# Patient Record
Sex: MALE | Race: WHITE | NOT HISPANIC OR LATINO | Employment: OTHER | URBAN - METROPOLITAN AREA
[De-identification: names, ages, dates, MRNs, and addresses within clinical notes are randomized per-mention and may not be internally consistent; named-entity substitution may affect disease eponyms.]

---

## 2017-04-17 DIAGNOSIS — R53.83 OTHER FATIGUE: ICD-10-CM

## 2017-04-17 DIAGNOSIS — E78.00 PURE HYPERCHOLESTEROLEMIA: ICD-10-CM

## 2017-04-17 DIAGNOSIS — E03.9 HYPOTHYROIDISM: ICD-10-CM

## 2017-04-17 DIAGNOSIS — R68.82 DECREASED LIBIDO: ICD-10-CM

## 2017-04-17 DIAGNOSIS — I10 ESSENTIAL (PRIMARY) HYPERTENSION: ICD-10-CM

## 2017-04-17 DIAGNOSIS — E11.9 TYPE 2 DIABETES MELLITUS WITHOUT COMPLICATIONS (HCC): ICD-10-CM

## 2017-04-17 DIAGNOSIS — L40.9 PSORIASIS: ICD-10-CM

## 2017-04-20 ENCOUNTER — GENERIC CONVERSION - ENCOUNTER (OUTPATIENT)
Dept: OTHER | Facility: OTHER | Age: 62
End: 2017-04-20

## 2017-05-11 ENCOUNTER — ALLSCRIPTS OFFICE VISIT (OUTPATIENT)
Dept: OTHER | Facility: OTHER | Age: 62
End: 2017-05-11

## 2017-06-28 DIAGNOSIS — D64.9 ANEMIA: ICD-10-CM

## 2018-01-14 VITALS
HEART RATE: 76 BPM | WEIGHT: 257.13 LBS | TEMPERATURE: 97.1 F | BODY MASS INDEX: 36 KG/M2 | SYSTOLIC BLOOD PRESSURE: 128 MMHG | HEIGHT: 71 IN | OXYGEN SATURATION: 92 % | DIASTOLIC BLOOD PRESSURE: 82 MMHG

## 2018-01-15 NOTE — MISCELLANEOUS
To Whom It May Concern:    Colleen Nicole is a patient of our practice  He requires all of these medications for the listed medical conditions  If you have any questions or concerns, please contact my office        Sincerely,     Kristofer Fernandez MD        Electronically signed by:Sherrie Mccullough MD  Apr 20 2017 12:36PM EST

## 2018-03-05 ENCOUNTER — OFFICE VISIT (OUTPATIENT)
Dept: FAMILY MEDICINE CLINIC | Facility: CLINIC | Age: 63
End: 2018-03-05
Payer: COMMERCIAL

## 2018-03-05 ENCOUNTER — TELEPHONE (OUTPATIENT)
Dept: FAMILY MEDICINE CLINIC | Facility: CLINIC | Age: 63
End: 2018-03-05

## 2018-03-05 VITALS
WEIGHT: 250 LBS | BODY MASS INDEX: 35 KG/M2 | HEART RATE: 74 BPM | OXYGEN SATURATION: 96 % | HEIGHT: 71 IN | DIASTOLIC BLOOD PRESSURE: 74 MMHG | TEMPERATURE: 98.4 F | SYSTOLIC BLOOD PRESSURE: 122 MMHG

## 2018-03-05 DIAGNOSIS — J42 CHRONIC BRONCHITIS, UNSPECIFIED CHRONIC BRONCHITIS TYPE (HCC): ICD-10-CM

## 2018-03-05 DIAGNOSIS — R68.89 FLU-LIKE SYMPTOMS: Primary | ICD-10-CM

## 2018-03-05 DIAGNOSIS — F33.1 MODERATE EPISODE OF RECURRENT MAJOR DEPRESSIVE DISORDER (HCC): ICD-10-CM

## 2018-03-05 PROBLEM — R79.89 LOW TESTOSTERONE: Status: ACTIVE | Noted: 2017-06-29

## 2018-03-05 LAB
SL AMB POCT RAPID FLU A: NEGATIVE
SL AMB POCT RAPID FLU B: NEGATIVE

## 2018-03-05 PROCEDURE — 87804 INFLUENZA ASSAY W/OPTIC: CPT | Performed by: FAMILY MEDICINE

## 2018-03-05 PROCEDURE — 99214 OFFICE O/P EST MOD 30 MIN: CPT | Performed by: FAMILY MEDICINE

## 2018-03-05 RX ORDER — ESCITALOPRAM OXALATE 20 MG/1
20 TABLET ORAL DAILY
Qty: 90 TABLET | Refills: 1 | Status: SHIPPED | OUTPATIENT
Start: 2018-03-05 | End: 2018-04-29 | Stop reason: SDUPTHER

## 2018-03-05 RX ORDER — OSELTAMIVIR PHOSPHATE 75 MG/1
75 CAPSULE ORAL EVERY 12 HOURS SCHEDULED
Qty: 10 CAPSULE | Refills: 0 | Status: SHIPPED | OUTPATIENT
Start: 2018-03-05 | End: 2018-03-10

## 2018-03-05 RX ORDER — ESCITALOPRAM OXALATE 20 MG/1
1 TABLET ORAL DAILY
COMMUNITY
Start: 2017-05-14 | End: 2018-03-05 | Stop reason: SDUPTHER

## 2018-03-05 RX ORDER — VALSARTAN AND HYDROCHLOROTHIAZIDE 80; 12.5 MG/1; MG/1
1 TABLET, FILM COATED ORAL DAILY
COMMUNITY
Start: 2017-04-16 | End: 2018-06-25 | Stop reason: SDUPTHER

## 2018-03-05 RX ORDER — GLIMEPIRIDE 1 MG/1
TABLET ORAL
Refills: 3 | COMMUNITY
Start: 2018-02-06 | End: 2021-02-02 | Stop reason: SDUPTHER

## 2018-03-05 RX ORDER — FEXOFENADINE HCL 180 MG/1
180 TABLET ORAL
COMMUNITY
Start: 2008-10-28 | End: 2021-03-12 | Stop reason: SDUPTHER

## 2018-03-05 RX ORDER — TESTOSTERONE GEL, 1% 10 MG/G
0.05 GEL TRANSDERMAL
COMMUNITY
Start: 2018-01-11

## 2018-03-05 RX ORDER — LEVOTHYROXINE SODIUM 137 UG/1
1 TABLET ORAL DAILY
COMMUNITY
End: 2018-09-26 | Stop reason: DRUGHIGH

## 2018-03-05 RX ORDER — ALBUTEROL SULFATE 90 UG/1
AEROSOL, METERED RESPIRATORY (INHALATION)
COMMUNITY
End: 2018-08-02

## 2018-03-05 RX ORDER — ALBUTEROL SULFATE 90 UG/1
1 AEROSOL, METERED RESPIRATORY (INHALATION) EVERY 4 HOURS PRN
COMMUNITY
Start: 2017-05-11 | End: 2018-03-05 | Stop reason: SDUPTHER

## 2018-03-05 RX ORDER — ALBUTEROL SULFATE 90 UG/1
1 AEROSOL, METERED RESPIRATORY (INHALATION) EVERY 4 HOURS PRN
Qty: 1 INHALER | Refills: 0 | Status: SHIPPED | OUTPATIENT
Start: 2018-03-05 | End: 2021-11-15

## 2018-03-05 NOTE — LETTER
March 5, 2018     Patient: Norman Goodrich   YOB: 1955   Date of Visit: 3/5/2018       To Whom it May Concern:    Norman Goodrich is under my professional care  He was seen in my office on 3/5/2018  He may return to work on 3/10/18  If you have any questions or concerns, please don't hesitate to call           Sincerely,          Roger Abreu MD        CC: No Recipients

## 2018-03-05 NOTE — PROGRESS NOTES
Assessment/Plan:     Diagnoses and all orders for this visit:    Flu-like symptoms  -     albuterol (PROAIR HFA) 90 mcg/act inhaler; Inhale 1 puff every 4 (four) hours as needed for wheezing or shortness of breath  -     oseltamivir (TAMIFLU) 75 mg capsule; Take 1 capsule (75 mg total) by mouth every 12 (twelve) hours for 5 days    Chronic bronchitis, unspecified chronic bronchitis type (HCC)  -     tiotropium (SPIRIVA HANDIHALER) 18 mcg inhalation capsule; Place 1 capsule (18 mcg total) into inhaler and inhale daily    Moderate episode of recurrent major depressive disorder (HCC)  -     escitalopram (LEXAPRO) 20 mg tablet; Take 1 tablet (20 mg total) by mouth daily    Other orders  -     metFORMIN (GLUCOPHAGE) 500 mg tablet; Take 1 tablet by mouth 2 (two) times a day  -     fexofenadine (ALLEGRA) 180 MG tablet; Take 180 mg by mouth  -     albuterol (PROAIR HFA) 90 mcg/act inhaler; Inhale  -     Discontinue: albuterol (PROAIR HFA) 90 mcg/act inhaler; Inhale 1 puff every 4 (four) hours as needed  -     Discontinue: tiotropium (SPIRIVA HANDIHALER) 18 mcg inhalation capsule; Place into inhaler and inhale  -     Discontinue: escitalopram (LEXAPRO) 20 mg tablet; Take 1 tablet by mouth daily  -     valsartan-hydrochlorothiazide (DIOVAN-HCT) 80-12 5 MG per tablet; Take 1 tablet by mouth daily  -     levothyroxine 137 mcg tablet; Take 1 tablet by mouth daily  -     testosterone (ANDROGEL) 1%; Place 0 05 g on the skin  -     glimepiride (AMARYL) 1 mg tablet; TAKE 1 TABLET (1 MG TOTAL) BY MOUTH EVERY MORNING BEFORE BREAKFAST  Suspect flu clinically  Will send tamiflu  Tylenol for pain, fevers  Subjective:      Patient ID: Gregor Narayanan is a 63 y o  male  He is here for cough, congestion, fevers, chills, aches  Taking Tylenol and Cough medication  Last dose of Tylenol was this morning  Started 2-3 days ago  He also has a h/o COPD and needs meds renewed   He states he has been wheezing with this current illness  He is on Spiriva and [employer]  Also needs depression meds renewed  States he is stable on the Lexapro  "it's better than it was"  Depression   This is a chronic problem  The current episode started more than 1 year ago  The problem has been unchanged  Associated symptoms include chills, congestion, coughing, a fever and a sore throat  The following portions of the patient's history were reviewed and updated as appropriate:   He  has no past medical history on file  He   Patient Active Problem List    Diagnosis Date Noted    Flu-like symptoms 03/05/2018    Low testosterone 06/29/2017    Controlled diabetes mellitus ([facility] ) 04/13/2016    Psoriasis 04/13/2016    Chronic obstructive pulmonary disease ([place] [address] ) 06/28/2013    Depression 06/28/2013    Hypercholesterolemia 06/27/2013    Hypertension 06/27/2013    Hypothyroidism 06/27/2013    Diabetes mellitus without complication ([place] [address] ) 41/75/3432     He  has no past surgical history on file  His family history is not on file  He  reports that he has quit smoking  He has never used smokeless tobacco  His alcohol and drug histories are not on file    Current Outpatient Prescriptions   Medication Sig Dispense Refill    albuterol (PROAIR HFA) 90 mcg/act inhaler Inhale      albuterol (PROAIR HFA) 90 mcg/act inhaler Inhale 1 puff every 4 (four) hours as needed for wheezing or shortness of breath 1 Inhaler 0    escitalopram (LEXAPRO) 20 mg tablet Take 1 tablet (20 mg total) by mouth daily 90 tablet 1    fexofenadine (ALLEGRA) 180 MG tablet Take 180 mg by mouth      glimepiride (AMARYL) 1 mg tablet TAKE 1 TABLET (1 MG TOTAL) BY MOUTH EVERY MORNING BEFORE BREAKFAST   3    levothyroxine 137 mcg tablet Take 1 tablet by mouth daily      metFORMIN (GLUCOPHAGE) 500 mg tablet Take 1 tablet by mouth 2 (two) times a day      testosterone (ANDROGEL) 1% Place 0 05 g on the skin      tiotropium (SPIRIVA HANDIHALER) 18 mcg inhalation capsule Place 1 capsule (18 mcg total) into inhaler and inhale daily 30 capsule 5    valsartan-hydrochlorothiazide (DIOVAN-HCT) 80-12 5 MG per tablet Take 1 tablet by mouth daily      oseltamivir (TAMIFLU) 75 mg capsule Take 1 capsule (75 mg total) by mouth every 12 (twelve) hours for 5 days 10 capsule 0     No current facility-administered medications for this visit  No current outpatient prescriptions on file prior to visit  No current facility-administered medications on file prior to visit  He is allergic to cimetidine; codeine; contrast  [iodinated diagnostic agents]; prednisone; simvastatin; and statins       Review of Systems   Constitutional: Positive for chills and fever  Negative for activity change and appetite change  HENT: Positive for congestion and sore throat  Negative for ear pain  Eyes: Negative  Respiratory: Positive for cough  Gastrointestinal: Negative  Psychiatric/Behavioral: Positive for depression  Objective:      /74   Pulse 74   Temp 98 4 °F (36 9 °C)   Ht 5' 11" (1 803 m)   Wt 113 kg (250 lb)   SpO2 96%   BMI 34 87 kg/m²          Physical Exam   Constitutional: He is oriented to person, place, and time  He appears well-developed and well-nourished  No distress  HENT:   Right Ear: Hearing, tympanic membrane, external ear and ear canal normal    Left Ear: Hearing, tympanic membrane, external ear and ear canal normal    Nose: Nose normal    Mouth/Throat: Uvula is midline  No oropharyngeal exudate or posterior oropharyngeal erythema  Eyes: Conjunctivae are normal  Pupils are equal, round, and reactive to light  Cardiovascular: Normal rate and normal heart sounds  Exam reveals no gallop and no friction rub  No murmur heard  Pulmonary/Chest: Effort normal and breath sounds normal  No respiratory distress  He has no wheezes  He has no rales  Lymphadenopathy:     He has no cervical adenopathy     Neurological: He is alert and oriented to person, place, and time    Skin: Skin is warm  No rash noted  Psychiatric: He has a normal mood and affect  His behavior is normal  Judgment and thought content normal    Nursing note and vitals reviewed

## 2018-03-05 NOTE — TELEPHONE ENCOUNTER
Patient's daughter called and stated that his insurance does not cover the Spiriva hanihaler - I did look on the [facility] formulary and they prefer Spiriva Respimat 1 25 mcg or 2 5 mcg, Anoro, Atrovent or Combivent  He only has 2 more doses of the Spiriva left   [facility]

## 2018-03-06 DIAGNOSIS — J44.9 CHRONIC OBSTRUCTIVE PULMONARY DISEASE, UNSPECIFIED COPD TYPE (HCC): Primary | ICD-10-CM

## 2018-04-29 DIAGNOSIS — F33.1 MODERATE EPISODE OF RECURRENT MAJOR DEPRESSIVE DISORDER (HCC): ICD-10-CM

## 2018-04-30 RX ORDER — ESCITALOPRAM OXALATE 20 MG/1
TABLET ORAL
Qty: 90 TABLET | Refills: 3 | Status: SHIPPED | OUTPATIENT
Start: 2018-04-30 | End: 2018-08-02 | Stop reason: SDUPTHER

## 2018-06-25 ENCOUNTER — TELEPHONE (OUTPATIENT)
Dept: FAMILY MEDICINE CLINIC | Facility: CLINIC | Age: 63
End: 2018-06-25

## 2018-06-25 DIAGNOSIS — I10 ESSENTIAL HYPERTENSION: Primary | ICD-10-CM

## 2018-06-25 RX ORDER — VALSARTAN AND HYDROCHLOROTHIAZIDE 80; 12.5 MG/1; MG/1
1 TABLET, FILM COATED ORAL DAILY
Qty: 90 TABLET | Refills: 3 | Status: SHIPPED | OUTPATIENT
Start: 2018-06-25 | End: 2018-06-26 | Stop reason: ALTCHOICE

## 2018-06-25 NOTE — TELEPHONE ENCOUNTER
[facility] called on patient and stated the insurance will not cover the Valsartan/HCTZ - I looked on patients formulary and patient has to try/fail Losartan or Losartan/HCTZ before they will cover the valsartan

## 2018-06-26 ENCOUNTER — TELEPHONE (OUTPATIENT)
Dept: FAMILY MEDICINE CLINIC | Facility: CLINIC | Age: 63
End: 2018-06-26

## 2018-06-26 DIAGNOSIS — I10 ESSENTIAL HYPERTENSION: Primary | ICD-10-CM

## 2018-06-26 RX ORDER — LOSARTAN POTASSIUM AND HYDROCHLOROTHIAZIDE 12.5; 5 MG/1; MG/1
1 TABLET ORAL DAILY
Qty: 30 TABLET | Refills: 2 | Status: SHIPPED | OUTPATIENT
Start: 2018-06-26 | End: 2018-08-02 | Stop reason: SDUPTHER

## 2018-07-11 ENCOUNTER — TELEPHONE (OUTPATIENT)
Dept: FAMILY MEDICINE CLINIC | Facility: CLINIC | Age: 63
End: 2018-07-11

## 2018-07-11 DIAGNOSIS — R41.89 COGNITIVE CHANGES: Primary | ICD-10-CM

## 2018-07-11 NOTE — TELEPHONE ENCOUNTER
Patient is having blood work for his Endo and is having all sx of B12 deficiency, would like a script for his B12, B1, and his B6 to be checked

## 2018-07-11 NOTE — TELEPHONE ENCOUNTER
Ok I need to link the test to a diagnosis  Insurance companies notoriously do not like to cover vitamin testing, so he may want to check on this  What are his symptoms?

## 2018-07-11 NOTE — TELEPHONE ENCOUNTER
Daughter notified and will check with the insurance, he is having cognitive issues, neuropathy sx and very fatigued

## 2018-07-13 ENCOUNTER — APPOINTMENT (OUTPATIENT)
Dept: LAB | Facility: CLINIC | Age: 63
End: 2018-07-13
Payer: COMMERCIAL

## 2018-07-13 ENCOUNTER — TRANSCRIBE ORDERS (OUTPATIENT)
Dept: ADMINISTRATIVE | Facility: HOSPITAL | Age: 63
End: 2018-07-13

## 2018-07-13 DIAGNOSIS — E03.9 ACQUIRED HYPOTHYROIDISM: ICD-10-CM

## 2018-07-13 DIAGNOSIS — E29.1 HYPOGONADISM MALE: Primary | ICD-10-CM

## 2018-07-13 DIAGNOSIS — E78.2 MIXED HYPERLIPIDEMIA: ICD-10-CM

## 2018-07-13 DIAGNOSIS — R41.89 COGNITIVE CHANGES: ICD-10-CM

## 2018-07-13 DIAGNOSIS — E10.9 TYPE 1 DIABETES MELLITUS WITHOUT COMPLICATION (HCC): ICD-10-CM

## 2018-07-13 LAB
ANION GAP SERPL CALCULATED.3IONS-SCNC: 7 MMOL/L (ref 4–13)
BUN SERPL-MCNC: 12 MG/DL (ref 5–25)
CALCIUM SERPL-MCNC: 9.4 MG/DL (ref 8.3–10.1)
CHLORIDE SERPL-SCNC: 101 MMOL/L (ref 100–108)
CHOLEST SERPL-MCNC: 159 MG/DL (ref 50–200)
CO2 SERPL-SCNC: 29 MMOL/L (ref 21–32)
CREAT SERPL-MCNC: 0.91 MG/DL (ref 0.6–1.3)
CREAT UR-MCNC: 60.8 MG/DL
EST. AVERAGE GLUCOSE BLD GHB EST-MCNC: 174 MG/DL
GFR SERPL CREATININE-BSD FRML MDRD: 91 ML/MIN/1.73SQ M
GLUCOSE P FAST SERPL-MCNC: 151 MG/DL (ref 65–99)
HBA1C MFR BLD: 7.7 % (ref 4.2–6.3)
HDLC SERPL-MCNC: 38 MG/DL (ref 40–60)
LDLC SERPL CALC-MCNC: 78 MG/DL (ref 0–100)
MICROALBUMIN UR-MCNC: <5 MG/L (ref 0–20)
MICROALBUMIN/CREAT 24H UR: <8 MG/G CREATININE (ref 0–30)
NONHDLC SERPL-MCNC: 121 MG/DL
POTASSIUM SERPL-SCNC: 3.7 MMOL/L (ref 3.5–5.3)
SODIUM SERPL-SCNC: 137 MMOL/L (ref 136–145)
T4 SERPL-MCNC: 10.2 UG/DL (ref 4.7–13.3)
TESTOST SERPL-MCNC: 404 NG/DL (ref 95–948)
TRIGL SERPL-MCNC: 214 MG/DL
TSH SERPL DL<=0.05 MIU/L-ACNC: 0.47 UIU/ML (ref 0.36–3.74)
VIT B12 SERPL-MCNC: 585 PG/ML (ref 100–900)

## 2018-07-13 PROCEDURE — 80048 BASIC METABOLIC PNL TOTAL CA: CPT | Performed by: INTERNAL MEDICINE

## 2018-07-13 PROCEDURE — 84403 ASSAY OF TOTAL TESTOSTERONE: CPT | Performed by: INTERNAL MEDICINE

## 2018-07-13 PROCEDURE — 36415 COLL VENOUS BLD VENIPUNCTURE: CPT | Performed by: INTERNAL MEDICINE

## 2018-07-13 PROCEDURE — 84436 ASSAY OF TOTAL THYROXINE: CPT | Performed by: INTERNAL MEDICINE

## 2018-07-13 PROCEDURE — 84425 ASSAY OF VITAMIN B-1: CPT

## 2018-07-13 PROCEDURE — 84207 ASSAY OF VITAMIN B-6: CPT

## 2018-07-13 PROCEDURE — 83036 HEMOGLOBIN GLYCOSYLATED A1C: CPT | Performed by: INTERNAL MEDICINE

## 2018-07-13 PROCEDURE — 82607 VITAMIN B-12: CPT

## 2018-07-13 PROCEDURE — 82570 ASSAY OF URINE CREATININE: CPT | Performed by: INTERNAL MEDICINE

## 2018-07-13 PROCEDURE — 80061 LIPID PANEL: CPT | Performed by: INTERNAL MEDICINE

## 2018-07-13 PROCEDURE — 82043 UR ALBUMIN QUANTITATIVE: CPT | Performed by: INTERNAL MEDICINE

## 2018-07-13 PROCEDURE — 84443 ASSAY THYROID STIM HORMONE: CPT | Performed by: INTERNAL MEDICINE

## 2018-07-20 LAB
VIT B1 BLD-SCNC: 107.8 NMOL/L (ref 66.5–200)
VIT B6 SERPL-MCNC: 24.6 UG/L (ref 5.3–46.7)

## 2018-08-02 ENCOUNTER — OFFICE VISIT (OUTPATIENT)
Dept: FAMILY MEDICINE CLINIC | Facility: CLINIC | Age: 63
End: 2018-08-02
Payer: COMMERCIAL

## 2018-08-02 VITALS
WEIGHT: 253 LBS | HEART RATE: 62 BPM | HEIGHT: 71 IN | SYSTOLIC BLOOD PRESSURE: 136 MMHG | BODY MASS INDEX: 35.42 KG/M2 | OXYGEN SATURATION: 94 % | TEMPERATURE: 96.9 F | DIASTOLIC BLOOD PRESSURE: 70 MMHG

## 2018-08-02 DIAGNOSIS — J42 CHRONIC BRONCHITIS, UNSPECIFIED CHRONIC BRONCHITIS TYPE (HCC): ICD-10-CM

## 2018-08-02 DIAGNOSIS — F33.1 MODERATE EPISODE OF RECURRENT MAJOR DEPRESSIVE DISORDER (HCC): ICD-10-CM

## 2018-08-02 DIAGNOSIS — R41.3 MEMORY CHANGES: ICD-10-CM

## 2018-08-02 DIAGNOSIS — E78.00 HYPERCHOLESTEROLEMIA: Primary | ICD-10-CM

## 2018-08-02 DIAGNOSIS — E03.9 HYPOTHYROIDISM, UNSPECIFIED TYPE: ICD-10-CM

## 2018-08-02 DIAGNOSIS — IMO0002 UNCONTROLLED TYPE 2 DIABETES MELLITUS WITH COMPLICATION, WITHOUT LONG-TERM CURRENT USE OF INSULIN: ICD-10-CM

## 2018-08-02 DIAGNOSIS — I10 ESSENTIAL HYPERTENSION: ICD-10-CM

## 2018-08-02 PROBLEM — Z87.891 FORMER SMOKER: Status: ACTIVE | Noted: 2018-06-14

## 2018-08-02 PROBLEM — R68.89 FLU-LIKE SYMPTOMS: Status: RESOLVED | Noted: 2018-03-05 | Resolved: 2018-08-02

## 2018-08-02 PROBLEM — J43.2 CENTRILOBULAR EMPHYSEMA (HCC): Status: ACTIVE | Noted: 2018-04-06

## 2018-08-02 PROBLEM — G47.33 OSA (OBSTRUCTIVE SLEEP APNEA): Status: ACTIVE | Noted: 2018-04-06

## 2018-08-02 PROCEDURE — 99214 OFFICE O/P EST MOD 30 MIN: CPT | Performed by: FAMILY MEDICINE

## 2018-08-02 PROCEDURE — 3075F SYST BP GE 130 - 139MM HG: CPT | Performed by: FAMILY MEDICINE

## 2018-08-02 PROCEDURE — 3078F DIAST BP <80 MM HG: CPT | Performed by: FAMILY MEDICINE

## 2018-08-02 RX ORDER — ESCITALOPRAM OXALATE 20 MG/1
20 TABLET ORAL DAILY
Qty: 90 TABLET | Refills: 1 | Status: SHIPPED | OUTPATIENT
Start: 2018-08-02 | End: 2021-03-08 | Stop reason: SDUPTHER

## 2018-08-02 RX ORDER — LOSARTAN POTASSIUM AND HYDROCHLOROTHIAZIDE 12.5; 5 MG/1; MG/1
1 TABLET ORAL DAILY
Qty: 90 TABLET | Refills: 1 | Status: SHIPPED | OUTPATIENT
Start: 2018-08-02 | End: 2018-11-28 | Stop reason: SDUPTHER

## 2018-08-02 RX ORDER — LOSARTAN POTASSIUM AND HYDROCHLOROTHIAZIDE 12.5; 5 MG/1; MG/1
1 TABLET ORAL DAILY
Qty: 30 TABLET | Refills: 0 | Status: SHIPPED | OUTPATIENT
Start: 2018-08-02 | End: 2018-08-02 | Stop reason: SDUPTHER

## 2018-08-03 ENCOUNTER — TELEPHONE (OUTPATIENT)
Dept: NEUROLOGY | Facility: CLINIC | Age: 63
End: 2018-08-03

## 2018-08-14 ENCOUNTER — TELEPHONE (OUTPATIENT)
Dept: FAMILY MEDICINE CLINIC | Facility: CLINIC | Age: 63
End: 2018-08-14

## 2018-08-14 NOTE — TELEPHONE ENCOUNTER
I have a message to do a peer to peer review but have not had time to call yet   I will try to do it this week

## 2018-08-14 NOTE — TELEPHONE ENCOUNTER
Olvin Session,  Wilman's daughter Niyah Clifton called in stating that an MRI of her fathers brain was ordered and someone from [employer] called and told her that as of now insurance wont approve the MRI and is requesting more info as to why he needs this done  She was hoping you'd be able to help her out because he really needs it done    Lisa's phone number is: [phone]

## 2018-08-15 ENCOUNTER — TELEPHONE (OUTPATIENT)
Dept: FAMILY MEDICINE CLINIC | Facility: CLINIC | Age: 63
End: 2018-08-15

## 2018-08-15 NOTE — TELEPHONE ENCOUNTER
Please call patient and or daughter who is his representative  Insurance will not approve a brain MRI without a mini-mental status exam and certain blood tests  The blood tests have been done but a mini-mental status exam was not performed  They could bring him here for an MMSE or perhaps they could do this with Neurology at their appointment

## 2018-08-24 ENCOUNTER — OFFICE VISIT (OUTPATIENT)
Dept: FAMILY MEDICINE CLINIC | Facility: CLINIC | Age: 63
End: 2018-08-24
Payer: COMMERCIAL

## 2018-08-24 VITALS
TEMPERATURE: 98.9 F | HEIGHT: 71 IN | WEIGHT: 253 LBS | BODY MASS INDEX: 35.42 KG/M2 | SYSTOLIC BLOOD PRESSURE: 120 MMHG | HEART RATE: 62 BPM | RESPIRATION RATE: 18 BRPM | OXYGEN SATURATION: 98 % | DIASTOLIC BLOOD PRESSURE: 70 MMHG

## 2018-08-24 DIAGNOSIS — G44.039 EPISODIC PAROXYSMAL HEMICRANIA, NOT INTRACTABLE: ICD-10-CM

## 2018-08-24 DIAGNOSIS — R41.3 MEMORY IMPAIRMENT: Primary | ICD-10-CM

## 2018-08-24 DIAGNOSIS — R26.89 ABNORMALITY OF GAIT DUE TO IMPAIRMENT OF BALANCE: ICD-10-CM

## 2018-08-24 PROCEDURE — 99214 OFFICE O/P EST MOD 30 MIN: CPT | Performed by: FAMILY MEDICINE

## 2018-08-24 PROCEDURE — 3008F BODY MASS INDEX DOCD: CPT | Performed by: FAMILY MEDICINE

## 2018-08-24 NOTE — PROGRESS NOTES
Pretty Dubois 1955 male MRN: [id number]  Family medicine follow up Visit        ASSESSMENT/PLAN  Diagnoses and all orders for this visit:    Memory impairment    Episodic paroxysmal hemicrania, not intractable    Abnormality of gait due to impairment of balance         Based upon mini-mental status examination, patient does have mild to moderate cognitive impairment  TSH, vitamin B12, vitamin B1, vitamin B6 within normal limits  Obtain MRI brain without contrast considering patient associated symptoms- headache, balance problems along with memory impairment  Advised to take Tylenol for headache  Discussed with family about patient driving that patient may need [employer] to drive evaluation considering patient has memory problems  Discussed about safety and risks  while driving  Patient's wife is always with him when he drives  Advised to follow up with neurology next month  Future Appointments  Date Time Provider Flakito Stevens   8/31/2018 4:00 PM MO MRI 1 MO MRI [facility]   9/26/2018 1:30 PM Radha Casanova MD [facility]        SUBJECTIVE  CC: Here for evaluation for memory problems  MARVIN  Pretty Dubois is a 66 y o  male here with daughter and wife for evaluation for memory problems  As per daughter he does have memory issues, he forgets things easily,  needs multiple redirections to finish 1 task  His memory impairment is worsening day by day  He does have unstable gait due to balance issues, had multiple falls during last 2 months  He does take longer roads to get to places but he reports that he is always with his wife in car and drives occasionally to get grocery to 10-15 miles from his home  He does have numbness and tingling in his arms and legs, otherwise no other neurological deficit  He does have depression and is on Lexapro  He has been having headache on left side  He described headache occurs intermittently, sharp in nature, 7/10, relieved by Motrin  Denies any blurry vision, numbness and tingling in face, eye discharge  He denies any history of migraine in the past      As per wife he does have multiple falls during last 2 months due to balance issues  He does not tell his wife about falls because he does not feels good about his falls  He does have appointment with neurology next month  Review of Systems   Constitutional: Negative for activity change, chills, fatigue and unexpected weight change  Eyes: Negative for visual disturbance  Respiratory: Negative for cough, shortness of breath and wheezing  Cardiovascular: Negative for chest pain, palpitations and leg swelling  Gastrointestinal: Negative for abdominal pain, constipation, diarrhea, nausea and vomiting  Musculoskeletal: Positive for gait problem  Falls due to balance problems   Skin: Negative for color change  Neurological: Positive for tremors, weakness, numbness and headaches  Negative for dizziness, facial asymmetry, speech difficulty and light-headedness  Memory impairment   Psychiatric/Behavioral: Positive for dysphoric mood and sleep disturbance  Negative for agitation and suicidal ideas  The patient is not nervous/anxious          Historical Information   The patient history was reviewed as follows:  Past Medical History:   Diagnosis Date    Benign essential hypertension     [facility] pulmonary disease (COPD) ([facility] [address] )     Depression     Diabetes mellitus (HCC)     Fatigue     Hypercholesterolemia     Hypersomnia, persistent     Sinusitis      Past Surgical History:   Procedure Laterality Date    APPENDECTOMY      HERNIA REPAIR      TONSILLECTOMY       Family History   Problem Relation Age of Onset    Hypertension Father     Aneurysm Father         cerebral      Social History   History   Alcohol use Not on file     History   Drug use: Unknown     History   Smoking Status    Former Smoker   Smokeless Tobacco    Never Used       Medications: Meds/Allergies   Current Outpatient Prescriptions   Medication Sig Dispense Refill    albuterol (PROAIR HFA) 90 mcg/act inhaler Inhale 1 puff every 4 (four) hours as needed for wheezing or shortness of breath 1 Inhaler 0    escitalopram (LEXAPRO) 20 mg tablet Take 1 tablet (20 mg total) by mouth daily 90 tablet 1    fexofenadine (ALLEGRA) 180 MG tablet Take 180 mg by mouth      glimepiride (AMARYL) 1 mg tablet TAKE 1 TABLET (1 MG TOTAL) BY MOUTH EVERY MORNING BEFORE BREAKFAST   3    levothyroxine 137 mcg tablet Take 1 tablet by mouth daily      losartan-hydrochlorothiazide (HYZAAR) 50-12 5 mg per tablet Take 1 tablet by mouth daily 90 tablet 1    metFORMIN (GLUCOPHAGE) 500 mg tablet Take 1 tablet by mouth 2 (two) times a day      testosterone (ANDROGEL) 1% Place 0 05 g on the skin      Umeclidinium-Vilanterol (ANORO ELLIPTA) 62 5-25 MCG/INH AEPB Inhale 1 puff daily 3 each 1     No current facility-administered medications for this visit  Allergies   Allergen Reactions    Cimetidine     Codeine     Contrast  [Iodinated Diagnostic Agents]     Metrizamide     Prednisone Other (See Comments)     ALL STEROIDS  COMBATIVE    Simvastatin Other (See Comments)     ALL STATINS  MUSCLE CRAMPS    Statins        OBJECTIVE  Vitals:   Vitals:    08/24/18 1455 08/24/18 1603   BP: 160/94 120/70   Pulse: 62    Resp: 18    Temp: 98 9 °F (37 2 °C)    TempSrc: Tympanic    SpO2: 98%    Weight: 115 kg (253 lb)    Height: 5' 11" (1 803 m)        Invasive Devices          No matching active lines, drains, or airways          Physical Exam   Constitutional: He appears well-developed and well-nourished  HENT:   Head: Normocephalic  Right Ear: External ear normal    Left Ear: External ear normal    Mouth/Throat: Oropharynx is clear and moist    Eyes: Conjunctivae are normal    Neck: Neck supple  Cardiovascular: Normal rate, regular rhythm, normal heart sounds and intact distal pulses      Pulmonary/Chest: Effort normal and breath sounds normal    Abdominal: Soft  Bowel sounds are normal    Musculoskeletal: Normal range of motion  He exhibits no edema, tenderness or deformity  Gait stable   Neurological: He is alert  Skin: Skin is warm  Psychiatric: His speech is normal and behavior is normal  His affect is labile  Cognition and memory are impaired  He exhibits a depressed mood  He exhibits abnormal recent memory  Neurologic Exam     Mental Status   Oriented to person  Disoriented to place  Disoriented to street  Oriented to country, city and area  Disoriented to time  Disoriented to year, date and day  Oriented to month and season  Registration: recalls 1 of 3 objects  Recall at 5 minutes: recalls 1 of 3 objects  Follows 2 step commands  Attention: decreased  Concentration: decreased  Speech: speech is normal   Level of consciousness: alert  Knowledge: consistent with education  Unable to perform simple calculations  Unable to name object  Able to read  Unable to repeat  Able to write  Normal comprehension  Mini-mental status examination done at today's visit, score 18     Patient's education level: high school   Thus depending upon any mental status examination, patient has mild to moderate cognitive impairment

## 2018-08-31 ENCOUNTER — HOSPITAL ENCOUNTER (OUTPATIENT)
Dept: MRI IMAGING | Facility: HOSPITAL | Age: 63
Discharge: HOME | End: 2018-08-31
Payer: COMMERCIAL

## 2018-08-31 DIAGNOSIS — R41.3 MEMORY CHANGES: ICD-10-CM

## 2018-08-31 PROCEDURE — 70551 MRI BRAIN STEM W/O DYE: CPT

## 2018-09-06 ENCOUNTER — TELEPHONE (OUTPATIENT)
Dept: FAMILY MEDICINE CLINIC | Facility: CLINIC | Age: 63
End: 2018-09-06

## 2018-09-06 DIAGNOSIS — J32.0 MAXILLARY SINUSITIS, UNSPECIFIED CHRONICITY: Primary | ICD-10-CM

## 2018-09-06 DIAGNOSIS — D17.79 BRAIN LIPOMA: ICD-10-CM

## 2018-09-06 RX ORDER — AMOXICILLIN AND CLAVULANATE POTASSIUM 875; 125 MG/1; MG/1
1 TABLET, FILM COATED ORAL EVERY 12 HOURS SCHEDULED
Qty: 20 TABLET | Refills: 0 | Status: SHIPPED | OUTPATIENT
Start: 2018-09-06 | End: 2018-09-16

## 2018-09-06 NOTE — TELEPHONE ENCOUNTER
I spoke to Ammon Talbert, his daughter  Explained the recommendations by Dr Yunier Solitario  Will repeat MRI in 6 months  Will treat w/ Augmentin for sinus infection and refer to Dr Gloria Conner, ENT  Pt's daughter is agreeable to this plan     ----- Message from Julieta Jacinto RN sent at 9/6/2018  4:19 PM EDT -----  Regarding: FW: MRI  Hi Dr Sonal Marx,    Please see Dr Davidson Rodriguez recommendations below for the patient Nerissa Naranjo  Thank you,  Salstephanie Ramirez  ----- Message -----  From: Radames Cushing, MD  Sent: 9/6/2018   4:03 PM  To: Julieta Jacinto RN  Subject: RE: MRI                                          So I'd agree it does look like a lipoma  We generally do not need to operate on these - only if they for some reason obstruct and require a shunt (I've seen that x1)  In fact, we don't tend to do any follow-up imaging either, once we're convinced it is in fact a lipoma  So I'd think a repeat MRI in ~6-12 months to confirm stability would be reasonable  I definitely cannot attribute his symptoms to this finding  We can see the patient if it would be helpful to the patient/PCP - would be with an AP or SNPLX  Thanks    ----- Message -----  From: Julieta Jacinto RN  Sent: 9/6/2018   8:31 AM  To: Radames Cushing, MD  Subject: FW: MRI                                          Hi Dr Yunier Solitario,    Dr Faisal Do requested for another patient to be reviewed  Nerissa Naranjo 1955 is a 66year old male who presented to his PCP's office on 8/24/18 for a follow up  At the visit he reported balance problems, memory impairment, and headache  During the last 2 months, the patient reported multiple falls  Dr Sonal Marx ordered a MRI brain which demonstrated 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 Thank you,  Dorota Saini  ----- Message -----  From: Niyah Christianson MD  Sent: 9/5/2018   2:37 PM  To: Azalea Ge RN  Subject: MRI                                              Hi again Dorota Saini,    Would you mind having your providers look at this MRI? Patient has a "tectal lipoma" and I am not sure what the follow up for this  If he needs to be seen for a consultation please let me know  I appreciate your help      Niyah Christianson

## 2018-09-26 ENCOUNTER — TELEPHONE (OUTPATIENT)
Dept: NEUROLOGY | Facility: CLINIC | Age: 63
End: 2018-09-26

## 2018-09-26 ENCOUNTER — OFFICE VISIT (OUTPATIENT)
Dept: NEUROLOGY | Facility: CLINIC | Age: 63
End: 2018-09-26
Payer: COMMERCIAL

## 2018-09-26 VITALS
SYSTOLIC BLOOD PRESSURE: 126 MMHG | BODY MASS INDEX: 36.22 KG/M2 | DIASTOLIC BLOOD PRESSURE: 88 MMHG | WEIGHT: 253 LBS | HEIGHT: 70 IN | HEART RATE: 60 BPM

## 2018-09-26 DIAGNOSIS — G62.9 NEUROPATHY: ICD-10-CM

## 2018-09-26 DIAGNOSIS — R26.89 BALANCE PROBLEM: ICD-10-CM

## 2018-09-26 DIAGNOSIS — R41.3 MEMORY CHANGES: Primary | ICD-10-CM

## 2018-09-26 DIAGNOSIS — R25.1 TREMOR: ICD-10-CM

## 2018-09-26 DIAGNOSIS — G44.039 EPISODIC PAROXYSMAL HEMICRANIA, NOT INTRACTABLE: ICD-10-CM

## 2018-09-26 PROCEDURE — 99204 OFFICE O/P NEW MOD 45 MIN: CPT | Performed by: PSYCHIATRY & NEUROLOGY

## 2018-09-26 RX ORDER — ALBUTEROL SULFATE 90 UG/1
2 AEROSOL, METERED RESPIRATORY (INHALATION) EVERY 6 HOURS PRN
COMMUNITY

## 2018-09-26 RX ORDER — LEVOTHYROXINE SODIUM 0.15 MG/1
150 TABLET ORAL DAILY
COMMUNITY
End: 2022-06-12 | Stop reason: SDUPTHER

## 2018-09-26 NOTE — TELEPHONE ENCOUNTER
Pt's daughter called stated that she saw that you requested lymes testing, and she knows that the lymes testing is "less than 20% accurate", states that her daughter has lymes disease and so does her sisters so she is very familiar with it  States that she wishes she knew that you ordered this while at the appt because she would have discussed this with you, but states that he has been bitten several times by ticks  She is asking if you would be willing to order a SPECT scan because she knows this is more accurate than lyme testing  She stated that she would like to speak with you regarding this if possible

## 2018-09-26 NOTE — PROGRESS NOTES
Luli Gage is a 66 y o  male  Chief Complaint   Patient presents with    Memory Loss    Balance Problem       Assessment:  1  Memory changes    2  Balance problem    3  Tremor    4  Neuropathy    5  Episodic paroxysmal hemicrania, not intractable        Plan:    Discussion:  Differential diagnosis discussed with the family, he has moderate cognitive impairment, is he has some features of Parkinson's disease liked the pill-rolling tremor and some bradykinesia with cognitive impairment, also has balance issues could be secondary to that versus due to neuropathy, I reviewed in detail the patient's MRI scan of the brain and its results which shows a tectal lipoma, with him, also noted Neurosurgery comments that he is not a candidate for surgery at this time and they do not feel his symptoms are related to that and may need to repeat an MRI scan in 6 months, have advised the patient to go for a detailed neuropsych testing, also would recommend an MRI scan of the C-spine to rule out a cervical etiology of his balance issues, and to do a Lyme test, patient in the family to call me after the test to discuss the results, I have also advised him to go for physical therapy for balance evaluation, I discussed with the patient and the family regarding medication options including Aricept for his cognitive impairment, also we could empirically try him on Sinemet, they would like to wait on the medications for now, I have advised the patient not to drive until we have the neuropsych testing and then he may need a  adopt ability test, also advised him to be under supervision, he is currently not working, his headaches are not bothersome to him at this time, he was advised to take fall and safety precautions and was advised mentally stimulating exercises, to go to the hospital if has any worsening symptoms and call me otherwise to see me back in 6-8 weeks and follow up with his other physicians      Subjective:    HPI SBFT NO OBSTRUCTION, but did not tolerated clear liquids  Suspect partial sbo  Laparoscopy/laparotomy today at 430    Need for surgery discussed  Risks include infection, bleeding, hernia, bowel injury, and anastomotic leak or stricture with bowel resection     Patient is here for evaluation of memory difficulty, balance issues, headache and tremor, according to the patient's daughter patient has been having issue since the last 3 years which have been getting progressively worse, he has issues with short-term memory, and some word-finding difficulty, also he has become paranoid, and sometimes is agitated, he has slowed down compared to what he was a few years ago and recently could not continue to work, he also has mild tremor which they describe as pill rolling, left hand worse than the right hand, also has slight difficulty with his handwriting, he also has had a few falls that he would just trip without any loss of consciousness, he denies any neck pain, he has headaches maybe once a week lasting for a few seconds to a minute mostly on the left side, not associated with photophobia phonophobia, no vision or speech difficulty, no focal weakness, he does have slight numbness and tingling in his feet and has history of diabetes, appetite is good weight has been stable, his daughter manages the finances, he has been driving without any issues, no other complaints      Vitals:    09/26/18 1314   BP: 126/88   Pulse: 60   Weight: 115 kg (253 lb)   Height: 5' 10" (1 778 m)       Current Medications    Current Outpatient Prescriptions:     albuterol (PROAIR HFA) 90 mcg/act inhaler, Inhale 1 puff every 4 (four) hours as needed for wheezing or shortness of breath, Disp: 1 Inhaler, Rfl: 0    albuterol (PROVENTIL HFA,VENTOLIN HFA) 90 mcg/act inhaler, Inhale 2 puffs every 6 (six) hours as needed for wheezing, Disp: , Rfl:     escitalopram (LEXAPRO) 20 mg tablet, Take 1 tablet (20 mg total) by mouth daily, Disp: 90 tablet, Rfl: 1    fexofenadine (ALLEGRA) 180 MG tablet, Take 180 mg by mouth, Disp: , Rfl:     glimepiride (AMARYL) 1 mg tablet, TAKE 1 TABLET (1 MG TOTAL) BY MOUTH EVERY MORNING BEFORE BREAKFAST , Disp: , Rfl: 3    levothyroxine 150 mcg tablet, Take 150 mcg by mouth daily, Disp: , Rfl:     losartan-hydrochlorothiazide (HYZAAR) 50-12 5 mg per tablet, Take 1 tablet by mouth daily, Disp: 90 tablet, Rfl: 1    metFORMIN (GLUCOPHAGE) 500 mg tablet, Take 1 tablet by mouth 2 (two) times a day, Disp: , Rfl:     testosterone (ANDROGEL) 1%, Place 0 05 g on the skin, Disp: , Rfl:     Umeclidinium-Vilanterol (ANORO ELLIPTA) 62 5-25 MCG/INH AEPB, Inhale 1 puff daily, Disp: 3 each, Rfl: 1      Allergies  Cimetidine; Codeine; Contrast  [iodinated diagnostic agents]; Metrizamide; Prednisone; Simvastatin; and Statins    Past Medical History  Past Medical History:   Diagnosis Date    Alopecia     Arthritis     Back pain     Benign essential hypertension     Chronic obstructive pulmonary disease (COPD) ([facility] [address] [facility]     Depression     Diabetes mellitus (HCC)     Emphysema, interstitial (HCC)     Fatigue     Hypercholesterolemia     Hypersomnia, persistent     Hypothyroidism     Low testosterone     Memory loss     Sinusitis     Thyroid goiter          Past Surgical History:  Past Surgical History:   Procedure Laterality Date    ADENOIDECTOMY      APPENDECTOMY      HERNIA REPAIR      TONSILLECTOMY           Family History:  Family History   Problem Relation Age of Onset    Hypertension Father     Aneurysm Father         cerebral    Lupus Mother     Hypothyroidism Daughter     Hashimoto's thyroiditis Daughter        Social History:   reports that he has quit smoking  He has never used smokeless tobacco  He reports that he does not drink alcohol or use drugs  I have reviewed the past medical history, surgical history, social and family history, current medications, allergies vitals, review of systems, and updated this information as appropriate today  Objective:    Physical Exam    Neurological Exam    GENERAL:  Cooperative in no acute distress  Well-developed and well-nourished    HEAD and NECK   Head is atraumatic normocephalic with no lesions or masses   Neck is supple with full range of motion    CARDIOVASCULAR  Carotid Arteries-no carotid bruits  NEUROLOGIC:  Mental Status-the patient is awake alert and oriented without aphasia or apraxia, [place] is 14/30  Cranial Nerves: Visual fields are full to confrontation  Discs are flat  Extraocular movements are full without nystagmus  Pupils are 2-1/2 mm and reactive  Face is symmetrical to light touch  Movements of facial expression move symmetrically  Hearing is normal to finger rub bilaterally  Soft palate lifts symmetrically  Shoulder shrug is symmetrical  Tongue is midline without atrophy  Motor: No drift is noted on arm extension  Strength is full in the upper and lower extremities with normal bulk and tone  Very minimal pill rolling movement of the left hand  Sensory:   Decreased to light touch temperature and vibratory sensation in the upper and lower extremities bilaterally in a glove and stocking distribution Cortical function is intact  Coordination: Finger to nose testing is performed accurately  Romberg is negative  Gait reveals a normal base with symmetrical arm swing  With slight pill rolling movement of the left hand   Reflexes:     1+ and symmetrical      Toes are downgoing          ROS:  Review of Systems   Constitutional: Positive for fatigue  HENT: Positive for congestion, hearing loss, sore throat, tinnitus and trouble swallowing  Eyes: Positive for photophobia  Respiratory: Positive for shortness of breath  Cardiovascular: Positive for chest pain, palpitations and leg swelling  Gastrointestinal: Positive for diarrhea  Endocrine: Positive for heat intolerance  Genitourinary: Positive for frequency  Musculoskeletal: Positive for back pain, gait problem, joint swelling and neck pain  Skin: Negative  Allergic/Immunologic: Negative  Neurological: Positive for dizziness, tremors, syncope, light-headedness and numbness     Psychiatric/Behavioral: Positive for confusion and sleep disturbance

## 2018-09-26 NOTE — TELEPHONE ENCOUNTER
Discussed with patient's daughter, told her that insurance will not let us do SPECT scan, she is agreeable to get the Lyme test

## 2018-10-03 ENCOUNTER — TELEPHONE (OUTPATIENT)
Dept: FAMILY MEDICINE CLINIC | Facility: CLINIC | Age: 63
End: 2018-10-03

## 2018-10-03 NOTE — TELEPHONE ENCOUNTER
Marilee Teixeira called with concerns about the august 24th visit that her dad had  She said that Lucia Hernandez did an evaluation on him and that she was very hard to understand so she feels that the evaluation was not correct  She then took her dad to dr Jeremy Huggins and he did not do much of anything except read the evaluation  Now they are concerned about Susan Coil having his licence taken away  She can not get him into another neurologist until December  She would like you to call her to give them some guidance

## 2018-10-03 NOTE — TELEPHONE ENCOUNTER
I would recommend what Dr Sterling Fowler recommended - both a neuropsych evaluation (which I previously recommended as well) and a 's test which I believe he can do through [facility]

## 2018-10-10 ENCOUNTER — TELEPHONE (OUTPATIENT)
Dept: NEUROLOGY | Facility: CLINIC | Age: 63
End: 2018-10-10

## 2018-10-10 NOTE — TELEPHONE ENCOUNTER
Called and spoke to Lisa(Daughter) and explained about the physical therapy and she informed me that he is starting pulmonary therapy and is scheduled for a psych test in April  Will call insurance to see if there is another provider that can see him sooner

## 2018-10-10 NOTE — TELEPHONE ENCOUNTER
Patient's daughter stated she received a call from the MRI dept that patient's MRI was denied  She would like a call back from PA dept regarding this, wants to know if "this is being worked on" so that it can be rescheduled    Delorise Ray [phone]

## 2018-10-20 DIAGNOSIS — I10 ESSENTIAL HYPERTENSION: ICD-10-CM

## 2018-10-22 RX ORDER — LOSARTAN POTASSIUM AND HYDROCHLOROTHIAZIDE 12.5; 5 MG/1; MG/1
TABLET ORAL
Qty: 30 TABLET | Refills: 0 | Status: SHIPPED | OUTPATIENT
Start: 2018-10-22 | End: 2018-11-28 | Stop reason: SDUPTHER

## 2018-11-08 ENCOUNTER — TELEPHONE (OUTPATIENT)
Dept: NEUROLOGY | Facility: CLINIC | Age: 63
End: 2018-11-08

## 2018-11-28 ENCOUNTER — OFFICE VISIT (OUTPATIENT)
Dept: CARDIOLOGY CLINIC | Facility: CLINIC | Age: 63
End: 2018-11-28
Payer: COMMERCIAL

## 2018-11-28 VITALS
BODY MASS INDEX: 36.08 KG/M2 | RESPIRATION RATE: 18 BRPM | WEIGHT: 252 LBS | OXYGEN SATURATION: 94 % | HEIGHT: 70 IN | HEART RATE: 68 BPM | DIASTOLIC BLOOD PRESSURE: 68 MMHG | SYSTOLIC BLOOD PRESSURE: 112 MMHG

## 2018-11-28 DIAGNOSIS — Z76.89 ENCOUNTER TO ESTABLISH CARE: Primary | ICD-10-CM

## 2018-11-28 DIAGNOSIS — I10 ESSENTIAL HYPERTENSION: ICD-10-CM

## 2018-11-28 DIAGNOSIS — Z82.49 FAMILY HX OF AORTIC ANEURYSM: ICD-10-CM

## 2018-11-28 PROCEDURE — 93000 ELECTROCARDIOGRAM COMPLETE: CPT | Performed by: INTERNAL MEDICINE

## 2018-11-28 PROCEDURE — 99203 OFFICE O/P NEW LOW 30 MIN: CPT | Performed by: INTERNAL MEDICINE

## 2018-11-28 RX ORDER — LOSARTAN POTASSIUM AND HYDROCHLOROTHIAZIDE 12.5; 5 MG/1; MG/1
1 TABLET ORAL DAILY
Qty: 90 TABLET | Refills: 3 | Status: SHIPPED | OUTPATIENT
Start: 2018-11-28 | End: 2019-05-28 | Stop reason: SDUPTHER

## 2018-11-28 NOTE — PROGRESS NOTES
Cardiology Outpatient Follow up Note    Kenia Valdivia 66 y o  male MRN: [id number]    11/28/18          Assessment/Plan:  1  Cardiomegaly noted on chest-xray- Grisel Samaniego complains of occasional left-sided chest discomfort and trace edema  Will workup with an echocardiogram     2  Chest pain-patient was offered a nuclear stress test however declined at this time  He would like to monitor his symptoms and if they progress will call the office to schedule the test     3  Family history of sudden cardiac death related to a ruptured aortic aneurysm- will workup with a CTA C/A/P with contrast     4  DM2- A1c: 7 7; on metformin and glimepiride    5  Dyslipidemia- with statin intolerance; also did not tolerate cholesevelam; zetia was offered however he was not interested at this time  6  Hypertension- cont losartan- hctz    7  DAFNE on CPAP    Follow up: 6 month    1  Encounter to establish care  POCT ECG       HPI:   Grisel Samaniego is a 42-year-old man with history of diabetes and hypertension who presents to establish care  He had a chest x-ray in April of 2018 that revealed possible cardiomegaly  He reportedly has not had follow-up in this regard since that time and recently saw the results on his patient portal and was concerned  He does complain of occasional left-sided tenderness over his left rib that is nonexertional and does not radiate  It is not associated with dyspnea or diaphoresis  He does however have chronic dyspnea related to COPD and is following up with Pulmonary [facility]  He also notes occasional trace self limiting edema however denies orthopnea, PND or weight gain  With regards to his dyslipidemia, he has had intolerance to multiple statins where he gets severe muscle cramping and is reluctant to restart any lipid-lowering therapy  He is overweight and not able to exercise due to COPD and does not maintain a heart healthy diet and denies any interest in dietary changes   Additionally he has had some recent memory impairment and has established care with neurology  He has no other concerns today  Family History:  Mother; lupus and cabgx4; father with aortic aneurysm    Social history: 1ppd; 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; 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      Patient Active Problem List   Diagnosis    Chronic obstructive pulmonary disease ([facility] [address] )    Depression    [facility]    Hypercholesterolemia    Hypertension    Hypothyroidism    Low testosterone    Psoriasis    Uncontrolled type 2 diabetes mellitus with complication, without long-term current use of insulin ([facility])    [facility]    Former smoker    DAFNE (obstructive sleep apnea)    Memory impairment    Episodic paroxysmal hemicrania, not intractable    Abnormality of gait due to impairment of balance    Balance problem    Tremor    Neuropathy       Allergies   Allergen Reactions    Cimetidine     Codeine     Contrast  [Iodinated Diagnostic Agents]     Metrizamide     Prednisone Other (See Comments)     ALL STEROIDS  COMBATIVE    Simvastatin Other (See Comments)     ALL STATINS  MUSCLE CRAMPS    Statins          Current Outpatient Prescriptions:     albuterol (PROAIR HFA) 90 mcg/act inhaler, Inhale 1 puff every 4 (four) hours as needed for wheezing or shortness of breath, Disp: 1 Inhaler, Rfl: 0    albuterol (PROVENTIL HFA,VENTOLIN HFA) 90 mcg/act inhaler, Inhale 2 puffs every 6 (six) hours as needed for wheezing, Disp: , Rfl:     escitalopram (LEXAPRO) 20 mg tablet, Take 1 tablet (20 mg total) by mouth daily, Disp: 90 tablet, Rfl: 1    fexofenadine (ALLEGRA) 180 MG tablet, Take 180 mg by mouth, Disp: , Rfl:     FLOVENT  MCG/ACT inhaler, Inhale 2 puffs 2 (two) times a day, Disp: , Rfl: 6    glimepiride (AMARYL) 1 mg tablet, TAKE 1 TABLET (1 MG TOTAL) BY MOUTH EVERY MORNING BEFORE BREAKFAST , Disp: , Rfl: 3    levothyroxine 150 mcg tablet, Take 150 mcg by mouth daily, Disp: , Rfl:    losartan-hydrochlorothiazide (HYZAAR) 50-12 5 mg per tablet, Take 1 tablet by mouth daily, Disp: 90 tablet, Rfl: 1    losartan-hydrochlorothiazide (HYZAAR) 50-12 5 mg per tablet, TAKE 1 TABLET BY MOUTH EVERY DAY, Disp: 30 tablet, Rfl: 0    metFORMIN (GLUCOPHAGE) 500 mg tablet, Take 1 tablet by mouth 2 (two) times a day, Disp: , Rfl:     testosterone (ANDROGEL) 1%, Place 0 05 g on the skin, Disp: , Rfl:     Umeclidinium-Vilanterol (ANORO ELLIPTA) 62 5-25 MCG/INH AEPB, Inhale 1 puff daily, Disp: 3 each, Rfl: 1    Past Medical History:   Diagnosis Date    Alopecia     Arthritis     Back pain     Benign essential hypertension     Chronic obstructive pulmonary disease (COPD) (HCC)     Depression     Diabetes mellitus (HCC)     Emphysema, interstitial (HCC)     Fatigue     Hypercholesterolemia     Hypersomnia, persistent     Hypothyroidism     Low testosterone     Memory loss     Sinusitis     Thyroid goiter        Family History   Problem Relation Age of Onset    Hypertension Father     Aneurysm Father         cerebral    Lupus Mother     Hypothyroidism Daughter     Hashimoto's thyroiditis Daughter        Past Surgical History:   Procedure Laterality Date    ADENOIDECTOMY      APPENDECTOMY      HERNIA REPAIR      TONSILLECTOMY         Social History     Social History    Marital status: /Civil Union     Spouse name: N/A    Number of children: N/A    Years of education: N/A     Occupational History    Not on file  Social History Main Topics    Smoking status: Former Smoker    Smokeless tobacco: Never Used    Alcohol use No    Drug use: No    Sexual activity: Not on file     Other Topics Concern    Not on file     Social History Narrative    No narrative on file       Review of Systems   Constitution: Negative for chills, diaphoresis, fever, weakness, weight gain and weight loss  Cardiovascular: Positive for chest pain   Negative for claudication, dyspnea on exertion, irregular heartbeat, leg swelling, near-syncope, orthopnea, palpitations, paroxysmal nocturnal dyspnea and syncope  Respiratory: Negative for shortness of breath  Gastrointestinal: Negative for nausea and vomiting  Neurological: Negative for light-headedness  Vitals: /68   Pulse 68   Resp 18   Ht 5' 10" (1 778 m)   Wt 114 kg (252 lb)   SpO2 94%   BMI 36 16 kg/m²       Physical Exam:     GEN: Alert and oriented x 3, in no acute distress  Well appearing and well nourished  HEENT: Sclera anicteric, conjunctivae pink, mucous membranes moist  Oropharynx clear  NECK: Supple, no carotid bruits, no significant JVD  Trachea midline, no thyromegaly  HEART: Regular rhythm, normal S1 and S2, no murmurs, clicks, gallops or rubs  PMI nondisplaced, no thrills  LUNGS: Clear to auscultation bilaterally; no wheezes, rales, or rhonchi  No increased work of breathing or signs of respiratory distress  ABDOMEN: Soft, nontender, nondistended, normoactive bowel sounds  EXTREMITIES: Skin warm and well perfused, no clubbing, or cyanosis, trace edema          Lab Results:       Lab Results   Component Value Date    HGBA1C 7 7 (H) 07/13/2018     No results found for: CHOL  Lab Results   Component Value Date    HDL 38 (L) 07/13/2018     Lab Results   Component Value Date    LDLCALC 78 07/13/2018     Lab Results   Component Value Date    TRIG 214 (H) 07/13/2018     No results found for: CHOLHDL

## 2018-12-04 ENCOUNTER — TELEPHONE (OUTPATIENT)
Dept: CARDIOLOGY CLINIC | Facility: CLINIC | Age: 63
End: 2018-12-04

## 2018-12-04 NOTE — TELEPHONE ENCOUNTER
CT CHEST ABDOMEN PELVIS W CONTRAST HAS GONE TO PEER TO PEER  PLEASE CALL [phone] [facility]  TRACKING # [id number]

## 2018-12-06 ENCOUNTER — TELEPHONE (OUTPATIENT)
Dept: CARDIOLOGY CLINIC | Facility: CLINIC | Age: 63
End: 2018-12-06

## 2018-12-06 DIAGNOSIS — Z82.49 FAMILY HISTORY OF DISSECTING AORTIC ANEURYSM: Primary | ICD-10-CM

## 2018-12-06 NOTE — TELEPHONE ENCOUNTER
SPOKE WITH THE PATIENT AND LET HIM KNOW THE CT SCAN FOR HIS CHEST WAS DENIED BUT DR SHORT HAS NOW ORDERED AN ULTRA SOUND FOR ABDOMINAL AORTA SCREENING  ALSO CALLED [facility] AND MADE THEM AWARE OF THESE CHANGES PATIENT WILL CALL AND SCHEDULE HIS ULTRA SOUND  ORDER FAXED TO [facility]

## 2018-12-13 ENCOUNTER — HOSPITAL ENCOUNTER (OUTPATIENT)
Dept: ULTRASOUND IMAGING | Facility: HOSPITAL | Age: 63
Discharge: HOME | End: 2018-12-13
Attending: INTERNAL MEDICINE
Payer: COMMERCIAL

## 2018-12-13 DIAGNOSIS — Z82.49 FAMILY HISTORY OF DISSECTING AORTIC ANEURYSM: ICD-10-CM

## 2018-12-13 PROCEDURE — 76706 US ABDL AORTA SCREEN AAA: CPT

## 2018-12-18 ENCOUNTER — HOSPITAL ENCOUNTER (OUTPATIENT)
Dept: NON INVASIVE DIAGNOSTICS | Facility: CLINIC | Age: 63
Discharge: HOME | End: 2018-12-18
Payer: COMMERCIAL

## 2018-12-18 DIAGNOSIS — Z82.49 FAMILY HX OF AORTIC ANEURYSM: ICD-10-CM

## 2018-12-18 PROCEDURE — 93308 TTE F-UP OR LMTD: CPT

## 2018-12-19 PROCEDURE — 93325 DOPPLER ECHO COLOR FLOW MAPG: CPT | Performed by: INTERNAL MEDICINE

## 2018-12-19 PROCEDURE — 93321 DOPPLER ECHO F-UP/LMTD STD: CPT | Performed by: INTERNAL MEDICINE

## 2018-12-19 PROCEDURE — 93308 TTE F-UP OR LMTD: CPT | Performed by: INTERNAL MEDICINE

## 2019-01-29 ENCOUNTER — TELEPHONE (OUTPATIENT)
Dept: NEUROLOGY | Facility: CLINIC | Age: 64
End: 2019-01-29

## 2019-02-07 ENCOUNTER — TRANSCRIBE ORDERS (OUTPATIENT)
Dept: ADMINISTRATIVE | Facility: HOSPITAL | Age: 64
End: 2019-02-07

## 2019-02-07 ENCOUNTER — APPOINTMENT (OUTPATIENT)
Dept: LAB | Facility: CLINIC | Age: 64
End: 2019-02-07
Payer: COMMERCIAL

## 2019-02-07 DIAGNOSIS — E03.9 ACQUIRED HYPOTHYROIDISM: ICD-10-CM

## 2019-02-07 DIAGNOSIS — E11.9 TYPE 2 DIABETES MELLITUS WITHOUT COMPLICATION, UNSPECIFIED WHETHER LONG TERM INSULIN USE (HCC): ICD-10-CM

## 2019-02-07 DIAGNOSIS — E04.0 GOITER, NONTOXIC SIMPLE: ICD-10-CM

## 2019-02-07 DIAGNOSIS — E11.9 TYPE 2 DIABETES MELLITUS WITHOUT COMPLICATION, UNSPECIFIED WHETHER LONG TERM INSULIN USE (HCC): Primary | ICD-10-CM

## 2019-02-07 LAB
ANION GAP SERPL CALCULATED.3IONS-SCNC: 7 MMOL/L (ref 4–13)
BUN SERPL-MCNC: 12 MG/DL (ref 5–25)
CALCIUM SERPL-MCNC: 9.2 MG/DL (ref 8.3–10.1)
CHLORIDE SERPL-SCNC: 102 MMOL/L (ref 100–108)
CHOLEST SERPL-MCNC: 163 MG/DL (ref 50–200)
CO2 SERPL-SCNC: 29 MMOL/L (ref 21–32)
CREAT SERPL-MCNC: 0.9 MG/DL (ref 0.6–1.3)
CREAT UR-MCNC: 64.5 MG/DL
EST. AVERAGE GLUCOSE BLD GHB EST-MCNC: 180 MG/DL
GFR SERPL CREATININE-BSD FRML MDRD: 92 ML/MIN/1.73SQ M
GLUCOSE P FAST SERPL-MCNC: 166 MG/DL (ref 65–99)
HBA1C MFR BLD: 7.9 % (ref 4.2–6.3)
HDLC SERPL-MCNC: 40 MG/DL (ref 40–60)
LDLC SERPL CALC-MCNC: 77 MG/DL (ref 0–100)
MICROALBUMIN UR-MCNC: <5 MG/L (ref 0–20)
MICROALBUMIN/CREAT 24H UR: <8 MG/G CREATININE (ref 0–30)
NONHDLC SERPL-MCNC: 123 MG/DL
POTASSIUM SERPL-SCNC: 3.8 MMOL/L (ref 3.5–5.3)
SODIUM SERPL-SCNC: 138 MMOL/L (ref 136–145)
T4 FREE SERPL-MCNC: 1.16 NG/DL (ref 0.76–1.46)
TRIGL SERPL-MCNC: 231 MG/DL
TSH SERPL DL<=0.05 MIU/L-ACNC: 0.38 UIU/ML (ref 0.36–3.74)

## 2019-02-07 PROCEDURE — 82043 UR ALBUMIN QUANTITATIVE: CPT | Performed by: INTERNAL MEDICINE

## 2019-02-07 PROCEDURE — 84403 ASSAY OF TOTAL TESTOSTERONE: CPT

## 2019-02-07 PROCEDURE — 84402 ASSAY OF FREE TESTOSTERONE: CPT

## 2019-02-07 PROCEDURE — 84439 ASSAY OF FREE THYROXINE: CPT

## 2019-02-07 PROCEDURE — 80048 BASIC METABOLIC PNL TOTAL CA: CPT

## 2019-02-07 PROCEDURE — 83036 HEMOGLOBIN GLYCOSYLATED A1C: CPT

## 2019-02-07 PROCEDURE — 82570 ASSAY OF URINE CREATININE: CPT | Performed by: INTERNAL MEDICINE

## 2019-02-07 PROCEDURE — 80061 LIPID PANEL: CPT

## 2019-02-07 PROCEDURE — 84443 ASSAY THYROID STIM HORMONE: CPT

## 2019-02-07 PROCEDURE — 36415 COLL VENOUS BLD VENIPUNCTURE: CPT

## 2019-02-08 LAB
TESTOST FREE SERPL-MCNC: 13.6 PG/ML (ref 6.6–18.1)
TESTOST SERPL-MCNC: 351 NG/DL (ref 264–916)

## 2019-04-02 ENCOUNTER — OFFICE VISIT (OUTPATIENT)
Dept: FAMILY MEDICINE CLINIC | Facility: CLINIC | Age: 64
End: 2019-04-02
Payer: COMMERCIAL

## 2019-04-02 VITALS
OXYGEN SATURATION: 97 % | HEART RATE: 83 BPM | TEMPERATURE: 95.9 F | DIASTOLIC BLOOD PRESSURE: 74 MMHG | SYSTOLIC BLOOD PRESSURE: 120 MMHG | BODY MASS INDEX: 36.36 KG/M2 | WEIGHT: 254 LBS | HEIGHT: 70 IN

## 2019-04-02 DIAGNOSIS — M46.1 SACROILIAC INFLAMMATION (HCC): Primary | ICD-10-CM

## 2019-04-02 PROCEDURE — 99213 OFFICE O/P EST LOW 20 MIN: CPT | Performed by: NURSE PRACTITIONER

## 2019-04-02 PROCEDURE — 1036F TOBACCO NON-USER: CPT | Performed by: NURSE PRACTITIONER

## 2019-04-02 PROCEDURE — 3008F BODY MASS INDEX DOCD: CPT | Performed by: NURSE PRACTITIONER

## 2019-04-02 RX ORDER — CYCLOBENZAPRINE HCL 10 MG
10 TABLET ORAL 3 TIMES DAILY PRN
Qty: 30 TABLET | Refills: 0 | Status: SHIPPED | OUTPATIENT
Start: 2019-04-02 | End: 2021-03-12 | Stop reason: SDUPTHER

## 2019-04-03 ENCOUNTER — TELEPHONE (OUTPATIENT)
Dept: FAMILY MEDICINE CLINIC | Facility: CLINIC | Age: 64
End: 2019-04-03

## 2019-04-03 DIAGNOSIS — M62.838 MUSCLE SPASM: Primary | ICD-10-CM

## 2019-04-03 RX ORDER — METHOCARBAMOL 500 MG/1
500 TABLET, FILM COATED ORAL 2 TIMES DAILY PRN
Qty: 30 TABLET | Refills: 0 | Status: SHIPPED | OUTPATIENT
Start: 2019-04-03 | End: 2021-03-12 | Stop reason: SDUPTHER

## 2019-04-17 ENCOUNTER — TRANSCRIBE ORDERS (OUTPATIENT)
Dept: ADMINISTRATIVE | Facility: HOSPITAL | Age: 64
End: 2019-04-17

## 2019-04-17 ENCOUNTER — APPOINTMENT (OUTPATIENT)
Dept: LAB | Facility: CLINIC | Age: 64
End: 2019-04-17
Payer: COMMERCIAL

## 2019-04-17 DIAGNOSIS — E61.1 LOW IRON: ICD-10-CM

## 2019-04-17 DIAGNOSIS — E61.1 LOW IRON: Primary | ICD-10-CM

## 2019-04-17 LAB
FERRITIN SERPL-MCNC: 17 NG/ML (ref 8–388)
IRON SATN MFR SERPL: 16 %
IRON SERPL-MCNC: 70 UG/DL (ref 65–175)
TIBC SERPL-MCNC: 435 UG/DL (ref 250–450)

## 2019-04-17 PROCEDURE — 83540 ASSAY OF IRON: CPT

## 2019-04-17 PROCEDURE — 82728 ASSAY OF FERRITIN: CPT

## 2019-04-17 PROCEDURE — 83550 IRON BINDING TEST: CPT

## 2019-04-17 PROCEDURE — 36415 COLL VENOUS BLD VENIPUNCTURE: CPT

## 2019-05-28 DIAGNOSIS — I10 ESSENTIAL HYPERTENSION: ICD-10-CM

## 2019-05-28 RX ORDER — LOSARTAN POTASSIUM AND HYDROCHLOROTHIAZIDE 12.5; 5 MG/1; MG/1
1 TABLET ORAL DAILY
Qty: 90 TABLET | Refills: 3 | Status: SHIPPED | OUTPATIENT
Start: 2019-05-28 | End: 2020-02-13

## 2019-06-25 ENCOUNTER — TELEPHONE (OUTPATIENT)
Dept: CARDIOLOGY CLINIC | Facility: CLINIC | Age: 64
End: 2019-06-25

## 2019-06-25 NOTE — TELEPHONE ENCOUNTER
PT'S WIFE CALLED & SAID THAT PT HAD AN ECHO DONE IN DEC & PT NEVER GOT THE RESULTS; PT'S WIFE WOULD LIKE A CALL BACK W/ THE RESULTS & NOT WAIT TIL PT'S APPT IN [facility]

## 2019-07-22 ENCOUNTER — TELEPHONE (OUTPATIENT)
Dept: FAMILY MEDICINE CLINIC | Facility: CLINIC | Age: 64
End: 2019-07-22

## 2019-07-22 ENCOUNTER — TELEPHONE (OUTPATIENT)
Dept: CARDIOLOGY CLINIC | Facility: CLINIC | Age: 64
End: 2019-07-22

## 2019-07-22 NOTE — TELEPHONE ENCOUNTER
PT WIFE KENISHA WANTS TO SPEAK TO YOU TODAY IF POSSIBLE  PT HAS AN APPT WITH YOU TOMORROW  KENISHA WANTS TO DISCUSS SOMETHING WITH YOU BECAUSE SHE IS NOT ABLE TO COME TO PT APPT TOMORROW AND PT HAS MEMORY PROBLEMS   Tushar Patel

## 2019-07-22 NOTE — TELEPHONE ENCOUNTER
Sherie Del Rosario calls requesting a letter excusing her father from jury duty due to parkinson's, dementia  He just received a summons to serve  Call daughter when ready   [phone]    Thanks, rb

## 2019-07-23 ENCOUNTER — OFFICE VISIT (OUTPATIENT)
Dept: CARDIOLOGY CLINIC | Facility: CLINIC | Age: 64
End: 2019-07-23
Payer: COMMERCIAL

## 2019-07-23 VITALS
HEART RATE: 82 BPM | BODY MASS INDEX: 35.99 KG/M2 | HEIGHT: 70 IN | OXYGEN SATURATION: 97 % | WEIGHT: 251.4 LBS | RESPIRATION RATE: 18 BRPM | DIASTOLIC BLOOD PRESSURE: 78 MMHG | SYSTOLIC BLOOD PRESSURE: 138 MMHG

## 2019-07-23 DIAGNOSIS — I10 ESSENTIAL HYPERTENSION: ICD-10-CM

## 2019-07-23 DIAGNOSIS — E78.1 HYPERTRIGLYCERIDEMIA: ICD-10-CM

## 2019-07-23 DIAGNOSIS — Z82.49 FAMILY HISTORY OF ABDOMINAL AORTIC ANEURYSM (AAA): Primary | ICD-10-CM

## 2019-07-23 PROCEDURE — 3075F SYST BP GE 130 - 139MM HG: CPT | Performed by: INTERNAL MEDICINE

## 2019-07-23 PROCEDURE — 99214 OFFICE O/P EST MOD 30 MIN: CPT | Performed by: INTERNAL MEDICINE

## 2019-07-23 NOTE — PROGRESS NOTES
Cardiology Outpatient Follow up Note    Nancy Marx 60 y o  male MRN: [id number]    07/23/19          Assessment/Plan:  1  Cardiomegaly noted on chest-xray- TTE performed however very limited images; he will repeat with echo contrast (at [facility] per patient preference)    2  Atypical chest pain-he attributes it to COPD; nuclear stress test was offered however he declined  He would like to monitor his symptoms and if they progress will call the office to schedule the test     3  Family history of sudden cardiac death related to a ruptured aortic aneurysm- will workup with a CT C/A/P  US with limited visibility    4  DM2- A1c: 7 7; follows with endocrinology at [facility]    5  Hypertriglyceridemia- prior with statin intolerance; he reports improved diabetic control  Will repeat FLP  6  Hypertension- cont losartan- hctz    7  Hypothyroidism- on synthroid    8  DAFNE on CPAP      1  Family history of abdominal aortic aneurysm (AAA)  Echo complete with contrast if indicated    CT chest abdomen pelvis wo contrast   2  Hypertriglyceridemia  Lipid Panel with Direct LDL reflex   3  Essential hypertension         HPI:   Ismael Rubalcava is a 30-year-old man with history of diabetes and hypertension who presents for routine follow up  He had a chest x-ray in April of 2018 that revealed possible cardiomegaly  CT chest in 2/2019 with no cardiomegaly however with coronary calcifications and moderate COPD  TTE revealed preserved LV function, however the imaging was sub-optimal    He is otherwise doing well from a cardiac standpoint today  He complains of chronic dyspnea with occasional chest/rib pain with inspiration that he attributes to COPD  Stress testing has been previously offered, however he is not interested  With regards to his dyslipidemia, he has had intolerance to multiple statins where he gets severe muscle cramping and is reluctant to restart any lipid-lowering therapy  He denies any other concerns at this time       Family History:  Mother; lupus and cabgx4; father with aortic aneurysm    Social history: smoked 1ppd for 30 years; 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      Patient Active Problem List   Diagnosis    Chronic obstructive pulmonary disease ([facility] )    Depression    Diabetes mellitus without complication ([facility] )    Hypercholesterolemia    Hypertension    Hypothyroidism    Low testosterone    Psoriasis    Uncontrolled type 2 diabetes mellitus with complication, without long-term current use of insulin (HCC)    Centrilobular emphysema ([facility] )    Former smoker    DAFNE (obstructive sleep apnea)    Memory impairment    Episodic paroxysmal hemicrania, not intractable    Abnormality of gait due to impairment of balance    Balance problem    Tremor    Neuropathy    [facility]iliac inflammation ([facility])       Allergies   Allergen Reactions    Cimetidine     Codeine     Contrast  [Iodinated Diagnostic Agents]     Metrizamide     Prednisone Other (See Comments)     ALL STEROIDS  COMBATIVE    Simvastatin Other (See Comments)     ALL STATINS  MUSCLE CRAMPS    Statins          Current Outpatient Medications:     albuterol (PROAIR HFA) 90 mcg/act inhaler, Inhale 1 puff every 4 (four) hours as needed for wheezing or shortness of breath, Disp: 1 Inhaler, Rfl: 0    albuterol (PROVENTIL HFA,VENTOLIN HFA) 90 mcg/act inhaler, Inhale 2 puffs every 6 (six) hours as needed for wheezing, Disp: , Rfl:     cyclobenzaprine (FLEXERIL) 10 mg tablet, Take 1 tablet (10 mg total) by mouth 3 (three) times a day as needed for muscle spasms for up to 14 days, Disp: 30 tablet, Rfl: 0    escitalopram (LEXAPRO) 20 mg tablet, Take 1 tablet (20 mg total) by mouth daily, Disp: 90 tablet, Rfl: 1    fexofenadine (ALLEGRA) 180 MG tablet, Take 180 mg by mouth, Disp: , Rfl:     FLOVENT  MCG/ACT inhaler, Inhale 2 puffs 2 (two) times a day, Disp: , Rfl: 6    glimepiride (AMARYL) 1 mg tablet, TAKE 1 TABLET (1 MG TOTAL) BY MOUTH EVERY MORNING BEFORE BREAKFAST , Disp: , Rfl: 3    levothyroxine 150 mcg tablet, Take 150 mcg by mouth daily, Disp: , Rfl:     losartan-hydrochlorothiazide (HYZAAR) 50-12 5 mg per tablet, Take 1 tablet by mouth daily, Disp: 90 tablet, Rfl: 3    metFORMIN (GLUCOPHAGE) 500 mg tablet, Take 1 tablet by mouth 2 (two) times a day, Disp: , Rfl:     methocarbamol (ROBAXIN) 500 mg tablet, Take 1 tablet (500 mg total) by mouth 2 (two) times a day as needed for muscle spasms for up to 15 days, Disp: 30 tablet, Rfl: 0    testosterone (ANDROGEL) 1%, Place 0 05 g on the skin, Disp: , Rfl:     Umeclidinium-Vilanterol (ANORO ELLIPTA) 62 5-25 MCG/INH AEPB, Inhale 1 puff daily, Disp: 3 each, Rfl: 1    Past Medical History:   Diagnosis Date    Alopecia     Arthritis     Back pain     Benign essential hypertension     Chronic obstructive pulmonary disease (COPD) (HCC)     Depression     Diabetes mellitus (HCC)     Emphysema, interstitial (HCC)     Fatigue     Hypercholesterolemia     Hypersomnia, persistent     Hypothyroidism     Low testosterone     Memory loss     Sinusitis     Thyroid goiter        Family History   Problem Relation Age of Onset    Hypertension Father     Aneurysm Father         cerebral    Lupus Mother     Hypothyroidism Daughter     Hashimoto's thyroiditis Daughter        Past Surgical History:   Procedure Laterality Date    ADENOIDECTOMY      APPENDECTOMY      HERNIA REPAIR      TONSILLECTOMY         Social History     Socioeconomic History    Marital status: /Civil Union     Spouse name: Not on file    Number of children: Not on file    Years of education: Not on file    Highest education level: Not on file   Occupational History    Not on file   Social Needs    Financial resource strain: Not on file    Food insecurity:     Worry: Not on file     Inability: Not on file    Transportation needs:     Medical: Not on file     Non-medical: Not on file   Tobacco Use    Smoking status: Former Smoker    Smokeless tobacco: Never Used   Substance and Sexual Activity    Alcohol use: No    Drug use: No    Sexual activity: Not on file   Lifestyle    Physical activity:     Days per week: Not on file     Minutes per session: Not on file    Stress: Not on file   Relationships    Social connections:     Talks on phone: Not on file     Gets together: Not on file     Attends Mandaen service: Not on file     Active member of club or organization: Not on file     Attends meetings of clubs or organizations: Not on file     Relationship status: Not on file    Intimate partner violence:     Fear of current or ex partner: Not on file     Emotionally abused: Not on file     Physically abused: Not on file     Forced sexual activity: Not on file   Other Topics Concern    Not on file   Social History Narrative    Not on file       Review of Systems   Constitution: Negative for diaphoresis, weight gain and weight loss  HENT: Negative for congestion  Cardiovascular: Negative for chest pain (as noted in hpi), dyspnea on exertion, irregular heartbeat, leg swelling, near-syncope, orthopnea, palpitations, paroxysmal nocturnal dyspnea and syncope  Respiratory: Negative for shortness of breath, sleep disturbances due to breathing and snoring  Hematologic/Lymphatic: Does not bruise/bleed easily  Skin: Negative for rash  Musculoskeletal: Negative for myalgias  Gastrointestinal: Negative for nausea and vomiting  Neurological: Negative for excessive daytime sleepiness and light-headedness  Psychiatric/Behavioral: The patient is not nervous/anxious  Vitals: /78   Pulse 82   Resp 18   Ht 5' 10" (1 778 m)   Wt 114 kg (251 lb 6 4 oz)   SpO2 97%   BMI 36 07 kg/m²       Physical Exam:     GEN: Alert and oriented x 3, in no acute distress  Well appearing and well nourished  HEENT: Sclera anicteric, conjunctivae pink, mucous membranes moist  Oropharynx clear  NECK: Supple, no carotid bruits, no significant JVD  Trachea midline, no thyromegaly  HEART: Regular rhythm, normal S1 and S2, no murmurs, clicks, gallops or rubs  PMI nondisplaced, no thrills  LUNGS: Clear to auscultation bilaterally; no wheezes, rales, or rhonchi  No increased work of breathing or signs of respiratory distress  ABDOMEN: Soft, nontender, nondistended, normoactive bowel sounds  EXTREMITIES: Skin warm and well perfused, no clubbing, cyanosis, or edema  NEURO: No focal findings  Normal speech  Mood and affect normal    SKIN: Normal without suspicious lesions on exposed skin          Lab Results:       Lab Results   Component Value Date    HGBA1C 7 9 (H) 02/07/2019    HGBA1C 7 7 (H) 07/13/2018     No results found for: CHOL  Lab Results   Component Value Date    HDL 40 02/07/2019    HDL 38 (L) 07/13/2018     Lab Results   Component Value Date    LDLCALC 77 02/07/2019    LDLCALC 78 07/13/2018     Lab Results   Component Value Date    TRIG 231 (H) 02/07/2019    TRIG 214 (H) 07/13/2018     No results found for: CHOLHDL

## 2019-07-25 ENCOUNTER — TELEPHONE (OUTPATIENT)
Dept: CARDIOLOGY CLINIC | Facility: CLINIC | Age: 64
End: 2019-07-25

## 2019-07-25 NOTE — TELEPHONE ENCOUNTER
The CT chest abd and pelvis has been denied by pts insurance because the note and dx does not support the requirements of medical necessity guidelines for CT radiology   Please advise next step ,ty-MS

## 2019-07-29 DIAGNOSIS — Z82.49 FAMILY HISTORY OF AORTIC ANEURYSM: Primary | ICD-10-CM

## 2019-07-29 NOTE — TELEPHONE ENCOUNTER
Please let him know that his insurance denied this CT chest abdomen and pelvis  He did have a CT chest at [facility] in April with no mention aortic disease    Will just check a CT abdomen without contrast

## 2019-07-29 NOTE — TELEPHONE ENCOUNTER
Spoke with patients wife advised test has been changed  Wife states he would like script mailed to them so he can have the test done at [facility]  Advised they should call us and let us know when and where test is being done for prior auth and for results

## 2019-08-22 ENCOUNTER — TELEPHONE (OUTPATIENT)
Dept: CARDIOLOGY CLINIC | Facility: CLINIC | Age: 64
End: 2019-08-22

## 2019-08-22 NOTE — TELEPHONE ENCOUNTER
Patient is scheduled with Dr Guillermo Mcneill on 8/29 ~ echo has not been scheduled or completed  Order was faxed to testing again on 8/20  Please call pt to reschedule for after testing  Thanks

## 2019-10-02 ENCOUNTER — APPOINTMENT (OUTPATIENT)
Dept: LAB | Facility: CLINIC | Age: 64
End: 2019-10-02
Payer: COMMERCIAL

## 2019-10-02 ENCOUNTER — TRANSCRIBE ORDERS (OUTPATIENT)
Dept: ADMINISTRATIVE | Facility: HOSPITAL | Age: 64
End: 2019-10-02

## 2019-10-02 DIAGNOSIS — E03.9 ACQUIRED HYPOTHYROIDISM: ICD-10-CM

## 2019-10-02 DIAGNOSIS — E11.9 TYPE 2 DIABETES MELLITUS WITHOUT COMPLICATION, UNSPECIFIED WHETHER LONG TERM INSULIN USE (HCC): ICD-10-CM

## 2019-10-02 DIAGNOSIS — E04.0 GOITER, NONTOXIC SIMPLE: Primary | ICD-10-CM

## 2019-10-02 DIAGNOSIS — E04.0 GOITER, NONTOXIC SIMPLE: ICD-10-CM

## 2019-10-02 DIAGNOSIS — E78.1 HYPERTRIGLYCERIDEMIA: ICD-10-CM

## 2019-10-02 LAB
ANION GAP SERPL CALCULATED.3IONS-SCNC: 8 MMOL/L (ref 4–13)
BUN SERPL-MCNC: 12 MG/DL (ref 5–25)
CALCIUM SERPL-MCNC: 9.6 MG/DL (ref 8.3–10.1)
CHLORIDE SERPL-SCNC: 103 MMOL/L (ref 100–108)
CHOLEST SERPL-MCNC: 139 MG/DL (ref 50–200)
CO2 SERPL-SCNC: 28 MMOL/L (ref 21–32)
CREAT SERPL-MCNC: 0.92 MG/DL (ref 0.6–1.3)
CREAT UR-MCNC: 90 MG/DL
EST. AVERAGE GLUCOSE BLD GHB EST-MCNC: 157 MG/DL
GFR SERPL CREATININE-BSD FRML MDRD: 89 ML/MIN/1.73SQ M
GLUCOSE P FAST SERPL-MCNC: 158 MG/DL (ref 65–99)
HBA1C MFR BLD: 7.1 % (ref 4.2–6.3)
HDLC SERPL-MCNC: 37 MG/DL (ref 40–60)
LDLC SERPL CALC-MCNC: 55 MG/DL (ref 0–100)
MICROALBUMIN UR-MCNC: <5 MG/L (ref 0–20)
MICROALBUMIN/CREAT 24H UR: <6 MG/G CREATININE (ref 0–30)
POTASSIUM SERPL-SCNC: 3.8 MMOL/L (ref 3.5–5.3)
SODIUM SERPL-SCNC: 139 MMOL/L (ref 136–145)
T4 FREE SERPL-MCNC: 1.13 NG/DL (ref 0.76–1.46)
TRIGL SERPL-MCNC: 236 MG/DL
TSH SERPL DL<=0.05 MIU/L-ACNC: 0.3 UIU/ML (ref 0.36–3.74)

## 2019-10-02 PROCEDURE — 80061 LIPID PANEL: CPT

## 2019-10-02 PROCEDURE — 36415 COLL VENOUS BLD VENIPUNCTURE: CPT

## 2019-10-02 PROCEDURE — 84443 ASSAY THYROID STIM HORMONE: CPT

## 2019-10-02 PROCEDURE — 82570 ASSAY OF URINE CREATININE: CPT | Performed by: INTERNAL MEDICINE

## 2019-10-02 PROCEDURE — 83036 HEMOGLOBIN GLYCOSYLATED A1C: CPT

## 2019-10-02 PROCEDURE — 84439 ASSAY OF FREE THYROXINE: CPT

## 2019-10-02 PROCEDURE — 82043 UR ALBUMIN QUANTITATIVE: CPT | Performed by: INTERNAL MEDICINE

## 2019-10-02 PROCEDURE — 80048 BASIC METABOLIC PNL TOTAL CA: CPT

## 2019-10-03 ENCOUNTER — TELEPHONE (OUTPATIENT)
Dept: CARDIOLOGY CLINIC | Facility: CLINIC | Age: 64
End: 2019-10-03

## 2019-10-03 NOTE — TELEPHONE ENCOUNTER
----- Message from Shelly Pierre MD sent at 10/2/2019  8:00 PM EDT -----  Please let him know that his triglycerides are mildly elevated  He needs to continue to minimize sugar and simple carbohydrate intake  He should also exercise safely if possible  We can discuss it further during his next appt

## 2019-10-15 ENCOUNTER — TELEPHONE (OUTPATIENT)
Dept: FAMILY MEDICINE CLINIC | Facility: CLINIC | Age: 64
End: 2019-10-15

## 2019-10-15 NOTE — TELEPHONE ENCOUNTER
Who is this letter for? Typically it is confidential what meds he is on unless whomever want sthis note requests it and he consents to it

## 2019-10-15 NOTE — TELEPHONE ENCOUNTER
Every year patient needs a letter stating that all of his medications are medically necessary  I have a list of his medications that I can put in a list if this is okay to do

## 2019-10-15 NOTE — TELEPHONE ENCOUNTER
Per daughter it is for the state in order to keep their medical assistance  They have to send in a bunch of information including this

## 2019-12-16 ENCOUNTER — TELEPHONE (OUTPATIENT)
Dept: FAMILY MEDICINE CLINIC | Facility: CLINIC | Age: 64
End: 2019-12-16

## 2019-12-16 NOTE — TELEPHONE ENCOUNTER
Left message on machine to see if he he had a recent eye exam - if not we can schedule an iris at our office

## 2020-02-13 DIAGNOSIS — I10 ESSENTIAL HYPERTENSION: ICD-10-CM

## 2020-02-13 RX ORDER — LOSARTAN POTASSIUM AND HYDROCHLOROTHIAZIDE 12.5; 5 MG/1; MG/1
TABLET ORAL
Qty: 90 TABLET | Refills: 3 | Status: SHIPPED | OUTPATIENT
Start: 2020-02-13 | End: 2020-05-08

## 2020-02-18 ENCOUNTER — TELEPHONE (OUTPATIENT)
Dept: FAMILY MEDICINE CLINIC | Facility: CLINIC | Age: 65
End: 2020-02-18

## 2020-03-31 ENCOUNTER — TELEPHONE (OUTPATIENT)
Dept: ADMINISTRATIVE | Facility: OTHER | Age: 65
End: 2020-03-31

## 2020-03-31 NOTE — TELEPHONE ENCOUNTER
Upon review of your request/inquiry, we have found/obtained the documentation  After careful review of the document we are unable to complete this request for Diabetic Foot Exam because the documentation does not have the proper verbiage (wording) needed to close the requested care gap(s)  No pulses noted on exam needed for care gap closure  Any additional questions or concerns should be emailed to the Practice Liaisons via [email or web address] email, please do not reply via In Basket       Thank you  Yenny Wynne MA

## 2020-03-31 NOTE — TELEPHONE ENCOUNTER
----- Message from Leila Moreno MA sent at 3/31/2020  9:58 AM EDT -----  Regarding: DIABETIC FOOT EXAM  Contact: [phone]  03/31/20 9:59 AM    Hello, our patient Ida Hartmann has had Diabetic Foot Exam completed/performed  Please assist in updating the patient chart by pulling the document from ENCOUNTERS Tab within Chart Review  The date of service is 10/04/2019 - JAREK ESTEVES       Thank you,  Lelia Moreno MA   JERI MCMANUS

## 2020-05-08 DIAGNOSIS — I10 ESSENTIAL HYPERTENSION: ICD-10-CM

## 2020-05-08 RX ORDER — LOSARTAN POTASSIUM AND HYDROCHLOROTHIAZIDE 12.5; 5 MG/1; MG/1
TABLET ORAL
Qty: 90 TABLET | Refills: 3 | Status: SHIPPED | OUTPATIENT
Start: 2020-05-08 | End: 2021-02-02 | Stop reason: SDUPTHER

## 2020-05-11 ENCOUNTER — TELEMEDICINE (OUTPATIENT)
Dept: FAMILY MEDICINE CLINIC | Facility: CLINIC | Age: 65
End: 2020-05-11
Payer: COMMERCIAL

## 2020-05-11 DIAGNOSIS — B37.0 ORAL CANDIDIASIS: Primary | ICD-10-CM

## 2020-05-11 DIAGNOSIS — B37.0 ORAL CANDIDIASIS: ICD-10-CM

## 2020-05-11 PROCEDURE — G2012 BRIEF CHECK IN BY MD/QHP: HCPCS | Performed by: FAMILY MEDICINE

## 2020-05-11 RX ORDER — FLUCONAZOLE 150 MG/1
150 TABLET ORAL DAILY
Qty: 7 TABLET | Refills: 0 | Status: SHIPPED | OUTPATIENT
Start: 2020-05-11 | End: 2020-05-11 | Stop reason: SDUPTHER

## 2020-05-11 RX ORDER — FLUCONAZOLE 150 MG/1
150 TABLET ORAL DAILY
Qty: 7 TABLET | Refills: 0 | Status: SHIPPED | OUTPATIENT
Start: 2020-05-11 | End: 2020-05-15 | Stop reason: SDUPTHER

## 2020-05-11 RX ORDER — FLUCONAZOLE 150 MG/1
150 TABLET ORAL DAILY
Qty: 7 TABLET | Refills: 0 | OUTPATIENT
Start: 2020-05-11 | End: 2020-05-18

## 2020-05-15 ENCOUNTER — TELEPHONE (OUTPATIENT)
Dept: FAMILY MEDICINE CLINIC | Facility: CLINIC | Age: 65
End: 2020-05-15

## 2020-05-15 DIAGNOSIS — B37.0 ORAL CANDIDIASIS: ICD-10-CM

## 2020-05-15 RX ORDER — FLUCONAZOLE 150 MG/1
150 TABLET ORAL DAILY
Qty: 7 TABLET | Refills: 0 | Status: SHIPPED | OUTPATIENT
Start: 2020-05-15 | End: 2020-05-22

## 2020-06-22 ENCOUNTER — OFFICE VISIT (OUTPATIENT)
Dept: FAMILY MEDICINE CLINIC | Facility: CLINIC | Age: 65
End: 2020-06-22
Payer: COMMERCIAL

## 2020-06-22 VITALS
HEART RATE: 76 BPM | BODY MASS INDEX: 35.22 KG/M2 | SYSTOLIC BLOOD PRESSURE: 146 MMHG | DIASTOLIC BLOOD PRESSURE: 80 MMHG | WEIGHT: 246 LBS | OXYGEN SATURATION: 98 % | TEMPERATURE: 97.6 F | HEIGHT: 70 IN

## 2020-06-22 DIAGNOSIS — K14.9 TONGUE IRRITATION: Primary | ICD-10-CM

## 2020-06-22 PROBLEM — B37.0 ORAL CANDIDIASIS: Status: RESOLVED | Noted: 2020-05-11 | Resolved: 2020-06-22

## 2020-06-22 PROBLEM — IMO0002 UNCONTROLLED TYPE 2 DIABETES MELLITUS WITH COMPLICATION, WITHOUT LONG-TERM CURRENT USE OF INSULIN: Status: RESOLVED | Noted: 2018-08-02 | Resolved: 2020-06-22

## 2020-06-22 PROBLEM — R91.1 PULMONARY NODULE: Status: ACTIVE | Noted: 2018-10-03

## 2020-06-22 PROBLEM — G20.C PRIMARY PARKINSONISM: Status: ACTIVE | Noted: 2018-12-05

## 2020-06-22 PROBLEM — G20 PRIMARY PARKINSONISM (HCC): Status: ACTIVE | Noted: 2018-12-05

## 2020-06-22 PROCEDURE — 1036F TOBACCO NON-USER: CPT | Performed by: FAMILY MEDICINE

## 2020-06-22 PROCEDURE — 99213 OFFICE O/P EST LOW 20 MIN: CPT | Performed by: FAMILY MEDICINE

## 2020-06-22 PROCEDURE — 3077F SYST BP >= 140 MM HG: CPT | Performed by: FAMILY MEDICINE

## 2020-06-22 PROCEDURE — 3079F DIAST BP 80-89 MM HG: CPT | Performed by: FAMILY MEDICINE

## 2020-06-22 PROCEDURE — 3008F BODY MASS INDEX DOCD: CPT | Performed by: FAMILY MEDICINE

## 2020-08-25 ENCOUNTER — TELEPHONE (OUTPATIENT)
Dept: ADMINISTRATIVE | Facility: OTHER | Age: 65
End: 2020-08-25

## 2020-08-25 NOTE — TELEPHONE ENCOUNTER
Upon review of your request/inquiry, we have found/obtained the documentation  After careful review of the document we are unable to complete this request for Diabetic Foot Exam because the documentation does not have the result(s) needed to close the requested care gap(s)  Any additional questions or concerns should be emailed to the Practice Liaisons via [email or web address] email, please do not reply via In Basket       Thank you  Amirah Ferris MA

## 2020-08-25 NOTE — TELEPHONE ENCOUNTER
----- Message from Pola Hernández MA sent at 8/25/2020 11:00 AM EDT -----  Regarding: FOOT EXAM  08/25/20 11:00 AM    Hello, our patient Wan Bo has had Diabetic Foot Exam completed/performed  Please assist in updating the patient chart by pulling the document from the Media Tab  The date of service is 10/04/2019 - ENDO NOTE       Thank you,  BOBBY Markham PG

## 2020-09-24 LAB — HBA1C MFR BLD HPLC: 7.6 %

## 2020-11-24 ENCOUNTER — TELEPHONE (OUTPATIENT)
Dept: ADMINISTRATIVE | Facility: OTHER | Age: 65
End: 2020-11-24

## 2021-02-02 DIAGNOSIS — F32.A DEPRESSION, UNSPECIFIED DEPRESSION TYPE: Primary | ICD-10-CM

## 2021-02-02 DIAGNOSIS — E11.9 DIABETES MELLITUS WITHOUT COMPLICATION (HCC): Primary | ICD-10-CM

## 2021-02-02 DIAGNOSIS — I10 ESSENTIAL HYPERTENSION: ICD-10-CM

## 2021-02-02 RX ORDER — LOSARTAN POTASSIUM AND HYDROCHLOROTHIAZIDE 12.5; 5 MG/1; MG/1
1 TABLET ORAL DAILY
Qty: 90 TABLET | Refills: 0 | Status: SHIPPED | OUTPATIENT
Start: 2021-02-02 | End: 2021-02-10 | Stop reason: SDUPTHER

## 2021-02-02 RX ORDER — BUPROPION HYDROCHLORIDE 300 MG/1
TABLET ORAL
COMMUNITY
Start: 2021-01-25 | End: 2021-02-02 | Stop reason: SDUPTHER

## 2021-02-02 RX ORDER — BUPROPION HYDROCHLORIDE 300 MG/1
300 TABLET ORAL EVERY MORNING
Qty: 90 TABLET | Refills: 0 | Status: SHIPPED | OUTPATIENT
Start: 2021-02-02 | End: 2021-02-11 | Stop reason: SDUPTHER

## 2021-02-02 RX ORDER — GLIMEPIRIDE 1 MG/1
1 TABLET ORAL
Qty: 90 TABLET | Refills: 0 | Status: SHIPPED | OUTPATIENT
Start: 2021-02-02 | End: 2021-02-10 | Stop reason: SDUPTHER

## 2021-02-04 DIAGNOSIS — F32.A DEPRESSION, UNSPECIFIED DEPRESSION TYPE: ICD-10-CM

## 2021-02-04 RX ORDER — BUPROPION HYDROCHLORIDE 300 MG/1
300 TABLET ORAL EVERY MORNING
Qty: 90 TABLET | Refills: 1 | OUTPATIENT
Start: 2021-02-04

## 2021-02-10 DIAGNOSIS — E11.9 DIABETES MELLITUS WITHOUT COMPLICATION (HCC): ICD-10-CM

## 2021-02-10 DIAGNOSIS — I10 ESSENTIAL HYPERTENSION: ICD-10-CM

## 2021-02-10 RX ORDER — GLIMEPIRIDE 1 MG/1
1 TABLET ORAL
Qty: 90 TABLET | Refills: 3 | Status: SHIPPED | OUTPATIENT
Start: 2021-02-10

## 2021-02-10 RX ORDER — LOSARTAN POTASSIUM AND HYDROCHLOROTHIAZIDE 12.5; 5 MG/1; MG/1
1 TABLET ORAL DAILY
Qty: 90 TABLET | Refills: 3 | Status: SHIPPED | OUTPATIENT
Start: 2021-02-10 | End: 2021-03-12 | Stop reason: SDUPTHER

## 2021-02-11 DIAGNOSIS — F32.A DEPRESSION, UNSPECIFIED DEPRESSION TYPE: ICD-10-CM

## 2021-02-11 RX ORDER — BUPROPION HYDROCHLORIDE 300 MG/1
300 TABLET ORAL EVERY MORNING
Qty: 60 TABLET | Refills: 0 | Status: SHIPPED | OUTPATIENT
Start: 2021-02-11 | End: 2021-03-12 | Stop reason: SDUPTHER

## 2021-03-08 DIAGNOSIS — F33.1 MODERATE EPISODE OF RECURRENT MAJOR DEPRESSIVE DISORDER (HCC): ICD-10-CM

## 2021-03-08 RX ORDER — ESCITALOPRAM OXALATE 20 MG/1
TABLET ORAL
Qty: 135 TABLET | Refills: 1 | Status: SHIPPED | OUTPATIENT
Start: 2021-03-08 | End: 2021-03-12 | Stop reason: SDUPTHER

## 2021-03-12 ENCOUNTER — TELEMEDICINE (OUTPATIENT)
Dept: FAMILY MEDICINE CLINIC | Facility: CLINIC | Age: 66
End: 2021-03-12
Payer: MEDICARE

## 2021-03-12 DIAGNOSIS — I10 ESSENTIAL HYPERTENSION: ICD-10-CM

## 2021-03-12 DIAGNOSIS — Z87.891 FORMER SMOKER: ICD-10-CM

## 2021-03-12 DIAGNOSIS — F32.A DEPRESSION, UNSPECIFIED DEPRESSION TYPE: ICD-10-CM

## 2021-03-12 DIAGNOSIS — E03.9 HYPOTHYROIDISM, UNSPECIFIED TYPE: ICD-10-CM

## 2021-03-12 DIAGNOSIS — Z12.5 SCREENING FOR PROSTATE CANCER: ICD-10-CM

## 2021-03-12 DIAGNOSIS — G20 PRIMARY PARKINSONISM (HCC): ICD-10-CM

## 2021-03-12 DIAGNOSIS — E11.9 DIABETES MELLITUS WITHOUT COMPLICATION (HCC): Primary | ICD-10-CM

## 2021-03-12 DIAGNOSIS — Z12.11 SCREEN FOR COLON CANCER: ICD-10-CM

## 2021-03-12 DIAGNOSIS — T78.40XA ALLERGY, INITIAL ENCOUNTER: ICD-10-CM

## 2021-03-12 DIAGNOSIS — M46.1 SACROILIAC INFLAMMATION (HCC): ICD-10-CM

## 2021-03-12 DIAGNOSIS — J42 CHRONIC BRONCHITIS, UNSPECIFIED CHRONIC BRONCHITIS TYPE (HCC): ICD-10-CM

## 2021-03-12 DIAGNOSIS — G62.9 NEUROPATHY: ICD-10-CM

## 2021-03-12 DIAGNOSIS — J43.2 CENTRILOBULAR EMPHYSEMA (HCC): ICD-10-CM

## 2021-03-12 DIAGNOSIS — F33.1 MODERATE EPISODE OF RECURRENT MAJOR DEPRESSIVE DISORDER (HCC): ICD-10-CM

## 2021-03-12 DIAGNOSIS — M62.838 MUSCLE SPASM: ICD-10-CM

## 2021-03-12 DIAGNOSIS — G47.33 OSA (OBSTRUCTIVE SLEEP APNEA): ICD-10-CM

## 2021-03-12 PROBLEM — K14.9 TONGUE IRRITATION: Status: RESOLVED | Noted: 2020-06-22 | Resolved: 2021-03-12

## 2021-03-12 PROCEDURE — 99213 OFFICE O/P EST LOW 20 MIN: CPT | Performed by: NURSE PRACTITIONER

## 2021-03-12 RX ORDER — ESCITALOPRAM OXALATE 20 MG/1
TABLET ORAL
Qty: 180 TABLET | Refills: 1 | Status: SHIPPED | OUTPATIENT
Start: 2021-03-12 | End: 2021-04-12 | Stop reason: SDUPTHER

## 2021-03-12 RX ORDER — FEXOFENADINE HCL 180 MG/1
180 TABLET ORAL DAILY
Qty: 90 TABLET | Refills: 3 | Status: SHIPPED | OUTPATIENT
Start: 2021-03-12 | End: 2022-02-21

## 2021-03-12 RX ORDER — LOSARTAN POTASSIUM AND HYDROCHLOROTHIAZIDE 12.5; 5 MG/1; MG/1
1 TABLET ORAL DAILY
Qty: 90 TABLET | Refills: 3 | Status: SHIPPED | OUTPATIENT
Start: 2021-03-12 | End: 2021-04-23

## 2021-03-12 RX ORDER — CYCLOBENZAPRINE HCL 10 MG
10 TABLET ORAL
Qty: 90 TABLET | Refills: 0 | Status: SHIPPED | OUTPATIENT
Start: 2021-03-12 | End: 2021-06-30

## 2021-03-12 RX ORDER — FLUTICASONE PROPIONATE 50 MCG
1 SPRAY, SUSPENSION (ML) NASAL DAILY
COMMUNITY

## 2021-03-12 RX ORDER — BUPROPION HYDROCHLORIDE 300 MG/1
300 TABLET ORAL EVERY MORNING
Qty: 90 TABLET | Refills: 3 | Status: SHIPPED | OUTPATIENT
Start: 2021-03-12 | End: 2022-05-05

## 2021-03-12 RX ORDER — METHOCARBAMOL 500 MG/1
500 TABLET, FILM COATED ORAL 2 TIMES DAILY PRN
Qty: 180 TABLET | Refills: 0 | Status: SHIPPED | OUTPATIENT
Start: 2021-03-12 | End: 2021-06-28

## 2021-03-12 NOTE — PROGRESS NOTES
Virtual Regular Visit      Assessment/Plan:    Problem List Items Addressed This Visit        Endocrine    Diabetes mellitus without complication ([place] [address] ) - Primary       Lab Results   Component Value Date    HGBA1C 7 6 09/24/2020   Patient following with endocrine and has an appointment scheduled for mid April 2021         Hypothyroidism     Managed by endocrine             Respiratory    Chronic obstructive pulmonary disease ([place] [address] )     Patient has emphysema and following with pulmonary and seeing [facility]          Relevant Medications    fluticasone (FLONASE) 50 mcg/act nasal spray    fexofenadine (ALLEGRA) 180 MG tablet    Centrilobular emphysema (HCC)    Relevant Medications    fluticasone (FLONASE) 50 mcg/act nasal spray    fexofenadine (ALLEGRA) 180 MG tablet    DAFNE (obstructive sleep apnea)     Patient using his DAFNE and using his CPAP             Cardiovascular and Mediastinum    Hypertension     Has HTN and not checking his BP         Relevant Medications    losartan-hydrochlorothiazide (HYZAAR) 50-12 5 mg per tablet       Nervous and Auditory    Neuropathy    Relevant Orders    Vitamin B12    Magnesium    Primary Parkinsonism ([place] [address] )     Patient following with neurology and has difficulty with following with neurology and movement disorders           Relevant Medications    carbidopa-levodopa (SINEMET)  mg per tablet    cyclobenzaprine (FLEXERIL) 10 mg tablet    methocarbamol (ROBAXIN) 500 mg tablet       Musculoskeletal and Integument    Sacroiliac inflammation (HCC)    Relevant Medications    cyclobenzaprine (FLEXERIL) 10 mg tablet       Other    Depression     Patient has depression and anxiety and is on the lexapro 30 mg for years and noticed a change in his mood and has been in a rut Wellbutrin 300 mg as well          Relevant Medications    escitalopram (LEXAPRO) 20 mg tablet    buPROPion (WELLBUTRIN XL) 300 mg 24 hr tablet    Former smoker     Quit 12 years ago           Other Visit Diagnoses Muscle spasm        Relevant Medications    methocarbamol (ROBAXIN) 500 mg tablet    Allergy, initial encounter        Relevant Medications    fexofenadine (ALLEGRA) 180 MG tablet    Screen for colon cancer        Relevant Orders    Cologuard    Screening for prostate cancer        Relevant Orders    PSA, Total Screen               Reason for visit is   Chief Complaint   Patient presents with    Virtual Regular Visit     review meds    Virtual Regular Visit        Encounter provider VIVIANA Saunders    Provider located at [address]  [address] [place] [id number]      Recent Visits  No visits were found meeting these conditions  Showing recent visits within past 7 days and meeting all other requirements     Today's Visits  Date Type Provider Dept   03/12/21 Telemedicine VIVIANA Saunders [facility]   Showing today's visits and meeting all other requirements     Future Appointments  No visits were found meeting these conditions  Showing future appointments within next 150 days and meeting all other requirements        The patient was identified by name and date of birth  Yuriy Castaneda was informed that this is a telemedicine visit and that the visit is being conducted through [address] [place] [address] and patient was informed that this is not a secure, HIPAA-compliant platform  He agrees to proceed     My office door was closed  No one else was in the room  He acknowledged consent and understanding of privacy and security of the video platform  The patient has agreed to participate and understands they can discontinue the visit at any time  Patient is aware this is a billable service  Subjective  Yuriy Castaneda is a 61 y o  male          Patient calling today with his daughter and wife for his check up and is calling for his check up patient would like to see a movement specialist for his Parkinsons disease he also reports that he is taking Lexapro 30 mg daily and would lik eto increase to 40 mg daily and see if he has some added benefit as he is having times of sadness   Patient has considered and is not interested in having his covid vaccination        Past Medical History:   Diagnosis Date    Alopecia     Arthritis     Back pain     Benign essential hypertension     [facility]D) [facility]     Depression     Diabetes mellitus ([facility]     [facility]     Fatigue     Hypercholesterolemia     Hypersomnia, persistent     Hypothyroidism     Low testosterone     Memory loss     Oral candidiasis 5/11/2020    Sinusitis     Thyroid goiter        Past Surgical History:   Procedure Laterality Date    ADENOIDECTOMY      APPENDECTOMY      HERNIA REPAIR      TONSILLECTOMY         Current Outpatient Medications   Medication Sig Dispense Refill    albuterol (PROAIR HFA) 90 mcg/act inhaler Inhale 1 puff every 4 (four) hours as needed for wheezing or shortness of breath 1 Inhaler 0    albuterol (PROVENTIL HFA,VENTOLIN HFA) 90 mcg/act inhaler Inhale 2 puffs every 6 (six) hours as needed for wheezing      buPROPion (WELLBUTRIN XL) 300 mg 24 hr tablet Take 1 tablet (300 mg total) by mouth every morning 90 tablet 3    carbidopa-levodopa (SINEMET)  mg per tablet Take 1 5 tablets by mouth 3 (three) times a day      cyclobenzaprine (FLEXERIL) 10 mg tablet Take 1 tablet (10 mg total) by mouth daily at bedtime 90 tablet 0    escitalopram (LEXAPRO) 20 mg tablet Take 2 daily to equal 40 mg 180 tablet 1    fexofenadine (ALLEGRA) 180 MG tablet Take 1 tablet (180 mg total) by mouth daily 90 tablet 3    FLOVENT  MCG/ACT inhaler Inhale 2 puffs 2 (two) times a day  6    fluticasone (FLONASE) 50 mcg/act nasal spray 1 spray into each nostril daily      glimepiride (AMARYL) 1 mg tablet Take 1 tablet (1 mg total) by mouth daily with breakfast 90 tablet 3    levothyroxine 150 mcg tablet Take 150 mcg by mouth daily  losartan-hydrochlorothiazide (HYZAAR) 50-12 5 mg per tablet Take 1 tablet by mouth daily 90 tablet 3    metFORMIN (GLUCOPHAGE) 500 mg tablet Take 1 tablet (500 mg total) by mouth 2 (two) times a day 180 tablet 3    methocarbamol (ROBAXIN) 500 mg tablet Take 1 tablet (500 mg total) by mouth 2 (two) times a day as needed for muscle spasms 180 tablet 0    testosterone (ANDROGEL) 1% Place 0 05 g on the skin      Umeclidinium-Vilanterol (ANORO ELLIPTA) 62 5-25 MCG/INH AEPB Inhale 1 puff daily 3 each 1     No current facility-administered medications for this visit  Allergies   Allergen Reactions    Cimetidine     Codeine     Contrast  [Iodinated Diagnostic Agents]     Metrizamide     Prednisone Other (See Comments)     ALL STEROIDS  COMBATIVE    Simvastatin Other (See Comments)     ALL STATINS  MUSCLE CRAMPS    Statins        Review of Systems   Constitutional: Negative for activity change, appetite change, chills, diaphoresis, fatigue, fever and unexpected weight change  HENT: Negative for congestion, ear pain, hearing loss, postnasal drip, sinus pressure, sinus pain, sneezing and sore throat  Eyes: Negative for pain, redness and visual disturbance  Respiratory: Negative for cough and shortness of breath  Cardiovascular: Negative for chest pain and leg swelling  Gastrointestinal: Positive for constipation  Negative for abdominal pain, diarrhea, nausea and vomiting  Endocrine: Negative  Genitourinary: Negative  Musculoskeletal: Negative for arthralgias  Skin: Negative  Allergic/Immunologic: Negative  Neurological: Negative for dizziness and light-headedness  Hematological: Negative  Psychiatric/Behavioral: Negative for agitation, behavioral problems, confusion, decreased concentration, dysphoric mood, hallucinations, self-injury, sleep disturbance and suicidal ideas  The patient is nervous/anxious  The patient is not hyperactive          Video Exam    Vitals: Physical Exam  Constitutional:       Appearance: Normal appearance  He is obese  HENT:      Head: Normocephalic and atraumatic  Right Ear: Tympanic membrane normal       Left Ear: Tympanic membrane normal       Nose: Nose normal       Mouth/Throat:      Mouth: Mucous membranes are moist    Pulmonary:      Effort: Pulmonary effort is normal       Breath sounds: Normal breath sounds  Abdominal:      Palpations: Abdomen is soft  Musculoskeletal: Normal range of motion  Skin:     General: Skin is warm  Capillary Refill: Capillary refill takes less than 2 seconds  Neurological:      General: No focal deficit present  Mental Status: He is alert and oriented to person, place, and time  GCS: GCS eye subscore is 4  GCS verbal subscore is 5  GCS motor subscore is 6  Comments: Patient has tremors at times UE    Psychiatric:         Attention and Perception: Attention normal          Mood and Affect: Mood is anxious  Speech: Speech normal          Behavior: Behavior normal          Thought Content: Thought content normal          Cognition and Memory: Cognition normal          Judgment: Judgment normal           I spent 15 minutes directly with the patient during this visit      VIRTUAL VISIT DISCLAIMER    Ida Hartmann acknowledges that he has consented to an online visit or consultation  He understands that the online visit is based solely on information provided by him, and that, in the absence of a face-to-face physical evaluation by the physician, the diagnosis he receives is both limited and provisional in terms of accuracy and completeness  This is not intended to replace a full medical face-to-face evaluation by the physician  Ida Hartmann understands and accepts these terms

## 2021-03-12 NOTE — ASSESSMENT & PLAN NOTE
Patient has depression and anxiety and is on the lexapro 30 mg for years and noticed a change in his mood and has been in a rut Wellbutrin 300 mg as well

## 2021-03-12 NOTE — ASSESSMENT & PLAN NOTE
Lab Results   Component Value Date    HGBA1C 7 6 09/24/2020   Patient following with endocrine and has an appointment scheduled for mid April 2021

## 2021-03-24 ENCOUNTER — TELEPHONE (OUTPATIENT)
Dept: NEUROLOGY | Facility: CLINIC | Age: 66
End: 2021-03-24

## 2021-03-24 NOTE — TELEPHONE ENCOUNTER
Best contact number for patient:  [phone]  Emergency Contact name and number:  Todd De Jesus [phone]  Referring provider and telephone number:  Lizeth Constance [phone]  Primary Care Provider Name and if affiliated with [facility]:   Jenniffer Deluna Yes  Reason for Appointment/Dx:  Parkinson's  Have you seen and followed up with a pediatric Neurologist for this disease in the past?  No (If yes ok to schedule with Dr Jamia Anderson)    Neurology Location patient would like to be seen:  Any  Order received? Yes                                                 Records Received? Yes    Have you ever seen another Neurologist?       No    Insurance Information    Insurance Name:Medicaid     ID/Policy #:[id number]    Secondary Insurance:    ID/Policy#: Workman's Comp/ Accident/ School  Information      Workman's Comp/Accident/School related?        No    If yes name of Insurance company:    Date of Injury:    Type of Injury:    [address] Name and Telephone Number:    Notes:                   Appointment date:   10/20/2021

## 2021-04-12 ENCOUNTER — TELEPHONE (OUTPATIENT)
Dept: FAMILY MEDICINE CLINIC | Facility: CLINIC | Age: 66
End: 2021-04-12

## 2021-04-12 ENCOUNTER — TELEMEDICINE (OUTPATIENT)
Dept: FAMILY MEDICINE CLINIC | Facility: CLINIC | Age: 66
End: 2021-04-12
Payer: MEDICARE

## 2021-04-12 VITALS — HEIGHT: 70 IN | WEIGHT: 250 LBS | BODY MASS INDEX: 35.79 KG/M2

## 2021-04-12 DIAGNOSIS — F32.A DEPRESSION, UNSPECIFIED DEPRESSION TYPE: Primary | ICD-10-CM

## 2021-04-12 DIAGNOSIS — K59.09 CONSTIPATION, CHRONIC: ICD-10-CM

## 2021-04-12 DIAGNOSIS — F33.1 MODERATE EPISODE OF RECURRENT MAJOR DEPRESSIVE DISORDER (HCC): ICD-10-CM

## 2021-04-12 LAB — HBA1C MFR BLD HPLC: 8.1 %

## 2021-04-12 PROCEDURE — 99213 OFFICE O/P EST LOW 20 MIN: CPT | Performed by: NURSE PRACTITIONER

## 2021-04-12 RX ORDER — ESCITALOPRAM OXALATE 20 MG/1
TABLET ORAL
Qty: 180 TABLET | Refills: 1 | Status: SHIPPED | OUTPATIENT
Start: 2021-04-12 | End: 2022-01-20 | Stop reason: SDUPTHER

## 2021-04-12 NOTE — TELEPHONE ENCOUNTER
Jenn Rutledge called back to let you know that it is the insurance company that is giving them a hard time about the lexapro going from 30 to 40  They just need a call and office notes  Please call Candy Brown when this is done because he is running out

## 2021-04-12 NOTE — PROGRESS NOTES
Virtual Regular Visit      Assessment/Plan:    Problem List Items Addressed This Visit        Digestive    Constipation, chronic    Relevant Medications    linaCLOtide 72 MCG CAPS       Other    Depression - Primary     Lexparo 40 mg working well for him will continue with current dose          Relevant Medications    escitalopram (LEXAPRO) 20 mg tablet               Reason for visit is   Chief Complaint   Patient presents with    Virtual Brief Visit    Follow-up     review medication    Virtual Regular Visit        Encounter provider VIVIANA Colmenares    Provider located at [place] [address] [place] [id number]      Recent Visits  No visits were found meeting these conditions  Showing recent visits within past 7 days and meeting all other requirements     Today's Visits  Date Type Provider Dept   04/12/21 Telemedicine VIVIANA Colmenares [facility]   Showing today's visits and meeting all other requirements     Future Appointments  No visits were found meeting these conditions  Showing future appointments within next 150 days and meeting all other requirements        The patient was identified by name and date of birth  Miya Noe was informed that this is a telemedicine visit and that the visit is being conducted through [address] [place] [address] and patient was informed that this is not a secure, HIPAA-compliant platform  He agrees to proceed     My office door was closed  No one else was in the room  He acknowledged consent and understanding of privacy and security of the video platform  The patient has agreed to participate and understands they can discontinue the visit at any time  Patient is aware this is a billable service  Subjective  Miya Noe is a 61 y o  male          Patient calling for 4 week recheck on the increase in his Lexapro from 30 mg to 40 mg Patient reports that he is noticing that his mood has improved and is feeling better on the increase dose  Patient is also scheduled in the summer time with movement specialist for his Parkinsons disease  Patient denies any side effects from the medication   Patient has been having issues with constipation and having a BM every 3 days small amounts karmen has tried to take ducolox and exlax and Miralax and stool softness  Patient is drinking enough water patient is active moving around          Past Medical History:   Diagnosis Date    Alopecia     Arthritis     Back pain     Benign essential hypertension     Chronic obstructive pulmonary disease (COPD) ([address] )     Depression     Diabetes mellitus (HCC)     Emphysema, interstitial (HCC)     Fatigue     Hypercholesterolemia     Hypersomnia, persistent     Hypothyroidism     Low testosterone     Memory loss     Oral candidiasis 5/11/2020    Sinusitis     Thyroid goiter        Past Surgical History:   Procedure Laterality Date    ADENOIDECTOMY      APPENDECTOMY      HERNIA REPAIR      TONSILLECTOMY         Current Outpatient Medications   Medication Sig Dispense Refill    albuterol (PROAIR HFA) 90 mcg/act inhaler Inhale 1 puff every 4 (four) hours as needed for wheezing or shortness of breath 1 Inhaler 0    albuterol (PROVENTIL HFA,VENTOLIN HFA) 90 mcg/act inhaler Inhale 2 puffs every 6 (six) hours as needed for wheezing      buPROPion (WELLBUTRIN XL) 300 mg 24 hr tablet Take 1 tablet (300 mg total) by mouth every morning 90 tablet 3    carbidopa-levodopa (SINEMET)  mg per tablet Take 1 5 tablets by mouth 3 (three) times a day      cyclobenzaprine (FLEXERIL) 10 mg tablet Take 1 tablet (10 mg total) by mouth daily at bedtime 90 tablet 0    escitalopram (LEXAPRO) 20 mg tablet Take 2 daily to equal 40 mg 180 tablet 1    fexofenadine (ALLEGRA) 180 MG tablet Take 1 tablet (180 mg total) by mouth daily 90 tablet 3    FLOVENT  MCG/ACT inhaler Inhale 2 puffs 2 (two) times a day  6    fluticasone (FLONASE) 50 mcg/act nasal spray 1 spray into each nostril daily      glimepiride (AMARYL) 1 mg tablet Take 1 tablet (1 mg total) by mouth daily with breakfast 90 tablet 3    levothyroxine 150 mcg tablet Take 150 mcg by mouth daily      losartan-hydrochlorothiazide (HYZAAR) 50-12 5 mg per tablet Take 1 tablet by mouth daily 90 tablet 3    metFORMIN (GLUCOPHAGE) 500 mg tablet Take 1 tablet (500 mg total) by mouth 2 (two) times a day 180 tablet 3    methocarbamol (ROBAXIN) 500 mg tablet Take 1 tablet (500 mg total) by mouth 2 (two) times a day as needed for muscle spasms 180 tablet 0    testosterone (ANDROGEL) 1% Place 0 05 g on the skin      Umeclidinium-Vilanterol (ANORO ELLIPTA) 62 5-25 MCG/INH AEPB Inhale 1 puff daily 3 each 1    linaCLOtide 72 MCG CAPS Take 72 mcg by mouth daily with breakfast 30 capsule 2     No current facility-administered medications for this visit  Allergies   Allergen Reactions    Cimetidine     Codeine     Contrast  [Iodinated Diagnostic Agents]     Metrizamide     Prednisone Other (See Comments)     ALL STEROIDS  COMBATIVE    Simvastatin Other (See Comments)     ALL STATINS  MUSCLE CRAMPS    Statins        Review of Systems   Constitutional: Negative for activity change, appetite change, chills, diaphoresis, fatigue, fever and unexpected weight change  HENT: Negative for congestion, ear pain, hearing loss, postnasal drip, sinus pressure, sinus pain, sneezing and sore throat  Eyes: Negative for pain, redness and visual disturbance  Respiratory: Negative for cough and shortness of breath  Cardiovascular: Negative for chest pain and leg swelling  Gastrointestinal: Positive for constipation  Negative for abdominal pain, diarrhea, nausea and vomiting  Endocrine: Negative  Genitourinary: Negative  Musculoskeletal: Negative for arthralgias  Skin: Negative  Allergic/Immunologic: Negative  Neurological: Negative for dizziness and light-headedness  Hematological: Negative  Psychiatric/Behavioral: Negative for behavioral problems and dysphoric mood  Video Exam    Vitals:    04/12/21 1428   Weight: 113 kg (250 lb)   Height: 5' 10" (1 778 m)       Physical Exam  Constitutional:       Appearance: Normal appearance  Pulmonary:      Effort: Pulmonary effort is normal       Breath sounds: Normal breath sounds  Abdominal:      Palpations: Abdomen is soft  Musculoskeletal: Normal range of motion  Skin:     General: Skin is warm  Capillary Refill: Capillary refill takes less than 2 seconds  Neurological:      Mental Status: He is alert and oriented to person, place, and time  Psychiatric:         Mood and Affect: Mood normal          Behavior: Behavior normal          Thought Content: Thought content normal          Judgment: Judgment normal           I spent 15 minutes directly with the patient during this visit      VIRTUAL VISIT DISCLAIMER    Jon Boogie acknowledges that he has consented to an online visit or consultation  He understands that the online visit is based solely on information provided by him, and that, in the absence of a face-to-face physical evaluation by the physician, the diagnosis he receives is both limited and provisional in terms of accuracy and completeness  This is not intended to replace a full medical face-to-face evaluation by the physician  Jon Boogie understands and accepts these terms

## 2021-04-13 NOTE — TELEPHONE ENCOUNTER
I called the insurance and spoke with Angie Lopes  She stated that there was already an open prior auth on file for this patient and just needed clinical questions answered  I answered the questions and she stated that the turn around time is 24-72 hours    Reference #[id number]

## 2021-04-13 NOTE — TELEPHONE ENCOUNTER
Pt daughter said someone has to call the insurance to have the lexapro increase approved  [phone]  Please call Sarah Conley when this is done  Pt is running low on medication and will be out by Monday   Thank you

## 2021-04-23 ENCOUNTER — TELEPHONE (OUTPATIENT)
Dept: ADMINISTRATIVE | Facility: OTHER | Age: 66
End: 2021-04-23

## 2021-04-23 DIAGNOSIS — I10 ESSENTIAL HYPERTENSION: ICD-10-CM

## 2021-04-23 RX ORDER — LOSARTAN POTASSIUM AND HYDROCHLOROTHIAZIDE 12.5; 5 MG/1; MG/1
TABLET ORAL
Qty: 90 TABLET | Refills: 3 | Status: SHIPPED | OUTPATIENT
Start: 2021-04-23 | End: 2021-05-06

## 2021-04-23 NOTE — TELEPHONE ENCOUNTER
----- Message from Jenna Swan MA sent at 4/22/2021  2:17 PM EDT -----  Regarding: Care Gap Request  04/22/21 2:17 PM    Hello, our patient Geovanni Watters has had Hemoglobin A1c completed/performed  Please assist in updating the patient chart by pulling the Care Everywhere (CE) document  The date of service is 4/12/2021       Thank you,  BOBBY Mariscal PG

## 2021-04-23 NOTE — TELEPHONE ENCOUNTER
Upon review of the In Basket request we were able to locate, review, and update the patient chart as requested for Hemoglobin A1c  Any additional questions or concerns should be emailed to the Practice Liaisons via [email or web address] email, please do not reply via In Basket      Thank you  Conner De La Rosa MA

## 2021-05-05 DIAGNOSIS — I10 ESSENTIAL HYPERTENSION: ICD-10-CM

## 2021-05-05 DIAGNOSIS — E11.9 DIABETES MELLITUS WITHOUT COMPLICATION (HCC): ICD-10-CM

## 2021-05-05 DIAGNOSIS — K59.09 CONSTIPATION, CHRONIC: ICD-10-CM

## 2021-05-06 RX ORDER — LOSARTAN POTASSIUM AND HYDROCHLOROTHIAZIDE 12.5; 5 MG/1; MG/1
TABLET ORAL
Qty: 90 TABLET | Refills: 3 | Status: SHIPPED | OUTPATIENT
Start: 2021-05-06 | End: 2022-04-21

## 2021-06-30 DIAGNOSIS — M46.1 SACROILIAC INFLAMMATION (HCC): ICD-10-CM

## 2021-06-30 RX ORDER — CYCLOBENZAPRINE HCL 10 MG
TABLET ORAL
Qty: 90 TABLET | Refills: 0 | Status: ON HOLD | OUTPATIENT
Start: 2021-06-30 | End: 2022-01-28

## 2021-06-30 NOTE — TELEPHONE ENCOUNTER
Please call patient and make sure he is not taking methocarbamol which is Robaxin  This is a muscle relaxer    He should not be on both Flexeril and this

## 2021-07-14 NOTE — ASSESSMENT & PLAN NOTE
Patient is a 61year old man with PMHx of HTN, Primary Parkinsonism, depression, neuropathy presenting for followup? For parkinsonisms  Patient last seen by Neurology at [facility] in 8/26/2020  It was noted on that visit that his cognition has declined with short temper? Patient is on PET scan 3/6/2020 that showed no decreased uptake compatible with Alzheimer's, FTD, or LBD  Patient is on sinemet  1 5? Patient sees Dr Amanda Manzanares, movement specialist  From previous note from [facility], patient remained stable in terms of tremors and ambulation  MOCA at 2/2020 was 23/30 tested by [facility] Neurology      Movement  Cognition: brushing teeth, buttoning clothes, driving car, memory, finances, short term conversations, routine  Falls  Turning around  Constipation  Family history  Medications/ changes    Plan:  Continue Sinemet

## 2021-07-15 ENCOUNTER — CONSULT (OUTPATIENT)
Dept: NEUROLOGY | Facility: CLINIC | Age: 66
End: 2021-07-15
Payer: MEDICARE

## 2021-07-15 VITALS
DIASTOLIC BLOOD PRESSURE: 80 MMHG | HEART RATE: 82 BPM | BODY MASS INDEX: 34.79 KG/M2 | WEIGHT: 243 LBS | HEIGHT: 70 IN | SYSTOLIC BLOOD PRESSURE: 130 MMHG

## 2021-07-15 DIAGNOSIS — G20 PRIMARY PARKINSONISM (HCC): Primary | ICD-10-CM

## 2021-07-15 DIAGNOSIS — G25.81 RESTLESS LEG SYNDROME: ICD-10-CM

## 2021-07-15 DIAGNOSIS — E61.1 IRON DEFICIENCY: ICD-10-CM

## 2021-07-15 PROCEDURE — 99214 OFFICE O/P EST MOD 30 MIN: CPT | Performed by: PSYCHIATRY & NEUROLOGY

## 2021-07-15 NOTE — PATIENT INSTRUCTIONS
Talk with your primary care provider for possible increase or addition of mood stabilizers such as sertraline (zoloft) to help with control mood       Please get bloodwork for ferritin, iron, vitamin B1 to the [facility] before your next visit in 6 months    Referral for physical therapy has been provided    Take 2 tablets of the sinemet (carbidopa-levodopa ) by mouth three times a day increased from 1 5 three times day for Parkinsons

## 2021-07-15 NOTE — PROGRESS NOTES
Patient ID: Elian Sotelo is a 61 y o  male  Assessment/Plan:    Primary Parkinsonism [facility])  Patient is a 61year old man with PMHx of HTN, Primary Parkinsonism, depression, neuropathy presenting for following up concerning patient's parkinson's disease  Worsening right hand tremor and increase in frustration and temper noted on history  On Neurologic exam, noted right hand tremor and mild cogwheeling with passive right upper extremity movement  Patient has mild difficulty with balance when forced back  Plan:  Increased sinemet carbidopa-levodopa  dosing to 1 5 tablet TID to 2 tablet TID  Recommended patient discuss with primary care concerning adjustments of mood stabilizers with discussion concerning sertraline (zoloft) for helping with his mood  Referral made to physical therapy on family's request      Restless legs syndrome  Patient reports restless legs syndrome that has become apparent in the last few months  Likely related with patient's Parkinson's Disease  Plan: For Parkinsonian RLS, increased carbidopa levodopa as in Primary Parkinson's plan  Ordered Ferritin, Iron for possible low iron levels for cause of RLS  Vitamin B1 level ordered as well at family's request and concern for B1 deficiency in the family         Diagnoses and all orders for this visit:    Primary parkinsonism ([facility] )  -     Ambulatory referral to Neurology  -     carbidopa-levodopa (SINEMET)  mg per tablet; Take 2 tablets by mouth 3 (three) times a day  -     Ambulatory referral to Physical Therapy; Future    Restless leg syndrome  -     Ferritin; Future  -     Iron; Future  -     Vitamin B1, whole blood; Future    Iron deficiency  -     Ferritin; Future  -     Iron; Future           Subjective:    Patient is a 61year old man with PMHx of HTN, Primary Parkinsonism, depression, neuropathy presenting for primary parkinson's disease  Patient last seen by Neurology at [facility] in 8/26/2020   It was noted on that visit that his cognition has declined with short temper  Patient is on PET scan 3/6/2020 that showed no decreased uptake compatible with Alzheimer's, FTD, or LBD  Patient is on sinemet  1 5 TID  Patient sees Dr Judy Buchanan, movement specialist  From previous note from [facility], patient remained stable in terms of tremors and ambulation  MOCA at 2/2020 was 23/30 tested by [facility] Neurology  On this visit, Patient reports worsening right hand tremors  Worsened right hand tremors comes mostly at rest or when patient is distracted such as when patient is sedentary and watching television  Patient's gait has been slower but no shuffling gait, rigidity with movement, no difficulty with turning around  Patient has had times recently when he holds objects and drops them  Patient also has been having mood problems becoming short tempered and increasingly frustrated in the past few months along with increased anhedonia  Patient also noted increased restless legs when in bed which has been discussed with PCP as well  Patient denies cognitive difficulties  Patient also has constipation treated with Linzess  The following portions of the patient's history were reviewed and updated as appropriate:   He  has a past medical history of Alopecia, Arthritis, Back pain, Benign essential hypertension, Chronic obstructive pulmonary disease (COPD) ([address] ), Depression, Diabetes mellitus ([address] ), Emphysema, interstitial ([address] ), Fatigue, Hypercholesterolemia, Hypersomnia, persistent, Hypothyroidism, Low testosterone, Memory loss, Oral candidiasis (5/11/2020), Sinusitis, and Thyroid goiter    He   Patient Active Problem List    Diagnosis Date Noted    Iron deficiency 07/15/2021    Restless legs syndrome 07/15/2021    Constipation, chronic 04/12/2021    Sacroiliac inflammation ([address] ) 04/02/2019    Primary parkinsonism ([address] ) 12/05/2018    Pulmonary nodule 10/03/2018    Balance problem 09/26/2018    Tremor 09/26/2018    Neuropathy 09/26/2018    Memory impairment 08/24/2018    Episodic paroxysmal hemicrania, 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    Abnormality of gait due to impairment of balance 08/24/2018    Former smoker 06/14/2018    Centrilobular emphysema ([facility] ) 04/06/2018    DAFNE (obstructive sleep apnea) 04/06/2018    Low testosterone 06/29/2017    Psoriasis 04/13/2016    Goiter, nontoxic simple 06/22/2015    Chronic obstructive pulmonary disease ([place] [address] ) 06/28/2013    Depression 06/28/2013    Hypercholesterolemia 06/27/2013    Hypertension 06/27/2013    Hypothyroidism 06/27/2013    Diabetes mellitus without complication ([place] [address] ) 29/40/3563     He  has a past surgical history that includes Appendectomy; Hernia repair; Tonsillectomy; and ADENOIDECTOMY  His family history includes Aneurysm in his father; Hashimoto's thyroiditis in his daughter; Hypertension in his father; Hypothyroidism in his daughter; Lupus in his mother  He  reports that he has quit smoking  He has never used smokeless tobacco  He reports that he does not drink alcohol and does not use drugs    Current Outpatient Medications   Medication Sig Dispense Refill    albuterol (PROAIR HFA) 90 mcg/act inhaler Inhale 1 puff every 4 (four) hours as needed for wheezing or shortness of breath 1 Inhaler 0    albuterol (PROVENTIL HFA,VENTOLIN HFA) 90 mcg/act inhaler Inhale 2 puffs every 6 (six) hours as needed for wheezing      buPROPion (WELLBUTRIN XL) 300 mg 24 hr tablet Take 1 tablet (300 mg total) by mouth every morning 90 tablet 3    carbidopa-levodopa (SINEMET)  mg per tablet Take 2 tablets by mouth 3 (three) times a day 180 tablet 11    cyclobenzaprine (FLEXERIL) 10 mg tablet TAKE 1 TABLET BY MOUTH DAILY AT BEDTIME 90 tablet 0    escitalopram (LEXAPRO) 20 mg tablet Take 2 daily to equal 40 mg 180 tablet 1    fexofenadine (ALLEGRA) 180 MG tablet Take 1 tablet (180 mg total) by mouth daily 90 tablet 3    FLOVENT  MCG/ACT inhaler Inhale 2 puffs 2 (two) times a day  6    fluticasone (FLONASE) 50 mcg/act nasal spray 1 spray into each nostril daily      glimepiride (AMARYL) 1 mg tablet Take 1 tablet (1 mg total) by mouth daily with breakfast 90 tablet 3    levothyroxine 150 mcg tablet Take 150 mcg by mouth daily      linaCLOtide 145 MCG CAPS Take 1 capsule (145 mcg total) by mouth daily with breakfast 30 capsule 2    losartan-hydrochlorothiazide (HYZAAR) 50-12 5 mg per tablet TAKE 1 TABLET EVERY DAY 90 tablet 3    metFORMIN (GLUCOPHAGE) 500 mg tablet TAKE 1 TABLET (500 MG TOTAL) BY MOUTH 2 (TWO) TIMES A  tablet 3    testosterone (ANDROGEL) 1% Place 0 05 g on the skin      Umeclidinium-Vilanterol (ANORO ELLIPTA) 62 5-25 MCG/INH AEPB Inhale 1 puff daily 3 each 1    linaCLOtide 72 MCG CAPS Take 72 mcg by mouth daily with breakfast 30 capsule 2     No current facility-administered medications for this visit       Current Outpatient Medications on File Prior to Visit   Medication Sig    albuterol (PROAIR HFA) 90 mcg/act inhaler Inhale 1 puff every 4 (four) hours as needed for wheezing or shortness of breath    albuterol (PROVENTIL HFA,VENTOLIN HFA) 90 mcg/act inhaler Inhale 2 puffs every 6 (six) hours as needed for wheezing    buPROPion (WELLBUTRIN XL) 300 mg 24 hr tablet Take 1 tablet (300 mg total) by mouth every morning    cyclobenzaprine (FLEXERIL) 10 mg tablet TAKE 1 TABLET BY MOUTH DAILY AT BEDTIME    escitalopram (LEXAPRO) 20 mg tablet Take 2 daily to equal 40 mg    fexofenadine (ALLEGRA) 180 MG tablet Take 1 tablet (180 mg total) by mouth daily    FLOVENT  MCG/ACT inhaler Inhale 2 puffs 2 (two) times a day    fluticasone (FLONASE) 50 mcg/act nasal spray 1 spray into each nostril daily    glimepiride (AMARYL) 1 mg tablet Take 1 tablet (1 mg total) by mouth daily with breakfast    levothyroxine 150 mcg tablet Take 150 mcg by mouth daily    linaCLOtide 145 MCG CAPS Take 1 capsule (145 mcg total) by mouth daily with breakfast    losartan-hydrochlorothiazide (HYZAAR) 50-12 5 mg per tablet TAKE 1 TABLET EVERY DAY    metFORMIN (GLUCOPHAGE) 500 mg tablet TAKE 1 TABLET (500 MG TOTAL) BY MOUTH 2 (TWO) TIMES A DAY    testosterone (ANDROGEL) 1% Place 0 05 g on the skin    Umeclidinium-Vilanterol (ANORO ELLIPTA) 62 5-25 MCG/INH AEPB Inhale 1 puff daily    [DISCONTINUED] carbidopa-levodopa (SINEMET)  mg per tablet Take 1 5 tablets by mouth 3 (three) times a day    linaCLOtide 72 MCG CAPS Take 72 mcg by mouth daily with breakfast     No current facility-administered medications on file prior to visit  He is allergic to cimetidine, codeine, contrast  [iodinated diagnostic agents], metrizamide, prednisone, simvastatin, and statins            Objective:    Blood pressure 130/80, pulse 82, height 5' 10" (1 778 m), weight 110 kg (243 lb)  Physical Exam  Eyes:      Extraocular Movements: Extraocular movements intact  Neurological:      Coordination: Coordination is intact  Romberg sign negative  Deep Tendon Reflexes: Strength normal and reflexes are normal and symmetric  Psychiatric:         Speech: Speech normal          Neurological Exam  Mental Status  Awake, alert and oriented to person, place and time  Speech is normal  Language is fluent with no aphasia  Cranial Nerves  CN II: Visual acuity is normal   CN III, IV, VI: Extraocular movements intact bilaterally  CN V: Facial sensation is normal   CN VII: Full and symmetric facial movement  CN XI: Shoulder shrug strength is normal   CN XII: Tongue midline without atrophy or fasciculations  Motor  Normal muscle bulk throughout  No fasciculations present  Normal muscle tone  No abnormal involuntary movements  Strength is 5/5 throughout all four extremities    Mild cogwheel rigidity on right UE with contralateral activation  No rigidity on left UE    Right 5th and 4th finger tremor noted on rest         Sensory  Sensation is intact to light touch, pinprick, vibration and proprioception in all four extremities  Reflexes  Deep tendon reflexes are 2+ and symmetric in all four extremities with downgoing toes bilaterally  Coordination  Finger-to-nose, rapid alternating movements and heel-to-shin normal bilaterally without dysmetria  Gait  Normal casual, toe, heel and tandem gait  Romberg is absent  Positive retropulsion test with significant push back  ROS:    Review of Systems   Constitutional: Negative  Negative for appetite change and fever  HENT: Positive for trouble swallowing and voice change  Negative for hearing loss and tinnitus  Eyes: Negative  Negative for photophobia and pain  Respiratory: Positive for shortness of breath  Cardiovascular: Negative  Negative for palpitations  Gastrointestinal: Negative  Negative for nausea and vomiting  Endocrine: Negative  Negative for cold intolerance  Genitourinary: Negative  Negative for dysuria, frequency and urgency  Musculoskeletal: Negative  Negative for myalgias and neck pain  Skin: Negative  Negative for rash  Neurological: Positive for dizziness, tremors and speech difficulty  Negative for seizures, syncope, facial asymmetry, weakness, light-headedness and numbness  Patient stated that he has right  hand tremors    Hematological: Negative  Does not bruise/bleed easily  Psychiatric/Behavioral: Positive for confusion and sleep disturbance  Negative for hallucinations

## 2021-07-15 NOTE — ASSESSMENT & PLAN NOTE
Patient is a 61year old man with PMHx of HTN, Primary Parkinsonism, depression, neuropathy presenting for following up concerning patient's parkinson's disease  Worsening right hand tremor and increase in frustration and temper noted on history  On Neurologic exam, noted right hand tremor and mild cogwheeling with passive right upper extremity movement  Patient has mild difficulty with balance when forced back      Plan:  Increased sinemet carbidopa-levodopa  dosing to 1 5 tablet TID to 2 tablet TID  Recommended patient discuss with primary care concerning adjustments of mood stabilizers with discussion concerning sertraline (zoloft) for helping with his mood  Referral made to physical therapy on family's request

## 2021-07-15 NOTE — ASSESSMENT & PLAN NOTE
Patient reports restless legs syndrome that has become apparent in the last few months  Likely related with patient's Parkinson's Disease  Plan:   For Parkinsonian RLS, increased carbidopa levodopa as in Primary Parkinson's plan  Ordered Ferritin, Iron for possible low iron levels for cause of RLS  Vitamin B1 level ordered as well at family's request and concern for B1 deficiency in the family

## 2021-08-03 DIAGNOSIS — K59.09 CONSTIPATION, CHRONIC: ICD-10-CM

## 2021-08-03 RX ORDER — LINACLOTIDE 145 UG/1
CAPSULE, GELATIN COATED ORAL
Qty: 30 CAPSULE | Refills: 2 | Status: SHIPPED | OUTPATIENT
Start: 2021-08-03 | End: 2021-11-15

## 2021-08-25 LAB
LEFT EYE DIABETIC RETINOPATHY: NORMAL
RIGHT EYE DIABETIC RETINOPATHY: NORMAL

## 2021-10-20 ENCOUNTER — CONSULT (OUTPATIENT)
Dept: NEUROLOGY | Facility: CLINIC | Age: 66
End: 2021-10-20
Payer: MEDICARE

## 2021-10-20 VITALS
SYSTOLIC BLOOD PRESSURE: 134 MMHG | BODY MASS INDEX: 35.27 KG/M2 | HEART RATE: 73 BPM | WEIGHT: 245.8 LBS | DIASTOLIC BLOOD PRESSURE: 74 MMHG

## 2021-10-20 DIAGNOSIS — G20 PRIMARY PARKINSONISM (HCC): ICD-10-CM

## 2021-10-20 DIAGNOSIS — F02.80 LEWY BODY DEMENTIA WITHOUT BEHAVIORAL DISTURBANCE (HCC): Primary | ICD-10-CM

## 2021-10-20 DIAGNOSIS — G47.52 REM BEHAVIORAL DISORDER: ICD-10-CM

## 2021-10-20 DIAGNOSIS — G31.83 LEWY BODY DEMENTIA WITHOUT BEHAVIORAL DISTURBANCE (HCC): Primary | ICD-10-CM

## 2021-10-20 PROCEDURE — 99215 OFFICE O/P EST HI 40 MIN: CPT | Performed by: PSYCHIATRY & NEUROLOGY

## 2021-10-20 RX ORDER — DONEPEZIL HYDROCHLORIDE 10 MG/1
TABLET, FILM COATED ORAL
Qty: 30 TABLET | Refills: 4 | Status: SHIPPED | OUTPATIENT
Start: 2021-10-20 | End: 2021-11-04

## 2021-10-22 ENCOUNTER — PATIENT OUTREACH (OUTPATIENT)
Dept: CASE MANAGEMENT | Facility: OTHER | Age: 66
End: 2021-10-22

## 2021-10-25 ENCOUNTER — PATIENT OUTREACH (OUTPATIENT)
Dept: FAMILY MEDICINE CLINIC | Facility: CLINIC | Age: 66
End: 2021-10-25

## 2021-10-25 DIAGNOSIS — J42 CHRONIC BRONCHITIS, UNSPECIFIED CHRONIC BRONCHITIS TYPE (HCC): ICD-10-CM

## 2021-10-25 DIAGNOSIS — E11.9 DIABETES MELLITUS WITHOUT COMPLICATION (HCC): Primary | ICD-10-CM

## 2021-10-28 ENCOUNTER — PATIENT OUTREACH (OUTPATIENT)
Dept: CASE MANAGEMENT | Facility: OTHER | Age: 66
End: 2021-10-28

## 2021-11-03 ENCOUNTER — TELEPHONE (OUTPATIENT)
Dept: NEUROLOGY | Facility: CLINIC | Age: 66
End: 2021-11-03

## 2021-11-10 ENCOUNTER — TELEPHONE (OUTPATIENT)
Dept: NEUROLOGY | Facility: CLINIC | Age: 66
End: 2021-11-10

## 2021-11-10 ENCOUNTER — PATIENT MESSAGE (OUTPATIENT)
Dept: NEUROLOGY | Facility: CLINIC | Age: 66
End: 2021-11-10

## 2021-11-15 ENCOUNTER — OFFICE VISIT (OUTPATIENT)
Dept: FAMILY MEDICINE CLINIC | Facility: CLINIC | Age: 66
End: 2021-11-15
Payer: MEDICARE

## 2021-11-15 VITALS
WEIGHT: 248 LBS | TEMPERATURE: 97.2 F | BODY MASS INDEX: 35.5 KG/M2 | SYSTOLIC BLOOD PRESSURE: 132 MMHG | HEIGHT: 70 IN | DIASTOLIC BLOOD PRESSURE: 62 MMHG | OXYGEN SATURATION: 96 % | HEART RATE: 68 BPM

## 2021-11-15 DIAGNOSIS — K59.09 CONSTIPATION, CHRONIC: ICD-10-CM

## 2021-11-15 DIAGNOSIS — E11.9 DIABETES MELLITUS WITHOUT COMPLICATION (HCC): ICD-10-CM

## 2021-11-15 DIAGNOSIS — Z51.81 ENCOUNTER FOR MONITORING TESTOSTERONE REPLACEMENT THERAPY: ICD-10-CM

## 2021-11-15 DIAGNOSIS — Z79.890 ENCOUNTER FOR MONITORING TESTOSTERONE REPLACEMENT THERAPY: ICD-10-CM

## 2021-11-15 DIAGNOSIS — I10 PRIMARY HYPERTENSION: ICD-10-CM

## 2021-11-15 DIAGNOSIS — E03.9 HYPOTHYROIDISM, UNSPECIFIED TYPE: ICD-10-CM

## 2021-11-15 DIAGNOSIS — F51.04 PSYCHOPHYSIOLOGICAL INSOMNIA: ICD-10-CM

## 2021-11-15 DIAGNOSIS — Z00.00 MEDICARE ANNUAL WELLNESS VISIT, SUBSEQUENT: Primary | ICD-10-CM

## 2021-11-15 DIAGNOSIS — F02.80 LEWY BODY DEMENTIA WITHOUT BEHAVIORAL DISTURBANCE (HCC): ICD-10-CM

## 2021-11-15 DIAGNOSIS — G31.83 LEWY BODY DEMENTIA WITHOUT BEHAVIORAL DISTURBANCE (HCC): ICD-10-CM

## 2021-11-15 DIAGNOSIS — J43.2 CENTRILOBULAR EMPHYSEMA (HCC): ICD-10-CM

## 2021-11-15 LAB — SL AMB POCT HEMOGLOBIN AIC: 7.2 (ref ?–6.5)

## 2021-11-15 PROCEDURE — 83036 HEMOGLOBIN GLYCOSYLATED A1C: CPT | Performed by: NURSE PRACTITIONER

## 2021-11-15 PROCEDURE — 99214 OFFICE O/P EST MOD 30 MIN: CPT | Performed by: NURSE PRACTITIONER

## 2021-11-15 PROCEDURE — G0402 INITIAL PREVENTIVE EXAM: HCPCS | Performed by: NURSE PRACTITIONER

## 2021-11-15 RX ORDER — LORAZEPAM 0.5 MG/1
0.5 TABLET ORAL
Qty: 30 TABLET | Refills: 0 | Status: SHIPPED | OUTPATIENT
Start: 2021-11-15 | End: 2021-12-15 | Stop reason: SDUPTHER

## 2021-11-15 RX ORDER — LINACLOTIDE 145 UG/1
CAPSULE, GELATIN COATED ORAL
Qty: 30 CAPSULE | Refills: 2 | Status: SHIPPED | OUTPATIENT
Start: 2021-11-15 | End: 2021-11-15

## 2021-11-16 ENCOUNTER — TELEPHONE (OUTPATIENT)
Dept: ADMINISTRATIVE | Facility: OTHER | Age: 66
End: 2021-11-16

## 2021-12-15 DIAGNOSIS — F51.04 PSYCHOPHYSIOLOGICAL INSOMNIA: ICD-10-CM

## 2021-12-16 RX ORDER — LORAZEPAM 0.5 MG/1
0.5 TABLET ORAL
Qty: 90 TABLET | Refills: 0 | Status: SHIPPED | OUTPATIENT
Start: 2021-12-16 | End: 2022-06-03

## 2022-01-20 DIAGNOSIS — F33.1 MODERATE EPISODE OF RECURRENT MAJOR DEPRESSIVE DISORDER (HCC): ICD-10-CM

## 2022-01-20 RX ORDER — ESCITALOPRAM OXALATE 20 MG/1
TABLET ORAL
Qty: 180 TABLET | Refills: 1 | Status: ON HOLD | OUTPATIENT
Start: 2022-01-20 | End: 2022-01-28

## 2022-01-24 ENCOUNTER — APPOINTMENT (EMERGENCY)
Dept: RADIOLOGY | Facility: HOSPITAL | Age: 67
DRG: 177 | End: 2022-01-24
Payer: MEDICARE

## 2022-01-24 ENCOUNTER — HOSPITAL ENCOUNTER (INPATIENT)
Facility: HOSPITAL | Age: 67
LOS: 4 days | Discharge: HOME HEALTH | DRG: 177 | End: 2022-01-28
Attending: EMERGENCY MEDICINE | Admitting: INTERNAL MEDICINE
Payer: MEDICARE

## 2022-01-24 ENCOUNTER — TELEPHONE (OUTPATIENT)
Dept: FAMILY MEDICINE CLINIC | Facility: CLINIC | Age: 67
End: 2022-01-24

## 2022-01-24 ENCOUNTER — NURSE TRIAGE (OUTPATIENT)
Dept: OTHER | Facility: OTHER | Age: 67
End: 2022-01-24

## 2022-01-24 DIAGNOSIS — F33.1 MODERATE EPISODE OF RECURRENT MAJOR DEPRESSIVE DISORDER (HCC): ICD-10-CM

## 2022-01-24 DIAGNOSIS — J44.1 COPD EXACERBATION (HCC): ICD-10-CM

## 2022-01-24 DIAGNOSIS — R79.82 ELEVATED C-REACTIVE PROTEIN (CRP): ICD-10-CM

## 2022-01-24 DIAGNOSIS — R74.01 ELEVATED AST (SGOT): ICD-10-CM

## 2022-01-24 DIAGNOSIS — R79.89 ELEVATED LACTIC ACID LEVEL: ICD-10-CM

## 2022-01-24 DIAGNOSIS — B37.0 THRUSH: ICD-10-CM

## 2022-01-24 DIAGNOSIS — R79.89 ELEVATED D-DIMER: ICD-10-CM

## 2022-01-24 DIAGNOSIS — R74.8 ELEVATED CK: ICD-10-CM

## 2022-01-24 DIAGNOSIS — J96.01 ACUTE HYPOXEMIC RESPIRATORY FAILURE DUE TO COVID-19 (HCC): Primary | ICD-10-CM

## 2022-01-24 DIAGNOSIS — R19.7 DIARRHEA: ICD-10-CM

## 2022-01-24 DIAGNOSIS — R06.02 SHORTNESS OF BREATH: Primary | ICD-10-CM

## 2022-01-24 DIAGNOSIS — M46.1 SACROILIAC INFLAMMATION (HCC): ICD-10-CM

## 2022-01-24 DIAGNOSIS — J12.82 PNEUMONIA DUE TO COVID-19 VIRUS: ICD-10-CM

## 2022-01-24 DIAGNOSIS — U07.1 ACUTE HYPOXEMIC RESPIRATORY FAILURE DUE TO COVID-19 (HCC): Primary | ICD-10-CM

## 2022-01-24 DIAGNOSIS — U07.1 PNEUMONIA DUE TO COVID-19 VIRUS: ICD-10-CM

## 2022-01-24 DIAGNOSIS — K21.9 GERD (GASTROESOPHAGEAL REFLUX DISEASE): ICD-10-CM

## 2022-01-24 LAB
2HR DELTA HS TROPONIN: 1 NG/L
ALBUMIN SERPL BCP-MCNC: 3.3 G/DL (ref 3.5–5)
ALP SERPL-CCNC: 66 U/L (ref 46–116)
ALT SERPL W P-5'-P-CCNC: 15 U/L (ref 12–78)
ANION GAP SERPL CALCULATED.3IONS-SCNC: 14 MMOL/L (ref 4–13)
AST SERPL W P-5'-P-CCNC: 98 U/L (ref 5–45)
ATRIAL RATE: 82 BPM
ATRIAL RATE: 85 BPM
BASE EX.OXY STD BLDV CALC-SCNC: 77.7 % (ref 60–80)
BASE EXCESS BLDV CALC-SCNC: 1.1 MMOL/L
BASOPHILS # BLD AUTO: 0.01 THOUSANDS/ΜL (ref 0–0.1)
BASOPHILS NFR BLD AUTO: 0 % (ref 0–1)
BILIRUB SERPL-MCNC: 0.88 MG/DL (ref 0.2–1)
BUN SERPL-MCNC: 14 MG/DL (ref 5–25)
CA-I BLD-SCNC: 0.95 MMOL/L (ref 1.12–1.32)
CALCIUM ALBUM COR SERPL-MCNC: 9.3 MG/DL (ref 8.3–10.1)
CALCIUM SERPL-MCNC: 8.7 MG/DL (ref 8.3–10.1)
CARDIAC TROPONIN I PNL SERPL HS: 7 NG/L
CARDIAC TROPONIN I PNL SERPL HS: 8 NG/L
CHLORIDE SERPL-SCNC: 96 MMOL/L (ref 100–108)
CK MB SERPL-MCNC: 6.3 NG/ML (ref 0–5)
CK MB SERPL-MCNC: <1 % (ref 0–2.5)
CK SERPL-CCNC: 908 U/L (ref 39–308)
CO2 SERPL-SCNC: 24 MMOL/L (ref 21–32)
CREAT SERPL-MCNC: 1.14 MG/DL (ref 0.6–1.3)
CRP SERPL QL: 84.2 MG/L
D DIMER PPP FEU-MCNC: 0.71 UG/ML FEU
EOSINOPHIL # BLD AUTO: 0 THOUSAND/ΜL (ref 0–0.61)
EOSINOPHIL NFR BLD AUTO: 0 % (ref 0–6)
ERYTHROCYTE [DISTWIDTH] IN BLOOD BY AUTOMATED COUNT: 15.1 % (ref 11.6–15.1)
FERRITIN SERPL-MCNC: 181 NG/ML (ref 8–388)
FLUAV RNA RESP QL NAA+PROBE: NEGATIVE
FLUBV RNA RESP QL NAA+PROBE: NEGATIVE
GFR SERPL CREATININE-BSD FRML MDRD: 67 ML/MIN/1.73SQ M
GLUCOSE SERPL-MCNC: 127 MG/DL (ref 65–140)
GLUCOSE SERPL-MCNC: 209 MG/DL (ref 65–140)
HCO3 BLDV-SCNC: 21.7 MMOL/L (ref 24–30)
HCT VFR BLD AUTO: 39.2 % (ref 36.5–49.3)
HGB BLD-MCNC: 12.4 G/DL (ref 12–17)
IMM GRANULOCYTES # BLD AUTO: 0.03 THOUSAND/UL (ref 0–0.2)
IMM GRANULOCYTES NFR BLD AUTO: 1 % (ref 0–2)
INR PPP: 1 (ref 0.84–1.19)
LACTATE SERPL-SCNC: 2.8 MMOL/L (ref 0.5–2)
LACTATE SERPL-SCNC: 2.8 MMOL/L (ref 0.5–2)
LYMPHOCYTES # BLD AUTO: 0.86 THOUSANDS/ΜL (ref 0.6–4.47)
LYMPHOCYTES NFR BLD AUTO: 18 % (ref 14–44)
MAGNESIUM SERPL-MCNC: 1.9 MG/DL (ref 1.6–2.6)
MCH RBC QN AUTO: 26.6 PG (ref 26.8–34.3)
MCHC RBC AUTO-ENTMCNC: 31.6 G/DL (ref 31.4–37.4)
MCV RBC AUTO: 84 FL (ref 82–98)
MONOCYTES # BLD AUTO: 0.53 THOUSAND/ΜL (ref 0.17–1.22)
MONOCYTES NFR BLD AUTO: 11 % (ref 4–12)
NEUTROPHILS # BLD AUTO: 3.31 THOUSANDS/ΜL (ref 1.85–7.62)
NEUTS SEG NFR BLD AUTO: 70 % (ref 43–75)
NRBC BLD AUTO-RTO: 0 /100 WBCS
NT-PROBNP SERPL-MCNC: 103 PG/ML
O2 CT BLDV-SCNC: 14.3 ML/DL
P AXIS: 77 DEGREES
P AXIS: 80 DEGREES
PCO2 BLDV: 24.3 MM HG (ref 42–50)
PH BLDV: 7.57 [PH] (ref 7.3–7.4)
PLATELET # BLD AUTO: 237 THOUSANDS/UL (ref 149–390)
PMV BLD AUTO: 10.1 FL (ref 8.9–12.7)
PO2 BLDV: 39.2 MM HG (ref 35–45)
POTASSIUM SERPL-SCNC: 4.1 MMOL/L (ref 3.5–5.3)
PR INTERVAL: 160 MS
PR INTERVAL: 170 MS
PROCALCITONIN SERPL-MCNC: 0.22 NG/ML
PROT SERPL-MCNC: 7.3 G/DL (ref 6.4–8.2)
PROTHROMBIN TIME: 12.8 SECONDS (ref 11.6–14.5)
QRS AXIS: 74 DEGREES
QRS AXIS: 77 DEGREES
QRSD INTERVAL: 82 MS
QRSD INTERVAL: 84 MS
QT INTERVAL: 406 MS
QT INTERVAL: 426 MS
QTC INTERVAL: 474 MS
QTC INTERVAL: 506 MS
RBC # BLD AUTO: 4.66 MILLION/UL (ref 3.88–5.62)
RSV RNA RESP QL NAA+PROBE: NEGATIVE
SARS-COV-2 RNA RESP QL NAA+PROBE: POSITIVE
SODIUM SERPL-SCNC: 134 MMOL/L (ref 136–145)
T WAVE AXIS: -64 DEGREES
T WAVE AXIS: 115 DEGREES
TRIGL SERPL-MCNC: 92 MG/DL
VENTRICULAR RATE: 82 BPM
VENTRICULAR RATE: 85 BPM
WBC # BLD AUTO: 4.74 THOUSAND/UL (ref 4.31–10.16)

## 2022-01-24 PROCEDURE — 82805 BLOOD GASES W/O2 SATURATION: CPT | Performed by: EMERGENCY MEDICINE

## 2022-01-24 PROCEDURE — 84484 ASSAY OF TROPONIN QUANT: CPT | Performed by: EMERGENCY MEDICINE

## 2022-01-24 PROCEDURE — 82553 CREATINE MB FRACTION: CPT | Performed by: EMERGENCY MEDICINE

## 2022-01-24 PROCEDURE — 83605 ASSAY OF LACTIC ACID: CPT | Performed by: EMERGENCY MEDICINE

## 2022-01-24 PROCEDURE — 82955 ASSAY OF G6PD ENZYME: CPT | Performed by: EMERGENCY MEDICINE

## 2022-01-24 PROCEDURE — 83605 ASSAY OF LACTIC ACID: CPT | Performed by: INTERNAL MEDICINE

## 2022-01-24 PROCEDURE — 94644 CONT INHLJ TX 1ST HOUR: CPT

## 2022-01-24 PROCEDURE — 86140 C-REACTIVE PROTEIN: CPT | Performed by: EMERGENCY MEDICINE

## 2022-01-24 PROCEDURE — 36415 COLL VENOUS BLD VENIPUNCTURE: CPT | Performed by: EMERGENCY MEDICINE

## 2022-01-24 PROCEDURE — 84484 ASSAY OF TROPONIN QUANT: CPT | Performed by: INTERNAL MEDICINE

## 2022-01-24 PROCEDURE — 99285 EMERGENCY DEPT VISIT HI MDM: CPT | Performed by: EMERGENCY MEDICINE

## 2022-01-24 PROCEDURE — 82330 ASSAY OF CALCIUM: CPT | Performed by: EMERGENCY MEDICINE

## 2022-01-24 PROCEDURE — 83735 ASSAY OF MAGNESIUM: CPT | Performed by: EMERGENCY MEDICINE

## 2022-01-24 PROCEDURE — 80053 COMPREHEN METABOLIC PANEL: CPT | Performed by: EMERGENCY MEDICINE

## 2022-01-24 PROCEDURE — 82728 ASSAY OF FERRITIN: CPT | Performed by: EMERGENCY MEDICINE

## 2022-01-24 PROCEDURE — 85025 COMPLETE CBC W/AUTO DIFF WBC: CPT | Performed by: EMERGENCY MEDICINE

## 2022-01-24 PROCEDURE — 82550 ASSAY OF CK (CPK): CPT | Performed by: EMERGENCY MEDICINE

## 2022-01-24 PROCEDURE — 83880 ASSAY OF NATRIURETIC PEPTIDE: CPT | Performed by: EMERGENCY MEDICINE

## 2022-01-24 PROCEDURE — 82948 REAGENT STRIP/BLOOD GLUCOSE: CPT

## 2022-01-24 PROCEDURE — 85610 PROTHROMBIN TIME: CPT | Performed by: EMERGENCY MEDICINE

## 2022-01-24 PROCEDURE — 85379 FIBRIN DEGRADATION QUANT: CPT | Performed by: EMERGENCY MEDICINE

## 2022-01-24 PROCEDURE — 99223 1ST HOSP IP/OBS HIGH 75: CPT | Performed by: INTERNAL MEDICINE

## 2022-01-24 PROCEDURE — 93010 ELECTROCARDIOGRAM REPORT: CPT | Performed by: INTERNAL MEDICINE

## 2022-01-24 PROCEDURE — 94760 N-INVAS EAR/PLS OXIMETRY 1: CPT

## 2022-01-24 PROCEDURE — XW033E5 INTRODUCTION OF REMDESIVIR ANTI-INFECTIVE INTO PERIPHERAL VEIN, PERCUTANEOUS APPROACH, NEW TECHNOLOGY GROUP 5: ICD-10-PCS | Performed by: INTERNAL MEDICINE

## 2022-01-24 PROCEDURE — 71045 X-RAY EXAM CHEST 1 VIEW: CPT

## 2022-01-24 PROCEDURE — 87040 BLOOD CULTURE FOR BACTERIA: CPT | Performed by: EMERGENCY MEDICINE

## 2022-01-24 PROCEDURE — 0241U HB NFCT DS VIR RESP RNA 4 TRGT: CPT | Performed by: EMERGENCY MEDICINE

## 2022-01-24 PROCEDURE — 84478 ASSAY OF TRIGLYCERIDES: CPT | Performed by: EMERGENCY MEDICINE

## 2022-01-24 PROCEDURE — 99285 EMERGENCY DEPT VISIT HI MDM: CPT

## 2022-01-24 PROCEDURE — 93005 ELECTROCARDIOGRAM TRACING: CPT

## 2022-01-24 PROCEDURE — 84145 PROCALCITONIN (PCT): CPT | Performed by: EMERGENCY MEDICINE

## 2022-01-24 RX ORDER — ALBUTEROL SULFATE 90 UG/1
2 AEROSOL, METERED RESPIRATORY (INHALATION) 4 TIMES DAILY
Status: DISCONTINUED | OUTPATIENT
Start: 2022-01-24 | End: 2022-01-28 | Stop reason: HOSPADM

## 2022-01-24 RX ORDER — LORAZEPAM 0.5 MG/1
0.5 TABLET ORAL
Status: DISCONTINUED | OUTPATIENT
Start: 2022-01-24 | End: 2022-01-24

## 2022-01-24 RX ORDER — MAGNESIUM HYDROXIDE/ALUMINUM HYDROXICE/SIMETHICONE 120; 1200; 1200 MG/30ML; MG/30ML; MG/30ML
30 SUSPENSION ORAL EVERY 6 HOURS PRN
Status: DISCONTINUED | OUTPATIENT
Start: 2022-01-24 | End: 2022-01-28 | Stop reason: HOSPADM

## 2022-01-24 RX ORDER — ACETAMINOPHEN 325 MG/1
650 TABLET ORAL ONCE
Status: COMPLETED | OUTPATIENT
Start: 2022-01-24 | End: 2022-01-24

## 2022-01-24 RX ORDER — POLYETHYLENE GLYCOL 3350 17 G/17G
17 POWDER, FOR SOLUTION ORAL DAILY PRN
Status: DISCONTINUED | OUTPATIENT
Start: 2022-01-24 | End: 2022-01-28 | Stop reason: HOSPADM

## 2022-01-24 RX ORDER — DEXAMETHASONE SODIUM PHOSPHATE 4 MG/ML
6 INJECTION, SOLUTION INTRA-ARTICULAR; INTRALESIONAL; INTRAMUSCULAR; INTRAVENOUS; SOFT TISSUE EVERY 24 HOURS
Status: DISCONTINUED | OUTPATIENT
Start: 2022-01-24 | End: 2022-01-28 | Stop reason: HOSPADM

## 2022-01-24 RX ORDER — ONDANSETRON 2 MG/ML
4 INJECTION INTRAMUSCULAR; INTRAVENOUS EVERY 6 HOURS PRN
Status: DISCONTINUED | OUTPATIENT
Start: 2022-01-24 | End: 2022-01-28 | Stop reason: HOSPADM

## 2022-01-24 RX ORDER — ACETAMINOPHEN 325 MG/1
650 TABLET ORAL EVERY 6 HOURS PRN
Status: DISCONTINUED | OUTPATIENT
Start: 2022-01-24 | End: 2022-01-28 | Stop reason: HOSPADM

## 2022-01-24 RX ORDER — CYCLOBENZAPRINE HCL 10 MG
10 TABLET ORAL
Status: DISCONTINUED | OUTPATIENT
Start: 2022-01-24 | End: 2022-01-28 | Stop reason: HOSPADM

## 2022-01-24 RX ORDER — ESCITALOPRAM OXALATE 10 MG/1
20 TABLET ORAL DAILY
Status: DISCONTINUED | OUTPATIENT
Start: 2022-01-25 | End: 2022-01-28 | Stop reason: HOSPADM

## 2022-01-24 RX ORDER — LEVOTHYROXINE SODIUM 0.15 MG/1
150 TABLET ORAL
Status: DISCONTINUED | OUTPATIENT
Start: 2022-01-25 | End: 2022-01-28 | Stop reason: HOSPADM

## 2022-01-24 RX ORDER — SIMETHICONE 80 MG
80 TABLET,CHEWABLE ORAL 4 TIMES DAILY PRN
Status: DISCONTINUED | OUTPATIENT
Start: 2022-01-24 | End: 2022-01-28 | Stop reason: HOSPADM

## 2022-01-24 RX ORDER — LORAZEPAM 2 MG/ML
1 INJECTION INTRAMUSCULAR EVERY 6 HOURS PRN
Status: DISCONTINUED | OUTPATIENT
Start: 2022-01-24 | End: 2022-01-28 | Stop reason: HOSPADM

## 2022-01-24 RX ORDER — SODIUM CHLORIDE 9 MG/ML
50 INJECTION, SOLUTION INTRAVENOUS ONCE
Status: COMPLETED | OUTPATIENT
Start: 2022-01-24 | End: 2022-01-24

## 2022-01-24 RX ORDER — SODIUM CHLORIDE FOR INHALATION 0.9 %
12 VIAL, NEBULIZER (ML) INHALATION ONCE
Status: COMPLETED | OUTPATIENT
Start: 2022-01-24 | End: 2022-01-24

## 2022-01-24 RX ORDER — BUPROPION HYDROCHLORIDE 150 MG/1
300 TABLET ORAL EVERY MORNING
Status: DISCONTINUED | OUTPATIENT
Start: 2022-01-25 | End: 2022-01-28 | Stop reason: HOSPADM

## 2022-01-24 RX ORDER — PANTOPRAZOLE SODIUM 40 MG/1
40 TABLET, DELAYED RELEASE ORAL
Status: DISCONTINUED | OUTPATIENT
Start: 2022-01-25 | End: 2022-01-28 | Stop reason: HOSPADM

## 2022-01-24 RX ADMIN — LORAZEPAM 1 MG: 2 INJECTION INTRAMUSCULAR; INTRAVENOUS at 21:37

## 2022-01-24 RX ADMIN — ALBUTEROL SULFATE 2 PUFF: 90 AEROSOL, METERED RESPIRATORY (INHALATION) at 21:38

## 2022-01-24 RX ADMIN — SODIUM CHLORIDE 50 ML/HR: 0.9 INJECTION, SOLUTION INTRAVENOUS at 18:50

## 2022-01-24 RX ADMIN — REMDESIVIR 200 MG: 100 INJECTION, POWDER, LYOPHILIZED, FOR SOLUTION INTRAVENOUS at 18:49

## 2022-01-24 RX ADMIN — ACETAMINOPHEN 650 MG: 325 TABLET, FILM COATED ORAL at 15:33

## 2022-01-24 RX ADMIN — CARBIDOPA AND LEVODOPA 2 TABLET: 25; 100 TABLET ORAL at 18:48

## 2022-01-24 RX ADMIN — SODIUM CHLORIDE, SODIUM LACTATE, POTASSIUM CHLORIDE, AND CALCIUM CHLORIDE 1000 ML: .6; .31; .03; .02 INJECTION, SOLUTION INTRAVENOUS at 15:34

## 2022-01-24 RX ADMIN — ISODIUM CHLORIDE 12 ML: 0.03 SOLUTION RESPIRATORY (INHALATION) at 14:37

## 2022-01-24 RX ADMIN — IPRATROPIUM BROMIDE 1.5 MG: 0.5 SOLUTION RESPIRATORY (INHALATION) at 14:36

## 2022-01-24 RX ADMIN — DEXAMETHASONE SODIUM PHOSPHATE 6 MG: 4 INJECTION, SOLUTION INTRAMUSCULAR; INTRAVENOUS at 18:48

## 2022-01-24 RX ADMIN — CYCLOBENZAPRINE HYDROCHLORIDE 10 MG: 10 TABLET, FILM COATED ORAL at 21:37

## 2022-01-24 RX ADMIN — ALBUTEROL SULFATE 10 MG: 2.5 SOLUTION RESPIRATORY (INHALATION) at 14:36

## 2022-01-24 RX ADMIN — ALBUTEROL SULFATE 2 PUFF: 90 AEROSOL, METERED RESPIRATORY (INHALATION) at 18:47

## 2022-01-24 RX ADMIN — INSULIN LISPRO 1 UNITS: 100 INJECTION, SOLUTION INTRAVENOUS; SUBCUTANEOUS at 21:38

## 2022-01-24 NOTE — TELEPHONE ENCOUNTER
[place], pt's daughter advises both her parents are patient's here  They both have covid symptoms, have not been tested  Pt has mucus and throwing up and trouble breath  Jhonny Rivera advises he is throwing up because of mucus  Pt has copd and emphazema  Pt is taking over the counter not helping    Jhonny Rivera asks you call her [phone] today

## 2022-01-24 NOTE — TELEPHONE ENCOUNTER
Gabby Bashir calls back she is requesting that you please call her regarding him  His breathing isnt very good with covid, dry mouth, cant sleep, drinking a lot,fatigue  Fatigue  I wanted to put him for a virtual visit with another provider but they declined   They only wanted to talk with you - please call them at [phone]

## 2022-01-24 NOTE — ASSESSMENT & PLAN NOTE
· Hypoxic, this is likely due to COVID pneumonia  · No wheezing or rhonchi on exam  · Continue with breathing treatments and home medications

## 2022-01-24 NOTE — ASSESSMENT & PLAN NOTE
· Acute hypoxic respiratory failure  · Hypoxic to 80s on room air and COVID positive  · Start Decadron and remdesivir  · To note, patient does not have an allergy to steroids but they make him agitated  · Ativan p r n  For agitation  · Lovenox for DVT prophylaxis  · Protonix for GI prophylaxis along with steroids  · Insulin sliding scale while on steroids  · Albuterol p r n

## 2022-01-24 NOTE — ED PROVIDER NOTES
History  Chief Complaint   Patient presents with    Shortness of Breath     brought in via EMS due to SOB, 90% on RA at home, 96% on 2L, hx of COPD     HPI    Prior to Admission Medications   Prescriptions Last Dose Informant Patient Reported? Taking?    LORazepam (Ativan) 0 5 mg tablet   No No   Sig: Take 1 tablet (0 5 mg total) by mouth daily at bedtime   Umeclidinium-Vilanterol (ANORO ELLIPTA) 62 5-25 MCG/INH AEPB  Self No Yes   Sig: Inhale 1 puff daily   albuterol (PROVENTIL HFA,VENTOLIN HFA) 90 mcg/act inhaler  Self Yes Yes   Sig: Inhale 2 puffs every 6 (six) hours as needed for wheezing   buPROPion (WELLBUTRIN XL) 300 mg 24 hr tablet   No Yes   Sig: Take 1 tablet (300 mg total) by mouth every morning   carbidopa-levodopa (SINEMET)  mg per tablet   No Yes   Sig: Take 2 tablets by mouth 3 (three) times a day   cyclobenzaprine (FLEXERIL) 10 mg tablet   No Yes   Sig: TAKE 1 TABLET BY MOUTH DAILY AT BEDTIME   escitalopram (LEXAPRO) 20 mg tablet   No Yes   Sig: Take 2 daily to equal 40 mg   fexofenadine (ALLEGRA) 180 MG tablet   No Yes   Sig: Take 1 tablet (180 mg total) by mouth daily   fluticasone (FLONASE) 50 mcg/act nasal spray   Yes Yes   Si spray into each nostril daily   galantamine (RAZADYNE ER) 8 MG 24 hr capsule   No Yes   Sig: Take 1 capsule (8 mg total) by mouth daily with breakfast   glimepiride (AMARYL) 1 mg tablet   No Yes   Sig: Take 1 tablet (1 mg total) by mouth daily with breakfast   levothyroxine 150 mcg tablet  Self Yes Yes   Sig: Take 150 mcg by mouth daily   losartan-hydrochlorothiazide (HYZAAR) 50-12 5 mg per tablet   No Yes   Sig: TAKE 1 TABLET EVERY DAY   metFORMIN (GLUCOPHAGE) 500 mg tablet   No Yes   Sig: TAKE 1 TABLET (500 MG TOTAL) BY MOUTH 2 (TWO) TIMES A DAY   testosterone (ANDROGEL) 1%  Self Yes Yes   Sig: Place 0 05 g on the skin      Facility-Administered Medications: None       Past Medical History:   Diagnosis Date    Alopecia     Arthritis     Back pain     Benign essential hypertension     Chronic obstructive pulmonary disease (COPD) (HCC)     Depression     Diabetes mellitus (HCC)     Emphysema, interstitial (HCC)     Fatigue     Hypercholesterolemia     Hypersomnia, persistent     Hypothyroidism     Low testosterone     Memory loss     Oral candidiasis 5/11/2020    Sinusitis     Thyroid goiter        Past Surgical History:   Procedure Laterality Date    ADENOIDECTOMY      APPENDECTOMY      HERNIA REPAIR      TONSILLECTOMY         Family History   Problem Relation Age of Onset    Hypertension Father     Aneurysm Father         cerebral    Lupus Mother     Hypothyroidism Daughter     Hashimoto's thyroiditis Daughter      I have reviewed and agree with the history as documented  E-Cigarette/Vaping     E-Cigarette/Vaping Substances     Social History     Tobacco Use    Smoking status: Former Smoker    Smokeless tobacco: Never Used   Substance Use Topics    Alcohol use: No    Drug use: No       Review of Systems    Physical Exam  Physical Exam  Vitals and nursing note reviewed  Constitutional:       General: He is not in acute distress  Appearance: He is well-developed  He is obese  HENT:      Head: Normocephalic and atraumatic  Eyes:      Conjunctiva/sclera: Conjunctivae normal       Pupils: Pupils are equal, round, and reactive to light  Neck:      Trachea: No tracheal deviation  Cardiovascular:      Rate and Rhythm: Normal rate and regular rhythm  Heart sounds: Normal heart sounds  Pulmonary:      Effort: Pulmonary effort is normal  No tachypnea, accessory muscle usage or respiratory distress  Breath sounds: Decreased breath sounds (mild, diffuse) and wheezing (mild, diffuse) present  Comments: Hypoxia on room air  Conversational dyspnea  No respiratory distress  Coughs with deep breaths  Abdominal:      General: There is no distension  Palpations: Abdomen is soft  Tenderness:  There is no abdominal tenderness  Musculoskeletal:      Cervical back: Normal range of motion  Right lower leg: No edema  Left lower leg: No edema  Skin:     General: Skin is warm and dry  Neurological:      Mental Status: He is alert and oriented to person, place, and time  GCS: GCS eye subscore is 4  GCS verbal subscore is 5  GCS motor subscore is 6     Psychiatric:         Behavior: Behavior normal          Vital Signs  ED Triage Vitals   Temperature Pulse Respirations Blood Pressure SpO2   01/24/22 1343 01/24/22 1343 01/24/22 1343 01/24/22 1343 01/24/22 1341   100 2 °F (37 9 °C) 80 20 (!) 190/83 (!) 87 %      Temp Source Heart Rate Source Patient Position - Orthostatic VS BP Location FiO2 (%)   01/24/22 1343 01/24/22 1343 01/24/22 1343 01/24/22 1343 --   Oral Monitor Lying Right arm       Pain Score       01/24/22 1533       Med Not Given for Pain - for MAR use only           Vitals:    01/24/22 1343 01/24/22 1536   BP: (!) 190/83 154/68   Pulse: 80 86   Patient Position - Orthostatic VS: Lying Sitting         Visual Acuity      ED Medications  Medications   lactated ringers bolus 1,000 mL (1,000 mL Intravenous New Bag 1/24/22 1534)   albuterol inhalation solution 10 mg (10 mg Nebulization Given 1/24/22 1436)   ipratropium (ATROVENT) 0 02 % inhalation solution 1 5 mg (1 5 mg Nebulization Given 1/24/22 1436)   sodium chloride 0 9 % inhalation solution 12 mL (12 mL Nebulization Given 1/24/22 1437)   acetaminophen (TYLENOL) tablet 650 mg (650 mg Oral Given 1/24/22 1533)       Diagnostic Studies  Results Reviewed     Procedure Component Value Units Date/Time    Magnesium [[id number]]  (Normal) Collected: 01/24/22 1411    Lab Status: Final result Specimen: Blood from Arm, Left Updated: 01/24/22 1548     Magnesium 1 9 mg/dL     C-reactive protein [[id number]]  (Abnormal) Collected: 01/24/22 1411    Lab Status: Final result Specimen: Blood from Arm, Left Updated: 01/24/22 1548     CRP 84 2 mg/L     NT-BNP PRO [[id number]] (Normal) Collected: 01/24/22 1411    Lab Status: Final result Specimen: Blood from Arm, Left Updated: 01/24/22 1548     NT-proBNP 103 pg/mL     Triglycerides [[id number]]  (Normal) Collected: 01/24/22 1411    Lab Status: Final result Specimen: Blood from Arm, Left Updated: 01/24/22 1548     Triglycerides 92 mg/dL     CK (with reflex to MB) [[id number]]  (Abnormal) Collected: 01/24/22 1411    Lab Status: Final result Specimen: Blood from Arm, Left Updated: 01/24/22 1548     Total  U/L     CKMB [[id number]] Collected: 01/24/22 1411    Lab Status: In process Specimen: Blood from Arm, Left Updated: 01/24/22 1548    Procalcitonin with AM Reflex [[id number]] Collected: 01/24/22 1537    Lab Status: In process Specimen: Blood from Arm, Left Updated: 01/24/22 1540    Ferritin [[id number]] Collected: 01/24/22 1537    Lab Status:  In process Specimen: Blood from Arm, Left Updated: 01/24/22 1539    Comprehensive metabolic panel [[id number]]  (Abnormal) Collected: 01/24/22 1411    Lab Status: Final result Specimen: Blood from Arm, Left Updated: 01/24/22 1525     Sodium 134 mmol/L      Potassium 4 1 mmol/L      Chloride 96 mmol/L      CO2 24 mmol/L      ANION GAP 14 mmol/L      BUN 14 mg/dL      Creatinine 1 14 mg/dL      Glucose 127 mg/dL      Calcium 8 7 mg/dL      Corrected Calcium 9 3 mg/dL      AST 98 U/L      ALT 15 U/L      Alkaline Phosphatase 66 U/L      Total Protein 7 3 g/dL      Albumin 3 3 g/dL      Total Bilirubin 0 88 mg/dL      eGFR 67 ml/min/1 73sq m     Narrative:      [place] guidelines for Chronic Kidney Disease (CKD):     Stage 1 with normal or high GFR (GFR > 90 mL/min/1 73 square meters)    Stage 2 Mild CKD (GFR = 60-89 mL/min/1 73 square meters)    Stage 3A Moderate CKD (GFR = 45-59 mL/min/1 73 square meters)    Stage 3B Moderate CKD (GFR = 30-44 mL/min/1 73 square meters)    Stage 4 Severe CKD (GFR = 15-29 mL/min/1 73 square meters)    Stage 5 End Stage CKD (GFR <15 mL/min/1 73 square meters)  Note: GFR calculation is accurate only with a steady state creatinine    Lactic acid, plasma [[id number]]  (Abnormal) Collected: 01/24/22 1411    Lab Status: Final result Specimen: Blood from Arm, Left Updated: 01/24/22 1520     LACTIC ACID 2 8 mmol/L     Narrative:      Result may be elevated if tourniquet was used during collection  Lactic acid 2 Hours [[id number]]     Lab Status: No result Specimen: Blood     COVID/FLU/RSV - 2 hour TAT [[id number]]  (Abnormal) Collected: 01/24/22 1411    Lab Status: Final result Specimen: Nares from Nose Updated: 01/24/22 1504     SARS-CoV-2 Positive     INFLUENZA A PCR Negative     INFLUENZA B PCR Negative     RSV PCR Negative    Narrative:      FOR PEDIATRIC PATIENTS - copy/paste COVID Guidelines URL to browser: [email or web address]    SARS-CoV-2 assay is a Nucleic Acid Amplification assay intended for the  qualitative detection of nucleic acid from SARS-CoV-2 in nasopharyngeal  swabs  Results are for the presumptive identification of SARS-CoV-2 RNA  Positive results are indicative of infection with SARS-CoV-2, the virus  causing COVID-19, but do not rule out bacterial infection or co-infection  with other viruses  Laboratories within the [place] and its  territories are required to report all positive results to the appropriate  public health authorities  Negative results do not preclude SARS-CoV-2  infection and should not be used as the sole basis for treatment or other  patient management decisions  Negative results must be combined with  clinical observations, patient history, and epidemiological information  This test has not been FDA cleared or approved  This test has been authorized by FDA under an Emergency Use Authorization  (EUA)   This test is only authorized for the duration of time the  declaration that circumstances exist justifying the authorization of the  emergency use of an in vitro diagnostic tests for detection of SARS-CoV-2  virus and/or diagnosis of COVID-19 infection under section 564(b)(1) of  the Act, 21 U  S C  408ROH-1(J)(1), unless the authorization is terminated  or revoked sooner  The test has been validated but independent review by FDA  and CLIA is pending  Test performed using [employer]: This RT-PCR assay targets N2,  a region unique to SARS-CoV-2  A conserved region in the E-gene was chosen  for pan-Sarbecovirus detection which includes SARS-CoV-2      HS Troponin 0hr (reflex protocol) [[id number]] Collected: 01/24/22 1411    Lab Status: Final result Specimen: Blood from Arm, Left Updated: 01/24/22 1453     hs TnI 0hr 7 ng/L     HS Troponin I 2hr [[id number]]     Lab Status: No result Specimen: Blood     D-dimer, quantitative [[id number]]  (Abnormal) Collected: 01/24/22 1411    Lab Status: Final result Specimen: Blood from Arm, Left Updated: 01/24/22 1451     D-Dimer, Quant 0 71 ug/ml FEU     Protime-INR [[id number]]  (Normal) Collected: 01/24/22 1411    Lab Status: Final result Specimen: Blood from Arm, Left Updated: 01/24/22 1442     Protime 12 8 seconds      INR 1 00    Calcium, ionized [[id number]]  (Abnormal) Collected: 01/24/22 1411    Lab Status: Final result Specimen: Blood from Arm, Left Updated: 01/24/22 1431     Calcium, Ionized 0 95 mmol/L     Blood gas, venous [[id number]]  (Abnormal) Collected: 01/24/22 1411    Lab Status: Final result Specimen: Blood from Arm, Left Updated: 01/24/22 1427     pH, [place] 7 568     pCO2, [place] 24 3 mm Hg      pO2, [place] 39 2 mm Hg      HCO3, [place] 21 7 mmol/L      Base Excess, [place] 1 1 mmol/L      O2 Content, [place] 14 3 ml/dL      O2 HGB, VENOUS 77 7 %     CBC and differential [[id number]]  (Abnormal) Collected: 01/24/22 1411    Lab Status: Final result Specimen: Blood from Arm, Left Updated: 01/24/22 1423     WBC 4 74 Thousand/uL      RBC 4 66 Million/uL      Hemoglobin 12 4 g/dL      Hematocrit 39 2 %      MCV 84 fL MCH 26 6 pg      MCHC 31 6 g/dL      RDW 15 1 %      MPV 10 1 fL      Platelets 734 Thousands/uL      nRBC 0 /100 WBCs      Neutrophils Relative 70 %      Immat GRANS % 1 %      Lymphocytes Relative 18 %      Monocytes Relative 11 %      Eosinophils Relative 0 %      Basophils Relative 0 %      Neutrophils Absolute 3 31 Thousands/µL      Immature Grans Absolute 0 03 Thousand/uL      Lymphocytes Absolute 0 86 Thousands/µL      Monocytes Absolute 0 53 Thousand/µL      Eosinophils Absolute 0 00 Thousand/µL      Basophils Absolute 0 01 Thousands/µL     Glucose 6 phosphate dehydrogenase [[id number]] Collected: 01/24/22 1411    Lab Status: In process Specimen: Blood from Arm, Left Updated: 01/24/22 1420    Blood culture #1 [[id number]] Collected: 01/24/22 1411    Lab Status: In process Specimen: Blood from Arm, Left Updated: 01/24/22 1419    Blood culture #2 [[id number]] Collected: 01/24/22 1411    Lab Status:  In process Specimen: Blood from Arm, Left Updated: 01/24/22 1419                 XR chest 1 view portable   ED Interpretation by Heydi Reed MD (01/24 1437)   COVID      Final Result by Clarissa Muir MD (01/24 1516)      Findings suspicious for early bibasal Covid pneumonia                  Workstation performed: [id number]                    Procedures  ECG 12 Lead Documentation Only    Date/Time: 1/24/2022 2:10 PM  Performed by: Heydi Reed MD  Authorized by: Heydi Reed MD     Indications / Diagnosis:  SOB  ECG reviewed by me, the ED Provider: yes    Patient location:  ED  Previous ECG:     Previous ECG:  Compared to current    Comparison ECG info:  11/28/18  Interpretation:     Interpretation: abnormal    Quality:     Tracing quality:  Limited by artifact (poor baseline)  Rate:     ECG rate:  85    ECG rate assessment: normal    Rhythm:     Rhythm: sinus rhythm    Ectopy:     Ectopy: none    QRS:     QRS axis:  Normal    QRS intervals: Normal  Conduction:     Conduction: normal    ST segments:     ST segments:  Non-specific    Depression:  II, V3, V4, V5 and I (1 mm or less)  T waves:     T waves: flattening      Flattening:  AVL and III             ED Course                                             MDM  Number of Diagnoses or Management Options  Acute hypoxemic respiratory failure due to COVID-19 [facility]): new and requires workup  COPD exacerbation ([facility] [address] ): new and requires workup  Diarrhea: new and requires workup  Elevated AST (SGOT): new and requires workup  Elevated CK: new and requires workup  Elevated C-reactive protein (CRP): new and requires workup  Elevated d-dimer: new and requires workup  Elevated lactic acid level: new and requires workup  Pneumonia due to COVID-19 virus: new and requires workup  Diagnosis management comments: This is a 61-year-old male who presents here today with generalized malaise  He says he began getting sick about 12 days ago, and thinks that he has COVID  He is having nasal congestion, coughing, shortness of breath, myalgias  He does endorse vomiting, but says it is more post-tussive, and has occasional diarrhea  He denies any abdominal pain  He denies any other nausea  He has not been eating due to lack of appetite but is able to drink fluids without issues  He says he has not taken or done anything for his symptoms  He does have a history of COPD and has been using his albuterol, with less and less improvement of symptoms  He denies fevers  He is on vaccinated against COVID  He has no chest pain  He was reportedly hypoxic with EMS prior to arrival     Review of systems:  Otherwise negative unless stated as above    He is well appearing, in no distress  He does have mild conversational dyspnea, speaking in slightly short sentences  He is mildly decreased breath sounds, mild diffuse wheezing, and consult trying to take deep breaths  He is hypoxic to 87% on room air    Exam is otherwise unremarkable  We will get a chest x-ray, and lab work as per COVID protocol to evaluate  We will treat him with nebulizers  He becomes combative with steroids, so we will avoid treatment with this for has COPD exacerbation  He will require admission given his hypoxia  Chest x-ray was reviewed myself and radiologist, and does show signs of COVID pneumonia  He stops his neb treatment part way through because it was "too much" and is unable to tell me what this means  He does have improved air movement and wheezing, and is coughing less with deep breath, though does still have mild conversational dyspnea  Lactic acid was mildly elevated, however his creatinine is not significantly off from baseline to suggest BETO, but is likely due to hypoxia  He does not otherwise meet SIRS criteria to suggest that this is severe sepsis or septic shock  I updated the patient on findings and plan of care, and he expresses understanding         Amount and/or Complexity of Data Reviewed  Clinical lab tests: ordered and reviewed  Tests in the radiology section of CPT®: reviewed and ordered  Discuss the patient with other providers: yes  Independent visualization of images, tracings, or specimens: yes        Disposition  Final diagnoses:   Acute hypoxemic respiratory failure due to COVID-19 (HCC)   Diarrhea   Elevated lactic acid level   Pneumonia due to COVID-19 virus   COPD exacerbation (HCC)   Elevated C-reactive protein (CRP)   Elevated CK   Elevated AST (SGOT)   Elevated d-dimer     Time reflects when diagnosis was documented in both MDM as applicable and the Disposition within this note     Time User Action Codes Description Comment    1/24/2022  3:17 PM Kenia Adkins Add [U07 1,  J96 01] Acute hypoxemic respiratory failure due to COVID-19 [facility])     1/24/2022  3:55 PM Kenia Adkins Add [R19 7] Diarrhea     1/24/2022  3:55 PM Kenia Adkins Add [R79 89] Elevated lactic acid level 1/24/2022  3:55 PM Lidia Adkins Add [U07 1,  J12 82] Pneumonia due to COVID-19 virus     1/24/2022  3:55 PM Lidia Adkins Add [J44 1] COPD exacerbation ([address] )     1/24/2022  3:55 PM Lidia Adkins Add [R79 82] Elevated C-reactive protein (CRP)     1/24/2022  3:56 PM Lidia Adkins Add [R74 8] Elevated CK     1/24/2022  3:56 PM Lidia Adkins Add [R74 01] Elevated AST (SGOT)     1/24/2022  3:57 PM Lidia Adkins Add [R79 89] Elevated d-dimer       ED Disposition     ED Disposition Condition Date/Time Comment    Admit Stable Mon Jan 24, 2022  3:17 PM Case was discussed with Dr Gregg Ríos and the patient's admission status was agreed to be Admission Status: inpatient status to the service of Dr Gregg Ríos  Follow-up Information    None         Patient's Medications   Discharge Prescriptions    No medications on file       No discharge procedures on file      PDMP Review       Value Time User    PDMP Reviewed  Yes 12/16/2021  8:26 AM Odalis Milan MD          ED Provider  Electronically Signed by           Rosa Marc MD  01/24/22 7435

## 2022-01-24 NOTE — H&P
[facility]  H&P- Cee Beckham 1955, 61 y o  male MRN: [id number]  Unit/Bed#: -01 Encounter: [id number]  Primary Care Provider: No primary care provider on file  Date and time admitted to hospital: 1/24/2022  1:36 PM    Pneumonia due to COVID-19 virus  Assessment & Plan  · Hypoxic to 80s on room air and COVID positive  · Start Decadron and remdesivir  · To note, patient does not have an allergy to steroids but they make him agitated  · Ativan p r n  For agitation  · Lovenox for DVT prophylaxis  · Protonix for GI prophylaxis along with steroids  · Insulin sliding scale while on steroids  · Albuterol p r n  Hypothyroidism  Assessment & Plan  · Continue medication    Chronic obstructive pulmonary disease (HCC)  Assessment & Plan  · Hypoxic, this is likely due to COVID pneumonia  · No wheezing or rhonchi on exam  · Continue with breathing treatments and home medications      VTE Pharmacologic Prophylaxis: VTE Score: 3 Moderate Risk (Score 3-4) - Pharmacological DVT Prophylaxis Ordered: enoxaparin (Lovenox)  Code Status: Level 1 - Full Code   Discussion with family: Patient declined call to   Anticipated Length of Stay: Patient will be admitted on an inpatient basis with an anticipated length of stay of greater than 2 midnights secondary to COVID  Total Time for Visit, including Counseling / Coordination of Care:     Greater than 50% of this total time spent on direct patient counseling and coordination of care  Chief Complaint:  Shortness of breath    History of Present Illness:  Cee Beckham is a 61 y o  male with a PMH of COPD and memory impairment who presents with shortness of breath  Patient states that today he became short of breath at home and had chest pain when his breathing  Patient states that his wife is COVID positive  Due to shortness of breath, prompted patient to come to the ER he was found to be COVID positive    Patient denies any history of blood clots  In the ED, he was hypoxic to the 80s on room air required supplemental oxygen  Chest x-ray showed early bibasal COVID pneumonia  Decadron remdesivir started  Patient does not have an allergy for say to steroids, he states that he does get agitated when you receive them  Review of Systems:  Review of Systems   Constitutional: Negative for chills and fever  HENT: Negative for ear pain and sore throat  Eyes: Negative for pain and visual disturbance  Respiratory: Positive for cough, chest tightness and shortness of breath  Cardiovascular: Negative for chest pain and palpitations  Gastrointestinal: Negative for abdominal pain and vomiting  Genitourinary: Negative for dysuria and hematuria  Musculoskeletal: Negative for arthralgias and back pain  Skin: Negative for color change and rash  Neurological: Negative for seizures and syncope  All other systems reviewed and are negative  Past Medical and Surgical History:   Past Medical History:   Diagnosis Date    Alopecia     Arthritis     Back pain     Benign essential hypertension     Chronic obstructive pulmonary disease (COPD) ([facility] [address] )     Depression     Diabetes mellitus (HCC)     Emphysema, interstitial (HCC)     Fatigue     Hypercholesterolemia     Hypersomnia, persistent     Hypothyroidism     Low testosterone     Memory loss     Oral candidiasis 5/11/2020    Sinusitis     Thyroid goiter        Past Surgical History:   Procedure Laterality Date    ADENOIDECTOMY      APPENDECTOMY      HERNIA REPAIR      TONSILLECTOMY         Meds/Allergies:  Prior to Admission medications    Medication Sig Start Date End Date Taking?  Authorizing Provider   albuterol (PROVENTIL HFA,VENTOLIN HFA) 90 mcg/act inhaler Inhale 2 puffs every 6 (six) hours as needed for wheezing   Yes Historical Provider, MD   buPROPion (WELLBUTRIN XL) 300 mg 24 hr tablet Take 1 tablet (300 mg total) by mouth every morning 3/12/21 3/12/22 Yes VIVIANA South   carbidopa-levodopa (SINEMET)  mg per tablet Take 2 tablets by mouth 3 (three) times a day 7/15/21 8/9/22 Yes Mignon Sevilla MD   cyclobenzaprine (FLEXERIL) 10 mg tablet TAKE 1 TABLET BY MOUTH DAILY AT BEDTIME 6/30/21  Yes Joe Briggs MD   escitalopram (LEXAPRO) 20 mg tablet Take 2 daily to equal 40 mg 1/20/22  Yes VIVIANA South   fexofenadine (ALLEGRA) 180 MG tablet Take 1 tablet (180 mg total) by mouth daily 3/12/21 3/12/22 Yes VIVIANA South   fluticasone (FLONASE) 50 mcg/act nasal spray 1 spray into each nostril daily   Yes Historical Provider, MD   galantamine (RAZADYNE ER) 8 MG 24 hr capsule Take 1 capsule (8 mg total) by mouth daily with breakfast 11/19/21  Yes Sravani Davidson PA-C   glimepiride (AMARYL) 1 mg tablet Take 1 tablet (1 mg total) by mouth daily with breakfast 2/10/21  Yes Joe Briggs MD   levothyroxine 150 mcg tablet Take 150 mcg by mouth daily   Yes Historical Provider, MD   losartan-hydrochlorothiazide (HYZAAR) 50-12 5 mg per tablet TAKE 1 TABLET EVERY DAY 5/6/21  Yes Joe Briggs MD   metFORMIN (GLUCOPHAGE) 500 mg tablet TAKE 1 TABLET (500 MG TOTAL) BY MOUTH 2 (TWO) TIMES A DAY 5/6/21  Yes Joe Briggs MD   testosterone (ANDROGEL) 1% Place 0 05 g on the skin 1/11/18  Yes Historical Provider, MD   Umeclidinium-Vilanterol (ANORO ELLIPTA) 62 5-25 MCG/INH AEPB Inhale 1 puff daily 3/6/18  Yes Joe Briggs MD   LORazepam (Ativan) 0 5 mg tablet Take 1 tablet (0 5 mg total) by mouth daily at bedtime 12/16/21 1/15/22  Joe Briggs MD     I have reviewed home medications using recent Epic encounter  Allergies:    Allergies   Allergen Reactions    Cimetidine     Codeine     Contrast  [Iodinated Diagnostic Agents]     Metrizamide     Prednisone Other (See Comments)     ALL STEROIDS  COMBATIVE    Simvastatin Other (See Comments)     ALL STATINS  MUSCLE CRAMPS    Statins        Social History:  Marital Status: /Civil Union Occupation:   Patient Pre-hospital Living Situation: Home  Patient Pre-hospital Level of Mobility: unable to be assessed at time of evaluation  Patient Pre-hospital Diet Restrictions:   Substance Use History:   Social History     Substance and Sexual Activity   Alcohol Use No     Social History     Tobacco Use   Smoking Status Former Smoker   Smokeless Tobacco Never Used     Social History     Substance and Sexual Activity   Drug Use No       Family History:  Family History   Problem Relation Age of Onset    Hypertension Father     Aneurysm Father         cerebral    Lupus Mother     Hypothyroidism Daughter     Hashimoto's thyroiditis Daughter        Physical Exam:     Vitals:   Blood Pressure: 154/68 (01/24/22 1536)  Pulse: 86 (01/24/22 1536)  Temperature: 100 2 °F (37 9 °C) (01/24/22 1343)  Temp Source: Oral (01/24/22 1343)  Respirations: (!) 24 (01/24/22 1536)  SpO2: 93 % (01/24/22 1536)    Physical Exam  Vitals and nursing note reviewed  Constitutional:       Appearance: He is well-developed  HENT:      Head: Normocephalic and atraumatic  Eyes:      Conjunctiva/sclera: Conjunctivae normal    Cardiovascular:      Rate and Rhythm: Normal rate and regular rhythm  Heart sounds: No murmur heard  Pulmonary:      Effort: Pulmonary effort is normal  No respiratory distress  Breath sounds: Normal breath sounds  Abdominal:      Palpations: Abdomen is soft  Tenderness: There is no abdominal tenderness  Musculoskeletal:      Cervical back: Neck supple  Skin:     General: Skin is warm and dry  Neurological:      Mental Status: He is alert           Additional Data:     Lab Results:  Results from last 7 days   Lab Units 01/24/22  1411   WBC Thousand/uL 4 74   HEMOGLOBIN g/dL 12 4   HEMATOCRIT % 39 2   PLATELETS Thousands/uL 237   NEUTROS PCT % 70   LYMPHS PCT % 18   MONOS PCT % 11   EOS PCT % 0     Results from last 7 days   Lab Units 01/24/22  1411   SODIUM mmol/L 134*   POTASSIUM mmol/L 4 1   CHLORIDE mmol/L 96*   CO2 mmol/L 24   BUN mg/dL 14   CREATININE mg/dL 1 14   ANION GAP mmol/L 14*   CALCIUM mg/dL 8 7   ALBUMIN g/dL 3 3*   TOTAL BILIRUBIN mg/dL 0 88   ALK PHOS U/L 66   ALT U/L 15   AST U/L 98*   GLUCOSE RANDOM mg/dL 127     Results from last 7 days   Lab Units 01/24/22  1411   INR  1 00             Results from last 7 days   Lab Units 01/24/22  1537 01/24/22  1411   LACTIC ACID mmol/L  --  2 8*   PROCALCITONIN ng/ml 0 22  --        Imaging: Reviewed radiology reports from this admission including: chest xray  XR chest 1 view portable   ED Interpretation by Mary Kate Manrique MD (01/24 0307)   COVID      Final Result by Gerhardt Brier, MD (01/24 1516)      Findings suspicious for early bibasal Covid pneumonia                  Workstation performed: [id number]             EKG and Other Studies Reviewed on Admission:   · EKG:        ** Please Note: This note has been constructed using a voice recognition system   **

## 2022-01-25 PROBLEM — E87.2 LACTIC ACID ACIDOSIS: Status: ACTIVE | Noted: 2022-01-25

## 2022-01-25 PROBLEM — J96.01 ACUTE RESPIRATORY FAILURE WITH HYPOXIA (HCC): Status: ACTIVE | Noted: 2022-01-25

## 2022-01-25 PROBLEM — E87.20 LACTIC ACID ACIDOSIS: Status: ACTIVE | Noted: 2022-01-25

## 2022-01-25 LAB
ALBUMIN SERPL BCP-MCNC: 2.9 G/DL (ref 3.5–5)
ALP SERPL-CCNC: 62 U/L (ref 46–116)
ALT SERPL W P-5'-P-CCNC: 47 U/L (ref 12–78)
ANION GAP SERPL CALCULATED.3IONS-SCNC: 7 MMOL/L (ref 4–13)
AST SERPL W P-5'-P-CCNC: 91 U/L (ref 5–45)
BASE EX.OXY STD BLDV CALC-SCNC: 70.8 % (ref 60–80)
BASE EXCESS BLDV CALC-SCNC: 1.4 MMOL/L
BASOPHILS # BLD AUTO: 0 THOUSANDS/ΜL (ref 0–0.1)
BASOPHILS NFR BLD AUTO: 0 % (ref 0–1)
BILIRUB SERPL-MCNC: 0.42 MG/DL (ref 0.2–1)
BUN SERPL-MCNC: 12 MG/DL (ref 5–25)
CALCIUM ALBUM COR SERPL-MCNC: 9.5 MG/DL (ref 8.3–10.1)
CALCIUM SERPL-MCNC: 8.6 MG/DL (ref 8.3–10.1)
CHLORIDE SERPL-SCNC: 102 MMOL/L (ref 100–108)
CO2 SERPL-SCNC: 29 MMOL/L (ref 21–32)
CREAT SERPL-MCNC: 1.08 MG/DL (ref 0.6–1.3)
EOSINOPHIL # BLD AUTO: 0 THOUSAND/ΜL (ref 0–0.61)
EOSINOPHIL NFR BLD AUTO: 0 % (ref 0–6)
ERYTHROCYTE [DISTWIDTH] IN BLOOD BY AUTOMATED COUNT: 14.9 % (ref 11.6–15.1)
GFR SERPL CREATININE-BSD FRML MDRD: 72 ML/MIN/1.73SQ M
GLUCOSE SERPL-MCNC: 105 MG/DL (ref 65–140)
GLUCOSE SERPL-MCNC: 135 MG/DL (ref 65–140)
GLUCOSE SERPL-MCNC: 173 MG/DL (ref 65–140)
GLUCOSE SERPL-MCNC: 184 MG/DL (ref 65–140)
GLUCOSE SERPL-MCNC: 210 MG/DL (ref 65–140)
HCO3 BLDV-SCNC: 26.8 MMOL/L (ref 24–30)
HCT VFR BLD AUTO: 35 % (ref 36.5–49.3)
HGB BLD-MCNC: 11.2 G/DL (ref 12–17)
IMM GRANULOCYTES # BLD AUTO: 0.01 THOUSAND/UL (ref 0–0.2)
IMM GRANULOCYTES NFR BLD AUTO: 0 % (ref 0–2)
LACTATE SERPL-SCNC: 1.2 MMOL/L (ref 0.5–2)
LACTATE SERPL-SCNC: 2.1 MMOL/L (ref 0.5–2)
LYMPHOCYTES # BLD AUTO: 0.45 THOUSANDS/ΜL (ref 0.6–4.47)
LYMPHOCYTES NFR BLD AUTO: 18 % (ref 14–44)
MAGNESIUM SERPL-MCNC: 2 MG/DL (ref 1.6–2.6)
MCH RBC QN AUTO: 26.5 PG (ref 26.8–34.3)
MCHC RBC AUTO-ENTMCNC: 32 G/DL (ref 31.4–37.4)
MCV RBC AUTO: 83 FL (ref 82–98)
MONOCYTES # BLD AUTO: 0.35 THOUSAND/ΜL (ref 0.17–1.22)
MONOCYTES NFR BLD AUTO: 14 % (ref 4–12)
NEUTROPHILS # BLD AUTO: 1.68 THOUSANDS/ΜL (ref 1.85–7.62)
NEUTS SEG NFR BLD AUTO: 68 % (ref 43–75)
NRBC BLD AUTO-RTO: 0 /100 WBCS
O2 CT BLDV-SCNC: 12.3 ML/DL
PCO2 BLDV: 45.5 MM HG (ref 42–50)
PH BLDV: 7.39 [PH] (ref 7.3–7.4)
PHOSPHATE SERPL-MCNC: 3.6 MG/DL (ref 2.3–4.1)
PLATELET # BLD AUTO: 240 THOUSANDS/UL (ref 149–390)
PMV BLD AUTO: 8.9 FL (ref 8.9–12.7)
PO2 BLDV: 38.7 MM HG (ref 35–45)
POTASSIUM SERPL-SCNC: 3.7 MMOL/L (ref 3.5–5.3)
PROCALCITONIN SERPL-MCNC: 0.23 NG/ML
PROT SERPL-MCNC: 6.7 G/DL (ref 6.4–8.2)
RBC # BLD AUTO: 4.22 MILLION/UL (ref 3.88–5.62)
SODIUM SERPL-SCNC: 138 MMOL/L (ref 136–145)
WBC # BLD AUTO: 2.49 THOUSAND/UL (ref 4.31–10.16)

## 2022-01-25 PROCEDURE — 99232 SBSQ HOSP IP/OBS MODERATE 35: CPT | Performed by: PHYSICIAN ASSISTANT

## 2022-01-25 PROCEDURE — 84145 PROCALCITONIN (PCT): CPT | Performed by: EMERGENCY MEDICINE

## 2022-01-25 PROCEDURE — 94660 CPAP INITIATION&MGMT: CPT

## 2022-01-25 PROCEDURE — 85025 COMPLETE CBC W/AUTO DIFF WBC: CPT | Performed by: INTERNAL MEDICINE

## 2022-01-25 PROCEDURE — 80053 COMPREHEN METABOLIC PANEL: CPT | Performed by: INTERNAL MEDICINE

## 2022-01-25 PROCEDURE — 82948 REAGENT STRIP/BLOOD GLUCOSE: CPT

## 2022-01-25 PROCEDURE — 82805 BLOOD GASES W/O2 SATURATION: CPT | Performed by: PHYSICIAN ASSISTANT

## 2022-01-25 PROCEDURE — 94760 N-INVAS EAR/PLS OXIMETRY 1: CPT

## 2022-01-25 PROCEDURE — 83605 ASSAY OF LACTIC ACID: CPT | Performed by: PHYSICIAN ASSISTANT

## 2022-01-25 PROCEDURE — 84100 ASSAY OF PHOSPHORUS: CPT | Performed by: INTERNAL MEDICINE

## 2022-01-25 PROCEDURE — 83735 ASSAY OF MAGNESIUM: CPT | Performed by: INTERNAL MEDICINE

## 2022-01-25 RX ORDER — HYDRALAZINE HYDROCHLORIDE 20 MG/ML
5 INJECTION INTRAMUSCULAR; INTRAVENOUS EVERY 6 HOURS PRN
Status: DISCONTINUED | OUTPATIENT
Start: 2022-01-25 | End: 2022-01-28 | Stop reason: HOSPADM

## 2022-01-25 RX ORDER — GUAIFENESIN 600 MG
600 TABLET, EXTENDED RELEASE 12 HR ORAL EVERY 12 HOURS SCHEDULED
Status: DISCONTINUED | OUTPATIENT
Start: 2022-01-25 | End: 2022-01-28 | Stop reason: HOSPADM

## 2022-01-25 RX ORDER — BENZONATATE 100 MG/1
100 CAPSULE ORAL 3 TIMES DAILY PRN
Status: DISCONTINUED | OUTPATIENT
Start: 2022-01-25 | End: 2022-01-28 | Stop reason: HOSPADM

## 2022-01-25 RX ADMIN — UMECLIDINIUM BROMIDE AND VILANTEROL TRIFENATATE 1 PUFF: 62.5; 25 POWDER RESPIRATORY (INHALATION) at 09:18

## 2022-01-25 RX ADMIN — CARBIDOPA AND LEVODOPA 2 TABLET: 25; 100 TABLET ORAL at 17:52

## 2022-01-25 RX ADMIN — CYCLOBENZAPRINE HYDROCHLORIDE 10 MG: 10 TABLET, FILM COATED ORAL at 21:43

## 2022-01-25 RX ADMIN — ALBUTEROL SULFATE 2 PUFF: 90 AEROSOL, METERED RESPIRATORY (INHALATION) at 21:45

## 2022-01-25 RX ADMIN — ESCITALOPRAM OXALATE 20 MG: 10 TABLET ORAL at 09:18

## 2022-01-25 RX ADMIN — ENOXAPARIN SODIUM 30 MG: 30 INJECTION SUBCUTANEOUS at 21:43

## 2022-01-25 RX ADMIN — SODIUM CHLORIDE 250 ML: 0.9 INJECTION, SOLUTION INTRAVENOUS at 15:01

## 2022-01-25 RX ADMIN — BUPROPION 300 MG: 150 TABLET, EXTENDED RELEASE ORAL at 09:18

## 2022-01-25 RX ADMIN — ENOXAPARIN SODIUM 40 MG: 40 INJECTION SUBCUTANEOUS at 09:19

## 2022-01-25 RX ADMIN — GUAIFENESIN 600 MG: 600 TABLET ORAL at 13:03

## 2022-01-25 RX ADMIN — CARBIDOPA AND LEVODOPA 2 TABLET: 25; 100 TABLET ORAL at 21:43

## 2022-01-25 RX ADMIN — INSULIN LISPRO 1 UNITS: 100 INJECTION, SOLUTION INTRAVENOUS; SUBCUTANEOUS at 21:46

## 2022-01-25 RX ADMIN — PANTOPRAZOLE SODIUM 40 MG: 40 TABLET, DELAYED RELEASE ORAL at 05:33

## 2022-01-25 RX ADMIN — GUAIFENESIN 600 MG: 600 TABLET ORAL at 21:44

## 2022-01-25 RX ADMIN — ALBUTEROL SULFATE 2 PUFF: 90 AEROSOL, METERED RESPIRATORY (INHALATION) at 17:53

## 2022-01-25 RX ADMIN — LEVOTHYROXINE SODIUM 150 MCG: 150 TABLET ORAL at 05:34

## 2022-01-25 RX ADMIN — REMDESIVIR 100 MG: 100 INJECTION, POWDER, LYOPHILIZED, FOR SOLUTION INTRAVENOUS at 18:06

## 2022-01-25 RX ADMIN — ALBUTEROL SULFATE 2 PUFF: 90 AEROSOL, METERED RESPIRATORY (INHALATION) at 13:04

## 2022-01-25 RX ADMIN — CARBIDOPA AND LEVODOPA 2 TABLET: 25; 100 TABLET ORAL at 09:18

## 2022-01-25 RX ADMIN — LOSARTAN POTASSIUM: 50 TABLET, FILM COATED ORAL at 09:18

## 2022-01-25 RX ADMIN — DEXAMETHASONE SODIUM PHOSPHATE 6 MG: 4 INJECTION, SOLUTION INTRAMUSCULAR; INTRAVENOUS at 17:52

## 2022-01-25 RX ADMIN — INSULIN LISPRO 1 UNITS: 100 INJECTION, SOLUTION INTRAVENOUS; SUBCUTANEOUS at 09:19

## 2022-01-25 RX ADMIN — ALBUTEROL SULFATE 2 PUFF: 90 AEROSOL, METERED RESPIRATORY (INHALATION) at 09:18

## 2022-01-25 NOTE — TELEPHONE ENCOUNTER
Daughter given information about Remdesivir and how and why it is used in the hospital for [place] pt  Daughter was concerned about the side effects she was reading online  Daughter advised to speak with pt's care team at the hospital to determine the benefits of taking this medication

## 2022-01-25 NOTE — ASSESSMENT & PLAN NOTE
· Presented on admission in ER desaturated to 87%, requiring 3L via NC for support  · Treatment as below, wean O2 as tolerated to maintain >88% given underlying COPD diagnosis

## 2022-01-25 NOTE — ASSESSMENT & PLAN NOTE
Lab Results   Component Value Date    HGBA1C 7 2 (A) 11/15/2021       Recent Labs     01/24/22 2119 01/25/22  0640   POCGLU 209* 173*       Blood Sugar Average: Last 72 hrs:  (P) 191   · Hold home amaryl and metformin  · SSI #1 initiated on admit, will continue and adjust as needed  · CCO diet #2  · Anticipate degree of hyperglycemia in the setting of steroid use  · Ativan prn agitation due to steroids

## 2022-01-25 NOTE — ASSESSMENT & PLAN NOTE
· No wheezing or rhonchi on exam, does not appear to have an acute exacerbation at this time  · Continue with breathing treatments and home medications

## 2022-01-25 NOTE — PLAN OF CARE
Problem: Potential for Falls  Goal: Patient will remain free of falls  Description: INTERVENTIONS:  - Educate patient/family on patient safety including physical limitations  - Instruct patient to call for assistance with activity   - Consult OT/PT to assist with strengthening/mobility   - Keep Call bell within reach  - Keep bed low and locked with side rails adjusted as appropriate  - Keep care items and personal belongings within reach  - Initiate and maintain comfort rounds  - Make Fall Risk Sign visible to staff  - Offer Toileting every  Hours, in advance of need  - Initiate/Maintain alarm  - Obtain necessary fall risk management equipment:   - Apply yellow socks and bracelet for high fall risk patients  - Consider moving patient to room near nurses station  1/24/2022 1926 by Nick Samuels RN  Outcome: Progressing  1/24/2022 1926 by Nick Samuels RN  Outcome: Progressing     Problem: PAIN - ADULT  Goal: Verbalizes/displays adequate comfort level or baseline comfort level  Description: Interventions:  - Encourage patient to monitor pain and request assistance  - Assess pain using appropriate pain scale  - Administer analgesics based on type and severity of pain and evaluate response  - Implement non-pharmacological measures as appropriate and evaluate response  - Consider cultural and social influences on pain and pain management  - Notify physician/advanced practitioner if interventions unsuccessful or patient reports new pain  Outcome: Progressing     Problem: INFECTION - ADULT  Goal: Absence or prevention of progression during hospitalization  Description: INTERVENTIONS:  - Assess and monitor for signs and symptoms of infection  - Monitor lab/diagnostic results  - Monitor all insertion sites, i e  indwelling lines, tubes, and drains  - Monitor endotracheal if appropriate and nasal secretions for changes in amount and color  - [place] appropriate cooling/warming therapies per order  - Administer medications as ordered  - Instruct and encourage patient and family to use good hand hygiene technique  - Identify and instruct in appropriate isolation precautions for identified infection/condition  Outcome: Progressing  Goal: Absence of fever/infection during neutropenic period  Description: INTERVENTIONS:  - Monitor WBC    Outcome: Progressing     Problem: SAFETY ADULT  Goal: Patient will remain free of falls  Description: INTERVENTIONS:  - Educate patient/family on patient safety including physical limitations  - Instruct patient to call for assistance with activity   - Consult OT/PT to assist with strengthening/mobility   - Keep Call bell within reach  - Keep bed low and locked with side rails adjusted as appropriate  - Keep care items and personal belongings within reach  - Initiate and maintain comfort rounds  - Make Fall Risk Sign visible to staff  - Offer Toileting every  Hours, in advance of need  - Initiate/Maintainalarm  - Obtain necessary fall risk management equipment:   - Apply yellow socks and bracelet for high fall risk patients  - Consider moving patient to room near nurses station  1/24/2022 1926 by Margret Shine RN  Outcome: Progressing  1/24/2022 1926 by Margret Shine RN  Outcome: Progressing  Goal: Maintain or return to baseline ADL function  Description: INTERVENTIONS:  -  Assess patient's ability to carry out ADLs; assess patient's baseline for ADL function and identify physical deficits which impact ability to perform ADLs (bathing, care of mouth/teeth, toileting, grooming, dressing, etc )  - Assess/evaluate cause of self-care deficits   - Assess range of motion  - Assess patient's mobility; develop plan if impaired  - Assess patient's need for assistive devices and provide as appropriate  - Encourage maximum independence but intervene and supervise when necessary  - Involve family in performance of ADLs  - Assess for home care needs following discharge   - Consider OT consult to assist with ADL evaluation and planning for discharge  - Provide patient education as appropriate  Outcome: Progressing  Goal: Maintains/Returns to pre admission functional level  Description: INTERVENTIONS:  - Perform BMAT or MOVE assessment daily    - Set and communicate daily mobility goal to care team and patient/family/caregiver  - Collaborate with rehabilitation services on mobility goals if consulted  - Perform Range of Motion  times a day  - Reposition patient every  hours  - Dangle patient  times a day  - Stand patient  times a day  - Ambulate patient  times a day  - Out of bed to chair  times a day   - Out of bed for meal times a day  - Out of bed for toileting  - Record patient progress and toleration of activity level   Outcome: Progressing     Problem: DISCHARGE PLANNING  Goal: Discharge to home or other facility with appropriate resources  Description: INTERVENTIONS:  - Identify barriers to discharge w/patient and caregiver  - Arrange for needed discharge resources and transportation as appropriate  - Identify discharge learning needs (meds, wound care, etc )  - Arrange for interpretive services to assist at discharge as needed  - Refer to Case Management Department for coordinating discharge planning if the patient needs post-hospital services based on physician/advanced practitioner order or complex needs related to functional status, cognitive ability, or social support system  Outcome: Progressing     Problem: Knowledge Deficit  Goal: Patient/family/caregiver demonstrates understanding of disease process, treatment plan, medications, and discharge instructions  Description: Complete learning assessment and assess knowledge base    Interventions:  - Provide teaching at level of understanding  - Provide teaching via preferred learning methods  Outcome: Progressing

## 2022-01-25 NOTE — PROGRESS NOTES
[facility] Progress Note Connor Schreiber 1955, 61 y o  male MRN: [id number]  Unit/Bed#: -01 Encounter: [id number]  Primary Care Provider: No primary care provider on file  Date and time admitted to hospital: 1/24/2022  1:36 PM    * Acute respiratory failure with hypoxia (HCC)  Assessment & Plan  · Presented on admission in ER desaturated to 87%, requiring 3L via NC for support  · Treatment as below, wean O2 as tolerated to maintain >88% given underlying COPD diagnosis     Pneumonia due to COVID-19 virus  Assessment & Plan  · Present on admission, patient unvaccinated  Reports shortness of breath and chest pain at home  + sick contact with wife at home    Reported 90% on RA by EMS    Currently on MILD COVID pathway:  · CXR on admit with bibasilar infiltrates consistent with COVID-19 pneumonia  · Continue O2 supplement, goal >88% O2 sat  · Continue IV remdesivir, day 2 of 5  · Continue IV Decadron 6 mg daily, day 2 of 10  · Baricitinib not indicated  · Antibiotics not indicated given normal procalcitonin x2  · Continue lovenox (ddimer 0 71)  · Mucinex, tessalon, albuterol prn  · Encourage patient to self-prone, incentive spirometer, OOB as appropriate  · Current oxygenation 93% on 3 L   · Monitor inflammatory markers q 2-3 days  · D-dimer 0 71, CRP 84 , NT-,  Procalcitonin 0 23 ->0 25  · Leukopenia likely secondary to viral illness, monitor     Chronic obstructive pulmonary disease (HCC)  Assessment & Plan  · No wheezing or rhonchi on exam, does not appear to have an acute exacerbation at this time  · Continue with breathing treatments and home medications    Diabetes mellitus without complication [facility])  Assessment & Plan  Lab Results   Component Value Date    HGBA1C 7 2 (A) 11/15/2021       Recent Labs     01/24/22  2119 01/25/22  0640   POCGLU 209* 173*       Blood Sugar Average: Last 72 hrs:  (P) 191   · Hold home amaryl and metformin  · SSI #1 initiated on admit, will continue and adjust as needed  · CCO diet #2  · Anticipate degree of hyperglycemia in the setting of steroid use  · Ativan prn agitation due to steroids    Hypothyroidism  Assessment & Plan  · Continue levothyroxine per home dose, TSH wnl     Lactic acid acidosis  Assessment & Plan  · Noted 2 8 on admit, will repeat now; consider compounded by metformin use  · No signs of volume overload, can give additional IV fluids if needed  · Trend to resolution       VTE Pharmacologic Prophylaxis: VTE Score: 3 Moderate Risk (Score 3-4) - Pharmacological DVT Prophylaxis Ordered: enoxaparin (Lovenox)  Patient Centered Rounds: I evaluated the patient without nursing staff present due to [address] [facility] [address] with Specialists or Other Care Team Provider: SUZETTE re: discharge planning     Education and Discussions with Family / Patient: Updated  (daughter) via phone  Time Spent for Care: 20 minutes  More than 50% of total time spent on counseling and coordination of care as described above  Current Length of Stay: 1 day(s)  Current Patient Status: Inpatient   Certification Statement: The patient will continue to require additional inpatient hospital stay due to IV treatment for COVID pneumonia and hypoxia  Discharge Plan: Anticipate discharge in 24-48 hrs to home  Code Status: Level 1 - Full Code    Subjective:   CC "I feel better today, so much better, really"    Patient seen sitting up in bed, he reports feeling dramatically better  Less short of breath, more energy, not coughing quite as much  Reports he was sick for about 12 days before coming in  Denies subjective fevers overnight  We reviewed his current treatment plan and projected time to discharge  He will start using the incentive spirometer and try side-lying as he cannot lay on his stomach  Denies chest pain overnight        Objective:     Vitals:   Temp (24hrs), Av 2 °F (37 3 °C), Min:98 4 °F (36 9 °C), Max:100 2 °F (37 9 °C)    Temp: [98 4 °F (36 9 °C)-100 2 °F (37 9 °C)] 99 1 °F (37 3 °C)  HR:  [75-86] 75  Resp:  [18-24] 18  BP: ()/() 153/108  SpO2:  [87 %-96 %] 93 %  There is no height or weight on file to calculate BMI  Input and Output Summary (last 24 hours): Intake/Output Summary (Last 24 hours) at 1/25/2022 1005  Last data filed at 1/25/2022 0900  Gross per 24 hour   Intake 1680 ml   Output --   Net 1680 ml       Physical Exam:   Physical Exam  Vitals and nursing note reviewed  Constitutional:       General: He is not in acute distress  Appearance: He is obese  He is ill-appearing  He is not toxic-appearing  Cardiovascular:      Rate and Rhythm: Normal rate and regular rhythm  Heart sounds: Normal heart sounds  No murmur heard  Pulmonary:      Effort: Pulmonary effort is normal  No respiratory distress  Breath sounds: Rales present  No wheezing  Neurological:      Mental Status: He is alert             Additional Data:     Labs:  Results from last 7 days   Lab Units 01/25/22  0443   WBC Thousand/uL 2 49*   HEMOGLOBIN g/dL 11 2*   HEMATOCRIT % 35 0*   PLATELETS Thousands/uL 240   NEUTROS PCT % 68   LYMPHS PCT % 18   MONOS PCT % 14*   EOS PCT % 0     Results from last 7 days   Lab Units 01/25/22  0443   SODIUM mmol/L 138   POTASSIUM mmol/L 3 7   CHLORIDE mmol/L 102   CO2 mmol/L 29   BUN mg/dL 12   CREATININE mg/dL 1 08   ANION GAP mmol/L 7   CALCIUM mg/dL 8 6   ALBUMIN g/dL 2 9*   TOTAL BILIRUBIN mg/dL 0 42   ALK PHOS U/L 62   ALT U/L 47   AST U/L 91*   GLUCOSE RANDOM mg/dL 210*     Results from last 7 days   Lab Units 01/24/22  1411   INR  1 00     Results from last 7 days   Lab Units 01/25/22  0640 01/24/22  2119   POC GLUCOSE mg/dl 173* 209*         Results from last 7 days   Lab Units 01/25/22  0443 01/24/22  1708 01/24/22  1537 01/24/22  1411   LACTIC ACID mmol/L  --  2 8*  --  2 8*   PROCALCITONIN ng/ml 0 23  --  0 22  --        Lines/Drains:  Invasive Devices  Report    Peripheral Intravenous Line            Peripheral IV 01/24/22 Left Antecubital <1 day    Peripheral IV 01/24/22 Left Hand <1 day                      Imaging: Personally reviewed the following imaging: chest xray    Recent Cultures (last 7 days):   Results from last 7 days   Lab Units 01/24/22  1411   BLOOD CULTURE  Received in Microbiology Lab  Culture in Progress  Received in Microbiology Lab  Culture in Progress  Last 24 Hours Medication List:   Current Facility-Administered Medications   Medication Dose Route Frequency Provider Last Rate    acetaminophen  650 mg Oral Q6H PRN Charmaine Gomes MD      albuterol  2 puff Inhalation 4x Daily Charmaine Gomes MD      aluminum-magnesium hydroxide-simethicone  30 mL Oral Q6H PRN Charmaine Gomes MD      benzonatate  100 mg Oral TID PRN Jennifer Pry, PERRY      buPROPion  300 mg Oral QAM Charmaine Gomes MD      carbidopa-levodopa  2 tablet Oral TID Charmaine Gomes MD      cyclobenzaprine  10 mg Oral HS Charmaine Gomes MD      dexamethasone  6 mg Intravenous V39Q Charmaine Gomes MD      enoxaparin  40 mg Subcutaneous Daily Charmaine Gomes MD      escitalopram  20 mg Oral Daily Charmaine Gomes MD      guaiFENesin  600 mg Oral Q12H [address] [place], [place]      insulin lispro  1-5 Units Subcutaneous HS Charmaine Gomes MD      insulin lispro  1-6 Units Subcutaneous TID Perry Bell MD      levothyroxine  150 mcg Oral Early Morning Charmaine Gomes MD      LORazepam  1 mg Intravenous Q6H PRN Charmaine Gomes MD      losartan potassium-hydrochlorothiazide [facility] 50/12  5) combination   Oral Daily Charmaine Gomes MD      ondansetron  4 mg Intravenous Q6H PRN Charmaine Gomes MD      pantoprazole  40 mg Oral Early Morning Charmaine Gomes MD      polyethylene glycol  17 g Oral Daily PRN Charmaine Gomes MD      remdesivir  100 mg Intravenous L52T Armida Choi MD      simethicone  80 mg Oral 4x Daily PRN Armida Choi MD      umeclidinium-vilanterol  1 puff Inhalation Daily Armida Choi MD          Today, Patient Was Seen By: Marcia Ruth PA-C    **Please Note: This note may have been constructed using a voice recognition system  **

## 2022-01-25 NOTE — ASSESSMENT & PLAN NOTE
· Noted 2 8 on admit, will repeat now; consider compounded by metformin use  · No signs of volume overload, can give additional IV fluids if needed  · Trend to resolution

## 2022-01-25 NOTE — CASE MANAGEMENT
Case Management Assessment & Discharge Planning Note    Patient name Rivas Younger  Location [address] [id number] MRN [id number]  : 1955 Date 2022       Current Admission Date: 2022  Current Admission Diagnosis:Acute respiratory failure with hypoxia [facility])   Patient Active Problem List    Diagnosis Date Noted    Acute respiratory failure with hypoxia ([facility] ) 2022    Lactic acid acidosis 2022    Pneumonia due to COVID-19 virus 2022    Medicare annual wellness visit, subsequent 11/15/2021    REM behavioral disorder 10/20/2021    Iron deficiency 07/15/2021    Restless legs syndrome 07/15/2021    Constipation, chronic 2021    Sacroiliac inflammation ([facility] ) 2019    [facility]y body dementia without [facility] disturbance [facility] 2018    Pulmonary nodule 10/03/2018    Balance problem 2018    Tremor 2018    Neuropathy 2018    Memory impairment 2018    Episodic paroxysmal hemicrania, 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    Abnormality of gait due to impairment of balance 2018    Former smoker 2018    Centrilobular emphysema ([facility] ) 2018    DAFNE (obstructive sleep apnea) 2018    Low testosterone 2017    Psoriasis 2016    Goiter, nontoxic simple 2015    Chronic obstructive pulmonary disease ([facility] ) 2013    Depression 2013    Hypercholesterolemia 2013    Hypertension 2013    Hypothyroidism 2013    Diabetes mellitus without complication ([facility] )       LOS (days): 1  Geometric Mean LOS (GMLOS) (days): 5 40  Days to GMLOS:4 4     OBJECTIVE:    Risk of Unplanned Readmission Score: 16         Current admission status: Inpatient       Preferred Pharmacy:   [id number] [address]  Phone: [phone] Fax: [phone]    [id number] [place] [address]  [address] [address] [facility]  Phone: [phone] Fax: [phone]    [address], [address]  [place] [address] [address]  Phone: [phone] Fax: [phone]    [address] [place] [address]  Phone: [phone] Fax: [phone]    Primary Care Provider: No primary care provider on file  Primary Insurance: MEDICARE  Secondary Insurance: [facility]    ASSESSMENT:  Active Health Care Agents     [facility] Representative - Daughter   Primary Phone: [phone] (Home)               Advance Directives  Does patient have a [address]?: No  Was patient offered paperwork?: Yes (paperwork given)  Does patient currently have a Health Care decision maker?: Yes, please see Health Care Proxy section  Does patient have Advance Directives?: No  Was patient offered paperwork?: Yes (paperwork given)  Primary Contact: dtr-marisol         Readmission Root Cause  30 Day Readmission: No    Patient Information  Admitted from[de-identified] Home  Mental Status: Confused  During Assessment patient was accompanied by: Not accompanied during assessment  Assessment information provided by[de-identified] Daughter Maggi Hernandez)  Primary Caregiver: Spouse  Caregiver's Name[de-identified] wife-Jennifer [address] [place] [address] Telephone Number[de-identified] [phone]  Support Systems: Daughter,Spouse/significant other  [place] of Residence: [place] [address] do you live in?: effort  Home entry access options   Select all that apply : Stairs  Number of steps to enter home : One Flight  Do the steps have railings?: Yes  Type of Current Residence: Bi-level  Upon entering residence, is there a bedroom on the main floor (no further steps)?: Yes  Upon entering residence, is there a bathroom on the main floor (no further steps)?: Yes  In the last 12 months, was there a time when you were not able to pay the mortgage or rent on time?: No  In the last 12 months, how many places have you lived?: 1  Homeless/housing insecurity resource given?: Refused  Living Arrangements: Lives w/ Spouse/significant other  Is patient a ?: No    Activities of Daily Living Prior to Admission  Functional Status: Assistance  Completes ADLs independently?: No  Level of ADL dependence: Assistance  Ambulates independently?: No  Level of ambulatory dependence: Assistance  Does patient use assisted devices?: No  Does patient currently own DME?: No  What DME does the patient currently own?: Home Oxygen concentrator,Portable Oxygen tanks,CPAP (young's dme)  Does patient have a history of Outpatient Therapy (PT/OT)?: No  Does the patient have a history of Short-Term Rehab?: No  Does patient have a history of HHC?: No  [facility] No         Patient Information Continued  Income Source: SSI/SSD  Does patient have prescription coverage?: Yes  Within the past 12 months, you worried that your food would run out before you got the money to buy more : Never true  Within the past 12 months, the food you bought just didnt last and you didnt have money to get more : Never true  Food insecurity resource given?: Refused  Does patient receive dialysis treatments?: No  Does patient have a history of substance abuse?: No  Does patient have a history of Mental Health Diagnosis?: Yes (depression)  Is patient receiving treatment for mental health?: Yes  Has patient received inpatient treatment related to mental health in the last 2 years?: No         Means of Transportation  [facility]] Family transport  In the past 12 months, has lack of transportation kept you from medical appointments or from getting medications?: No  In the past 12 months, has lack of transportation kept you from meetings, work, or from getting things needed for daily living?: No  Was application for public transport provided?: Refused        DISCHARGE DETAILS:    Discharge planning discussed with[de-identified] dtr- marisol  Freedom of Choice: Yes  Comments - Freedom of Choice: patient is not covid vaccinated  Dtr-marisol would like CM to send referrals to [facility] and [facility]  CM contacted family/caregiver?: Yes  Were Treatment Team discharge recommendations reviewed with patient/caregiver?: Yes  Did patient/caregiver verbalize understanding of patient care needs?: Yes  Were patient/caregiver advised of the risks associated with not following Treatment Team discharge recommendations?: Yes    Contacts  Patient Contacts: dtr-marisol  Relationship to Patient[de-identified] Family  Contact Method: Phone  Phone Number: [phone]  Reason/Outcome: Continuity of [address]         Is the patient interested in [facility] at discharge?: Yes  [address] requested[de-identified] [address] Name[de-identified] [address] [facility] [facility] Provider[de-identified] PCP  Home Health Services Needed[de-identified] Gait/ADL Training,Strengthening/Theraputic Exercises to Improve Function,Oxygen Via Nasal Cannula  Oxygen LPM Ordered (if applicable based on home health services needed):: 3 LPM  Homebound Criteria Met[de-identified] Requires the Assistance of Another Person for Safe Ambulation or to Leave the Home,Uses an Assist Device (i e  cane, walker, etc)  Supporting Clincal Findings[de-identified] Limited Endurance,Requires Oxygen    DME Referral Provided  Referral made for DME?: No    Other Referral/Resources/Interventions Provided:  Interventions: HHC,Short Term Rehab  Referral Comments: referrals pended for [facility] and C      Would you like to participate in our [address] service program?  : No - Declined    Treatment Team Recommendation: Home  Discharge Destination Plan[de-identified] Home with [address] (referrals pended for [facility] and [facility])  Transport at Discharge : [facility]                 Transport Service Arrived:  No

## 2022-01-25 NOTE — TELEPHONE ENCOUNTER
Regarding: covid tx question -  remdesivir  ----- Message from [facility] SHASTA sent at 1/24/2022  6:48 PM EST -----  "Im not sure if this should wait until tomorrow but they want to put my dad on a medication called  remdesivir and I just wanted to know more about it if possible   I wanted to speak with the on call or my dads practitioner but I just have a few questions and not sure if they can wait "

## 2022-01-25 NOTE — TELEPHONE ENCOUNTER
Reason for Disposition   Caller has medicine question only, adult not sick, AND triager answers question    Answer Assessment - Initial Assessment Questions  1  NAME of MEDICATION: "What medicine are you calling about?"      Remdesivir   2  QUESTION: "What is your question?" (e g , medication refill, side effect)      "They want my dad to start on this in the hospital, I just want information about it "  3  PRESCRIBING HCP: "Who prescribed it?" Reason: if prescribed by specialist, call should be referred to that group        In patient hospital    Protocols used: MEDICATION QUESTION CALL-ADULT-

## 2022-01-26 PROBLEM — E87.20 LACTIC ACID ACIDOSIS: Status: RESOLVED | Noted: 2022-01-25 | Resolved: 2022-01-26

## 2022-01-26 PROBLEM — E87.2 LACTIC ACID ACIDOSIS: Status: RESOLVED | Noted: 2022-01-25 | Resolved: 2022-01-26

## 2022-01-26 LAB
ANION GAP SERPL CALCULATED.3IONS-SCNC: 7 MMOL/L (ref 4–13)
BUN SERPL-MCNC: 15 MG/DL (ref 5–25)
CALCIUM SERPL-MCNC: 8.9 MG/DL (ref 8.3–10.1)
CHLORIDE SERPL-SCNC: 104 MMOL/L (ref 100–108)
CK MB SERPL-MCNC: 1.4 % (ref 0–2.5)
CK MB SERPL-MCNC: 11.2 NG/ML (ref 0–5)
CK SERPL-CCNC: 777 U/L (ref 39–308)
CO2 SERPL-SCNC: 28 MMOL/L (ref 21–32)
CREAT SERPL-MCNC: 1.08 MG/DL (ref 0.6–1.3)
CRP SERPL QL: 66.1 MG/L
D DIMER PPP FEU-MCNC: 0.73 UG/ML FEU
ERYTHROCYTE [DISTWIDTH] IN BLOOD BY AUTOMATED COUNT: 15.1 % (ref 11.6–15.1)
G6PD BLD QN: 353 U/10E12 RBC (ref 127–427)
GFR SERPL CREATININE-BSD FRML MDRD: 72 ML/MIN/1.73SQ M
GLUCOSE SERPL-MCNC: 132 MG/DL (ref 65–140)
GLUCOSE SERPL-MCNC: 145 MG/DL (ref 65–140)
GLUCOSE SERPL-MCNC: 155 MG/DL (ref 65–140)
GLUCOSE SERPL-MCNC: 196 MG/DL (ref 65–140)
GLUCOSE SERPL-MCNC: 205 MG/DL (ref 65–140)
HCT VFR BLD AUTO: 36.5 % (ref 36.5–49.3)
HGB BLD-MCNC: 11.6 G/DL (ref 12–17)
MCH RBC QN AUTO: 27 PG (ref 26.8–34.3)
MCHC RBC AUTO-ENTMCNC: 31.8 G/DL (ref 31.4–37.4)
MCV RBC AUTO: 85 FL (ref 82–98)
PLATELET # BLD AUTO: 233 THOUSANDS/UL (ref 149–390)
PMV BLD AUTO: 9.4 FL (ref 8.9–12.7)
POTASSIUM SERPL-SCNC: 3.7 MMOL/L (ref 3.5–5.3)
PROCALCITONIN SERPL-MCNC: 0.18 NG/ML
RBC # BLD AUTO: 3.7 X10E6/UL (ref 4.14–5.8)
RBC # BLD AUTO: 4.3 MILLION/UL (ref 3.88–5.62)
SODIUM SERPL-SCNC: 139 MMOL/L (ref 136–145)
WBC # BLD AUTO: 2.77 THOUSAND/UL (ref 4.31–10.16)

## 2022-01-26 PROCEDURE — 84145 PROCALCITONIN (PCT): CPT | Performed by: PHYSICIAN ASSISTANT

## 2022-01-26 PROCEDURE — 82553 CREATINE MB FRACTION: CPT | Performed by: PHYSICIAN ASSISTANT

## 2022-01-26 PROCEDURE — 80048 BASIC METABOLIC PNL TOTAL CA: CPT | Performed by: PHYSICIAN ASSISTANT

## 2022-01-26 PROCEDURE — 99232 SBSQ HOSP IP/OBS MODERATE 35: CPT | Performed by: PHYSICIAN ASSISTANT

## 2022-01-26 PROCEDURE — 85379 FIBRIN DEGRADATION QUANT: CPT | Performed by: PHYSICIAN ASSISTANT

## 2022-01-26 PROCEDURE — 86140 C-REACTIVE PROTEIN: CPT | Performed by: PHYSICIAN ASSISTANT

## 2022-01-26 PROCEDURE — 82550 ASSAY OF CK (CPK): CPT | Performed by: PHYSICIAN ASSISTANT

## 2022-01-26 PROCEDURE — 85027 COMPLETE CBC AUTOMATED: CPT | Performed by: PHYSICIAN ASSISTANT

## 2022-01-26 PROCEDURE — 82948 REAGENT STRIP/BLOOD GLUCOSE: CPT

## 2022-01-26 RX ORDER — SODIUM CHLORIDE 9 MG/ML
75 INJECTION, SOLUTION INTRAVENOUS ONCE
Status: COMPLETED | OUTPATIENT
Start: 2022-01-26 | End: 2022-01-26

## 2022-01-26 RX ADMIN — INSULIN LISPRO 1 UNITS: 100 INJECTION, SOLUTION INTRAVENOUS; SUBCUTANEOUS at 08:22

## 2022-01-26 RX ADMIN — CYCLOBENZAPRINE HYDROCHLORIDE 10 MG: 10 TABLET, FILM COATED ORAL at 21:15

## 2022-01-26 RX ADMIN — ENOXAPARIN SODIUM 30 MG: 30 INJECTION SUBCUTANEOUS at 08:18

## 2022-01-26 RX ADMIN — LEVOTHYROXINE SODIUM 150 MCG: 150 TABLET ORAL at 06:13

## 2022-01-26 RX ADMIN — ENOXAPARIN SODIUM 30 MG: 30 INJECTION SUBCUTANEOUS at 21:15

## 2022-01-26 RX ADMIN — ALBUTEROL SULFATE 2 PUFF: 90 AEROSOL, METERED RESPIRATORY (INHALATION) at 12:40

## 2022-01-26 RX ADMIN — BUPROPION 300 MG: 150 TABLET, EXTENDED RELEASE ORAL at 08:18

## 2022-01-26 RX ADMIN — REMDESIVIR 100 MG: 100 INJECTION, POWDER, LYOPHILIZED, FOR SOLUTION INTRAVENOUS at 18:17

## 2022-01-26 RX ADMIN — POLYETHYLENE GLYCOL 3350 17 G: 17 POWDER, FOR SOLUTION ORAL at 23:45

## 2022-01-26 RX ADMIN — GUAIFENESIN 600 MG: 600 TABLET ORAL at 21:15

## 2022-01-26 RX ADMIN — ESCITALOPRAM OXALATE 20 MG: 10 TABLET ORAL at 08:19

## 2022-01-26 RX ADMIN — CARBIDOPA AND LEVODOPA 2 TABLET: 25; 100 TABLET ORAL at 08:18

## 2022-01-26 RX ADMIN — PANTOPRAZOLE SODIUM 40 MG: 40 TABLET, DELAYED RELEASE ORAL at 06:13

## 2022-01-26 RX ADMIN — SODIUM CHLORIDE 75 ML/HR: 0.9 INJECTION, SOLUTION INTRAVENOUS at 10:37

## 2022-01-26 RX ADMIN — ALBUTEROL SULFATE 2 PUFF: 90 AEROSOL, METERED RESPIRATORY (INHALATION) at 18:15

## 2022-01-26 RX ADMIN — GUAIFENESIN 600 MG: 600 TABLET ORAL at 08:18

## 2022-01-26 RX ADMIN — UMECLIDINIUM BROMIDE AND VILANTEROL TRIFENATATE 1 PUFF: 62.5; 25 POWDER RESPIRATORY (INHALATION) at 08:23

## 2022-01-26 RX ADMIN — ALBUTEROL SULFATE 2 PUFF: 90 AEROSOL, METERED RESPIRATORY (INHALATION) at 08:23

## 2022-01-26 RX ADMIN — INSULIN LISPRO 1 UNITS: 100 INJECTION, SOLUTION INTRAVENOUS; SUBCUTANEOUS at 21:16

## 2022-01-26 RX ADMIN — DEXAMETHASONE SODIUM PHOSPHATE 6 MG: 4 INJECTION, SOLUTION INTRAMUSCULAR; INTRAVENOUS at 18:16

## 2022-01-26 RX ADMIN — LOSARTAN POTASSIUM: 50 TABLET, FILM COATED ORAL at 08:18

## 2022-01-26 RX ADMIN — CARBIDOPA AND LEVODOPA 2 TABLET: 25; 100 TABLET ORAL at 21:15

## 2022-01-26 RX ADMIN — CARBIDOPA AND LEVODOPA 2 TABLET: 25; 100 TABLET ORAL at 18:17

## 2022-01-26 RX ADMIN — ALBUTEROL SULFATE 2 PUFF: 90 AEROSOL, METERED RESPIRATORY (INHALATION) at 21:16

## 2022-01-26 NOTE — ASSESSMENT & PLAN NOTE
Lab Results   Component Value Date    HGBA1C 7 2 (A) 11/15/2021       Recent Labs     01/25/22  0640 01/25/22  1118 01/25/22  1631 01/25/22 2125   POCGLU 173* 105 135 184*       Blood Sugar Average: Last 72 hrs:  (P) 161 2   · Hold home amaryl and metformin  · SSI #1 initiated on admit, will continue and adjust as needed  · CCO diet #2  · Anticipate degree of hyperglycemia in the setting of steroid use  · Ativan prn agitation due to steroids

## 2022-01-26 NOTE — NURSING NOTE
Daughter Michelle Dies) calling with concerns regarding patients status  All questions answered from nursing perspective, SLIM aware daughter would like update directly

## 2022-01-26 NOTE — RESPIRATORY THERAPY NOTE
01/26/22 0029   Respiratory Assessment   Resp Comments pt taken off of the CPAP, pt unable to tolerate our unit, pt will wear nasal cannula for sleep and have a family member drop of his home unit in the am

## 2022-01-26 NOTE — ASSESSMENT & PLAN NOTE
· Present on admission, patient unvaccinated  Reports shortness of breath and chest pain at home  + sick contact with wife at home    Reported 90% on RA by EMS    Currently on MILD COVID pathway:  · CXR on admit with bibasilar infiltrates consistent with COVID-19 pneumonia  · Continue O2 supplement, goal >88% O2 sat  · Continue IV remdesivir, day 3 of 5  · Continue IV Decadron 6 mg daily, day 3 of 10  · Baricitinib not indicated  · Antibiotics not indicated given normal procalcitonin x2  · Continue lovenox (ddimer 0 71)  · Mucinex, tessalon, albuterol prn  · Encourage patient to self-prone, incentive spirometer, OOB as appropriate  · Current oxygenation 93% on 3 L   · Monitor inflammatory markers q 2-3 days  · D-dimer 0 71, CRP 84 , NT-,  Procalcitonin 0 23 ->0 25  · Leukopenia likely secondary to viral illness, improving  · CK still elevated, though improved; will give light IV fluids today given no signs of volume overload

## 2022-01-26 NOTE — PLAN OF CARE
Problem: Potential for Falls  Goal: Patient will remain free of falls  Description: INTERVENTIONS:  - Educate patient/family on patient safety including physical limitations  - Instruct patient to call for assistance with activity   - Consult OT/PT to assist with strengthening/mobility   - Keep Call bell within reach  - Keep bed low and locked with side rails adjusted as appropriate  - Keep care items and personal belongings within reach  - Initiate and maintain comfort rounds  - Make Fall Risk Sign visible to staff  - Offer Toileting every 2 Hours, in advance of need  - Initiate/Maintain 2alarm  - Obtain necessary fall risk management equipment: 2  - Apply yellow socks and bracelet for high fall risk patients  - Consider moving patient to room near nurses station  Outcome: Progressing     Problem: PAIN - ADULT  Goal: Verbalizes/displays adequate comfort level or baseline comfort level  Description: Interventions:  - Encourage patient to monitor pain and request assistance  - Assess pain using appropriate pain scale  - Administer analgesics based on type and severity of pain and evaluate response  - Implement non-pharmacological measures as appropriate and evaluate response  - Consider cultural and social influences on pain and pain management  - Notify physician/advanced practitioner if interventions unsuccessful or patient reports new pain  Outcome: Progressing     Problem: INFECTION - ADULT  Goal: Absence or prevention of progression during hospitalization  Description: INTERVENTIONS:  - Assess and monitor for signs and symptoms of infection  - Monitor lab/diagnostic results  - Monitor all insertion sites, i e  indwelling lines, tubes, and drains  - Monitor endotracheal if appropriate and nasal secretions for changes in amount and color  - [place] appropriate cooling/warming therapies per order  - Administer medications as ordered  - Instruct and encourage patient and family to use good hand hygiene technique  - Identify and instruct in appropriate isolation precautions for identified infection/condition  Outcome: Progressing  Goal: Absence of fever/infection during neutropenic period  Description: INTERVENTIONS:  - Monitor WBC    Outcome: Progressing     Problem: SAFETY ADULT  Goal: Patient will remain free of falls  Description: INTERVENTIONS:  - Educate patient/family on patient safety including physical limitations  - Instruct patient to call for assistance with activity   - Consult OT/PT to assist with strengthening/mobility   - Keep Call bell within reach  - Keep bed low and locked with side rails adjusted as appropriate  - Keep care items and personal belongings within reach  - Initiate and maintain comfort rounds  - Make Fall Risk Sign visible to staff  - Offer Toileting every 2 Hours, in advance of need  - Initiate/Maintain 2alarm  - Obtain necessary fall risk management equipment: 2  - Apply yellow socks and bracelet for high fall risk patients  - Consider moving patient to room near nurses station  Outcome: Progressing  Goal: Maintain or return to baseline ADL function  Description: INTERVENTIONS:  -  Assess patient's ability to carry out ADLs; assess patient's baseline for ADL function and identify physical deficits which impact ability to perform ADLs (bathing, care of mouth/teeth, toileting, grooming, dressing, etc )  - Assess/evaluate cause of self-care deficits   - Assess range of motion  - Assess patient's mobility; develop plan if impaired  - Assess patient's need for assistive devices and provide as appropriate  - Encourage maximum independence but intervene and supervise when necessary  - Involve family in performance of ADLs  - Assess for home care needs following discharge   - Consider OT consult to assist with ADL evaluation and planning for discharge  - Provide patient education as appropriate  Outcome: Progressing  Goal: Maintains/Returns to pre admission functional level  Description: INTERVENTIONS:  - Perform BMAT or MOVE assessment daily    - Set and communicate daily mobility goal to care team and patient/family/caregiver  - Collaborate with rehabilitation services on mobility goals if consulted  - Perform Range of Motion 2 times a day  - Reposition patient every 2 hours  - Dangle patient 2 times a day  - Stand patient 2 times a day  - Ambulate patient 2 times a day  - Out of bed to chair 2 times a day   - Out of bed for meals 2 times a day  - Out of bed for toileting  - Record patient progress and toleration of activity level   Outcome: Progressing     Problem: DISCHARGE PLANNING  Goal: Discharge to home or other facility with appropriate resources  Description: INTERVENTIONS:  - Identify barriers to discharge w/patient and caregiver  - Arrange for needed discharge resources and transportation as appropriate  - Identify discharge learning needs (meds, wound care, etc )  - Arrange for interpretive services to assist at discharge as needed  - Refer to Case Management Department for coordinating discharge planning if the patient needs post-hospital services based on physician/advanced practitioner order or complex needs related to functional status, cognitive ability, or social support system  Outcome: Progressing     Problem: Knowledge Deficit  Goal: Patient/family/caregiver demonstrates understanding of disease process, treatment plan, medications, and discharge instructions  Description: Complete learning assessment and assess knowledge base    Interventions:  - Provide teaching at level of understanding  - Provide teaching via preferred learning methods  Outcome: Progressing     Problem: RESPIRATORY - ADULT  Goal: Achieves optimal ventilation and oxygenation  Description: INTERVENTIONS:  - Assess for changes in respiratory status  - Assess for changes in mentation and behavior  - Position to facilitate oxygenation and minimize respiratory effort  - Oxygen administered by appropriate delivery if ordered  - Initiate smoking cessation education as indicated  - Encourage broncho-pulmonary hygiene including cough, deep breathe, Incentive Spirometry  - Assess the need for suctioning and aspirate as needed  - Assess and instruct to report SOB or any respiratory difficulty  - Respiratory Therapy support as indicated  Outcome: Progressing     Problem: METABOLIC, FLUID AND ELECTROLYTES - ADULT  Goal: Glucose maintained within target range  Description: INTERVENTIONS:  - Monitor Blood Glucose as ordered  - Assess for signs and symptoms of hyperglycemia and hypoglycemia  - Administer ordered medications to maintain glucose within target range  - Assess nutritional intake and initiate nutrition service referral as needed  Outcome: Progressing

## 2022-01-26 NOTE — NURSING NOTE
Pulse ox evaluation completed per providers order  Patient desaturated to 85% on room air  Patient now back on 4LNC

## 2022-01-26 NOTE — PLAN OF CARE
Problem: Potential for Falls  Goal: Patient will remain free of falls  Description: INTERVENTIONS:  - Educate patient/family on patient safety including physical limitations  - Instruct patient to call for assistance with activity   - Consult OT/PT to assist with strengthening/mobility   - Keep Call bell within reach  - Keep bed low and locked with side rails adjusted as appropriate  - Keep care items and personal belongings within reach  - Initiate and maintain comfort rounds  - Make Fall Risk Sign visible to staff  - Offer Toileting every 2 Hours, in advance of need  - Initiate/Maintain alarm  - Obtain necessary fall risk management equipment:   - Apply yellow socks and bracelet for high fall risk patients  - Consider moving patient to room near nurses station  Outcome: Progressing     Problem: PAIN - ADULT  Goal: Verbalizes/displays adequate comfort level or baseline comfort level  Description: Interventions:  - Encourage patient to monitor pain and request assistance  - Assess pain using appropriate pain scale  - Administer analgesics based on type and severity of pain and evaluate response  - Implement non-pharmacological measures as appropriate and evaluate response  - Consider cultural and social influences on pain and pain management  - Notify physician/advanced practitioner if interventions unsuccessful or patient reports new pain  Outcome: Progressing     Problem: INFECTION - ADULT  Goal: Absence or prevention of progression during hospitalization  Description: INTERVENTIONS:  - Assess and monitor for signs and symptoms of infection  - Monitor lab/diagnostic results  - Monitor all insertion sites, i e  indwelling lines, tubes, and drains  - Monitor endotracheal if appropriate and nasal secretions for changes in amount and color  - [place] appropriate cooling/warming therapies per order  - Administer medications as ordered  - Instruct and encourage patient and family to use good hand hygiene technique  - Identify and instruct in appropriate isolation precautions for identified infection/condition  Outcome: Progressing  Goal: Absence of fever/infection during neutropenic period  Description: INTERVENTIONS:  - Monitor WBC    Outcome: Progressing     Problem: SAFETY ADULT  Goal: Patient will remain free of falls  Description: INTERVENTIONS:  - Educate patient/family on patient safety including physical limitations  - Instruct patient to call for assistance with activity   - Consult OT/PT to assist with strengthening/mobility   - Keep Call bell within reach  - Keep bed low and locked with side rails adjusted as appropriate  - Keep care items and personal belongings within reach  - Initiate and maintain comfort rounds  - Make Fall Risk Sign visible to staff  - Offer Toileting every 2 Hours, in advance of need  - Initiate/Maintain alarm  - Obtain necessary fall risk management equipment:   - Apply yellow socks and bracelet for high fall risk patients  - Consider moving patient to room near nurses station  Outcome: Progressing  Goal: Maintain or return to baseline ADL function  Description: INTERVENTIONS:  -  Assess patient's ability to carry out ADLs; assess patient's baseline for ADL function and identify physical deficits which impact ability to perform ADLs (bathing, care of mouth/teeth, toileting, grooming, dressing, etc )  - Assess/evaluate cause of self-care deficits   - Assess range of motion  - Assess patient's mobility; develop plan if impaired  - Assess patient's need for assistive devices and provide as appropriate  - Encourage maximum independence but intervene and supervise when necessary  - Involve family in performance of ADLs  - Assess for home care needs following discharge   - Consider OT consult to assist with ADL evaluation and planning for discharge  - Provide patient education as appropriate  Outcome: Progressing  Goal: Maintains/Returns to pre admission functional level  Description: INTERVENTIONS:  - Perform BMAT or MOVE assessment daily    - Set and communicate daily mobility goal to care team and patient/family/caregiver  - Collaborate with rehabilitation services on mobility goals if consulted  - Perform Range of Motion 2 times a day  - Reposition patient every 2 hours  - Dangle patient 2 times a day  - Stand patient 2 times a day  - Ambulate patient 2 times a day  - Out of bed to chair 2 times a day   - Out of bed for meals 2 times a day  - Out of bed for toileting  - Record patient progress and toleration of activity level   Outcome: Progressing     Problem: DISCHARGE PLANNING  Goal: Discharge to home or other facility with appropriate resources  Description: INTERVENTIONS:  - Identify barriers to discharge w/patient and caregiver  - Arrange for needed discharge resources and transportation as appropriate  - Identify discharge learning needs (meds, wound care, etc )  - Arrange for interpretive services to assist at discharge as needed  - Refer to Case Management Department for coordinating discharge planning if the patient needs post-hospital services based on physician/advanced practitioner order or complex needs related to functional status, cognitive ability, or social support system  Outcome: Progressing     Problem: Knowledge Deficit  Goal: Patient/family/caregiver demonstrates understanding of disease process, treatment plan, medications, and discharge instructions  Description: Complete learning assessment and assess knowledge base    Interventions:  - Provide teaching at level of understanding  - Provide teaching via preferred learning methods  Outcome: Progressing     Problem: RESPIRATORY - ADULT  Goal: Achieves optimal ventilation and oxygenation  Description: INTERVENTIONS:  - Assess for changes in respiratory status  - Assess for changes in mentation and behavior  - Position to facilitate oxygenation and minimize respiratory effort  - Oxygen administered by appropriate delivery if ordered  - Initiate smoking cessation education as indicated  - Encourage broncho-pulmonary hygiene including cough, deep breathe, Incentive Spirometry  - Assess the need for suctioning and aspirate as needed  - Assess and instruct to report SOB or any respiratory difficulty  - Respiratory Therapy support as indicated  Outcome: Progressing     Problem: METABOLIC, FLUID AND ELECTROLYTES - ADULT  Goal: Glucose maintained within target range  Description: INTERVENTIONS:  - Monitor Blood Glucose as ordered  - Assess for signs and symptoms of hyperglycemia and hypoglycemia  - Administer ordered medications to maintain glucose within target range  - Assess nutritional intake and initiate nutrition service referral as needed  Outcome: Progressing

## 2022-01-26 NOTE — PROGRESS NOTES
[facility] Progress Note Drake Pod 1955, 61 y o  male MRN: [id number]  Unit/Bed#: -01 Encounter: [id number]  Primary Care Provider: No primary care provider on file  Date and time admitted to hospital: 1/24/2022  1:36 PM    * Acute respiratory failure with hypoxia (HCC)  Assessment & Plan  · Presented on admission in ER desaturated to 87%, requiring 3L via NC for support  · Treatment as below, wean O2 as tolerated to maintain >88% given underlying COPD diagnosis     Pneumonia due to COVID-19 virus  Assessment & Plan  · Present on admission, patient unvaccinated  Reports shortness of breath and chest pain at home  + sick contact with wife at home    Reported 90% on RA by EMS    Currently on MILD COVID pathway:  · CXR on admit with bibasilar infiltrates consistent with COVID-19 pneumonia  · Continue O2 supplement, goal >88% O2 sat  · Continue IV remdesivir, day 3 of 5  · Continue IV Decadron 6 mg daily, day 3 of 10  · Baricitinib not indicated  · Antibiotics not indicated given normal procalcitonin x3  · Continue lovenox (ddimer 0 71)  · Mucinex, tessalon, albuterol prn  · Encourage patient to self-prone, incentive spirometer, OOB as appropriate  · Current oxygenation 93% on 3 L   · Monitor inflammatory markers q 2-3 days  · D-dimer 0 71, CRP 84 , NT-,  Procalcitonin 0 23 ->0 25  · Leukopenia likely secondary to viral illness, improving  · CK still elevated will give light IV fluids today     Chronic obstructive pulmonary disease (HCC)  Assessment & Plan  · No wheezing or rhonchi on exam, does not appear to have an acute exacerbation at this time  · Continue with breathing treatments and home medications    Diabetes mellitus without complication [facility])  Assessment & Plan  Lab Results   Component Value Date    HGBA1C 7 2 (A) 11/15/2021       Recent Labs     01/25/22  0640 01/25/22  1118 01/25/22  1631 01/25/22  2125   POCGLU 173* 105 135 184*       Blood Sugar Average: Last 72 hrs:  (P) 161 2   · Hold home amaryl and metformin  · SSI #1 initiated on admit, will continue and adjust as needed  · CCO diet #2  · Anticipate degree of hyperglycemia in the setting of steroid use  · Ativan prn agitation due to steroids    Hypothyroidism  Assessment & Plan  · Continue levothyroxine per home dose, TSH wnl     Lactic acid acidosis-resolved as of 2022  Assessment & Plan  · Noted 2 8 on admit, resolved with IV fluids; consider compounded by metformin use      VTE Pharmacologic Prophylaxis: VTE Score: 3 Moderate Risk (Score 3-4) - Pharmacological DVT Prophylaxis Ordered: enoxaparin (Lovenox)  Patient Centered Rounds: I evaluated the patient without nursing staff present due to [address] with Specialists or Other Care Team Provider: SUZETTE re: dispo    Education and Discussions with Family / Patient: Updated  (daughter, Jerad Roth) via phone  Time Spent for Care: 20 minutes  More than 50% of total time spent on counseling and coordination of care as described above  Current Length of Stay: 2 day(s)  Current Patient Status: Inpatient   Certification Statement: The patient will continue to require additional inpatient hospital stay due to pending further O2 improvement and improvement in labs  Discharge Plan: Anticipate discharge in 24-48 hrs to home  Code Status: Level 1 - Full Code    Subjective:   CC "I feel great!"    Patient seen this morning and feeling better than yesterday  Less short of breath, less coughing  Still has chest discomfort with deep inhalation or cough, getting tired with exertion but better  No fevers  He has been independent in room without complaints        Objective:     Vitals:   Temp (24hrs), Av 1 °F (37 3 °C), Min:98 9 °F (37 2 °C), Max:99 2 °F (37 3 °C)    Temp:  [98 9 °F (37 2 °C)-99 2 °F (37 3 °C)] 98 9 °F (37 2 °C)  HR:  [65-75] 68  BP: (115-157)/() 116/58  SpO2:  [90 %-93 %] 91 %  There is no height or weight on file to calculate BMI  Input and Output Summary (last 24 hours): Intake/Output Summary (Last 24 hours) at 1/26/2022 0744  Last data filed at 1/25/2022 1811  Gross per 24 hour   Intake 1220 ml   Output --   Net 1220 ml       Physical Exam:   Physical Exam  Vitals and nursing note reviewed  Constitutional:       General: He is not in acute distress  Appearance: He is obese  He is not ill-appearing or toxic-appearing  Cardiovascular:      Rate and Rhythm: Normal rate and regular rhythm  Heart sounds: Normal heart sounds  Pulmonary:      Effort: Pulmonary effort is normal  No respiratory distress  Breath sounds: Rales (improved, bases) present  No wheezing or rhonchi  Musculoskeletal:      Right lower leg: No edema  Left lower leg: No edema  Skin:     General: Skin is warm and dry  Coloration: Skin is not pale  Neurological:      Mental Status: He is alert and oriented to person, place, and time  Psychiatric:         Mood and Affect: Mood normal          Behavior: Behavior normal            Additional Data:     Labs:  Results from last 7 days   Lab Units 01/26/22 0459 01/25/22 0443 01/25/22 0443   WBC Thousand/uL 2 77*   < > 2 49*   HEMOGLOBIN g/dL 11 6*   < > 11 2*   HEMATOCRIT % 36 5   < > 35 0*   PLATELETS Thousands/uL 233   < > 240   NEUTROS PCT %  --   --  68   LYMPHS PCT %  --   --  18   MONOS PCT %  --   --  14*   EOS PCT %  --   --  0    < > = values in this interval not displayed       Results from last 7 days   Lab Units 01/26/22 0459 01/25/22 0443 01/25/22 0443   SODIUM mmol/L 139   < > 138   POTASSIUM mmol/L 3 7   < > 3 7   CHLORIDE mmol/L 104   < > 102   CO2 mmol/L 28   < > 29   BUN mg/dL 15   < > 12   CREATININE mg/dL 1 08   < > 1 08   ANION GAP mmol/L 7   < > 7   CALCIUM mg/dL 8 9   < > 8 6   ALBUMIN g/dL  --   --  2 9*   TOTAL BILIRUBIN mg/dL  --   --  0 42   ALK PHOS U/L  --   --  62   ALT U/L  --   --  47   AST U/L  --   --  91*   GLUCOSE RANDOM mg/dL 196*   < > 210*    < > = values in this interval not displayed  Results from last 7 days   Lab Units 01/24/22  1411   INR  1 00     Results from last 7 days   Lab Units 01/25/22  2125 01/25/22  1631 01/25/22  1118 01/25/22  0640 01/24/22  2119   POC GLUCOSE mg/dl 184* 135 105 173* 209*         Results from last 7 days   Lab Units 01/25/22  1451 01/25/22  1156 01/25/22  0443 01/24/22  1708 01/24/22  1537 01/24/22  1411   LACTIC ACID mmol/L 1 2 2 1*  --  2 8*  --  2 8*   PROCALCITONIN ng/ml  --   --  0 23  --  0 22  --        Lines/Drains:  Invasive Devices  Report    Peripheral Intravenous Line            Peripheral IV 01/25/22 Dorsal (posterior); Right Hand <1 day                      Imaging: No pertinent imaging reviewed  Recent Cultures (last 7 days):   Results from last 7 days   Lab Units 01/24/22  1411   BLOOD CULTURE  No Growth at 24 hrs  No Growth at 24 hrs         Last 24 Hours Medication List:   Current Facility-Administered Medications   Medication Dose Route Frequency Provider Last Rate    acetaminophen  650 mg Oral Q6H PRN Navin Spann MD      albuterol  2 puff Inhalation 4x Daily Navin Spann MD      aluminum-magnesium hydroxide-simethicone  30 mL Oral Q6H PRN Navin Spann MD      benzonatate  100 mg Oral TID PRN MIKE GomesC      buPROPion  300 mg Oral QAM Navin Spann MD      carbidopa-levodopa  2 tablet Oral TID Navin Spann MD      cyclobenzaprine  10 mg Oral HS Navin Spann MD      dexamethasone  6 mg Intravenous T15B Navin Spann MD      enoxaparin  30 mg Subcutaneous Q12H [facility] [place]      escitalopram  20 mg Oral Daily Navin Spann MD      guaiFENesin  600 mg Oral Q12H [facility] [place], [place]      hydrALAZINE  5 mg Intravenous Q6H PRN MIKE GomesC      insulin lispro  1-5 Units Subcutaneous HS Navin Spann MD      insulin lispro  1-6 Units Subcutaneous TID [facility] Evette Carrington MD      levothyroxine  150 mcg Oral Early Morning Evette Carrington MD      LORazepam  1 mg Intravenous Q6H PRN Evette Carrington MD      losartan potassium-hydrochlorothiazide [facility] 50/12  5) combination   Oral Daily Evette Carrington MD      ondansetron  4 mg Intravenous Q6H PRN Evette Carrington MD      pantoprazole  40 mg Oral Early Morning Evette Carrington MD      polyethylene glycol  17 g Oral Daily PRN Evette Carrington MD      remdesivir  100 mg Intravenous L66D Evette Carrington MD      simethicone  80 mg Oral 4x Daily PRN Evette Carrington MD      umeclidinium-vilanterol  1 puff Inhalation Daily Evette Carrington MD          Today, Patient Was Seen By: Ritu Laughlin PA-C    **Please Note: This note may have been constructed using a voice recognition system  **

## 2022-01-27 ENCOUNTER — TELEPHONE (OUTPATIENT)
Dept: FAMILY MEDICINE CLINIC | Facility: CLINIC | Age: 67
End: 2022-01-27

## 2022-01-27 LAB
ANION GAP SERPL CALCULATED.3IONS-SCNC: 8 MMOL/L (ref 4–13)
BUN SERPL-MCNC: 15 MG/DL (ref 5–25)
CALCIUM SERPL-MCNC: 8.9 MG/DL (ref 8.3–10.1)
CHLORIDE SERPL-SCNC: 105 MMOL/L (ref 100–108)
CK MB SERPL-MCNC: 1.7 % (ref 0–2.5)
CK MB SERPL-MCNC: 5.9 NG/ML (ref 0–5)
CK SERPL-CCNC: 338 U/L (ref 39–308)
CO2 SERPL-SCNC: 25 MMOL/L (ref 21–32)
CREAT SERPL-MCNC: 1.01 MG/DL (ref 0.6–1.3)
ERYTHROCYTE [DISTWIDTH] IN BLOOD BY AUTOMATED COUNT: 15.1 % (ref 11.6–15.1)
GFR SERPL CREATININE-BSD FRML MDRD: 78 ML/MIN/1.73SQ M
GLUCOSE SERPL-MCNC: 115 MG/DL (ref 65–140)
GLUCOSE SERPL-MCNC: 135 MG/DL (ref 65–140)
GLUCOSE SERPL-MCNC: 199 MG/DL (ref 65–140)
GLUCOSE SERPL-MCNC: 205 MG/DL (ref 65–140)
GLUCOSE SERPL-MCNC: 231 MG/DL (ref 65–140)
HCT VFR BLD AUTO: 34.7 % (ref 36.5–49.3)
HGB BLD-MCNC: 11.3 G/DL (ref 12–17)
MCH RBC QN AUTO: 27.8 PG (ref 26.8–34.3)
MCHC RBC AUTO-ENTMCNC: 32.6 G/DL (ref 31.4–37.4)
MCV RBC AUTO: 85 FL (ref 82–98)
PLATELET # BLD AUTO: 260 THOUSANDS/UL (ref 149–390)
PMV BLD AUTO: 8.9 FL (ref 8.9–12.7)
POTASSIUM SERPL-SCNC: 3.7 MMOL/L (ref 3.5–5.3)
PROCALCITONIN SERPL-MCNC: 0.13 NG/ML
RBC # BLD AUTO: 4.07 MILLION/UL (ref 3.88–5.62)
SODIUM SERPL-SCNC: 138 MMOL/L (ref 136–145)
WBC # BLD AUTO: 3.6 THOUSAND/UL (ref 4.31–10.16)

## 2022-01-27 PROCEDURE — 85027 COMPLETE CBC AUTOMATED: CPT | Performed by: PHYSICIAN ASSISTANT

## 2022-01-27 PROCEDURE — 84145 PROCALCITONIN (PCT): CPT | Performed by: PHYSICIAN ASSISTANT

## 2022-01-27 PROCEDURE — 82553 CREATINE MB FRACTION: CPT | Performed by: PHYSICIAN ASSISTANT

## 2022-01-27 PROCEDURE — 80048 BASIC METABOLIC PNL TOTAL CA: CPT | Performed by: PHYSICIAN ASSISTANT

## 2022-01-27 PROCEDURE — 94660 CPAP INITIATION&MGMT: CPT

## 2022-01-27 PROCEDURE — 82550 ASSAY OF CK (CPK): CPT | Performed by: PHYSICIAN ASSISTANT

## 2022-01-27 PROCEDURE — 94761 N-INVAS EAR/PLS OXIMETRY MLT: CPT

## 2022-01-27 PROCEDURE — 94760 N-INVAS EAR/PLS OXIMETRY 1: CPT

## 2022-01-27 PROCEDURE — 99232 SBSQ HOSP IP/OBS MODERATE 35: CPT | Performed by: PHYSICIAN ASSISTANT

## 2022-01-27 PROCEDURE — 82948 REAGENT STRIP/BLOOD GLUCOSE: CPT

## 2022-01-27 RX ADMIN — CYCLOBENZAPRINE HYDROCHLORIDE 10 MG: 10 TABLET, FILM COATED ORAL at 21:11

## 2022-01-27 RX ADMIN — BUPROPION 300 MG: 150 TABLET, EXTENDED RELEASE ORAL at 09:07

## 2022-01-27 RX ADMIN — ALBUTEROL SULFATE 2 PUFF: 90 AEROSOL, METERED RESPIRATORY (INHALATION) at 18:39

## 2022-01-27 RX ADMIN — ENOXAPARIN SODIUM 30 MG: 30 INJECTION SUBCUTANEOUS at 09:06

## 2022-01-27 RX ADMIN — PANTOPRAZOLE SODIUM 40 MG: 40 TABLET, DELAYED RELEASE ORAL at 05:41

## 2022-01-27 RX ADMIN — CARBIDOPA AND LEVODOPA 2 TABLET: 25; 100 TABLET ORAL at 09:07

## 2022-01-27 RX ADMIN — LEVOTHYROXINE SODIUM 150 MCG: 150 TABLET ORAL at 05:41

## 2022-01-27 RX ADMIN — LOSARTAN POTASSIUM: 50 TABLET, FILM COATED ORAL at 09:06

## 2022-01-27 RX ADMIN — POLYETHYLENE GLYCOL 3350 17 G: 17 POWDER, FOR SOLUTION ORAL at 21:22

## 2022-01-27 RX ADMIN — DEXAMETHASONE SODIUM PHOSPHATE 6 MG: 4 INJECTION, SOLUTION INTRAMUSCULAR; INTRAVENOUS at 18:40

## 2022-01-27 RX ADMIN — ALBUTEROL SULFATE 2 PUFF: 90 AEROSOL, METERED RESPIRATORY (INHALATION) at 12:32

## 2022-01-27 RX ADMIN — INSULIN LISPRO 2 UNITS: 100 INJECTION, SOLUTION INTRAVENOUS; SUBCUTANEOUS at 09:05

## 2022-01-27 RX ADMIN — CARBIDOPA AND LEVODOPA 2 TABLET: 25; 100 TABLET ORAL at 21:11

## 2022-01-27 RX ADMIN — ALBUTEROL SULFATE 2 PUFF: 90 AEROSOL, METERED RESPIRATORY (INHALATION) at 21:10

## 2022-01-27 RX ADMIN — GUAIFENESIN 600 MG: 600 TABLET ORAL at 09:06

## 2022-01-27 RX ADMIN — ALBUTEROL SULFATE 2 PUFF: 90 AEROSOL, METERED RESPIRATORY (INHALATION) at 09:04

## 2022-01-27 RX ADMIN — REMDESIVIR 100 MG: 100 INJECTION, POWDER, LYOPHILIZED, FOR SOLUTION INTRAVENOUS at 18:41

## 2022-01-27 RX ADMIN — ENOXAPARIN SODIUM 30 MG: 30 INJECTION SUBCUTANEOUS at 21:10

## 2022-01-27 RX ADMIN — INSULIN LISPRO 1 UNITS: 100 INJECTION, SOLUTION INTRAVENOUS; SUBCUTANEOUS at 21:11

## 2022-01-27 RX ADMIN — UMECLIDINIUM BROMIDE AND VILANTEROL TRIFENATATE 1 PUFF: 62.5; 25 POWDER RESPIRATORY (INHALATION) at 09:04

## 2022-01-27 RX ADMIN — ESCITALOPRAM OXALATE 20 MG: 10 TABLET ORAL at 09:06

## 2022-01-27 RX ADMIN — GUAIFENESIN 600 MG: 600 TABLET ORAL at 21:11

## 2022-01-27 RX ADMIN — CARBIDOPA AND LEVODOPA 2 TABLET: 25; 100 TABLET ORAL at 18:41

## 2022-01-27 NOTE — CASE MANAGEMENT
Case Management Progress Note    Patient name Joe Dolan  Location [address] 203/-20 MRN [id number]  : 1955 Date 2022       LOS (days): 3  Geometric Mean LOS (GMLOS) (days): 5 40  Days to GMLOS:2 5        OBJECTIVE:        Current admission status: Inpatient  Preferred Pharmacy:   [facility]/pharmacy #[id number]- [place] - [address]  Phone: [phone] Fax: [phone]    [facility]pharmacy #[id number]- [place] - [address]  [address] [place] [address]  Phone: [phone] Fax: [phone] [facility] [address] [address]  Phone: [phone] Fax: [phone]    [address] [address]  Phone: [phone] Fax: [phone]    Primary Care Provider: No primary care provider on file  Primary Insurance: MEDICARE  Secondary Insurance: [address] [place] [address]    PROGRESS NOTE:      Patient does not qualify for STR  Patient and family are agreeable to [facility] [address] referrals are pended

## 2022-01-27 NOTE — PLAN OF CARE
Problem: Potential for Falls  Goal: Patient will remain free of falls  Description: INTERVENTIONS:  - Educate patient/family on patient safety including physical limitations  - Instruct patient to call for assistance with activity   - Consult OT/PT to assist with strengthening/mobility   - Keep Call bell within reach  - Keep bed low and locked with side rails adjusted as appropriate  - Keep care items and personal belongings within reach  - Initiate and maintain comfort rounds  - Make Fall Risk Sign visible to staff  - Offer Toileting every 2 Hours, in advance of need  - Initiate/Maintain bed/chair alarm  - Obtain necessary fall risk management equipment  - Apply yellow socks and bracelet for high fall risk patients  - Consider moving patient to room near nurses station  Outcome: Progressing     Problem: PAIN - ADULT  Goal: Verbalizes/displays adequate comfort level or baseline comfort level  Description: Interventions:  - Encourage patient to monitor pain and request assistance  - Assess pain using appropriate pain scale  - Administer analgesics based on type and severity of pain and evaluate response  - Implement non-pharmacological measures as appropriate and evaluate response  - Consider cultural and social influences on pain and pain management  - Notify physician/advanced practitioner if interventions unsuccessful or patient reports new pain  Outcome: Progressing     Problem: INFECTION - ADULT  Goal: Absence or prevention of progression during hospitalization  Description: INTERVENTIONS:  - Assess and monitor for signs and symptoms of infection  - Monitor lab/diagnostic results  - Monitor all insertion sites, i e  indwelling lines, tubes, and drains  - Monitor endotracheal if appropriate and nasal secretions for changes in amount and color  - [place] appropriate cooling/warming therapies per order  - Administer medications as ordered  - Instruct and encourage patient and family to use good hand hygiene technique  - Identify and instruct in appropriate isolation precautions for identified infection/condition  Outcome: Progressing  Goal: Absence of fever/infection during neutropenic period  Description: INTERVENTIONS:  - Monitor WBC    Outcome: Progressing     Problem: SAFETY ADULT  Goal: Patient will remain free of falls  Description: INTERVENTIONS:  - Educate patient/family on patient safety including physical limitations  - Instruct patient to call for assistance with activity   - Consult OT/PT to assist with strengthening/mobility   - Keep Call bell within reach  - Keep bed low and locked with side rails adjusted as appropriate  - Keep care items and personal belongings within reach  - Initiate and maintain comfort rounds  - Make Fall Risk Sign visible to staff  - Offer Toileting every 2 Hours, in advance of need  - Initiate/Maintain bed/chair alarm  - Obtain necessary fall risk management equipment  - Apply yellow socks and bracelet for high fall risk patients  - Consider moving patient to room near nurses station  Outcome: Progressing  Goal: Maintain or return to baseline ADL function  Description: INTERVENTIONS:  -  Assess patient's ability to carry out ADLs; assess patient's baseline for ADL function and identify physical deficits which impact ability to perform ADLs (bathing, care of mouth/teeth, toileting, grooming, dressing, etc )  - Assess/evaluate cause of self-care deficits   - Assess range of motion  - Assess patient's mobility; develop plan if impaired  - Assess patient's need for assistive devices and provide as appropriate  - Encourage maximum independence but intervene and supervise when necessary  - Involve family in performance of ADLs  - Assess for home care needs following discharge   - Consider OT consult to assist with ADL evaluation and planning for discharge  - Provide patient education as appropriate  Outcome: Progressing  Goal: Maintains/Returns to pre admission functional level  Description: INTERVENTIONS:  - Perform BMAT or MOVE assessment daily    - Set and communicate daily mobility goal to care team and patient/family/caregiver  - Collaborate with rehabilitation services on mobility goals if consulted  - Ambulate patient 2  times a day  - Out of bed to chair 3  times a day   - Out of bed for meals 3 times a day  - Out of bed for toileting  - Record patient progress and toleration of activity level   Outcome: Progressing     Problem: DISCHARGE PLANNING  Goal: Discharge to home or other facility with appropriate resources  Description: INTERVENTIONS:  - Identify barriers to discharge w/patient and caregiver  - Arrange for needed discharge resources and transportation as appropriate  - Identify discharge learning needs (meds, wound care, etc )  - Arrange for interpretive services to assist at discharge as needed  - Refer to Case Management Department for coordinating discharge planning if the patient needs post-hospital services based on physician/advanced practitioner order or complex needs related to functional status, cognitive ability, or social support system  Outcome: Progressing     Problem: Knowledge Deficit  Goal: Patient/family/caregiver demonstrates understanding of disease process, treatment plan, medications, and discharge instructions  Description: Complete learning assessment and assess knowledge base    Interventions:  - Provide teaching at level of understanding  - Provide teaching via preferred learning methods  Outcome: Progressing     Problem: RESPIRATORY - ADULT  Goal: Achieves optimal ventilation and oxygenation  Description: INTERVENTIONS:  - Assess for changes in respiratory status  - Assess for changes in mentation and behavior  - Position to facilitate oxygenation and minimize respiratory effort  - Oxygen administered by appropriate delivery if ordered  - Initiate smoking cessation education as indicated  - Encourage broncho-pulmonary hygiene including cough, deep breathe, Incentive Spirometry  - Assess the need for suctioning and aspirate as needed  - Assess and instruct to report SOB or any respiratory difficulty  - Respiratory Therapy support as indicated  Outcome: Progressing     Problem: METABOLIC, FLUID AND ELECTROLYTES - ADULT  Goal: Glucose maintained within target range  Description: INTERVENTIONS:  - Monitor Blood Glucose as ordered  - Assess for signs and symptoms of hyperglycemia and hypoglycemia  - Administer ordered medications to maintain glucose within target range  - Assess nutritional intake and initiate nutrition service referral as needed  Outcome: Progressing

## 2022-01-27 NOTE — ASSESSMENT & PLAN NOTE
· Present on admission, patient unvaccinated  Reports shortness of breath and chest pain at home  + sick contact with wife at home    Reported 90% on RA by EMS    Currently on MILD COVID pathway:  · CXR on admit with bibasilar infiltrates consistent with COVID-19 pneumonia  · Continue O2 supplement, goal >88% O2 sat  · Continue IV remdesivir, day 4 of 5  · Continue IV Decadron 6 mg daily, day 4 of 10  · Baricitinib not indicated  · Antibiotics not indicated given normal procalcitonin x3  · Continue lovenox (ddimer 0 71)  · Mucinex, tessalon, albuterol prn  · Encourage patient to self-prone, incentive spirometer, OOB as appropriate  · Current oxygenation 93% on 3 L   · Monitor inflammatory markers q 2-3 days  · D-dimer 0 71, CRP 84 , NT-,  Procalcitonin 0 23 ->0 25 ->0 18  · Leukopenia likely secondary to viral illness, improving  · CK improved, no further checks indicated

## 2022-01-27 NOTE — PROGRESS NOTES
[facility] Progress Note Miladis Solis 1955, 61 y o  male MRN: [id number]  Unit/Bed#: -01 Encounter: [id number]  Primary Care Provider: No primary care provider on file  Date and time admitted to hospital: 1/24/2022  1:36 PM    * Acute respiratory failure with hypoxia (HCC)  Assessment & Plan  · Presented on admission in ER desaturated to 87%, requiring 3L via NC for support  · Treatment as below, wean O2 as tolerated to maintain >88% given underlying COPD diagnosis     Pneumonia due to COVID-19 virus  Assessment & Plan  · Present on admission, patient unvaccinated  Reports shortness of breath and chest pain at home  + sick contact with wife at home    Reported 90% on RA by EMS    Currently on MILD COVID pathway:  · CXR on admit with bibasilar infiltrates consistent with COVID-19 pneumonia  · Continue O2 supplement, goal >88% O2 sat  · Continue IV remdesivir, day 4 of 5  · Continue IV Decadron 6 mg daily, day 4 of 10  · Baricitinib not indicated  · Antibiotics not indicated given normal procalcitonin x3  · Continue lovenox (ddimer 0 71)  · Mucinex, tessalon, albuterol prn  · Encourage patient to self-prone, incentive spirometer, OOB as appropriate  · Current oxygenation 93% on 3 L   · Monitor inflammatory markers q 2-3 days  · D-dimer 0 71, CRP 84 , NT-,  Procalcitonin 0 23 ->0 25 ->0 18  · Leukopenia likely secondary to viral illness, improving  · CK improved, no further checks indicated    Chronic obstructive pulmonary disease (HCC)  Assessment & Plan  · No wheezing or rhonchi on exam, does not appear to have an acute exacerbation at this time  · Continue with breathing treatments and home medications    Diabetes mellitus without complication [facility])  Assessment & Plan  Lab Results   Component Value Date    HGBA1C 7 2 (A) 11/15/2021       Recent Labs     01/26/22  1059 01/26/22  1641 01/26/22 2029 01/27/22  0711   POCGLU 145* 132 205* 205*       Blood Sugar Average: Last 72 hrs:  (P) 164 8   · Hold home amaryl and metformin  · SSI #1 initiated on admit, will continue and adjust as needed  · CCO diet #2  · Anticipate degree of hyperglycemia in the setting of steroid use  · Ativan prn agitation due to steroids    Hypothyroidism  Assessment & Plan  · Continue levothyroxine per home dose, TSH wnl       VTE Pharmacologic Prophylaxis: VTE Score: 3 Moderate Risk (Score 3-4) - Pharmacological DVT Prophylaxis Ordered: enoxaparin (Lovenox)  Patient Centered Rounds: I evaluated the patient without nursing staff present due to [facility] with Specialists or Other Care Team Provider: SUZETTE re: d/c planning     Education and Discussions with Family / Patient: Updated  (daughter, Vijay Moore) via phone  Time Spent for Care: 20 minutes  More than 50% of total time spent on counseling and coordination of care as described above  Current Length of Stay: 3 day(s)  Current Patient Status: Inpatient   Certification Statement: The patient will continue to require additional inpatient hospital stay due to pending home O2 evaluation today  Discharge Plan: Anticipate discharge later today or tomorrow to home with home services  Code Status: Level 1 - Full Code    Subjective:   CC "I am feeling great!"    Patient seen OOB to chair, feeling better  He is feeling ready to go home  No chest pain, minimal shortness of breath only with prolonged activity  Ambulatory in room independently  Objective:     Vitals:   Temp (24hrs), Av 5 °F (36 9 °C), Min:97 9 °F (36 6 °C), Max:99 °F (37 2 °C)    Temp:  [97 9 °F (36 6 °C)-99 °F (37 2 °C)] 97 9 °F (36 6 °C)  HR:  [65-73] 70  Resp:  [16-20] 16  BP: (121-142)/(57-66) 142/66  SpO2:  [91 %-96 %] 91 %  There is no height or weight on file to calculate BMI  Input and Output Summary (last 24 hours):      Intake/Output Summary (Last 24 hours) at 2022 0743  Last data filed at 2022 0115  Gross per 24 hour   Intake 960 ml   Output 1300 ml   Net -340 ml       Physical Exam:   Physical Exam  Vitals and nursing note reviewed  Constitutional:       General: He is not in acute distress  Appearance: He is obese  He is not ill-appearing or toxic-appearing  Cardiovascular:      Rate and Rhythm: Normal rate and regular rhythm  Pulmonary:      Effort: Pulmonary effort is normal  No respiratory distress  Breath sounds: Normal breath sounds  No wheezing  Abdominal:      General: Bowel sounds are normal       Palpations: Abdomen is soft  Musculoskeletal:      Right lower leg: No edema  Left lower leg: No edema  Neurological:      Mental Status: He is alert and oriented to person, place, and time  Psychiatric:         Mood and Affect: Mood normal            Additional Data:     Labs:  Results from last 7 days   Lab Units 01/27/22  0542 01/26/22  0459 01/25/22  0443   WBC Thousand/uL 3 60*   < > 2 49*   HEMOGLOBIN g/dL 11 3*   < > 11 2*   HEMATOCRIT % 34 7*   < > 35 0*   PLATELETS Thousands/uL 260   < > 240   NEUTROS PCT %  --   --  68   LYMPHS PCT %  --   --  18   MONOS PCT %  --   --  14*   EOS PCT %  --   --  0    < > = values in this interval not displayed  Results from last 7 days   Lab Units 01/27/22  0542 01/26/22  0459 01/25/22  0443   SODIUM mmol/L 138   < > 138   POTASSIUM mmol/L 3 7   < > 3 7   CHLORIDE mmol/L 105   < > 102   CO2 mmol/L 25   < > 29   BUN mg/dL 15   < > 12   CREATININE mg/dL 1 01   < > 1 08   ANION GAP mmol/L 8   < > 7   CALCIUM mg/dL 8 9   < > 8 6   ALBUMIN g/dL  --   --  2 9*   TOTAL BILIRUBIN mg/dL  --   --  0 42   ALK PHOS U/L  --   --  62   ALT U/L  --   --  47   AST U/L  --   --  91*   GLUCOSE RANDOM mg/dL 231*   < > 210*    < > = values in this interval not displayed       Results from last 7 days   Lab Units 01/24/22  1411   INR  1 00     Results from last 7 days   Lab Units 01/27/22  0711 01/26/22  2029 01/26/22  1641 01/26/22  1059 01/26/22  0807 01/25/22  2125 01/25/22  1631 01/25/22  1118 01/25/22  0640 01/24/22  2119   POC GLUCOSE mg/dl 205* 205* 132 145* 155* 184* 135 105 173* 209*         Results from last 7 days   Lab Units 01/27/22  0543 01/26/22  0907 01/25/22  1451 01/25/22  1156 01/25/22  0443 01/24/22  1708 01/24/22  1537 01/24/22  1411   LACTIC ACID mmol/L  --   --  1 2 2 1*  --  2 8*  --  2 8*   PROCALCITONIN ng/ml 0 13 0 18  --   --  0 23  --  0 22  --        Lines/Drains:  Invasive Devices  Report    Peripheral Intravenous Line            Peripheral IV 01/25/22 Dorsal (posterior); Right Hand 1 day                      Imaging: No pertinent imaging reviewed  Recent Cultures (last 7 days):   Results from last 7 days   Lab Units 01/24/22  1411   BLOOD CULTURE  No Growth at 48 hrs  No Growth at 48 hrs         Last 24 Hours Medication List:   Current Facility-Administered Medications   Medication Dose Route Frequency Provider Last Rate    acetaminophen  650 mg Oral Q6H PRN Sangeeta Keenan MD      albuterol  2 puff Inhalation 4x Daily Sangeeta Keenan MD      aluminum-magnesium hydroxide-simethicone  30 mL Oral Q6H PRN Sangeeta Keenan MD      benzonatate  100 mg Oral TID PRN Andrea Doss PA-C      buPROPion  300 mg Oral QAM Sangeeta Keenan MD      carbidopa-levodopa  2 tablet Oral TID Sangeeta Keenan MD      cyclobenzaprine  10 mg Oral HS Sangeeta Keenan MD      dexamethasone  6 mg Intravenous Z06C Sangeeta Keenan MD      enoxaparin  30 mg Subcutaneous Q12H [facility] [place]      escitalopram  20 mg Oral Daily Sangeeta Keenan MD      guaiFENesin  600 mg Oral Q12H [facility] [place]      hydrALAZINE  5 mg Intravenous Q6H PRN Andrea Doss PA-C      insulin lispro  1-5 Units Subcutaneous HS Sangeeta Keenan MD      insulin lispro  1-6 Units Subcutaneous TID [facility] Sangeeta Keenan MD      levothyroxine  150 mcg Oral Early Morning Sangeeta Keenan MD  LORazepam  1 mg Intravenous Q6H PRN Joedemetrius Malone MD      losartan potassium-hydrochlorothiazide [facility] 50/12  5) combination   Oral Daily Joelin Malone MD      ondansetron  4 mg Intravenous Q6H PRN Joe Malone MD      pantoprazole  40 mg Oral Early Morning Joelin Malone MD      polyethylene glycol  17 g Oral Daily PRN Joe Malone MD      remdesivir  100 mg Intravenous T66L Joe Malone MD      simethicone  80 mg Oral 4x Daily PRN Joe Malone MD      umeclidinium-vilanterol  1 puff Inhalation Daily Joe Malone MD          Today, Patient Was Seen By: Desmond Goodwin PA-C    **Please Note: This note may have been constructed using a voice recognition system  **

## 2022-01-27 NOTE — RESPIRATORY THERAPY NOTE
Home Oxygen Qualifying Test       Patient name: Kenia Valdivia        : 1955   Date of Test:  2022  Diagnosis: covid     Home Oxygen Test:    **Medicare Guidelines require item(s) 1-5 on all ambulatory patients or 1 and 2 on non-ambulatory patients  1   Baseline SPO2 on Room Air at rest 84 %  2   SPO2 during exercise on Room Air              During exercise monitor SpO2  If SPO2 increases >=89% with ambulation do not add supplemental             oxygen  If <= 88% on room air add O2 via NC and titrate patient  Patient must be ambulated with O2 and titrated to > 88% with exertion  3   SPO2 on Oxygen at rest NA    4  2LNC required for patient to maintain an spo2 of 89% or better   5  Exercise performed:          NA          [x]  Supplemental Home Oxygen is indicated  []  Client does not qualify for home oxygen  Respiratory Additional Notes- Spo2 on room air at rest is 84%  Patient requires 2LNC to bring spo2 to 89% or better  Home oxygen is indicated       Desmond Oconnor, RT

## 2022-01-27 NOTE — TELEPHONE ENCOUNTER
Pt daughter called  Asked for you to give her a call  Valeria De Los Santos it is time sensitive and about his insulin   Her number is [phone]

## 2022-01-27 NOTE — ASSESSMENT & PLAN NOTE
Lab Results   Component Value Date    HGBA1C 7 2 (A) 11/15/2021       Recent Labs     01/26/22  1059 01/26/22  1641 01/26/22 2029 01/27/22  0711   POCGLU 145* 132 205* 205*       Blood Sugar Average: Last 72 hrs:  (P) 164 8   · Hold home amaryl and metformin  · SSI #1 initiated on admit, will continue and adjust as needed  · CCO diet #2  · Anticipate degree of hyperglycemia in the setting of steroid use  · Ativan prn agitation due to steroids

## 2022-01-27 NOTE — CASE MANAGEMENT
Case Management Progress Note    Patient name Gregor Narayanan  Location [address] 203/-81 MRN [id number]  : 1955 Date 2022       LOS (days): 3  Geometric Mean LOS (GMLOS) (days): 5 40  Days to GMLOS:2 5        OBJECTIVE:        Current admission status: Inpatient  Preferred Pharmacy:   [id number] [place] - [address]  Phone: [phone] Fax: [phone]    [id number] [place] - [address]  [address]  [address], [address] [place] [address]  Phone: [phone] Fax: [phone] [address] [address]  Phone: [phone] Fax: [phone]    [address] [facility] [address]  Phone: [phone] Fax: [phone]    Primary Care Provider: No primary care provider on file  Primary Insurance: MEDICARE  Secondary Insurance: [address] [place] [address]    PROGRESS NOTE:      Per SLIM patient will require inpatient stay tonight due to home oxygen eval   Patient will be discharged home and will need bls transport due to oxygen and covid

## 2022-01-28 VITALS
RESPIRATION RATE: 18 BRPM | OXYGEN SATURATION: 93 % | SYSTOLIC BLOOD PRESSURE: 132 MMHG | HEART RATE: 64 BPM | TEMPERATURE: 98.5 F | DIASTOLIC BLOOD PRESSURE: 57 MMHG

## 2022-01-28 DIAGNOSIS — F33.1 MODERATE EPISODE OF RECURRENT MAJOR DEPRESSIVE DISORDER (HCC): ICD-10-CM

## 2022-01-28 DIAGNOSIS — M46.1 SACROILIAC INFLAMMATION (HCC): ICD-10-CM

## 2022-01-28 LAB
DME PARACHUTE DELIVERY DATE ACTUAL: NORMAL
DME PARACHUTE DELIVERY DATE EXPECTED: NORMAL
DME PARACHUTE DELIVERY DATE REQUESTED: NORMAL
DME PARACHUTE DELIVERY NOTE: NORMAL
DME PARACHUTE ITEM DESCRIPTION: NORMAL
DME PARACHUTE ORDER STATUS: NORMAL
DME PARACHUTE SUPPLIER NAME: NORMAL
DME PARACHUTE SUPPLIER PHONE: NORMAL
GLUCOSE SERPL-MCNC: 132 MG/DL (ref 65–140)
GLUCOSE SERPL-MCNC: 148 MG/DL (ref 65–140)
GLUCOSE SERPL-MCNC: 204 MG/DL (ref 65–140)

## 2022-01-28 PROCEDURE — 99239 HOSP IP/OBS DSCHRG MGMT >30: CPT | Performed by: NURSE PRACTITIONER

## 2022-01-28 PROCEDURE — 94760 N-INVAS EAR/PLS OXIMETRY 1: CPT

## 2022-01-28 PROCEDURE — 82948 REAGENT STRIP/BLOOD GLUCOSE: CPT

## 2022-01-28 PROCEDURE — 94660 CPAP INITIATION&MGMT: CPT

## 2022-01-28 RX ORDER — ESCITALOPRAM OXALATE 20 MG/1
TABLET ORAL
Qty: 180 TABLET | Refills: 1 | Status: SHIPPED | OUTPATIENT
Start: 2022-01-28 | End: 2022-08-01

## 2022-01-28 RX ORDER — CYCLOBENZAPRINE HCL 10 MG
TABLET ORAL
Qty: 90 TABLET | Refills: 0 | Status: SHIPPED | OUTPATIENT
Start: 2022-01-28 | End: 2022-04-27

## 2022-01-28 RX ORDER — PANTOPRAZOLE SODIUM 40 MG/1
40 TABLET, DELAYED RELEASE ORAL
Qty: 30 TABLET | Refills: 0 | Status: SHIPPED | OUTPATIENT
Start: 2022-01-29 | End: 2022-02-24 | Stop reason: ALTCHOICE

## 2022-01-28 RX ORDER — BENZONATATE 100 MG/1
100 CAPSULE ORAL 3 TIMES DAILY PRN
Qty: 20 CAPSULE | Refills: 0 | Status: SHIPPED | OUTPATIENT
Start: 2022-01-28 | End: 2022-02-04 | Stop reason: ALTCHOICE

## 2022-01-28 RX ORDER — GUAIFENESIN 600 MG
600 TABLET, EXTENDED RELEASE 12 HR ORAL EVERY 12 HOURS SCHEDULED
Qty: 60 TABLET | Refills: 0 | Status: SHIPPED | OUTPATIENT
Start: 2022-01-28 | End: 2022-02-24 | Stop reason: ALTCHOICE

## 2022-01-28 RX ADMIN — GUAIFENESIN 600 MG: 600 TABLET ORAL at 10:10

## 2022-01-28 RX ADMIN — UMECLIDINIUM BROMIDE AND VILANTEROL TRIFENATATE 1 PUFF: 62.5; 25 POWDER RESPIRATORY (INHALATION) at 10:13

## 2022-01-28 RX ADMIN — LOSARTAN POTASSIUM: 50 TABLET, FILM COATED ORAL at 10:10

## 2022-01-28 RX ADMIN — ESCITALOPRAM OXALATE 20 MG: 10 TABLET ORAL at 10:09

## 2022-01-28 RX ADMIN — CARBIDOPA AND LEVODOPA 2 TABLET: 25; 100 TABLET ORAL at 10:10

## 2022-01-28 RX ADMIN — BUPROPION 300 MG: 150 TABLET, EXTENDED RELEASE ORAL at 10:10

## 2022-01-28 RX ADMIN — ALBUTEROL SULFATE 2 PUFF: 90 AEROSOL, METERED RESPIRATORY (INHALATION) at 17:42

## 2022-01-28 RX ADMIN — NYSTATIN 500000 UNITS: 100000 SUSPENSION ORAL at 17:42

## 2022-01-28 RX ADMIN — CARBIDOPA AND LEVODOPA 2 TABLET: 25; 100 TABLET ORAL at 17:42

## 2022-01-28 RX ADMIN — NYSTATIN 500000 UNITS: 100000 SUSPENSION ORAL at 11:03

## 2022-01-28 RX ADMIN — ALBUTEROL SULFATE 2 PUFF: 90 AEROSOL, METERED RESPIRATORY (INHALATION) at 10:13

## 2022-01-28 RX ADMIN — PANTOPRAZOLE SODIUM 40 MG: 40 TABLET, DELAYED RELEASE ORAL at 06:24

## 2022-01-28 RX ADMIN — ALBUTEROL SULFATE 2 PUFF: 90 AEROSOL, METERED RESPIRATORY (INHALATION) at 14:19

## 2022-01-28 RX ADMIN — LEVOTHYROXINE SODIUM 150 MCG: 150 TABLET ORAL at 06:24

## 2022-01-28 NOTE — CASE MANAGEMENT
Case Management Discharge Planning Note    Patient name Sendy Coker  Location [address] 203/-21 MRN [id number]  : 1955 Date 2022       Current Admission Date: 2022  Current Admission Diagnosis:Acute respiratory failure with hypoxia [facility])   Patient Active Problem List    Diagnosis Date Noted    Acute respiratory failure with hypoxia ([facility] ) 2022    Pneumonia due to COVID-19 virus 2022    Medicare annual wellness visit, subsequent 11/15/2021    REM behavioral disorder 10/20/2021    Iron deficiency 07/15/2021    Restless legs syndrome 07/15/2021    Constipation, chronic 2021    Sacroiliac inflammation ([facility] ) 2019    Lewy body dementia without behavioral disturbance ([facility] ) 2018    Pulmonary nodule 10/03/2018    Balance problem 2018    Tremor 2018    Neuropathy 2018    Memory impairment 2018    Episodic paroxysmal hemicrania, 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    Abnormality of gait due to impairment of balance 2018    Former smoker 2018    Centrilobular emphysema ([facility] ) 2018    DAFNE (obstructive sleep apnea) 2018    Low testosterone 2017    Psoriasis 2016    Goiter, nontoxic simple 2015    Chronic obstructive pulmonary disease ([facility] ) 2013    Depression 2013    Hypercholesterolemia 2013    Hypertension 2013    Hypothyroidism 2013    Diabetes mellitus without complication ([facility] )       LOS (days): 4  Geometric Mean LOS (GMLOS) (days): 5 40  Days to GMLOS:1 6     OBJECTIVE:  Risk of Unplanned Readmission Score: 14         Current admission status: Inpatient   Preferred Pharmacy:   [id number] [address] [facility]  Phone: [phone] Fax: [phone]    [id number] [place] - [address]  [address]   [address], [address] [place] [address]  Phone: [phone] Fax: [phone]    [facility] [address] [facility], [address]  [address]  [place] [address] [place] [address]  Phone: [phone] Fax: [phone]    [address] [facility], [address] [place] [address]  Phone: [phone] Fax: [phone]    Primary Care Provider: No primary care provider on file  Primary Insurance: MEDICARE  Secondary Insurance: [employer] [facility]    DISCHARGE DETAILS:    Discharge planning discussed with[de-identified] Dtr- marisol  Freedom of Choice: Yes  Comments - Freedom of Choice: patient is not covid vaccinated  Dtr-marisol is declinig STR however would like [address] services  [facility] accepted  CM contacted family/caregiver?: Yes  Were Treatment Team discharge recommendations reviewed with patient/caregiver?: Yes  Did patient/caregiver verbalize understanding of patient care needs?: Yes  Were patient/caregiver advised of the risks associated with not following Treatment Team discharge recommendations?: Yes    Contacts  Patient Contacts: dtr-marisol  Relationship to Patient[de-identified] Family  Contact Method: Phone  Phone Number: [phone]  Reason/Outcome: Continuity of [address]          Is the patient interested in [address] at discharge?: Yes  [address] Name[de-identified] Christiano Meehan LPM Ordered (if applicable based on home health services needed):: 4 LPM    DME Referral Provided  Referral made for DME?: Yes  DME referral completed for the following items[de-identified] Home Oxygen concentrator,Portable Oxygen tanks  DME Supplier Name[de-identified] [employer]    Other Referral/Resources/Interventions Provided:  Interventions: HHC  Referral Comments: [facility]      Would you like to participate in our [address] service program?  : No - Declined    Treatment Team Recommendation: Home  Discharge Destination Plan[de-identified] [facility]  Transport at Discharge : [facility]  Dispatcher Contacted: Yes  Number/Name of Dispatcher: david referral sent  Transported by [employer] and Unit #): awaiting transport time and company name  ETA of Transport (Date): 01/28/22       IMM Given (Date):: 01/28/22  IMM Given to[de-identified] Family  Family notified[de-identified] dtr-marisol

## 2022-01-28 NOTE — DISCHARGE INSTRUCTIONS
COVID-19 (Coronavirus Disease 2019)   WHAT YOU NEED TO KNOW:   Coronavirus disease 2019 (COVID-19) is the disease caused by a coronavirus first discovered in December 2019  Coronaviruses generally cause upper respiratory (nose, throat, and lung) infections, such as a cold  The new virus spreads quickly and easily  The virus can be spread starting 2 days before symptoms even begin  The virus has also changed into several new forms (called variants) since it was discovered  The variants may be more contagious (easily spread) than the original form  Some may also cause more severe illness than others  It is important to follow local, national, and worldwide measures to protect yourself and others from infection  DISCHARGE INSTRUCTIONS:   If you think you or someone you know may be infected:  Do the following to protect others:  · If emergency care is needed,  tell the  about the possible infection, or call ahead and tell the emergency department  · Call a healthcare provider  for instructions if symptoms are mild  Anyone who may be infected should not  arrive without calling first  The provider will need to protect staff members and other patients  · The person who may be infected needs to wear a face covering  while getting medical care  This will help lower the risk of infecting others  Coverings are not used for anyone who is younger than 2 years, has breathing problems, or cannot remove it  The provider can give you instructions for anyone who cannot wear a covering  Call your local emergency number (911 in the [address] [place] [address],[address]) or an emergency department if:   · You have trouble breathing or shortness of breath at rest     · You have chest pain or pressure that lasts longer than 5 minutes  · You become confused or hard to wake  · Your lips or face are blue  · You have a fever of 104°F (40°C) or higher      Call your doctor if:   · You do not  have symptoms of COVID-19 but had close physical contact within 14 days with someone who tested positive  · You have questions or concerns about your condition or care  Medicines: You may need any of the following:  · Decongestants  help reduce nasal congestion and help you breathe more easily  If you take decongestant pills, they may make you feel restless or cause problems with your sleep  Do not use decongestant sprays for more than a few days  · Cough suppressants  help reduce coughing  Ask your healthcare provider which type of cough medicine is best for you  · Acetaminophen  decreases pain and fever  It is available without a doctor's order  Ask how much to take and how often to take it  Follow directions  Read the labels of all other medicines you are using to see if they also contain acetaminophen, or ask your doctor or pharmacist  Acetaminophen can cause liver damage if not taken correctly  Do not use more than 4 grams (4,000 milligrams) total of acetaminophen in one day  · NSAIDs , such as ibuprofen, help decrease swelling, pain, and fever  NSAIDs can cause stomach bleeding or kidney problems in certain people  If you take blood thinner medicine, always ask your healthcare provider if NSAIDs are safe for you  Always read the medicine label and follow directions  · Take your medicine as directed  Contact your healthcare provider if you think your medicine is not helping or if you have side effects  Tell him or her if you are allergic to any medicine  Keep a list of the medicines, vitamins, and herbs you take  Include the amounts, and when and why you take them  Bring the list or the pill bottles to follow-up visits  Carry your medicine list with you in case of an emergency  What you need to know about COVID-19 vaccines:  Get a vaccine even if you already had COVID-19  · COVID-19 vaccines are given as a shot in 1 or 2 doses  Some vaccines have emergency use authorization (EUA)   An EUA means the vaccine is not approved but is given because the benefits outweigh the risks  A 2-dose vaccine is fully approved for use in those 16 years or older  This vaccine also has an EUA for adolescents 12 to 15 years  Your healthcare provider can help you understand the benefits and risks  · A third dose is recommended for adults with a weakened immune system who get a 2-dose vaccine  The third dose is given at least 28 days after the second  · Even after you get the vaccine, continue social distancing and other measures  Experts are still learning how well the vaccines work to prevent infection, transmission, and severe illness  Although rare, you can become infected after you get the vaccine  You may also be able to pass the virus to others without knowing you are infected  · After you get the vaccine, check local, national, and international travel rules  Check to see if you need to be tested before you travel  You may also need to quarantine after you return  Some countries require proof of a negative test before you leave  You should also be tested 3 to 5 days after you return from another country  How the 2019 coronavirus spreads: The following are ways the virus is thought to spread, but more information may be coming:  · Droplets are the main way all coronaviruses spread  The virus travels in droplets that form when a person talks, coughs, or sneezes  The droplets can also float in the air for minutes or hours  Infection happens when you breathe in the droplets or get them in your eyes or nose  Close personal contact with an infected person increases your risk for infection  This means being within 6 feet (2 meters) of the person for at least 15 minutes over 24 hours  · Person-to-person contact can spread the virus  For example, a person with the virus on his or her hands can spread it by shaking hands with someone  · The virus can stay on objects and surfaces for a short time    You may become infected by touching the object or surface and then touching your eyes or mouth  · An infected animal may be able to infect a person who touches it  This may happen at live markets or on a farm  Help lower the risk for COVID-19:  The best way to prevent infection is to avoid anyone who is infected, but this can be hard to do  An infected person can spread the virus before signs or symptoms begin, or even if signs or symptoms never develop  The following can help lower the risk for infection:      · Wash your hands often throughout the day  Use soap and water  Rub your soapy hands together, lacing your fingers, for at least 20 seconds  Rinse with warm, running water  Dry your hands with a clean towel or paper towel  Use hand  that contains alcohol if soap and water are not available  Teach children how to wash their hands and use hand   · Cover sneezes and coughs  Turn your face away and cover your mouth and nose with a tissue  Throw the tissue away  Use the bend of your arm if a tissue is not available  Then wash your hands well with soap and water or use hand   Teach children how to cover a cough or sneeze  · Wear a face covering (mask) around anyone who does not live in your home  Use a cloth covering with at least 2 layers  You can also create layers by putting a cloth covering over a disposable non-medical mask  Cover your mouth and your nose  The covering should fit snugly against the bridge of your nose  Securely fasten it under your chin and on the sides of your face  Do not  wear a plastic face shield instead of a covering  Continue social distancing and washing your hands often  A face covering is not a substitute for social distancing safety measures  · Follow worldwide, national, and local social distancing guidelines  Keep at least 6 feet (2 meters) between you and others  Also keep this distance from anyone who comes to your home, such as someone making a delivery   Wear a face covering while you are around others  You will need to wear a covering in restaurants, stores, and other public buildings  You will also need a covering on mass transit, such as a bus, subway, or airplane  Remember to use a covering made from thick material or wear 2 coverings together  · Make a habit of not touching your face  If you get the virus on your hands, you can transfer it to your eyes, nose, or mouth and become infected  You can also transfer it to objects, surfaces, or people  Do not touch your eyes, nose, or mouth without washing your hands first     · Clean and disinfect high-touch surfaces and objects often  Use disinfecting wipes, or make a solution of 4 teaspoons of bleach in 1 quart (4 cups) of water  Clean and disinfect even if you think no one living in or coming to your home is infected with the virus  · Ask about other vaccines you may need  Get the influenza (flu) vaccine as soon as recommended each year, usually starting in September or October  Get the pneumonia vaccine if recommended  Your healthcare provider can tell you if you should also get other vaccines, and when to get them  Follow social distancing guidelines:  National and local social distancing rules vary  Rules may change over time as restrictions are lifted  Restrictions may return if an outbreak happens where you live  It is important to know and follow all current social distancing rules in your area  The following are general guidelines:  · Stay home if you are sick or think you may have COVID-19  It is important to stay home if you are waiting for a testing appointment or for test results  Even if you do not have symptoms, you can pass the virus to others  · Limit trips out of your home  Have food, medicines, and other supplies delivered and left at your door or other area, if possible  Plan trips out of your home so you make the fewest stops possible to limit close personal contact  Keep track of places you go  This will help contact tracers notify others if you become infected  · Avoid close physical contact with anyone who does not live in your home  Do not shake hands with, hug, or kiss a person as a greeting  If you must use public transportation (such as a bus or subway), try to sit or stand away from others  Only allow necessary people into your home  Wear your face covering, and remind others to wear a face covering  Remind them to wash their hands when they arrive and before they leave  Do not  let someone into your home or go to someone's home just to visit  Even if you both do not feel sick, the virus can pass from one of you to the other  · Avoid in-person gatherings and crowds  Gatherings or crowds of 10 or more individuals can cause the virus to spread  Avoid places such as [place], beaches, sporting events, and tourist attractions  For events such as parties, holiday meals, Rastafari services, and conferences, attend virtually (on a computer), if possible  · Ask your healthcare provider for other ways to have appointments  Some providers offer phone, video, or other types of appointments  You may also be able to get prescriptions for a few months of your medicines at a time  · Stay safe if you must go out to work  Keep physical distance between you and other workers as much as possible  Follow your employer's rules so everyone stays safe  If you have COVID-19 and are recovering at home,  healthcare providers will give you specific instructions to follow  The following are general guidelines to remind you how to keep others safe until you are well:  · Wash your hands often  Use soap and water as much as possible  Use hand  that contains alcohol if soap and water are not available  Dry your hands with a clean towel or paper towel  Do not share towels with anyone  If you use paper towels, throw them away in a lined trash can kept in your room or area   Use a covered trash can, if possible  · Do not go out of your home unless it is necessary  Ask someone who is not infected to go out for groceries or supplies, or have them delivered  Do not go to your healthcare provider's office without an appointment  · Only have close physical contact with a person giving direct care, or a baby or child you must care for  Family members and friends should not visit you  If possible, stay in a separate area or room of your home if you live with others  No one should go into the area or room except to give you care  You can visit with others by phone, video chat, e-mail, or similar systems  · Wear a face covering while others are near you  This can help prevent droplets from spreading the virus when you talk, sneeze, or cough  Put the covering on before anyone comes into your room or area  Remind the person to cover his or her nose and mouth before coming in to provide care for you  · Do not share items  Do not share dishes, towels, or other items with anyone  Items need to be washed after you use them  · Protect your baby  Some newborns have tested positive for the virus  It is not known if they became infected before or after birth  The highest risk is when a  has close contact with an infected person  If you are pregnant or breastfeeding, talk to your healthcare provider or obstetrician about any concerns you have  He or she will tell you when to bring your baby in for check-ups and vaccines  He or she will also tell you what to do if you think your baby was infected with the coronavirus  Wash your hands and put on a clean face covering before you breastfeed or care for your baby  · Do not handle live animals unless it is necessary  Some animals, including pets, have been infected with the new coronavirus  Ask someone who is not infected to take care of your pet until you are well  If you must care for a pet, wear a face covering   Wash your hands before and after you give care  Talk to your healthcare provider about how to keep a service animal safe, if needed  · Follow directions from your healthcare provider for being around others after you recover  It is not known if or for how long a recovered person can pass the virus to others  Your provider may give you instructions, such as continuing social distancing and wearing a face covering  He or she will tell you when it is okay to be around others again  This may be 10 to 20 days after symptoms started or you had a positive test  Most symptoms will also need to be gone  Your provider will give you more information  Follow up with your doctor as directed:  Write down your questions so you remember to ask them during your visits  For more information:   · Centers for Disease Control and Prevention  [address] , [address]  Phone: 1- 931 - 907-5959  Web Address: [employer]    © [address] [facility] 2021 Information is for End User's use only and may not be sold, redistributed or otherwise used for commercial purposes  All illustrations and images included in CareNotes® are the copyrighted property of A D A M , Inc  or [facility] Elijah King   The above information is an  only  It is not intended as medical advice for individual conditions or treatments  Talk to your doctor, nurse or pharmacist before following any medical regimen to see if it is safe and effective for you

## 2022-01-28 NOTE — ASSESSMENT & PLAN NOTE
· Present on admission, patient unvaccinated  Reports shortness of breath and chest pain at home  + sick contact with wife at home  Reported 90% on RA by EMS    Currently on MILD COVID pathway:  · CXR on admit with bibasilar infiltrates consistent with COVID-19 pneumonia  · Continue O2 supplement, goal >88% O2 sat  · Will complete IV Remdesivir today    · Continue IV Decadron 6 mg daily, day 5 of 10  · Continue lovenox (ddimer 0 71)  · Mucinex, tessalon, albuterol prn  · Encourage patient to self-prone, incentive spirometer, OOB as appropriate  · Current oxygenation 93% on 3 L   · Monitor inflammatory markers q 2-3 days  · D-dimer 0 71, CRP 84 , NT-,  Procalcitonin 0 23 ->0 25 ->0 18  · Leukopenia likely secondary to viral illness, improving  · CK improved, no further checks indicated

## 2022-01-28 NOTE — PLAN OF CARE
Problem: Potential for Falls  Goal: Patient will remain free of falls  Description: INTERVENTIONS:  - Educate patient/family on patient safety including physical limitations  - Instruct patient to call for assistance with activity   - Consult OT/PT to assist with strengthening/mobility   - Keep Call bell within reach  - Keep bed low and locked with side rails adjusted as appropriate  - Keep care items and personal belongings within reach  - Initiate and maintain comfort rounds  - Make Fall Risk Sign visible to staff  - Offer Toileting every 2 Hours, in advance of need  - Initiate/Maintain alarm  - Obtain necessary fall risk management equipment:   - Apply yellow socks and bracelet for high fall risk patients  - Consider moving patient to room near nurses station  Outcome: Progressing     Problem: PAIN - ADULT  Goal: Verbalizes/displays adequate comfort level or baseline comfort level  Description: Interventions:  - Encourage patient to monitor pain and request assistance  - Assess pain using appropriate pain scale  - Administer analgesics based on type and severity of pain and evaluate response  - Implement non-pharmacological measures as appropriate and evaluate response  - Consider cultural and social influences on pain and pain management  - Notify physician/advanced practitioner if interventions unsuccessful or patient reports new pain  Outcome: Progressing     Problem: INFECTION - ADULT  Goal: Absence or prevention of progression during hospitalization  Description: INTERVENTIONS:  - Assess and monitor for signs and symptoms of infection  - Monitor lab/diagnostic results  - Monitor all insertion sites, i e  indwelling lines, tubes, and drains  - Monitor endotracheal if appropriate and nasal secretions for changes in amount and color  - [place] appropriate cooling/warming therapies per order  - Administer medications as ordered  - Instruct and encourage patient and family to use good hand hygiene technique  - Identify and instruct in appropriate isolation precautions for identified infection/condition  Outcome: Progressing  Goal: Absence of fever/infection during neutropenic period  Description: INTERVENTIONS:  - Monitor WBC    Outcome: Progressing     Problem: SAFETY ADULT  Goal: Patient will remain free of falls  Description: INTERVENTIONS:  - Educate patient/family on patient safety including physical limitations  - Instruct patient to call for assistance with activity   - Consult OT/PT to assist with strengthening/mobility   - Keep Call bell within reach  - Keep bed low and locked with side rails adjusted as appropriate  - Keep care items and personal belongings within reach  - Initiate and maintain comfort rounds  - Make Fall Risk Sign visible to staff  - Offer Toileting every 2 Hours, in advance of need  - Initiate/Maintain alarm  - Obtain necessary fall risk management equipment:   - Apply yellow socks and bracelet for high fall risk patients  - Consider moving patient to room near nurses station  Outcome: Progressing  Goal: Maintain or return to baseline ADL function  Description: INTERVENTIONS:  -  Assess patient's ability to carry out ADLs; assess patient's baseline for ADL function and identify physical deficits which impact ability to perform ADLs (bathing, care of mouth/teeth, toileting, grooming, dressing, etc )  - Assess/evaluate cause of self-care deficits   - Assess range of motion  - Assess patient's mobility; develop plan if impaired  - Assess patient's need for assistive devices and provide as appropriate  - Encourage maximum independence but intervene and supervise when necessary  - Involve family in performance of ADLs  - Assess for home care needs following discharge   - Consider OT consult to assist with ADL evaluation and planning for discharge  - Provide patient education as appropriate  Outcome: Progressing  Goal: Maintains/Returns to pre admission functional level  Description: INTERVENTIONS:  - Perform BMAT or MOVE assessment daily    - Set and communicate daily mobility goal to care team and patient/family/caregiver  - Collaborate with rehabilitation services on mobility goals if consulted  - Perform Range of Motion 2 times a day  - Reposition patient every 2 hours  - Dangle patient 2 times a day  - Stand patient 2 times a day  - Ambulate patient 2 times a day  - Out of bed to chair 2 times a day   - Out of bed for meals 2 times a day  - Out of bed for toileting  - Record patient progress and toleration of activity level   Outcome: Progressing     Problem: DISCHARGE PLANNING  Goal: Discharge to home or other facility with appropriate resources  Description: INTERVENTIONS:  - Identify barriers to discharge w/patient and caregiver  - Arrange for needed discharge resources and transportation as appropriate  - Identify discharge learning needs (meds, wound care, etc )  - Arrange for interpretive services to assist at discharge as needed  - Refer to Case Management Department for coordinating discharge planning if the patient needs post-hospital services based on physician/advanced practitioner order or complex needs related to functional status, cognitive ability, or social support system  Outcome: Progressing     Problem: Knowledge Deficit  Goal: Patient/family/caregiver demonstrates understanding of disease process, treatment plan, medications, and discharge instructions  Description: Complete learning assessment and assess knowledge base    Interventions:  - Provide teaching at level of understanding  - Provide teaching via preferred learning methods  Outcome: Progressing     Problem: RESPIRATORY - ADULT  Goal: Achieves optimal ventilation and oxygenation  Description: INTERVENTIONS:  - Assess for changes in respiratory status  - Assess for changes in mentation and behavior  - Position to facilitate oxygenation and minimize respiratory effort  - Oxygen administered by appropriate delivery if ordered  - Initiate smoking cessation education as indicated  - Encourage broncho-pulmonary hygiene including cough, deep breathe, Incentive Spirometry  - Assess the need for suctioning and aspirate as needed  - Assess and instruct to report SOB or any respiratory difficulty  - Respiratory Therapy support as indicated  Outcome: Progressing     Problem: METABOLIC, FLUID AND ELECTROLYTES - ADULT  Goal: Glucose maintained within target range  Description: INTERVENTIONS:  - Monitor Blood Glucose as ordered  - Assess for signs and symptoms of hyperglycemia and hypoglycemia  - Administer ordered medications to maintain glucose within target range  - Assess nutritional intake and initiate nutrition service referral as needed  Outcome: Progressing

## 2022-01-28 NOTE — ASSESSMENT & PLAN NOTE
Lab Results   Component Value Date    HGBA1C 7 2 (A) 11/15/2021       Recent Labs     01/27/22  0711 01/27/22  1114 01/27/22  1712 01/27/22 2054   POCGLU 205* 135 115 199*       Blood Sugar Average: Last 72 hrs:  (P) 839 [id number]     · Restart amaryl and metformin on discharge

## 2022-01-28 NOTE — ASSESSMENT & PLAN NOTE
· Presented on admission in ER desaturated to 87%, requiring 3L via NC for support  · Treatment as below, wean O2 as tolerated to maintain >88% given underlying COPD diagnosis   · Patient qualified for home o2 at 2L

## 2022-01-28 NOTE — PLAN OF CARE
Problem: Potential for Falls  Goal: Patient will remain free of falls  Description: INTERVENTIONS:  - Educate patient/family on patient safety including physical limitations  - Instruct patient to call for assistance with activity   - Consult OT/PT to assist with strengthening/mobility   - Keep Call bell within reach  - Keep bed low and locked with side rails adjusted as appropriate  - Keep care items and personal belongings within reach  - Initiate and maintain comfort rounds  - Make Fall Risk Sign visible to staff  - Offer Toileting every    Hours, in advance of need  - Initiate/Maintain   alarm  - Obtain necessary fall risk management equipment:     - Apply yellow socks and bracelet for high fall risk patients  - Consider moving patient to room near nurses station  Outcome: Progressing     Problem: PAIN - ADULT  Goal: Verbalizes/displays adequate comfort level or baseline comfort level  Description: Interventions:  - Encourage patient to monitor pain and request assistance  - Assess pain using appropriate pain scale  - Administer analgesics based on type and severity of pain and evaluate response  - Implement non-pharmacological measures as appropriate and evaluate response  - Consider cultural and social influences on pain and pain management  - Notify physician/advanced practitioner if interventions unsuccessful or patient reports new pain  Outcome: Progressing     Problem: INFECTION - ADULT  Goal: Absence or prevention of progression during hospitalization  Description: INTERVENTIONS:  - Assess and monitor for signs and symptoms of infection  - Monitor lab/diagnostic results  - Monitor all insertion sites, i e  indwelling lines, tubes, and drains  - Monitor endotracheal if appropriate and nasal secretions for changes in amount and color  - Donnellson appropriate cooling/warming therapies per order  - Administer medications as ordered  - Instruct and encourage patient and family to use good hand hygiene technique  - Identify and instruct in appropriate isolation precautions for identified infection/condition  Outcome: Progressing  Goal: Absence of fever/infection during neutropenic period  Description: INTERVENTIONS:  - Monitor WBC    Outcome: Progressing     Problem: SAFETY ADULT  Goal: Patient will remain free of falls  Description: INTERVENTIONS:  - Educate patient/family on patient safety including physical limitations  - Instruct patient to call for assistance with activity   - Consult OT/PT to assist with strengthening/mobility   - Keep Call bell within reach  - Keep bed low and locked with side rails adjusted as appropriate  - Keep care items and personal belongings within reach  - Initiate and maintain comfort rounds  - Make Fall Risk Sign visible to staff  - Offer Toileting every    Hours, in advance of need  - Initiate/Maintain   alarm  - Obtain necessary fall risk management equipment:     - Apply yellow socks and bracelet for high fall risk patients  - Consider moving patient to room near nurses station  Outcome: Progressing  Goal: Maintain or return to baseline ADL function  Description: INTERVENTIONS:  -  Assess patient's ability to carry out ADLs; assess patient's baseline for ADL function and identify physical deficits which impact ability to perform ADLs (bathing, care of mouth/teeth, toileting, grooming, dressing, etc )  - Assess/evaluate cause of self-care deficits   - Assess range of motion  - Assess patient's mobility; develop plan if impaired  - Assess patient's need for assistive devices and provide as appropriate  - Encourage maximum independence but intervene and supervise when necessary  - Involve family in performance of ADLs  - Assess for home care needs following discharge   - Consider OT consult to assist with ADL evaluation and planning for discharge  - Provide patient education as appropriate  Outcome: Progressing  Goal: Maintains/Returns to pre admission functional level  Description: INTERVENTIONS:  - Perform BMAT or MOVE assessment daily    - Set and communicate daily mobility goal to care team and patient/family/caregiver  - Collaborate with rehabilitation services on mobility goals if consulted  - Perform Range of Motion    times a day  - Reposition patient every    hours  - Dangle patient    times a day  - Stand patient    times a day  - Ambulate patient    times a day  - Out of bed to chair    times a day   - Out of bed for meals    times a day  - Out of bed for toileting  - Record patient progress and toleration of activity level   Outcome: Progressing     Problem: DISCHARGE PLANNING  Goal: Discharge to home or other facility with appropriate resources  Description: INTERVENTIONS:  - Identify barriers to discharge w/patient and caregiver  - Arrange for needed discharge resources and transportation as appropriate  - Identify discharge learning needs (meds, wound care, etc )  - Arrange for interpretive services to assist at discharge as needed  - Refer to Case Management Department for coordinating discharge planning if the patient needs post-hospital services based on physician/advanced practitioner order or complex needs related to functional status, cognitive ability, or social support system  Outcome: Progressing     Problem: Knowledge Deficit  Goal: Patient/family/caregiver demonstrates understanding of disease process, treatment plan, medications, and discharge instructions  Description: Complete learning assessment and assess knowledge base    Interventions:  - Provide teaching at level of understanding  - Provide teaching via preferred learning methods  Outcome: Progressing     Problem: RESPIRATORY - ADULT  Goal: Achieves optimal ventilation and oxygenation  Description: INTERVENTIONS:  - Assess for changes in respiratory status  - Assess for changes in mentation and behavior  - Position to facilitate oxygenation and minimize respiratory effort  - Oxygen administered by appropriate delivery if ordered  - Initiate smoking cessation education as indicated  - Encourage broncho-pulmonary hygiene including cough, deep breathe, Incentive Spirometry  - Assess the need for suctioning and aspirate as needed  - Assess and instruct to report SOB or any respiratory difficulty  - Respiratory Therapy support as indicated  Outcome: Progressing     Problem: METABOLIC, FLUID AND ELECTROLYTES - ADULT  Goal: Glucose maintained within target range  Description: INTERVENTIONS:  - Monitor Blood Glucose as ordered  - Assess for signs and symptoms of hyperglycemia and hypoglycemia  - Administer ordered medications to maintain glucose within target range  - Assess nutritional intake and initiate nutrition service referral as needed  Outcome: Progressing

## 2022-01-28 NOTE — CASE MANAGEMENT
Case Management Discharge Planning Note    Patient name Austin Massed  Location [address] 203/-[id number] MRN [id number]  : 1955 Date 2022       Current Admission Date: 2022  Current Admission Diagnosis:Acute respiratory failure with hypoxia [facility])   Patient Active Problem List    Diagnosis Date Noted    Acute respiratory failure with hypoxia ([facility] ) 2022    Pneumonia due to COVID-19 virus 2022    Medicare annual wellness visit, subsequent 11/15/2021    REM behavioral disorder 10/20/2021    Iron deficiency 07/15/2021    Restless legs syndrome 07/15/2021    Constipation, chronic 2021    Sacroiliac inflammation ([facility] ) 2019    [facility] 2018    Pulmonary nodule 10/03/2018    Balance problem 2018    Tremor 2018    Neuropathy 2018    Memory impairment 2018    Episodic paroxysmal hemicrania, 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    Abnormality of gait due to impairment of balance 2018    Former smoker 2018    Centrilobular emphysema ([facility] ) 2018    DAFNE (obstructive sleep apnea) 2018    Low testosterone 2017    Psoriasis 2016    Goiter, nontoxic simple 2015    Chronic obstructive pulmonary disease ([facility] ) 2013    Depression 2013    Hypercholesterolemia 2013    Hypertension 2013    Hypothyroidism 2013    Diabetes mellitus without complication ([facility] ) 69/10/8415      LOS (days): 4  Geometric Mean LOS (GMLOS) (days): 5 40  Days to GMLOS:1 4     OBJECTIVE:  Risk of Unplanned Readmission Score: 15         Current admission status: Inpatient   Preferred Pharmacy:   [id number] [address] [facility]  Phone: [phone] Fax: [phone]    [id number] [address] [address]  [address]   [address], [address] [place] [address]  Phone: [phone] Fax: [phone]    [facility] [address] [facility], [address]  [address]  [facility] [address]  Phone: [phone] Fax: [phone]    [facility], [address] [place] [address]  Phone: [phone] Fax: [phone]    Primary Care Provider: No primary care provider on file  Primary Insurance: MEDICARE  Secondary Insurance: [employer] [facility]    DISCHARGE DETAILS:    Discharge planning discussed with[de-identified] patient and dtr-marisol  Freedom of Choice: Yes  Comments - Freedom of Choice: patient is not covid vaccinated  Dtr-marisol is declinig STR however would like [facility] services  [facility] health accepted  CM contacted family/caregiver?: Yes  Were Treatment Team discharge recommendations reviewed with patient/caregiver?: Yes  Did patient/caregiver verbalize understanding of patient care needs?: Yes  Were patient/caregiver advised of the risks associated with not following Treatment Team discharge recommendations?: Yes    Contacts  Patient Contacts: dtr-marisol  Relationship to Patient[de-identified] Family  Contact Method: Phone  Phone Number: [phone]  Reason/Outcome: Continuity of [address]         Is the patient interested in [facility] at discharge?: Yes    DME Referral Provided  Referral made for DME?: No    Other Referral/Resources/Interventions Provided:  Interventions: [facility]  Referral Comments: [facility] health accepted  Would you like to participate in our [address] service program?  : No - Declined    Treatment Team Recommendation: Home  Discharge Destination Plan[de-identified] Home with [employer] ([facility]ina [facility] accepted)  Transport at Discharge : [facility]  Dispatcher Contacted: Yes  Number/Name of Dispatcher: allscripts referral sent  Transported by [employer] and Unit #):  SLEMOISES  ETA of Transport (Date): 01/28/22  ETA of Transport (Time): [address]

## 2022-01-29 LAB
BACTERIA BLD CULT: NORMAL
BACTERIA BLD CULT: NORMAL

## 2022-01-31 ENCOUNTER — TELEPHONE (OUTPATIENT)
Dept: FAMILY MEDICINE CLINIC | Facility: CLINIC | Age: 67
End: 2022-01-31

## 2022-01-31 DIAGNOSIS — R06.02 SHORTNESS OF BREATH: Primary | ICD-10-CM

## 2022-01-31 RX ORDER — DEXAMETHASONE 6 MG/1
6 TABLET ORAL
Qty: 7 TABLET | Refills: 0 | Status: SHIPPED | OUTPATIENT
Start: 2022-01-31 | End: 2022-02-04 | Stop reason: ALTCHOICE

## 2022-01-31 NOTE — TELEPHONE ENCOUNTER
Pt's daughter Lucien Jones calls concerned about pt's Oxygen levels  He was sent home from the hospital on 2 liters of oxygen even though he was using 3 while admitted  Once at home on 2 liters his o2 was only 84%  They bumped him up to 3 liters now to increase oxygen sat  Daughter does not know what to do next and is concerned because he will run out of o2 sooner than expected if he is on 3 liters instead of 2  He was not on oxygen prior to COVID dx  Please advise, thank you!

## 2022-01-31 NOTE — TELEPHONE ENCOUNTER
Daughter stated that the decadron actually helped him and did not bother him  She wanted me to stress that there was no combativeness  He was wheezing and short of breath last night, upping oxygen helped but did not resolve the situation  She would really like this medication to help him

## 2022-01-31 NOTE — TELEPHONE ENCOUNTER
He was treated with decadron in the hospital and if not wheezing or short of breath would not want to order additional with his reaction of combativeness with steroids

## 2022-01-31 NOTE — TELEPHONE ENCOUNTER
Notified Avinash Solis and she verbalized understanding  She is also concerned that he has COPD and was not sent home on a steroid to help with the inflammation  She is requesting you send something in for him   He is scheduled for a virtual TCM on Friday

## 2022-02-02 NOTE — UTILIZATION REVIEW
VIVIANA Hawley   Nurse Practitioner   Hospitalist   Discharge Summary      Attested   Date of Service:  1/28/2022  6:00 AM                 Attested        Attestation signed by Eneida Swan MD at 1/28/2022  3:32 PM   Patient was managed independently by advanced practitioner  Katharina Shepherd was available for discussion or any questions  I did not see or examine the patient myself  I am co-signing this note for administrative purposes only                Show:Clear all  [x]Manual[x]Template[x]Copied    Added by:  [x]VIVIANA Mcdaniel      []Isaac for details    [address] [facility]  Discharge- Kenia Valdivia 1955, 61 y o  male MRN: [id number]  Unit/Bed#: -01 Encounter: [id number]  Primary Care Provider: No primary care provider on file  Date and time admitted to hospital: 1/24/2022  1:36 PM     * Acute respiratory failure with hypoxia (HCC)  Assessment & Plan  · Presented on admission in ER desaturated to 87%, requiring 3L via NC for support  · Treatment as below, wean O2 as tolerated to maintain >88% given underlying COPD diagnosis   · Patient qualified for home o2 at 2L      Pneumonia due to COVID-19 virus  Assessment & Plan  · Present on admission, patient unvaccinated  Reports shortness of breath and chest pain at home  + sick contact with wife at home  Reported 90% on RA by EMS     Currently on MILD COVID pathway:  · CXR on admit with bibasilar infiltrates consistent with COVID-19 pneumonia  · Continue O2 supplement, goal >88% O2 sat  · Will complete IV Remdesivir today  · Continue IV Decadron 6 mg daily, day 5 of 10  · Continue lovenox (ddimer 0 71)  · Mucinex, tessalon, albuterol prn  · Encourage patient to self-prone, incentive spirometer, OOB as appropriate  · Current oxygenation 93% on 3 L   · Monitor inflammatory markers q 2-3 days  ? D-dimer 0 71, CRP 84 , NT-,  Procalcitonin 0 23 ->0 25 ->0 18  ?  Leukopenia likely secondary to viral illness, improving  ? CK improved, no further checks indicated     Hypothyroidism  Assessment & Plan  · Continue levothyroxine per home dose, TSH wnl      Diabetes mellitus without complication [facility])  Assessment & Plan        Lab Results   Component Value Date     HGBA1C 7 2 (A) 11/15/2021                Recent Labs     01/27/22  0711 01/27/22  1114 01/27/22  1712 01/27/22  2054   POCGLU 205* 135 115 199*         Blood Sugar Average: Last 72 hrs:  (P) 685 [id number]      · Restart amaryl and metformin on discharge     Chronic obstructive pulmonary disease (HCC)  Assessment & Plan  · No wheezing or rhonchi on exam, does not appear to have an acute exacerbation at this time  · Continue with breathing treatments and home medications         Medical Problems                            Resolved Problems  Date Reviewed: 1/28/2022                    Resolved      Lactic acid acidosis 1/26/2022        Resolved by  Herbie Antunez PA-C                  Discharging Physician / Practitioner: VIVIANA Stbubs  PCP: No primary care provider on file  Admission Date:       Admission Orders (From admission, onward)              Ordered          01/24/22 1557   Inpatient Admission  Once                          Discharge Date: 01/28/22     Consultations During Hospital Stay:  · None     Procedures Performed:   · None     Significant Findings / Test Results:   · XR chest 1 view portable ·   ED Interpretation by Deon España MD (01/24 1437) ·   COVID ·     ·   Final Result by Tre Arreaga MD (01/24 1516) ·     ·   Findings suspicious for early bibasal Covid pneumonia ·     ·     ·     ·     ·     ·   Workstation performed: [id number] ·     ·     ·         Incidental Findings:   ·  None     Test Results Pending at Discharge (will require follow up):    · None     Outpatient Tests Requested:  · Follow up with PCP within 1 week     Complications:  None     Reason for Admission: Acute respiratory failure with hypoxia     Hospital Course:   Hina Leonard is a 61 y o  male patient with past medical history of hypertension, COPD, DM, HLD, Hypothyroidism who originally presented to the hospital on 1/24/2022 due to shortness of breath, chest pain with breathing  Patient found to be covid-19 positive on 1/24; close contact from patients wife  Patient admitted and treated with IV Decadron/Remdesivir; He required 3-4L supplemental oxygen; he had a home oxygen evaluation and was deemed to need oxygen on discharge to home which was ordered by case management  Vital signs remained stable, labs also remained stable  He was stable for discharge      Please see above list of diagnoses and related plan for additional information       Condition at Discharge: stable     Discharge Day Visit / Exam:   Subjective:  Patient resting in the recliner, wants to go home  Denies complaints of chest pain, shortness of breath, fever or chills      Vitals: Blood Pressure: (!) 143/44 (01/28/22 0648)  Pulse: 64 (01/28/22 0648)  Temperature: 98 5 °F (36 9 °C) (01/28/22 0648)  Temp Source: Oral (01/28/22 5861)  Respirations: 18 (01/28/22 0648)  SpO2: 93 % (01/28/22 4036)      Exam:   Physical Exam  Vitals and nursing note reviewed  Constitutional:       General: He is not in acute distress  Appearance: He is obese  Cardiovascular:      Rate and Rhythm: Normal rate  Pulses: Normal pulses  Heart sounds: Normal heart sounds  Pulmonary:      Effort: Pulmonary effort is normal       Breath sounds: Normal breath sounds  Abdominal:      General: Bowel sounds are normal       Palpations: Abdomen is soft  Musculoskeletal:         General: No tenderness  Normal range of motion  Skin:     General: Skin is warm and dry  Capillary Refill: Capillary refill takes less than 2 seconds  Neurological:      Mental Status: He is alert and oriented to person, place, and time  Mental status is at baseline     Psychiatric:         Mood and Affect: Mood normal             Discussion with Family: Updated  (sister) via phone  Both sisters, Jefry Adair and Robson Monte      Discharge instructions/Information to patient and family:   See after visit summary for information provided to patient and family        Provisions for Follow-Up Care:  See after visit summary for information related to follow-up care and any pertinent home health orders  Disposition:   Home with [facility] Services (Reminder: Complete face to face encounter)     Planned Readmission: None     Discharge Statement:  I spent 35 minutes discharging the patient  This time was spent on the day of discharge  I had direct contact with the patient on the day of discharge  Greater than 50% of the total time was spent examining patient, answering all patient questions, arranging and discussing plan of care with patient as well as directly providing post-discharge instructions    Additional time then spent on discharge activities      Discharge Medications:  See after visit summary for reconciled discharge medications provided to patient and/or family        **Please Note: This note may have been constructed using a voice recognition system**              Cosigned by: Shyann Adame MD at 1/28/2022  3:32 PM       Revision History

## 2022-02-02 NOTE — UTILIZATION REVIEW
Inpatient Admission Authorization Request   NOTIFICATION OF INPATIENT ADMISSION/INPATIENT AUTHORIZATION REQUEST   SERVICING FACILITY:   [address] [place] [address]  Tax ID: [id number]  NPI: [id number]  Place of Service: Inpatient [address] [facility]  [address]  Place of Service Code: 24     ATTENDING PROVIDER:  Attending Name and NPI#: James Mukherjee [[id number]]  Address: [address] [place] [address]  Phone: [phone]     UTILIZATION REVIEW CONTACT:  Martínez Hoffmann, Utilization   Network Utilization Review Department  Phone: [phone]  Fax [phone]  Email: [email or web address]     PHYSICIAN ADVISORY SERVICES:  FOR [id number] REVIEW - MEDICAL NECESSITY DENIAL  Phone: [phone]  Fax: [phone]  Email: [email or web address]     TYPE OF REQUEST:  Inpatient Status     ADMISSION INFORMATION:  ADMISSION DATE/TIME: 1/24/22  3:57 PM  PATIENT DIAGNOSIS CODE/DESCRIPTION:  Diarrhea [R19 7]  Elevated C-reactive protein (CRP) [R79 82]  SOB (shortness of breath) [R06 02]  Elevated CK [R74 8]  COPD exacerbation (HCC) [J44 1]  Elevated lactic acid level [R79 89]  Elevated AST (SGOT) [R74 01]  Elevated d-dimer [R79 89]  Acute hypoxemic respiratory failure due to COVID-19 ([address] ) [U07 1, J96 01]  Pneumonia due to COVID-19 virus [U07 1, J12 82]  DISCHARGE DATE/TIME: 1/28/2022  7:21 PM  DISCHARGE DISPOSITION (IF DISCHARGED): Home with Home Health Care     IMPORTANT INFORMATION:  Please contact the Martínez Hoffmann directly with any questions or concerns regarding this request  Department voicemails are confidential     Send requests for admission clinical reviews, concurrent reviews, approvals, and administrative denials due to lack of clinical to fax [phone]

## 2022-02-04 ENCOUNTER — TELEMEDICINE (OUTPATIENT)
Dept: FAMILY MEDICINE CLINIC | Facility: CLINIC | Age: 67
End: 2022-02-04
Payer: MEDICARE

## 2022-02-04 ENCOUNTER — TELEPHONE (OUTPATIENT)
Dept: FAMILY MEDICINE CLINIC | Facility: CLINIC | Age: 67
End: 2022-02-04

## 2022-02-04 VITALS
WEIGHT: 248 LBS | BODY MASS INDEX: 35.5 KG/M2 | RESPIRATION RATE: 20 BRPM | HEIGHT: 70 IN | DIASTOLIC BLOOD PRESSURE: 96 MMHG | SYSTOLIC BLOOD PRESSURE: 145 MMHG | OXYGEN SATURATION: 96 % | HEART RATE: 76 BPM

## 2022-02-04 DIAGNOSIS — B37.0 THRUSH: ICD-10-CM

## 2022-02-04 DIAGNOSIS — E11.9 DIABETES MELLITUS WITHOUT COMPLICATION (HCC): ICD-10-CM

## 2022-02-04 DIAGNOSIS — J12.82 PNEUMONIA DUE TO COVID-19 VIRUS: ICD-10-CM

## 2022-02-04 DIAGNOSIS — E61.1 IRON DEFICIENCY: ICD-10-CM

## 2022-02-04 DIAGNOSIS — J96.01 ACUTE RESPIRATORY FAILURE WITH HYPOXIA (HCC): Primary | ICD-10-CM

## 2022-02-04 DIAGNOSIS — U07.1 PNEUMONIA DUE TO COVID-19 VIRUS: ICD-10-CM

## 2022-02-04 DIAGNOSIS — J42 CHRONIC BRONCHITIS, UNSPECIFIED CHRONIC BRONCHITIS TYPE (HCC): ICD-10-CM

## 2022-02-04 DIAGNOSIS — E03.9 HYPOTHYROIDISM, UNSPECIFIED TYPE: ICD-10-CM

## 2022-02-04 PROCEDURE — 99214 OFFICE O/P EST MOD 30 MIN: CPT | Performed by: NURSE PRACTITIONER

## 2022-02-04 NOTE — ASSESSMENT & PLAN NOTE
· Present on admission, patient unvaccinated  Reports shortness of breath and chest pain at home  + sick contact with wife at home  Reported 90% on RA by EMS     Currently on MILD COVID pathway:  · CXR on admit with bibasilar infiltrates consistent with COVID-19 pneumonia  · Continue O2 supplement, goal >88% O2 sat  · Will complete IV Remdesivir today  · Continue IV Decadron 6 mg daily, day 5 of 10  · Continue lovenox (ddimer 0 71)  · Mucinex, tessalon, albuterol prn  · Encourage patient to self-prone, incentive spirometer, OOB as appropriate  · Current oxygenation 93% on 3 L   · Monitor inflammatory markers q 2-3 days  ? D-dimer 0 71, CRP 84 , NT-,  Procalcitonin 0 23 ->0 25 ->0 18  ? Leukopenia likely secondary to viral illness, improving  ?  CK improved, no further checks indicated

## 2022-02-04 NOTE — PROGRESS NOTES
Virtual TCM Visit:    Verification of patient location:    Patient is located in the following state in which I hold an active license [place]        Assessment/Plan:        Problem List Items Addressed This Visit        Digestive    Thrush    Relevant Medications    nystatin (MYCOSTATIN) 500,000 units/5 mL suspension       Endocrine    Diabetes mellitus without complication ([facility] )       Lab Results   Component Value Date    HGBA1C 7 2 (A) 11/15/2021 ·   Restart amaryl and metformin on discharge           Relevant Orders    Comprehensive metabolic panel    CBC and differential    Lipid Panel with Direct LDL reflex    HEMOGLOBIN A1C W/ EAG ESTIMATION    Microalbumin / creatinine urine ratio    Hypothyroidism     · Continue levothyroxine per home dose, TSH wnl            Relevant Orders    TSH, 3rd generation with Free T4 reflex       Respiratory    Chronic obstructive pulmonary disease (HCC)     · No wheezing or rhonchi on exam, does not appear to have an acute exacerbation at this time  · Continue with breathing treatments and home medications           Pneumonia due to COVID-19 virus     · Present on admission, patient unvaccinated  Reports shortness of breath and chest pain at home  + sick contact with wife at home  Reported 90% on RA by EMS     Currently on MILD COVID pathway:  · CXR on admit with bibasilar infiltrates consistent with COVID-19 pneumonia  · Continue O2 supplement, goal >88% O2 sat  · Will complete IV Remdesivir today  · Continue IV Decadron 6 mg daily, day 5 of 10  · Continue lovenox (ddimer 0 71)  · Mucinex, tessalon, albuterol prn  · Encourage patient to self-prone, incentive spirometer, OOB as appropriate  · Current oxygenation 93% on 3 L   · Monitor inflammatory markers q 2-3 days  ? D-dimer 0 71, CRP 84 , NT-,  Procalcitonin 0 23 ->0 25 ->0 18  ? Leukopenia likely secondary to viral illness, improving  ?  CK improved, no further checks indicated           Acute respiratory failure with hypoxia ([address] ) - Primary     · Presented on admission in ER desaturated to 87%, requiring 3L via NC for support  · Treatment as below, wean O2 as tolerated to maintain >88% given underlying COPD diagnosis   · Patient qualified for home o2 at 2L  Other    Iron deficiency    Relevant Orders    Iron Panel (Includes Ferritin, Iron Sat%, Iron, and TIBC)             Reason for visit is TCM    Encounter provider VIVIANA Luna       Provider located at [place] [address]  [address] [place] [id number]      Recent Visits  Date Type Provider Dept   01/31/22 Telephone [facility]g [address] recent visits within past 7 days and meeting all other requirements  Today's Visits  Date Type Provider Dept   02/04/22 Telemedicine VIVIANA Luna  [place]    Showing today's visits and meeting all other requirements  Future Appointments  No visits were found meeting these conditions  Showing future appointments within next 150 days and meeting all other requirements       After connecting through [employer], the patient was identified by name and date of birth  Christ Villaseñor was informed that this is a telemedicine visit and that the visit is being conducted through [facility] and patient was informed this is a secure, HIPAA-complaint platform  He agrees to proceed     My office door was closed  No one else was in the room  He acknowledged consent and understanding of privacy and security of the video platform  The patient has agreed to participate and understands they can discontinue the visit at any time  Patient is aware this is a billable service  Subjective:     Patient ID: Christ Villaseñor is a 61 y o  male      Hospital Course   Christ Villaseñor is a 61 y o  male patient with past medical history of hypertension, COPD, DM, HLD, Hypothyroidism who originally presented to the hospital on 1/24/2022 due to shortness of breath, chest pain with breathing  Patient found to be covid-19 positive on 1/24; close contact from patients wife  Patient admitted and treated with IV Decadron/Remdesivir; He required 3-4L supplemental oxygen; he had a home oxygen evaluation and was deemed to need oxygen on discharge to home which was ordered by case management  Vital signs remained stable, labs also remained stable  He was stable for discharge  2/4/2022  Patient calling for follow up on his recent [facility] stay he was sent home on 2 liters of oxygen  Review of Systems   Constitutional: Negative for activity change, appetite change, chills, diaphoresis, fatigue, fever and unexpected weight change  HENT: Positive for congestion and postnasal drip  Negative for ear pain, hearing loss, sinus pressure, sinus pain, sneezing and sore throat  Has changes in his mouth and has thrush    Eyes: Negative for pain, redness and visual disturbance  Respiratory: Positive for shortness of breath  Negative for cough and wheezing  2 liters O2   Cardiovascular: Negative for chest pain and leg swelling  Gastrointestinal: Positive for diarrhea  Negative for abdominal pain, nausea and vomiting  Endocrine: Negative  Genitourinary: Negative  Musculoskeletal: Positive for arthralgias  Skin: Negative  Allergic/Immunologic: Negative  Neurological: Positive for headaches  Negative for dizziness and light-headedness  Hematological: Negative  Psychiatric/Behavioral: Negative for behavioral problems and dysphoric mood  Objective:    Vitals:    02/04/22 1032 02/04/22 1055   BP:  145/96   Pulse:  76   Resp:  20   SpO2:  96%   Weight: 112 kg (248 lb)    Height: 5' 10" (1 778 m)        Physical Exam  HENT:      Nose: Nose normal       Mouth/Throat:      Mouth: Mucous membranes are moist    Pulmonary:      Effort: Pulmonary effort is normal       Breath sounds: Normal breath sounds  Abdominal:      Palpations: Abdomen is soft  Musculoskeletal:         General: Normal range of motion  Skin:     General: Skin is warm and dry  Neurological:      Mental Status: He is alert and oriented to person, place, and time  Transitional Care Management Review:  Geovanni Watters is a 61 y o  male here for TCM follow up  During the TCM phone call patient stated:    TCM Call (since 1/4/2022)     None      TCM Call (since 1/4/2022)     None          I spent 20 minutes with the patient during this visit  Christina Preciado      VIRTUAL VISIT DISCLAIMER    Geoavnni Watters verbally agrees to participate in [facility]  Pt is aware that [facility] could be limited without vital signs or the ability to perform a full hands-on physical exam  Wilman Yoo understands he or the provider may request at any time to terminate the video visit and request the patient to seek care or treatment in person

## 2022-02-04 NOTE — ASSESSMENT & PLAN NOTE
Lab Results   Component Value Date    HGBA1C 7 2 (A) 11/15/2021   · Restart amaryl and metformin on discharge

## 2022-02-19 DIAGNOSIS — T78.40XA ALLERGY, INITIAL ENCOUNTER: ICD-10-CM

## 2022-02-21 RX ORDER — FEXOFENADINE HCL 180 MG/1
TABLET ORAL
Qty: 30 TABLET | Refills: 11 | Status: SHIPPED | OUTPATIENT
Start: 2022-02-21

## 2022-02-22 ENCOUNTER — TELEPHONE (OUTPATIENT)
Dept: FAMILY MEDICINE CLINIC | Facility: CLINIC | Age: 67
End: 2022-02-22

## 2022-02-22 NOTE — TELEPHONE ENCOUNTER
ALEXANDER for  for upcoming appt - Still greenish yellow phlegm, lungs are clear very demised    has been having frequent headaches    3 a day for past week    Also constipation is getting worse    that needs to be addressed  Jefferson Giraldo was helping but now it's now    now using merilax which is also not helping      Joy[facility]  [phone]

## 2022-02-23 PROBLEM — B37.0 THRUSH: Status: RESOLVED | Noted: 2022-02-04 | Resolved: 2022-02-23

## 2022-02-24 ENCOUNTER — TELEMEDICINE (OUTPATIENT)
Dept: FAMILY MEDICINE CLINIC | Facility: CLINIC | Age: 67
End: 2022-02-24
Payer: MEDICARE

## 2022-02-24 VITALS
OXYGEN SATURATION: 93 % | DIASTOLIC BLOOD PRESSURE: 88 MMHG | HEART RATE: 76 BPM | WEIGHT: 248 LBS | HEIGHT: 70 IN | SYSTOLIC BLOOD PRESSURE: 153 MMHG | BODY MASS INDEX: 35.5 KG/M2

## 2022-02-24 DIAGNOSIS — J96.01 ACUTE RESPIRATORY FAILURE WITH HYPOXIA (HCC): ICD-10-CM

## 2022-02-24 DIAGNOSIS — K59.09 CONSTIPATION, CHRONIC: Primary | ICD-10-CM

## 2022-02-24 PROCEDURE — 99213 OFFICE O/P EST LOW 20 MIN: CPT | Performed by: NURSE PRACTITIONER

## 2022-02-24 RX ORDER — LUBIPROSTONE 8 UG/1
8 CAPSULE, GELATIN COATED ORAL 2 TIMES DAILY WITH MEALS
Qty: 60 CAPSULE | Refills: 5 | Status: SHIPPED | OUTPATIENT
Start: 2022-02-24 | End: 2022-02-28 | Stop reason: ALTCHOICE

## 2022-02-24 NOTE — ASSESSMENT & PLAN NOTE
Will refer to Dr Taylor Backbone and start the [employer] patient was not successful on the Linzess at maximum dose 290 and stopped and is taking fiber and ducolox and fluids and mag citrate prn

## 2022-02-24 NOTE — ASSESSMENT & PLAN NOTE
Recovering from [place] on RA 93% will update his chest x-ray with his complaints of occasional SOB

## 2022-02-24 NOTE — PROGRESS NOTES
Virtual Regular Visit    Verification of patient location:    Patient is located in the following state in which I hold an active license [place]      Assessment/Plan:    Problem List Items Addressed This Visit        Digestive    Constipation, chronic - Primary     Will refer to Dr Candido Lopez and start the [facility] patient was not successful on the Linzess at maximum dose 290 and stopped and is taking fiber and ducolox and fluids and mag citrate prn          Relevant Medications    lubiprostone (AMITIZA) 8 mcg capsule    Other Relevant Orders    Ambulatory Referral to Gastroenterology    Comprehensive metabolic panel    CBC and differential       Respiratory    Acute respiratory failure with hypoxia ([address] )     Recovering from [place] on RA 93% will update his chest x-ray with his complaints of occasional SOB         Relevant Orders    XR chest pa & lateral          BMI Counseling: Body mass index is 35 58 kg/m²  The BMI is above normal  Nutrition recommendations include decreasing portion sizes, encouraging healthy choices of fruits and vegetables, decreasing fast food intake, consuming healthier snacks, limiting drinks that contain sugar and moderation in carbohydrate intake  Exercise recommendations include moderate physical activity 150 minutes/week  No pharmacotherapy was ordered  Patient referred to PCP  Rationale for BMI follow-up plan is due to patient being overweight or obese           Reason for visit is   Chief Complaint   Patient presents with    Virtual Regular Visit     sinus issues and headaches    Virtual Regular Visit        Encounter provider VIVIANA Moscoso    Provider located at [place] [address]  [address]  [place] [id number]      Recent Visits  Date Type Provider Dept   02/22/22 Telephone [address] recent visits within past 7 days and meeting all other requirements  Today's Visits  Date Type Provider Dept   02/24/22 Telemedicine VIVIANA Rojas Pg   Showing today's visits and meeting all other requirements  Future Appointments  No visits were found meeting these conditions  Showing future appointments within next 150 days and meeting all other requirements       The patient was identified by name and date of birth  Tima Coburn was informed that this is a telemedicine visit and that the visit is being conducted through [address] [place] [address] Now and patient was informed that this is a secure, HIPAA-compliant platform  He agrees to proceed     My office door was closed  No one else was in the room  He acknowledged consent and understanding of privacy and security of the video platform  The patient has agreed to participate and understands they can discontinue the visit at any time  Patient is aware this is a billable service  Subjective  Tima Coburn is a 61 y o  male    Patient calling today and reports that he has been having a headache for the past week and a half getting one three times a day in the front of his head and not had headaches like this before and was taking tylenol and then advil and aleive and then muscle relaxer at night  Patient using the nedi pot and not much coming out at this point   Patient also reports that he is having problems with constipation and having small pellets for BMs no blood in the stool or pain in his abdomen and last BM yesterday   Patient is taking ducolox   Patient is also having issues with feeling the chills and not feeling sick and has improved but present        Past Medical History:   Diagnosis Date    Alopecia     Arthritis     Back pain     Benign essential hypertension     Chronic obstructive pulmonary disease (COPD) ([facility] [address] )     Depression     Diabetes mellitus (HCC)     Emphysema, interstitial (HCC)     Fatigue     Hypercholesterolemia     Hypersomnia, persistent     Hypothyroidism     Low testosterone     Memory loss     Oral candidiasis 5/11/2020    Sinusitis     Thyroid goiter        Past Surgical History:   Procedure Laterality Date    ADENOIDECTOMY      APPENDECTOMY      HERNIA REPAIR      TONSILLECTOMY         Current Outpatient Medications   Medication Sig Dispense Refill    albuterol (PROVENTIL HFA,VENTOLIN HFA) 90 mcg/act inhaler Inhale 2 puffs every 6 (six) hours as needed for wheezing      buPROPion (WELLBUTRIN XL) 300 mg 24 hr tablet Take 1 tablet (300 mg total) by mouth every morning 90 tablet 3    carbidopa-levodopa (SINEMET)  mg per tablet Take 2 tablets by mouth 3 (three) times a day 180 tablet 11    cyclobenzaprine (FLEXERIL) 10 mg tablet TAKE 1 TABLET BY MOUTH EVERYDAY AT BEDTIME 90 tablet 0    escitalopram (LEXAPRO) 20 mg tablet TAKE 2 TABLETS DAILY 180 tablet 1    fexofenadine (ALLEGRA) 180 MG tablet TAKE 1 TABLET BY MOUTH EVERY DAY 30 tablet 11    fluticasone (FLONASE) 50 mcg/act nasal spray 1 spray into each nostril daily      galantamine (RAZADYNE ER) 8 MG 24 hr capsule Take 1 capsule (8 mg total) by mouth daily with breakfast 30 capsule 5    glimepiride (AMARYL) 1 mg tablet Take 1 tablet (1 mg total) by mouth daily with breakfast 90 tablet 3    levothyroxine 150 mcg tablet Take 150 mcg by mouth daily      LORazepam (Ativan) 0 5 mg tablet Take 1 tablet (0 5 mg total) by mouth daily at bedtime 90 tablet 0    losartan-hydrochlorothiazide (HYZAAR) 50-12 5 mg per tablet TAKE 1 TABLET EVERY DAY 90 tablet 3    metFORMIN (GLUCOPHAGE) 500 mg tablet TAKE 1 TABLET (500 MG TOTAL) BY MOUTH 2 (TWO) TIMES A  tablet 3    testosterone (ANDROGEL) 1% Place 0 05 g on the skin      Umeclidinium-Vilanterol (ANORO ELLIPTA) 62 5-25 MCG/INH AEPB Inhale 1 puff daily 3 each 1    lubiprostone (AMITIZA) 8 mcg capsule Take 1 capsule (8 mcg total) by mouth 2 (two) times a day with meals 60 capsule 5     No current facility-administered medications for this visit          Allergies   Allergen Reactions    Cimetidine     Codeine     Contrast  [Iodinated Diagnostic Agents]     Metrizamide     Prednisone Other (See Comments)     ALL STEROIDS  COMBATIVE    Simvastatin Other (See Comments)     ALL STATINS  MUSCLE CRAMPS    Statins        Review of Systems   Constitutional: Positive for appetite change and chills  Negative for activity change, diaphoresis, fatigue, fever and unexpected weight change  HENT: Negative for congestion, ear pain, hearing loss, postnasal drip, sinus pressure, sinus pain, sneezing and sore throat  Eyes: Negative for pain, redness and visual disturbance  Respiratory: Positive for shortness of breath  Negative for cough  Cardiovascular: Negative for chest pain and leg swelling  Gastrointestinal: Positive for constipation  Negative for abdominal pain, diarrhea, nausea and vomiting  Endocrine: Negative  Genitourinary:        Hard to empty out his bladder   Musculoskeletal: Positive for neck pain  Negative for arthralgias  Skin: Negative  Allergic/Immunologic: Negative  Neurological: Positive for headaches  Negative for dizziness, tremors, seizures, syncope, speech difficulty, weakness, light-headedness and numbness  Hematological: Negative  Psychiatric/Behavioral: Negative for behavioral problems and dysphoric mood  Video Exam    Vitals:    02/24/22 1041   BP: 153/88   Pulse: 76   SpO2: 93%   Weight: 112 kg (248 lb)   Height: 5' 10" (1 778 m)       Physical Exam  Vitals and nursing note reviewed  Constitutional:       Appearance: Normal appearance  HENT:      Head: Normocephalic and atraumatic  Nose: Nose normal       Mouth/Throat:      Mouth: Mucous membranes are moist    Pulmonary:      Effort: Pulmonary effort is normal       Breath sounds: Normal breath sounds  Abdominal:      Palpations: Abdomen is soft  Skin:     General: Skin is warm  Neurological:      Mental Status: He is alert and oriented to person, place, and time     Psychiatric:         Mood and Affect: Mood normal           I spent 20 minutes directly with the patient during this visit    VIRTUAL VISIT DISCLAIMER      Rivas Gurrola verbally agrees to participate in [employer]  Pt is aware that [employer] could be limited without vital signs or the ability to perform a full hands-on physical exam  Wilman Bryan understands he or the provider may request at any time to terminate the video visit and request the patient to seek care or treatment in person

## 2022-02-28 ENCOUNTER — TELEPHONE (OUTPATIENT)
Dept: FAMILY MEDICINE CLINIC | Facility: CLINIC | Age: 67
End: 2022-02-28

## 2022-02-28 DIAGNOSIS — K59.09 CONSTIPATION, CHRONIC: Primary | ICD-10-CM

## 2022-02-28 RX ORDER — NALOXEGOL OXALATE 12.5 MG/1
12.5 TABLET, FILM COATED ORAL DAILY
Qty: 30 TABLET | Refills: 2 | Status: SHIPPED | OUTPATIENT
Start: 2022-02-28 | End: 2022-06-02 | Stop reason: SDUPTHER

## 2022-02-28 NOTE — TELEPHONE ENCOUNTER
Amitiza is not covered  And the prior auth was denied  The insurance suggested [facility] or [facility]

## 2022-03-01 ENCOUNTER — TELEPHONE (OUTPATIENT)
Dept: FAMILY MEDICINE CLINIC | Facility: CLINIC | Age: 67
End: 2022-03-01

## 2022-03-01 ENCOUNTER — LAB (OUTPATIENT)
Dept: LAB | Facility: CLINIC | Age: 67
End: 2022-03-01
Payer: MEDICARE

## 2022-03-01 ENCOUNTER — APPOINTMENT (OUTPATIENT)
Dept: RADIOLOGY | Facility: CLINIC | Age: 67
End: 2022-03-01
Payer: MEDICARE

## 2022-03-01 DIAGNOSIS — G25.81 RESTLESS LEG SYNDROME: ICD-10-CM

## 2022-03-01 DIAGNOSIS — Z79.890 ENCOUNTER FOR MONITORING TESTOSTERONE REPLACEMENT THERAPY: ICD-10-CM

## 2022-03-01 DIAGNOSIS — Z51.81 ENCOUNTER FOR MONITORING TESTOSTERONE REPLACEMENT THERAPY: ICD-10-CM

## 2022-03-01 DIAGNOSIS — E11.9 DIABETES MELLITUS WITHOUT COMPLICATION (HCC): ICD-10-CM

## 2022-03-01 DIAGNOSIS — E61.1 IRON DEFICIENCY: ICD-10-CM

## 2022-03-01 DIAGNOSIS — K59.09 CONSTIPATION, CHRONIC: ICD-10-CM

## 2022-03-01 DIAGNOSIS — E03.9 HYPOTHYROIDISM, UNSPECIFIED TYPE: ICD-10-CM

## 2022-03-01 DIAGNOSIS — J96.01 ACUTE RESPIRATORY FAILURE WITH HYPOXIA (HCC): ICD-10-CM

## 2022-03-01 DIAGNOSIS — Z12.5 SCREENING FOR PROSTATE CANCER: ICD-10-CM

## 2022-03-01 DIAGNOSIS — G62.9 NEUROPATHY: ICD-10-CM

## 2022-03-01 LAB
ALBUMIN SERPL BCP-MCNC: 3.5 G/DL (ref 3.5–5)
ALP SERPL-CCNC: 89 U/L (ref 46–116)
ALT SERPL W P-5'-P-CCNC: 25 U/L (ref 12–78)
ANION GAP SERPL CALCULATED.3IONS-SCNC: 6 MMOL/L (ref 4–13)
AST SERPL W P-5'-P-CCNC: 32 U/L (ref 5–45)
BASOPHILS # BLD AUTO: 0.05 THOUSANDS/ΜL (ref 0–0.1)
BASOPHILS NFR BLD AUTO: 1 % (ref 0–1)
BILIRUB SERPL-MCNC: 0.42 MG/DL (ref 0.2–1)
BUN SERPL-MCNC: 15 MG/DL (ref 5–25)
CALCIUM SERPL-MCNC: 10.5 MG/DL (ref 8.3–10.1)
CHLORIDE SERPL-SCNC: 104 MMOL/L (ref 100–108)
CHOLEST SERPL-MCNC: 164 MG/DL
CO2 SERPL-SCNC: 28 MMOL/L (ref 21–32)
CREAT SERPL-MCNC: 1.16 MG/DL (ref 0.6–1.3)
CREAT UR-MCNC: 67.2 MG/DL
EOSINOPHIL # BLD AUTO: 0.46 THOUSAND/ΜL (ref 0–0.61)
EOSINOPHIL NFR BLD AUTO: 8 % (ref 0–6)
ERYTHROCYTE [DISTWIDTH] IN BLOOD BY AUTOMATED COUNT: 16.7 % (ref 11.6–15.1)
FERRITIN SERPL-MCNC: 107 NG/ML (ref 8–388)
GFR SERPL CREATININE-BSD FRML MDRD: 66 ML/MIN/1.73SQ M
GLUCOSE P FAST SERPL-MCNC: 144 MG/DL (ref 65–99)
HCT VFR BLD AUTO: 41.7 % (ref 36.5–49.3)
HDLC SERPL-MCNC: 35 MG/DL
HGB BLD-MCNC: 12.6 G/DL (ref 12–17)
IMM GRANULOCYTES # BLD AUTO: 0.06 THOUSAND/UL (ref 0–0.2)
IMM GRANULOCYTES NFR BLD AUTO: 1 % (ref 0–2)
IRON SATN MFR SERPL: 21 % (ref 20–50)
IRON SERPL-MCNC: 79 UG/DL (ref 65–175)
LDLC SERPL CALC-MCNC: 94 MG/DL (ref 0–100)
LYMPHOCYTES # BLD AUTO: 1.5 THOUSANDS/ΜL (ref 0.6–4.47)
LYMPHOCYTES NFR BLD AUTO: 24 % (ref 14–44)
MAGNESIUM SERPL-MCNC: 2.1 MG/DL (ref 1.6–2.6)
MCH RBC QN AUTO: 27 PG (ref 26.8–34.3)
MCHC RBC AUTO-ENTMCNC: 30.2 G/DL (ref 31.4–37.4)
MCV RBC AUTO: 90 FL (ref 82–98)
MICROALBUMIN UR-MCNC: 9.9 MG/L (ref 0–20)
MICROALBUMIN/CREAT 24H UR: 15 MG/G CREATININE (ref 0–30)
MONOCYTES # BLD AUTO: 0.65 THOUSAND/ΜL (ref 0.17–1.22)
MONOCYTES NFR BLD AUTO: 11 % (ref 4–12)
NEUTROPHILS # BLD AUTO: 3.45 THOUSANDS/ΜL (ref 1.85–7.62)
NEUTS SEG NFR BLD AUTO: 55 % (ref 43–75)
NRBC BLD AUTO-RTO: 0 /100 WBCS
PLATELET # BLD AUTO: 372 THOUSANDS/UL (ref 149–390)
PMV BLD AUTO: 9 FL (ref 8.9–12.7)
POTASSIUM SERPL-SCNC: 4.2 MMOL/L (ref 3.5–5.3)
PROT SERPL-MCNC: 8.4 G/DL (ref 6.4–8.2)
PSA SERPL-MCNC: 0.4 NG/ML (ref 0–4)
RBC # BLD AUTO: 4.66 MILLION/UL (ref 3.88–5.62)
SODIUM SERPL-SCNC: 138 MMOL/L (ref 136–145)
T4 FREE SERPL-MCNC: 1.13 NG/DL (ref 0.76–1.46)
TIBC SERPL-MCNC: 381 UG/DL (ref 250–450)
TRIGL SERPL-MCNC: 175 MG/DL
TSH SERPL DL<=0.05 MIU/L-ACNC: 0.16 UIU/ML (ref 0.36–3.74)
VIT B12 SERPL-MCNC: 496 PG/ML (ref 100–900)
WBC # BLD AUTO: 6.17 THOUSAND/UL (ref 4.31–10.16)

## 2022-03-01 PROCEDURE — 82728 ASSAY OF FERRITIN: CPT

## 2022-03-01 PROCEDURE — 82607 VITAMIN B-12: CPT

## 2022-03-01 PROCEDURE — 36415 COLL VENOUS BLD VENIPUNCTURE: CPT

## 2022-03-01 PROCEDURE — 84425 ASSAY OF VITAMIN B-1: CPT

## 2022-03-01 PROCEDURE — 84443 ASSAY THYROID STIM HORMONE: CPT

## 2022-03-01 PROCEDURE — 80053 COMPREHEN METABOLIC PANEL: CPT

## 2022-03-01 PROCEDURE — 83550 IRON BINDING TEST: CPT

## 2022-03-01 PROCEDURE — 71046 X-RAY EXAM CHEST 2 VIEWS: CPT

## 2022-03-01 PROCEDURE — 84439 ASSAY OF FREE THYROXINE: CPT

## 2022-03-01 PROCEDURE — G0103 PSA SCREENING: HCPCS

## 2022-03-01 PROCEDURE — 83540 ASSAY OF IRON: CPT

## 2022-03-01 PROCEDURE — 83036 HEMOGLOBIN GLYCOSYLATED A1C: CPT

## 2022-03-01 PROCEDURE — 80061 LIPID PANEL: CPT

## 2022-03-01 PROCEDURE — 82043 UR ALBUMIN QUANTITATIVE: CPT | Performed by: NURSE PRACTITIONER

## 2022-03-01 PROCEDURE — 85025 COMPLETE CBC W/AUTO DIFF WBC: CPT

## 2022-03-01 PROCEDURE — 83735 ASSAY OF MAGNESIUM: CPT

## 2022-03-01 PROCEDURE — 82570 ASSAY OF URINE CREATININE: CPT | Performed by: NURSE PRACTITIONER

## 2022-03-01 NOTE — TELEPHONE ENCOUNTER
Rec'd a call from Reyes, pt's daughter  She advised that pt already tried Linzess and did not like it  Pt req starting [employer]

## 2022-03-02 LAB
EST. AVERAGE GLUCOSE BLD GHB EST-MCNC: 180 MG/DL
HBA1C MFR BLD: 7.9 %

## 2022-03-03 ENCOUNTER — TELEPHONE (OUTPATIENT)
Dept: FAMILY MEDICINE CLINIC | Facility: CLINIC | Age: 67
End: 2022-03-03

## 2022-03-03 DIAGNOSIS — J18.9 PNEUMONIA OF RIGHT LOWER LOBE DUE TO INFECTIOUS ORGANISM: Primary | ICD-10-CM

## 2022-03-03 RX ORDER — AZITHROMYCIN 250 MG/1
TABLET, FILM COATED ORAL
Qty: 6 TABLET | Refills: 0 | Status: SHIPPED | OUTPATIENT
Start: 2022-03-03 | End: 2022-03-07

## 2022-03-03 RX ORDER — AMOXICILLIN AND CLAVULANATE POTASSIUM 875; 125 MG/1; MG/1
1 TABLET, FILM COATED ORAL EVERY 12 HOURS SCHEDULED
Qty: 14 TABLET | Refills: 0 | Status: SHIPPED | OUTPATIENT
Start: 2022-03-03 | End: 2022-03-14

## 2022-03-10 LAB — VIT B1 BLD-SCNC: 156.3 NMOL/L (ref 66.5–200)

## 2022-03-14 DIAGNOSIS — J18.9 PNEUMONIA OF RIGHT LOWER LOBE DUE TO INFECTIOUS ORGANISM: ICD-10-CM

## 2022-03-14 RX ORDER — AMOXICILLIN AND CLAVULANATE POTASSIUM 875; 125 MG/1; MG/1
TABLET, FILM COATED ORAL
Qty: 14 TABLET | Refills: 0 | Status: SHIPPED | OUTPATIENT
Start: 2022-03-14 | End: 2022-03-21

## 2022-03-22 ENCOUNTER — TELEPHONE (OUTPATIENT)
Dept: FAMILY MEDICINE CLINIC | Facility: CLINIC | Age: 67
End: 2022-03-22

## 2022-03-22 DIAGNOSIS — U07.1 PNEUMONIA DUE TO COVID-19 VIRUS: Primary | ICD-10-CM

## 2022-03-22 DIAGNOSIS — J12.82 PNEUMONIA DUE TO COVID-19 VIRUS: Primary | ICD-10-CM

## 2022-03-22 DIAGNOSIS — J12.82 PNEUMONIA DUE TO COVID-19 VIRUS: ICD-10-CM

## 2022-03-22 DIAGNOSIS — U07.1 PNEUMONIA DUE TO COVID-19 VIRUS: ICD-10-CM

## 2022-03-22 NOTE — TELEPHONE ENCOUNTER
They received office notes from November that need to be amened and  Re-faxed    The notes sent do not mention the nebulizer at all, the notes need to include the nebulizer

## 2022-03-22 NOTE — TELEPHONE ENCOUNTER
They needed face to face notes and his last appt was in November  I sent them to them but they need to be amended to mention he is using a nebulizer and faxed back over

## 2022-03-23 NOTE — TELEPHONE ENCOUNTER
Daughter said she would appreciate it f the note could be addended to mention the nebulizer    SHARON Knight so pt can get what he needs

## 2022-03-24 ENCOUNTER — TELEPHONE (OUTPATIENT)
Dept: FAMILY MEDICINE CLINIC | Facility: CLINIC | Age: 67
End: 2022-03-24

## 2022-03-24 DIAGNOSIS — U07.1 PNEUMONIA DUE TO COVID-19 VIRUS: ICD-10-CM

## 2022-03-24 DIAGNOSIS — J12.82 PNEUMONIA DUE TO COVID-19 VIRUS: ICD-10-CM

## 2022-03-24 NOTE — TELEPHONE ENCOUNTER
Received call from patient advising insurance company will not cover nebulizer solution  Pt called and said [facility] needs script to process under Medicare B to be covered   Please fax to [phone]

## 2022-03-31 DIAGNOSIS — E11.9 DIABETES MELLITUS WITHOUT COMPLICATION (HCC): ICD-10-CM

## 2022-04-21 DIAGNOSIS — I10 ESSENTIAL HYPERTENSION: ICD-10-CM

## 2022-04-21 RX ORDER — LOSARTAN POTASSIUM AND HYDROCHLOROTHIAZIDE 12.5; 5 MG/1; MG/1
TABLET ORAL
Qty: 90 TABLET | Refills: 3 | Status: SHIPPED | OUTPATIENT
Start: 2022-04-21 | End: 2022-06-03 | Stop reason: ALTCHOICE

## 2022-04-25 ENCOUNTER — TELEPHONE (OUTPATIENT)
Dept: FAMILY MEDICINE CLINIC | Facility: CLINIC | Age: 67
End: 2022-04-25

## 2022-04-25 NOTE — TELEPHONE ENCOUNTER
I spoke with patients daughter, she scheduled Pal Fairbanks for tomorrow with you  She is aware that she is to take him to the ER if this gets worse  She said that with his nebulizer he is now in the mid 90's  Would you want to order the X-ray prior to seeing him or wait?

## 2022-04-25 NOTE — TELEPHONE ENCOUNTER
Pt had covid in February    he seems to still have pneumonia, yesterday his Ox level was 89  Daughter is asking if Brenna would put in a chest x ray order ? ? Please call daughter when it's in so he can get it done

## 2022-04-26 ENCOUNTER — OFFICE VISIT (OUTPATIENT)
Dept: FAMILY MEDICINE CLINIC | Facility: CLINIC | Age: 67
End: 2022-04-26
Payer: MEDICARE

## 2022-04-26 ENCOUNTER — APPOINTMENT (OUTPATIENT)
Dept: LAB | Facility: CLINIC | Age: 67
End: 2022-04-26
Payer: MEDICARE

## 2022-04-26 ENCOUNTER — APPOINTMENT (OUTPATIENT)
Dept: RADIOLOGY | Facility: CLINIC | Age: 67
End: 2022-04-26
Payer: MEDICARE

## 2022-04-26 VITALS
BODY MASS INDEX: 35.07 KG/M2 | TEMPERATURE: 97.6 F | HEART RATE: 70 BPM | OXYGEN SATURATION: 97 % | DIASTOLIC BLOOD PRESSURE: 72 MMHG | HEIGHT: 70 IN | WEIGHT: 245 LBS | SYSTOLIC BLOOD PRESSURE: 118 MMHG

## 2022-04-26 DIAGNOSIS — U07.1 PNEUMONIA DUE TO COVID-19 VIRUS: ICD-10-CM

## 2022-04-26 DIAGNOSIS — J12.82 PNEUMONIA DUE TO COVID-19 VIRUS: ICD-10-CM

## 2022-04-26 DIAGNOSIS — G47.33 OSA (OBSTRUCTIVE SLEEP APNEA): ICD-10-CM

## 2022-04-26 DIAGNOSIS — J42 CHRONIC BRONCHITIS, UNSPECIFIED CHRONIC BRONCHITIS TYPE (HCC): ICD-10-CM

## 2022-04-26 DIAGNOSIS — J42 CHRONIC BRONCHITIS, UNSPECIFIED CHRONIC BRONCHITIS TYPE (HCC): Primary | ICD-10-CM

## 2022-04-26 DIAGNOSIS — R06.02 SHORTNESS OF BREATH: ICD-10-CM

## 2022-04-26 LAB
ALBUMIN SERPL BCP-MCNC: 3.6 G/DL (ref 3.5–5)
ALP SERPL-CCNC: 69 U/L (ref 46–116)
ALT SERPL W P-5'-P-CCNC: 17 U/L (ref 12–78)
ANION GAP SERPL CALCULATED.3IONS-SCNC: 2 MMOL/L (ref 4–13)
AST SERPL W P-5'-P-CCNC: 24 U/L (ref 5–45)
BASOPHILS # BLD AUTO: 0.03 THOUSANDS/ΜL (ref 0–0.1)
BASOPHILS NFR BLD AUTO: 1 % (ref 0–1)
BILIRUB SERPL-MCNC: 0.37 MG/DL (ref 0.2–1)
BUN SERPL-MCNC: 20 MG/DL (ref 5–25)
CALCIUM SERPL-MCNC: 9.7 MG/DL (ref 8.3–10.1)
CHLORIDE SERPL-SCNC: 106 MMOL/L (ref 100–108)
CO2 SERPL-SCNC: 32 MMOL/L (ref 21–32)
CREAT SERPL-MCNC: 1.22 MG/DL (ref 0.6–1.3)
EOSINOPHIL # BLD AUTO: 0.27 THOUSAND/ΜL (ref 0–0.61)
EOSINOPHIL NFR BLD AUTO: 5 % (ref 0–6)
ERYTHROCYTE [DISTWIDTH] IN BLOOD BY AUTOMATED COUNT: 15 % (ref 11.6–15.1)
GFR SERPL CREATININE-BSD FRML MDRD: 61 ML/MIN/1.73SQ M
GLUCOSE SERPL-MCNC: 193 MG/DL (ref 65–140)
HCT VFR BLD AUTO: 41.6 % (ref 36.5–49.3)
HGB BLD-MCNC: 13 G/DL (ref 12–17)
IMM GRANULOCYTES # BLD AUTO: 0.01 THOUSAND/UL (ref 0–0.2)
IMM GRANULOCYTES NFR BLD AUTO: 0 % (ref 0–2)
LYMPHOCYTES # BLD AUTO: 1.29 THOUSANDS/ΜL (ref 0.6–4.47)
LYMPHOCYTES NFR BLD AUTO: 23 % (ref 14–44)
MCH RBC QN AUTO: 29.1 PG (ref 26.8–34.3)
MCHC RBC AUTO-ENTMCNC: 31.3 G/DL (ref 31.4–37.4)
MCV RBC AUTO: 93 FL (ref 82–98)
MONOCYTES # BLD AUTO: 0.55 THOUSAND/ΜL (ref 0.17–1.22)
MONOCYTES NFR BLD AUTO: 10 % (ref 4–12)
NEUTROPHILS # BLD AUTO: 3.44 THOUSANDS/ΜL (ref 1.85–7.62)
NEUTS SEG NFR BLD AUTO: 61 % (ref 43–75)
NRBC BLD AUTO-RTO: 0 /100 WBCS
PLATELET # BLD AUTO: 258 THOUSANDS/UL (ref 149–390)
PMV BLD AUTO: 9.6 FL (ref 8.9–12.7)
POTASSIUM SERPL-SCNC: 3.8 MMOL/L (ref 3.5–5.3)
PROT SERPL-MCNC: 7.2 G/DL (ref 6.4–8.2)
RBC # BLD AUTO: 4.46 MILLION/UL (ref 3.88–5.62)
SODIUM SERPL-SCNC: 140 MMOL/L (ref 136–145)
WBC # BLD AUTO: 5.59 THOUSAND/UL (ref 4.31–10.16)

## 2022-04-26 PROCEDURE — 80053 COMPREHEN METABOLIC PANEL: CPT

## 2022-04-26 PROCEDURE — 99214 OFFICE O/P EST MOD 30 MIN: CPT | Performed by: PHYSICIAN ASSISTANT

## 2022-04-26 PROCEDURE — 85025 COMPLETE CBC W/AUTO DIFF WBC: CPT

## 2022-04-26 PROCEDURE — 71046 X-RAY EXAM CHEST 2 VIEWS: CPT

## 2022-04-26 PROCEDURE — 36415 COLL VENOUS BLD VENIPUNCTURE: CPT

## 2022-04-26 RX ORDER — GLYCOPYRROLATE AND FORMOTEROL FUMARATE 9; 4.8 UG/1; UG/1
AEROSOL, METERED RESPIRATORY (INHALATION)
COMMUNITY
Start: 2022-03-08

## 2022-04-26 RX ORDER — CALCIUM CITRATE/VITAMIN D3 200MG-6.25
TABLET ORAL
COMMUNITY
Start: 2022-02-12

## 2022-04-26 NOTE — PROGRESS NOTES
Assessment/Plan:       Problem List Items Addressed This Visit        Respiratory    Chronic obstructive pulmonary disease ([facility] [address] ) - Primary    Relevant Medications    Bevespi Aerosphere 9-4 8 MCG/ACT inhaler    Other Relevant Orders    XR chest pa & lateral    Comprehensive metabolic panel    CBC and differential      Other Visit Diagnoses     Shortness of breath        Relevant Orders    XR chest pa & lateral    Comprehensive metabolic panel    CBC and differential        ambulatory challenge with O2 at or above 93%  No wheezes, there are bibasilar rales  Normal cardiac exam, weights stable  Will seek CXR, does not appear to be in acute exacerbation of COPD  Will need to r/o infectious process  Strict ER and return precautions provided  Check labs as well  Pt needs pulmonary follow up  Subjective:      Patient ID: Yajaira Ramirez is a 74 y o  male  Pt presents with concerns of 1 week of more SOB and low back pain  No fevers  + fatigue  He had covid in January and was hospitalized and since then he shares he had recurrent pneumonia  He is not coughing  He shares that the SOB is improved when he takes his nebs  His O2 has dropped to the high 80s at home before reboduning to mid 90s  He has not seen pulmonary since d/c from the hospital  He denies CP, LE edema, syncope  Weights stable  No known cardiac pathology  The following portions of the patient's history were reviewed and updated as appropriate:   He  has a past medical history of Alopecia, Arthritis, Back pain, Benign essential hypertension, Chronic obstructive pulmonary disease (COPD) ([address] ), Depression, Diabetes mellitus ([address] ), Emphysema, interstitial ([address] ), Fatigue, Hypercholesterolemia, Hypersomnia, persistent, Hypothyroidism, Low testosterone, Memory loss, Oral candidiasis (5/11/2020), Sinusitis, and Thyroid goiter    He   Patient Active Problem List    Diagnosis Date Noted    Acute respiratory failure with hypoxia ([address] ) 01/25/2022    Pneumonia due to COVID-19 virus 01/24/2022    Medicare annual wellness visit, subsequent 11/15/2021    REM behavioral disorder 10/20/2021    Iron deficiency 07/15/2021    Restless legs syndrome 07/15/2021    Constipation, chronic 04/12/2021    [facility]roiliac inflammation ([facility] ) 04/02/2019    Lewy body dementia without behavioral disturbance ([facility] ) 12/05/2018    Pulmonary nodule 10/03/2018    Balance problem 09/26/2018    Tremor 09/26/2018    Neuropathy 09/26/2018    Memory impairment 08/24/2018    Episodic paroxysmal hemicrania, 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    Abnormality of gait due to impairment of balance 08/24/2018    Former smoker 06/14/2018    Centrilobular emphysema ([facility] ) 04/06/2018    DAFNE (obstructive sleep apnea) 04/06/2018    Low testosterone 06/29/2017    Psoriasis 04/13/2016    Goiter, nontoxic simple 06/22/2015    Chronic obstructive pulmonary disease ([facility] ) 06/28/2013    Depression 06/28/2013    Hypercholesterolemia 06/27/2013    Hypertension 06/27/2013    Hypothyroidism 06/27/2013    Diabetes mellitus without complication ([facility] ) 35/94/2559     He  has a past surgical history that includes Appendectomy; Hernia repair; Tonsillectomy; and ADENOIDECTOMY  His family history includes Aneurysm in his father; Hashimoto's thyroiditis in his daughter; Hypertension in his father; Hypothyroidism in his daughter; Lupus in his mother  He  reports that he has quit smoking  His smoking use included cigarettes  He has a 20 00 pack-year smoking history  He has never used smokeless tobacco  He reports that he does not drink alcohol and does not use drugs    Current Outpatient Medications   Medication Sig Dispense Refill    albuterol (PROVENTIL HFA,VENTOLIN HFA) 90 mcg/act inhaler Inhale 2 puffs every 6 (six) hours as needed for wheezing      Bevespi Aerosphere 9-4 8 MCG/ACT inhaler       carbidopa-levodopa (SINEMET)  mg per tablet Take 2 tablets by mouth 3 (three) times a day 180 tablet 11    cyclobenzaprine (FLEXERIL) 10 mg tablet TAKE 1 TABLET BY MOUTH EVERYDAY AT BEDTIME 90 tablet 0    escitalopram (LEXAPRO) 20 mg tablet TAKE 2 TABLETS DAILY 180 tablet 1    fexofenadine (ALLEGRA) 180 MG tablet TAKE 1 TABLET BY MOUTH EVERY DAY 30 tablet 11    fluticasone (FLONASE) 50 mcg/act nasal spray 1 spray into each nostril daily      galantamine (RAZADYNE ER) 8 MG 24 hr capsule Take 1 capsule (8 mg total) by mouth daily with breakfast 30 capsule 5    glimepiride (AMARYL) 1 mg tablet Take 1 tablet (1 mg total) by mouth daily with breakfast 90 tablet 3    ipratropium (ATROVENT) 0 02 % nebulizer solution Take 2 5 mL (0 5 mg total) by nebulization 4 (four) times a day 62 5 mL 3    levothyroxine 150 mcg tablet Take 150 mcg by mouth daily      losartan-hydrochlorothiazide (HYZAAR) 50-12 5 mg per tablet TAKE 1 TABLET EVERY DAY 90 tablet 3    metFORMIN (GLUCOPHAGE) 500 mg tablet TAKE 1 TABLET (500 MG TOTAL) BY MOUTH 2 (TWO) TIMES A  tablet 3    Naloxegol Oxalate (Movantik) 12 5 MG TABS Take 1 tablet (12 5 mg total) by mouth in the morning Take 1 hour before or 2 hours after breakfast 30 tablet 2    testosterone (ANDROGEL) 1% Place 0 05 g on the skin      True Metrix Blood Glucose Test test strip TESTING TWICE A DAY E 11 9      Umeclidinium-Vilanterol (ANORO ELLIPTA) 62 5-25 MCG/INH AEPB Inhale 1 puff daily 3 each 1    buPROPion (WELLBUTRIN XL) 300 mg 24 hr tablet Take 1 tablet (300 mg total) by mouth every morning 90 tablet 3    LORazepam (Ativan) 0 5 mg tablet Take 1 tablet (0 5 mg total) by mouth daily at bedtime 90 tablet 0     No current facility-administered medications for this visit       Current Outpatient Medications on File Prior to Visit   Medication Sig    albuterol (PROVENTIL HFA,VENTOLIN HFA) 90 mcg/act inhaler Inhale 2 puffs every 6 (six) hours as needed for wheezing    Bevespi Aerosphere 9-4 8 MCG/ACT inhaler     carbidopa-levodopa (SINEMET)  mg per tablet Take 2 tablets by mouth 3 (three) times a day    cyclobenzaprine (FLEXERIL) 10 mg tablet TAKE 1 TABLET BY MOUTH EVERYDAY AT BEDTIME    escitalopram (LEXAPRO) 20 mg tablet TAKE 2 TABLETS DAILY    fexofenadine (ALLEGRA) 180 MG tablet TAKE 1 TABLET BY MOUTH EVERY DAY    fluticasone (FLONASE) 50 mcg/act nasal spray 1 spray into each nostril daily    galantamine (RAZADYNE ER) 8 MG 24 hr capsule Take 1 capsule (8 mg total) by mouth daily with breakfast    glimepiride (AMARYL) 1 mg tablet Take 1 tablet (1 mg total) by mouth daily with breakfast    ipratropium (ATROVENT) 0 02 % nebulizer solution Take 2 5 mL (0 5 mg total) by nebulization 4 (four) times a day    levothyroxine 150 mcg tablet Take 150 mcg by mouth daily    losartan-hydrochlorothiazide (HYZAAR) 50-12 5 mg per tablet TAKE 1 TABLET EVERY DAY    metFORMIN (GLUCOPHAGE) 500 mg tablet TAKE 1 TABLET (500 MG TOTAL) BY MOUTH 2 (TWO) TIMES A DAY    Naloxegol Oxalate (Movantik) 12 5 MG TABS Take 1 tablet (12 5 mg total) by mouth in the morning Take 1 hour before or 2 hours after breakfast    testosterone (ANDROGEL) 1% Place 0 05 g on the skin    True Metrix Blood Glucose Test test strip TESTING TWICE A DAY E 11 9    Umeclidinium-Vilanterol (ANORO ELLIPTA) 62 5-25 MCG/INH AEPB Inhale 1 puff daily    buPROPion (WELLBUTRIN XL) 300 mg 24 hr tablet Take 1 tablet (300 mg total) by mouth every morning    LORazepam (Ativan) 0 5 mg tablet Take 1 tablet (0 5 mg total) by mouth daily at bedtime     No current facility-administered medications on file prior to visit  He is allergic to cimetidine, codeine, contrast  [iodinated diagnostic agents], metrizamide, prednisone, simvastatin, and statins       Review of Systems   Constitutional: Positive for fatigue  Negative for chills and fever  HENT: Negative for congestion, ear pain, hearing loss, nosebleeds, postnasal drip, rhinorrhea, sinus pressure, sinus pain, sneezing and sore throat      Eyes: Negative for pain, discharge, itching and visual disturbance  Respiratory: Positive for shortness of breath  Negative for cough, chest tightness and wheezing  Cardiovascular: Negative for chest pain, palpitations and leg swelling  Gastrointestinal: Negative for abdominal pain, blood in stool, constipation, diarrhea, nausea and vomiting  Genitourinary: Negative for frequency and urgency  Neurological: Negative for dizziness, light-headedness and numbness  Objective:      /72 (BP Location: Left arm, Patient Position: Sitting, Cuff Size: Large)   Pulse 70   Temp 97 6 °F (36 4 °C)   Ht 5' 10" (1 778 m)   Wt 111 kg (245 lb)   SpO2 97%   BMI 35 15 kg/m²          Physical Exam  Vitals and nursing note reviewed  Constitutional:       General: He is not in acute distress  Appearance: Normal appearance  HENT:      Head: Normocephalic and atraumatic  Nose: Nose normal       Mouth/Throat:      Mouth: Mucous membranes are moist       Pharynx: Oropharynx is clear  No oropharyngeal exudate or posterior oropharyngeal erythema  Eyes:      Pupils: Pupils are equal, round, and reactive to light  Cardiovascular:      Rate and Rhythm: Normal rate and regular rhythm  Heart sounds: Normal heart sounds  No murmur heard  Pulmonary:      Effort: Pulmonary effort is normal  No respiratory distress  Breath sounds: Examination of the right-upper field reveals decreased breath sounds  Examination of the left-upper field reveals decreased breath sounds  Examination of the right-middle field reveals decreased breath sounds  Examination of the left-middle field reveals decreased breath sounds  Examination of the right-lower field reveals rhonchi and rales  Examination of the left-lower field reveals rhonchi and rales  Decreased breath sounds, rhonchi and rales present  Abdominal:      Palpations: Abdomen is soft  Musculoskeletal:         General: Normal range of motion        Cervical back: Normal range of motion and neck supple  Right lower leg: No edema  Left lower leg: No edema  Lymphadenopathy:      Cervical: No cervical adenopathy  Skin:     General: Skin is warm and dry  Neurological:      Mental Status: He is alert and oriented to person, place, and time     Psychiatric:         Mood and Affect: Mood and affect normal

## 2022-04-27 DIAGNOSIS — M46.1 SACROILIAC INFLAMMATION (HCC): ICD-10-CM

## 2022-04-27 RX ORDER — CYCLOBENZAPRINE HCL 10 MG
TABLET ORAL
Qty: 90 TABLET | Refills: 0 | Status: SHIPPED | OUTPATIENT
Start: 2022-04-27 | End: 2022-05-05 | Stop reason: ALTCHOICE

## 2022-05-02 ENCOUNTER — TELEPHONE (OUTPATIENT)
Dept: NEUROLOGY | Facility: CLINIC | Age: 67
End: 2022-05-02

## 2022-05-02 NOTE — TELEPHONE ENCOUNTER
THE [facility] to confirm patient's 5/5/2022 @ 2 pm appointment with Dr Rachael Parks and to provide new office address of [address] [place] [address],[address], [place]  Call back number given [phone]

## 2022-05-04 ENCOUNTER — TELEPHONE (OUTPATIENT)
Dept: FAMILY MEDICINE CLINIC | Facility: CLINIC | Age: 67
End: 2022-05-04

## 2022-05-04 DIAGNOSIS — G47.52 REM SLEEP BEHAVIOR DISORDER: ICD-10-CM

## 2022-05-04 DIAGNOSIS — G31.83 LEWY BODY DEMENTIA WITHOUT BEHAVIORAL DISTURBANCE (HCC): Primary | ICD-10-CM

## 2022-05-04 DIAGNOSIS — F02.80 LEWY BODY DEMENTIA WITHOUT BEHAVIORAL DISTURBANCE (HCC): Primary | ICD-10-CM

## 2022-05-04 DIAGNOSIS — D68.52 HETEROZYGOUS FOR PROTHROMBIN G20210A MUTATION (HCC): ICD-10-CM

## 2022-05-04 NOTE — TELEPHONE ENCOUNTER
Ronnie Quintana / [id number] [facility] called and said she needs a Physician Referral / Order put into Epic  Delmar Chatterjee Pt has an appt tomorrow w/Neurology      DX Codes  G 31 83 , F 02 80, G 20,   G 47 52

## 2022-05-05 ENCOUNTER — OFFICE VISIT (OUTPATIENT)
Dept: NEUROLOGY | Facility: CLINIC | Age: 67
End: 2022-05-05
Payer: MEDICARE

## 2022-05-05 ENCOUNTER — TELEPHONE (OUTPATIENT)
Dept: FAMILY MEDICINE CLINIC | Facility: CLINIC | Age: 67
End: 2022-05-05

## 2022-05-05 VITALS
HEIGHT: 70 IN | BODY MASS INDEX: 35.07 KG/M2 | WEIGHT: 245 LBS | SYSTOLIC BLOOD PRESSURE: 90 MMHG | HEART RATE: 62 BPM | DIASTOLIC BLOOD PRESSURE: 60 MMHG

## 2022-05-05 DIAGNOSIS — G47.52 REM SLEEP BEHAVIOR DISORDER: ICD-10-CM

## 2022-05-05 DIAGNOSIS — G47.52 REM BEHAVIORAL DISORDER: ICD-10-CM

## 2022-05-05 DIAGNOSIS — F02.80 LEWY BODY DEMENTIA WITHOUT BEHAVIORAL DISTURBANCE (HCC): ICD-10-CM

## 2022-05-05 DIAGNOSIS — D68.52 HETEROZYGOUS FOR PROTHROMBIN G20210A MUTATION (HCC): ICD-10-CM

## 2022-05-05 DIAGNOSIS — R43.9 SMELL DISTURBANCE: ICD-10-CM

## 2022-05-05 DIAGNOSIS — F32.A DEPRESSION, UNSPECIFIED DEPRESSION TYPE: ICD-10-CM

## 2022-05-05 DIAGNOSIS — G31.83 LEWY BODY DEMENTIA WITHOUT BEHAVIORAL DISTURBANCE (HCC): ICD-10-CM

## 2022-05-05 DIAGNOSIS — G20 PRIMARY PARKINSONISM (HCC): ICD-10-CM

## 2022-05-05 DIAGNOSIS — G25.81 RESTLESS LEGS SYNDROME (RLS): Primary | ICD-10-CM

## 2022-05-05 DIAGNOSIS — G25.81 RESTLESS LEGS SYNDROME: ICD-10-CM

## 2022-05-05 PROBLEM — R42 LIGHTHEADEDNESS: Status: ACTIVE | Noted: 2022-05-05

## 2022-05-05 PROCEDURE — 99214 OFFICE O/P EST MOD 30 MIN: CPT | Performed by: PSYCHIATRY & NEUROLOGY

## 2022-05-05 RX ORDER — GABAPENTIN 300 MG/1
300 CAPSULE ORAL
Qty: 30 CAPSULE | Refills: 4 | Status: SHIPPED | OUTPATIENT
Start: 2022-05-05

## 2022-05-05 RX ORDER — BUPROPION HYDROCHLORIDE 300 MG/1
TABLET ORAL
Qty: 90 TABLET | Refills: 3 | Status: SHIPPED | OUTPATIENT
Start: 2022-05-05

## 2022-05-05 RX ORDER — GALANTAMINE HYDROBROMIDE 8 MG/1
CAPSULE, EXTENDED RELEASE ORAL
Qty: 90 CAPSULE | Refills: 1 | Status: SHIPPED | OUTPATIENT
Start: 2022-05-05

## 2022-05-05 NOTE — ASSESSMENT & PLAN NOTE
Patient has been having increased restless legs which patient finds debilitating while sleeping with family confirming increased agitation of his legs bilaterally  However, symptoms can be more REM behavior sleep disorder as patient was also noted talking in his sleep and also kicking in bed  Discussed this with the patient and family as both disorders may be happening  Patient and family would like to gabapentin first to help with RLS  Patient's iron levels are normal and magnesium levels were not significant      Plan:  Trial gabapentin 300mg nightly at bedtime for RLS

## 2022-05-05 NOTE — TELEPHONE ENCOUNTER
Will need to look into a bit further and get back to them to see what is not contraindicated for him

## 2022-05-05 NOTE — PROGRESS NOTES
Patient ID: Anabel Guo is a 74 y o  male  Assessment/Plan:    Lewy body dementia without behavioral disturbance [facility])  Patient is a 74year old man with hx of htn, depression, neuropathy presenting for followup  Overall tremors have been improved while on increased sinemet 25/100 2 tabs TID although noted them to be on both hands, the fingers, and right leg  Tremors does not seem to affect his daily activities significantly currently  Imbalance has been stable until COVID pneumonia in 1/2022 and has not reached back to previous baseline  Patient having difficulties with memory problems which sharply declined after COVID and also has been having problems after COVID  Patient trying different medications from memory  Patient has been on Donepezil but has had headaches from them  Have tried rivastigmine  Currently trying to get prescription for galantamine which has been sent to the pharmacy  Patient denies numbness, weakness, visual deficits  Patient has had no falls    Plan:  Patient tolerating sinemet very well and is helping with his tremors; would like to continue same dose currently for the medication  Patient to followup in 4 months    Restless legs syndrome  Patient has been having increased restless legs which patient finds debilitating while sleeping with family confirming increased agitation of his legs bilaterally  However, symptoms can be more REM behavior sleep disorder as patient was also noted talking in his sleep and also kicking in bed  Discussed this with the patient and family as both disorders may be happening  Patient and family would like to gabapentin first to help with RLS  Patient's iron levels are normal and magnesium levels were not significant  Plan:  Trial gabapentin 300mg nightly at bedtime for RLS    REM behavioral disorder  Discussed further in Restless legs syndrome but patient has Restless legs syndrome   Discussed with family about possibly trialing clonazepam to help with the nighttime symptoms  Patient and family at this time would like to hold at this time  Patient and family told they can contact office about any questions and help in the meantime before next followup    Smell disturbance  Patient has sensed phantom smells for over a week  They are transient with no loss of consciousness and no concerning staring spells  No likely temporal lobe seizures and can possibly a sequela of post COVID symptoms  Discussed this with family and patient  Lightheadedness  Patient having more lightheadedness especially when standing up  Blood pressure on this visit noted to be fairly low around 90/60 and 100/60 at different positions  Not likely orthostatic hypotension but the decreased blood pressure overall could be a reason for greater lightheadedness  Advised patient and family to contact primary care doctor to help with control with blood pressure medications in hopes this would help with his lightheadedness symptoms  Diagnoses and all orders for this visit:    Restless legs syndrome (RLS)  -     gabapentin (Neurontin) 300 mg capsule; Take 1 capsule (300 mg total) by mouth daily at bedtime    Lewy body dementia without behavioral disturbance ([place] [address] )  -     Ambulatory Referral to Neurology    Heterozygous for prothrombin 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 ([place] [address] )  -     Ambulatory Referral to Neurology    REM sleep behavior disorder  -     Ambulatory Referral to Neurology    Primary parkinsonism ([place] [address] )  -     carbidopa-levodopa (SINEMET)  mg per tablet; Take 2 tablets by mouth 3 (three) times a day    Restless legs syndrome    REM behavioral disorder    Smell disturbance           Subjective:    Patient is a 74year old man with hx of htn, depression, neuropathy presenting for follow up for parkinsonian tremors and RLD in setting of Lewy Body Dementia    Patient going for pulmonary rehab but has been getting difficulty with SOB but has had COVID pneumonia on 1/24/2022   Also has been having memory problems that has deteriorated since having COVID  Patient is going back to pulmonary rehab to help with his breathing  Patient also noted dizziness which is described as "near passing out" and thus more lightheadedness especially when standing up lately  Patient was more unbalanced and falling out of bed more frequently and also getting more disoriented at night coming out fo the bathroom  Patient feeling more lightheaded when he stands up, No syncopal episodes and no falling  He sways to one side more often than the other  Also notes more agitation of legs when sleeping as well  Tremors are on both hands and to the fingers and right leg but improved with improvement in shakiness but continues to not help with daily activities  These symptoms became much worse with the COVID but has been steadily declining since  Patient states he has been on aricept which was switched to rivastigmine generic for memory problems but has been having diffculty with memory  Patient having chemical phantom smells for a week which he has been for daily  However, no loss of consciousness  Family also noted staring spells but they were easily redirectable  No numbness, no weakness, no new visual deficits, no dysarthria, no dysphagia at this time  To summarize from previous visits,  Patient was last seen by M in 8/26/2020 and was noted that cognition declined with short temper  PET scan showed no decrease in uptake compatible with Alzheimer's , FTD, or LBD  Patient was on sinemet  1 5TID  Patient previous movement specialist soctor was Dr Link Duff and [facility] on 2/2020 was 23/30 tested by [facility]  Patient had worsening right hand tremors mostly at rest or when distracted  Also noted slower but no shuffling gait, rigidicty with movment, No difficulty turning around  Patient has times when he dropped objects  There was also concern for mood problems, with short temper, and frustration   Also increased restless legs in bed  Sinemet dosing was then increased to 2 tablet TID and recommended patient discuss adjustments of mood stabilizers concerning sertraline to help with mood  The following portions of the patient's history were reviewed and updated as appropriate:   He  has a past medical history of Alopecia, Arthritis, Back pain, Benign essential hypertension, Chronic obstructive pulmonary disease (COPD) ([address] ), Depression, Diabetes mellitus ([address] ), Emphysema, interstitial ([address] ), Fatigue, Hypercholesterolemia, Hypersomnia, persistent, Hypothyroidism, Low testosterone, Memory loss, Oral candidiasis (5/11/2020), Sinusitis, and Thyroid goiter    He   Patient Active Problem List    Diagnosis Date Noted    Smell disturbance 05/05/2022    Lightheadedness 05/05/2022    [facility] respiratory failure with hypoxia [facility] ) 01/25/2022    Pneumonia due to COVID-19 virus 01/24/2022    Medicare annual wellness visit, subsequent 11/15/2021    REM behavioral disorder 10/20/2021    Iron deficiency 07/15/2021    Restless legs syndrome 07/15/2021    Constipation, chronic 04/12/2021    [facility] inflammation [facility] ) 04/02/2019    Lewy body dementia without behavioral disturbance ([facility] ) 12/05/2018    Pulmonary nodule 10/03/2018    Balance problem 09/26/2018    Tremor 09/26/2018    Neuropathy 09/26/2018    Memory impairment 08/24/2018    Episodic paroxysmal hemicrania, 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    Abnormality of gait due to impairment of balance 08/24/2018    Former smoker 06/14/2018    Centrilobular emphysema ([address] ) 04/06/2018    DAFNE (obstructive sleep apnea) 04/06/2018    Low testosterone 06/29/2017    Psoriasis 04/13/2016    Goiter, nontoxic simple 06/22/2015    Chronic obstructive pulmonary disease ([facility] ) 06/28/2013    Depression 06/28/2013    Hypercholesterolemia 06/27/2013    Hypertension 06/27/2013    Hypothyroidism 06/27/2013    Diabetes mellitus without complication ([facility] ) 11/23/4530     He  has a past surgical history that includes Appendectomy; Hernia repair; Tonsillectomy; and ADENOIDECTOMY  His family history includes Aneurysm in his father; Hashimoto's thyroiditis in his daughter; Hypertension in his father; Hypothyroidism in his daughter; Lupus in his mother  He  reports that he has quit smoking  His smoking use included cigarettes  He has a 20 00 pack-year smoking history  He has never used smokeless tobacco  He reports that he does not drink alcohol and does not use drugs  Current Outpatient Medications   Medication Sig Dispense Refill    albuterol (PROVENTIL HFA,VENTOLIN HFA) 90 mcg/act inhaler Inhale 2 puffs every 6 (six) hours as needed for wheezing      Bevespi Aerosphere 9-4 8 MCG/ACT inhaler       buPROPion (WELLBUTRIN XL) 300 mg 24 hr tablet TAKE 1 TABLET BY MOUTH EVERY DAY IN THE MORNING 90 tablet 3    carbidopa-levodopa (SINEMET)  mg per tablet Take 2 tablets by mouth 3 (three) times a day 180 tablet 11    escitalopram (LEXAPRO) 20 mg tablet TAKE 2 TABLETS DAILY 180 tablet 1    fexofenadine (ALLEGRA) 180 MG tablet TAKE 1 TABLET BY MOUTH EVERY DAY 30 tablet 11    fluticasone (FLONASE) 50 mcg/act nasal spray 1 spray into each nostril daily      galantamine (RAZADYNE ER) 8 MG 24 hr capsule TAKE 1 CAPSULE BY MOUTH DAILY WITH BREAKFAST   90 capsule 1    glimepiride (AMARYL) 1 mg tablet Take 1 tablet (1 mg total) by mouth daily with breakfast 90 tablet 3    ipratropium (ATROVENT) 0 02 % nebulizer solution Take 2 5 mL (0 5 mg total) by nebulization 4 (four) times a day 62 5 mL 3    levothyroxine 150 mcg tablet Take 150 mcg by mouth daily      losartan-hydrochlorothiazide (HYZAAR) 50-12 5 mg per tablet TAKE 1 TABLET EVERY DAY 90 tablet 3    metFORMIN (GLUCOPHAGE) 500 mg tablet TAKE 1 TABLET (500 MG TOTAL) BY MOUTH 2 (TWO) TIMES A  tablet 3    Naloxegol Oxalate (Movantik) 12 5 MG TABS Take 1 tablet (12 5 mg total) by mouth in the morning Take 1 hour before or 2 hours after breakfast 30 tablet 2    testosterone (ANDROGEL) 1% Place 0 05 g on the skin      True Metrix Blood Glucose Test test strip TESTING TWICE A DAY E 11 9      Umeclidinium-Vilanterol (ANORO ELLIPTA) 62 5-25 MCG/INH AEPB Inhale 1 puff daily 3 each 1    gabapentin (Neurontin) 300 mg capsule Take 1 capsule (300 mg total) by mouth daily at bedtime 30 capsule 4    LORazepam (Ativan) 0 5 mg tablet Take 1 tablet (0 5 mg total) by mouth daily at bedtime 90 tablet 0     No current facility-administered medications for this visit       Current Outpatient Medications on File Prior to Visit   Medication Sig    albuterol (PROVENTIL HFA,VENTOLIN HFA) 90 mcg/act inhaler Inhale 2 puffs every 6 (six) hours as needed for wheezing    Bevespi Aerosphere 9-4 8 MCG/ACT inhaler     escitalopram (LEXAPRO) 20 mg tablet TAKE 2 TABLETS DAILY    fexofenadine (ALLEGRA) 180 MG tablet TAKE 1 TABLET BY MOUTH EVERY DAY    fluticasone (FLONASE) 50 mcg/act nasal spray 1 spray into each nostril daily    glimepiride (AMARYL) 1 mg tablet Take 1 tablet (1 mg total) by mouth daily with breakfast    ipratropium (ATROVENT) 0 02 % nebulizer solution Take 2 5 mL (0 5 mg total) by nebulization 4 (four) times a day    levothyroxine 150 mcg tablet Take 150 mcg by mouth daily    losartan-hydrochlorothiazide (HYZAAR) 50-12 5 mg per tablet TAKE 1 TABLET EVERY DAY    metFORMIN (GLUCOPHAGE) 500 mg tablet TAKE 1 TABLET (500 MG TOTAL) BY MOUTH 2 (TWO) TIMES A DAY    Naloxegol Oxalate (Movantik) 12 5 MG TABS Take 1 tablet (12 5 mg total) by mouth in the morning Take 1 hour before or 2 hours after breakfast    testosterone (ANDROGEL) 1% Place 0 05 g on the skin    True Metrix Blood Glucose Test test strip TESTING TWICE A DAY E 11 9    Umeclidinium-Vilanterol (ANORO ELLIPTA) 62 5-25 MCG/INH AEPB Inhale 1 puff daily    [DISCONTINUED] carbidopa-levodopa (SINEMET)  mg per tablet Take 2 tablets by mouth 3 (three) times a day    LORazepam (Ativan) 0 5 mg tablet Take 1 tablet (0 5 mg total) by mouth daily at bedtime    [DISCONTINUED] buPROPion (WELLBUTRIN XL) 300 mg 24 hr tablet Take 1 tablet (300 mg total) by mouth every morning    [DISCONTINUED] cyclobenzaprine (FLEXERIL) 10 mg tablet TAKE 1 TABLET BY MOUTH EVERYDAY AT BEDTIME    [DISCONTINUED] galantamine (RAZADYNE ER) 8 MG 24 hr capsule Take 1 capsule (8 mg total) by mouth daily with breakfast     No current facility-administered medications on file prior to visit  He is allergic to cimetidine, codeine, contrast  [iodinated diagnostic agents], metrizamide, prednisone, simvastatin, and statins            Objective:    Blood pressure 90/60, pulse 62, height 5' 10" (1 778 m), weight 111 kg (245 lb)  Physical Exam  Eyes:      Extraocular Movements: Extraocular movements intact  Neurological:      Coordination: Coordination is intact  Romberg sign negative  Deep Tendon Reflexes: Strength normal and reflexes are normal and symmetric  Psychiatric:         Speech: Speech normal          Neurological Exam  Mental Status  Awake, alert and oriented to person, place and time  Speech is normal  Language is fluent with no aphasia  Cranial Nerves  CN II: Visual acuity is normal   CN III, IV, VI: Extraocular movements intact bilaterally  CN V: Facial sensation is normal   CN VII: Full and symmetric facial movement  CN XI: Shoulder shrug strength is normal   CN XII: Tongue midline without atrophy or fasciculations  Motor  Normal muscle bulk throughout  No fasciculations present  Normal muscle tone  No abnormal involuntary movements  Strength is 5/5 throughout all four extremities  Pull test negative    No cogwheel rigidity  Sensory  Sensation is intact to light touch, pinprick, vibration and proprioception in all four extremities  Reflexes  Deep tendon reflexes are 2+ and symmetric in all four extremities with downgoing toes bilaterally      Coordination  Finger-to-nose, rapid alternating movements and heel-to-shin normal bilaterally without dysmetria  Gait  Normal casual, toe, heel and tandem gait  Romberg is absent  Left sided asymmetric arm swing  ROS:    Review of Systems    Review of Systems   Constitutional: Negative  Negative for appetite change and fever  HENT: Positive for hearing loss and voice change  Negative for tinnitus and trouble swallowing  Ear pain:   Eyes: Positive for photophobia and visual disturbance  Negative for pain  Respiratory: Positive for shortness of breath  Cardiovascular: Negative  Negative for palpitations  Gastrointestinal: Negative  Negative for nausea and vomiting  Endocrine: Negative  Negative for cold intolerance  Genitourinary: Negative  Negative for dysuria, frequency and urgency  Musculoskeletal: Positive for back pain and gait problem  Negative for myalgias and neck pain  Skin: Negative  Negative for rash  Neurological: Positive for tremors, speech difficulty and weakness  Negative for dizziness, seizures, syncope, facial asymmetry, light-headedness, numbness and headaches  Hematological: Negative  Does not bruise/bleed easily  Psychiatric/Behavioral: Negative  Negative for confusion, hallucinations and sleep disturbance

## 2022-05-05 NOTE — PROGRESS NOTES
Review of Systems   Constitutional: Negative  Negative for appetite change and fever  HENT: Positive for hearing loss and voice change  Negative for tinnitus and trouble swallowing  Ear pain:                     Eyes: Positive for photophobia and visual disturbance  Negative for pain  Respiratory: Positive for shortness of breath  Cardiovascular: Negative  Negative for palpitations  Gastrointestinal: Negative  Negative for nausea and vomiting  Endocrine: Negative  Negative for cold intolerance  Genitourinary: Negative  Negative for dysuria, frequency and urgency  Musculoskeletal: Positive for back pain and gait problem  Negative for myalgias and neck pain  Skin: Negative  Negative for rash  Neurological: Positive for tremors, speech difficulty and weakness  Negative for dizziness, seizures, syncope, facial asymmetry, light-headedness, numbness and headaches  Hematological: Negative  Does not bruise/bleed easily  Psychiatric/Behavioral: Negative  Negative for confusion, hallucinations and sleep disturbance

## 2022-05-05 NOTE — ASSESSMENT & PLAN NOTE
Patient is a 74year old man with hx of htn, depression, neuropathy presenting for followup  Overall tremors have been improved while on increased sinemet 25/100 2 tabs TID although noted them to be on both hands, the fingers, and right leg  Tremors does not seem to affect his daily activities significantly currently  Imbalance has been stable until COVID pneumonia in 1/2022 and has not reached back to previous baseline  Patient having difficulties with memory problems which sharply declined after COVID and also has been having problems after COVID  Patient trying different medications from memory  Patient has been on Donepezil but has had headaches from them  Have tried rivastigmine  Currently trying to get prescription for galantamine which has been sent to the pharmacy  Patient denies numbness, weakness, visual deficits   Patient has had no falls    Plan:  Patient tolerating sinemet very well and is helping with his tremors; would like to continue same dose currently for the medication  Patient to followup in 4 months

## 2022-05-05 NOTE — TELEPHONE ENCOUNTER
Daughter said Ricky Alvarez prescribed flexeril     & she read [employer] [facility] that there is a contra indication w/this disease and these pt's should not take this med    so is asking Ricky Alvarez to prescribe another muscle relaxer

## 2022-05-05 NOTE — ASSESSMENT & PLAN NOTE
Patient has sensed phantom smells for over a week  They are transient with no loss of consciousness and no concerning staring spells  No likely temporal lobe seizures and can possibly a sequela of post COVID symptoms  Discussed this with family and patient

## 2022-05-05 NOTE — ASSESSMENT & PLAN NOTE
Patient having more lightheadedness especially when standing up  Blood pressure on this visit noted to be fairly low around 90/60 and 100/60 at different positions  Not likely orthostatic hypotension but the decreased blood pressure overall could be a reason for greater lightheadedness  Advised patient and family to contact primary care doctor to help with control with blood pressure medications in hopes this would help with his lightheadedness symptoms

## 2022-05-05 NOTE — ASSESSMENT & PLAN NOTE
Discussed further in Restless legs syndrome but patient has Restless legs syndrome  Discussed with family about possibly trialing clonazepam to help with the nighttime symptoms  Patient and family at this time would like to hold at this time   Patient and family told they can contact office about any questions and help in the meantime before next followup

## 2022-05-05 NOTE — PATIENT INSTRUCTIONS
Continue carbidopa-levodopa 2 tabs three times a day for Parkinson's    Take galantamine as instructed by your movement specialist    Take gabapentin 300mg caps nightly for your restless legs    Talk to your primary care doctor to help with his blood pressure as it might be causing his dizziness/lightheadedness    Phantom smells most likely seems like an aftereffect from the COVID infection and not likely seizures    Also can call the office for any questions at [phone]    Followup after 4 months

## 2022-05-09 ENCOUNTER — TELEPHONE (OUTPATIENT)
Dept: FAMILY MEDICINE CLINIC | Facility: CLINIC | Age: 67
End: 2022-05-09

## 2022-05-09 DIAGNOSIS — J42 CHRONIC BRONCHITIS, UNSPECIFIED CHRONIC BRONCHITIS TYPE (HCC): Primary | ICD-10-CM

## 2022-06-01 DIAGNOSIS — J44.9 CHRONIC OBSTRUCTIVE PULMONARY DISEASE, UNSPECIFIED COPD TYPE (HCC): ICD-10-CM

## 2022-06-01 RX ORDER — UMECLIDINIUM BROMIDE AND VILANTEROL TRIFENATATE 62.5; 25 UG/1; UG/1
1 POWDER RESPIRATORY (INHALATION) DAILY
Qty: 60 BLISTER | Refills: 3 | Status: SHIPPED | OUTPATIENT
Start: 2022-06-01 | End: 2022-08-30

## 2022-06-02 ENCOUNTER — TELEPHONE (OUTPATIENT)
Dept: FAMILY MEDICINE CLINIC | Facility: CLINIC | Age: 67
End: 2022-06-02

## 2022-06-02 DIAGNOSIS — K59.09 CONSTIPATION, CHRONIC: ICD-10-CM

## 2022-06-02 RX ORDER — NALOXEGOL OXALATE 12.5 MG/1
12.5 TABLET, FILM COATED ORAL DAILY
Qty: 30 TABLET | Refills: 2 | Status: CANCELLED | OUTPATIENT
Start: 2022-06-02 | End: 2022-08-31

## 2022-06-02 RX ORDER — NALOXEGOL OXALATE 12.5 MG/1
12.5 TABLET, FILM COATED ORAL DAILY
Qty: 180 TABLET | Refills: 2 | Status: SHIPPED | OUTPATIENT
Start: 2022-06-02 | End: 2022-08-31

## 2022-06-03 ENCOUNTER — OFFICE VISIT (OUTPATIENT)
Dept: FAMILY MEDICINE CLINIC | Facility: CLINIC | Age: 67
End: 2022-06-03
Payer: MEDICARE

## 2022-06-03 VITALS
OXYGEN SATURATION: 98 % | HEART RATE: 64 BPM | BODY MASS INDEX: 34.56 KG/M2 | SYSTOLIC BLOOD PRESSURE: 110 MMHG | DIASTOLIC BLOOD PRESSURE: 74 MMHG | HEIGHT: 70 IN | WEIGHT: 241.4 LBS | TEMPERATURE: 98 F

## 2022-06-03 DIAGNOSIS — J43.2 CENTRILOBULAR EMPHYSEMA (HCC): ICD-10-CM

## 2022-06-03 DIAGNOSIS — E11.9 DIABETES MELLITUS WITHOUT COMPLICATION (HCC): ICD-10-CM

## 2022-06-03 DIAGNOSIS — E03.9 HYPOTHYROIDISM, UNSPECIFIED TYPE: ICD-10-CM

## 2022-06-03 DIAGNOSIS — K59.09 CONSTIPATION, CHRONIC: Primary | ICD-10-CM

## 2022-06-03 DIAGNOSIS — J96.01 ACUTE RESPIRATORY FAILURE WITH HYPOXIA (HCC): ICD-10-CM

## 2022-06-03 DIAGNOSIS — G47.33 OSA (OBSTRUCTIVE SLEEP APNEA): ICD-10-CM

## 2022-06-03 DIAGNOSIS — G31.83 LEWY BODY DEMENTIA WITHOUT BEHAVIORAL DISTURBANCE (HCC): ICD-10-CM

## 2022-06-03 DIAGNOSIS — R43.8 BAD ORAL TASTE: ICD-10-CM

## 2022-06-03 DIAGNOSIS — T78.40XA ALLERGY, INITIAL ENCOUNTER: ICD-10-CM

## 2022-06-03 DIAGNOSIS — F02.80 LEWY BODY DEMENTIA WITHOUT BEHAVIORAL DISTURBANCE (HCC): ICD-10-CM

## 2022-06-03 DIAGNOSIS — F33.1 MODERATE EPISODE OF RECURRENT MAJOR DEPRESSIVE DISORDER (HCC): ICD-10-CM

## 2022-06-03 DIAGNOSIS — G47.52 REM BEHAVIORAL DISORDER: ICD-10-CM

## 2022-06-03 DIAGNOSIS — G62.9 NEUROPATHY: ICD-10-CM

## 2022-06-03 DIAGNOSIS — R26.89 BALANCE PROBLEM: ICD-10-CM

## 2022-06-03 DIAGNOSIS — E78.00 HYPERCHOLESTEROLEMIA: ICD-10-CM

## 2022-06-03 DIAGNOSIS — J30.1 SEASONAL ALLERGIC RHINITIS DUE TO POLLEN: ICD-10-CM

## 2022-06-03 DIAGNOSIS — I10 PRIMARY HYPERTENSION: ICD-10-CM

## 2022-06-03 DIAGNOSIS — R26.89 ABNORMALITY OF GAIT DUE TO IMPAIRMENT OF BALANCE: ICD-10-CM

## 2022-06-03 DIAGNOSIS — J42 CHRONIC BRONCHITIS, UNSPECIFIED CHRONIC BRONCHITIS TYPE (HCC): ICD-10-CM

## 2022-06-03 PROBLEM — R43.9 SMELL DISTURBANCE: Status: RESOLVED | Noted: 2022-05-05 | Resolved: 2022-06-03

## 2022-06-03 PROBLEM — U07.1 PNEUMONIA DUE TO COVID-19 VIRUS: Status: RESOLVED | Noted: 2022-01-24 | Resolved: 2022-06-03

## 2022-06-03 PROBLEM — R42 LIGHTHEADEDNESS: Status: RESOLVED | Noted: 2022-05-05 | Resolved: 2022-06-03

## 2022-06-03 PROBLEM — R25.1 TREMOR: Status: RESOLVED | Noted: 2018-09-26 | Resolved: 2022-06-03

## 2022-06-03 PROBLEM — J12.82 PNEUMONIA DUE TO COVID-19 VIRUS: Status: RESOLVED | Noted: 2022-01-24 | Resolved: 2022-06-03

## 2022-06-03 PROBLEM — Z00.00 MEDICARE ANNUAL WELLNESS VISIT, SUBSEQUENT: Status: RESOLVED | Noted: 2021-11-15 | Resolved: 2022-06-03

## 2022-06-03 PROBLEM — E61.1 IRON DEFICIENCY: Status: RESOLVED | Noted: 2021-07-15 | Resolved: 2022-06-03

## 2022-06-03 PROBLEM — M46.1 SACROILIAC INFLAMMATION (HCC): Status: RESOLVED | Noted: 2019-04-02 | Resolved: 2022-06-03

## 2022-06-03 PROBLEM — R43.9 DISTURBANCE OF SMELL: Status: ACTIVE | Noted: 2022-06-03

## 2022-06-03 PROCEDURE — 99214 OFFICE O/P EST MOD 30 MIN: CPT | Performed by: NURSE PRACTITIONER

## 2022-06-03 RX ORDER — LOSARTAN POTASSIUM 50 MG/1
50 TABLET ORAL DAILY
Qty: 90 TABLET | Refills: 3 | Status: SHIPPED | OUTPATIENT
Start: 2022-06-03

## 2022-06-03 RX ORDER — FEXOFENADINE HCL 180 MG/1
TABLET ORAL
Qty: 90 TABLET | Refills: 2 | Status: SHIPPED | OUTPATIENT
Start: 2022-06-03

## 2022-06-03 RX ORDER — FEXOFENADINE HCL 180 MG/1
180 TABLET ORAL DAILY
Qty: 90 TABLET | Refills: 2 | Status: SHIPPED | OUTPATIENT
Start: 2022-06-03 | End: 2022-06-03

## 2022-06-03 NOTE — ASSESSMENT & PLAN NOTE
Lab Results   Component Value Date    HGBA1C 7 9 (H) 03/01/2022   Patient recent [address] taking the Metformin bid glimepiride bid

## 2022-06-03 NOTE — PROGRESS NOTES
Assessment/Plan:           Problem List Items Addressed This Visit        Digestive    Constipation, chronic - Primary     Patient is taking the Movantik for his constipation daily and is working well for him               Endocrine    Diabetes mellitus without complication ([facility] [address] )       Lab Results   Component Value Date    HGBA1C 7 9 (H) 03/01/2022   Patient recent [address] taking the Metformin bid glimepiride bid           Relevant Orders    CBC and differential    Comprehensive metabolic panel    Hypothyroidism     Levothyroxine 150 mcg daily will update the labs            Relevant Orders    TSH, 3rd generation with Free T4 reflex       Respiratory    Centrilobular emphysema ([facility] [address] )     Patient not on any oxygen at this point and is using the atrovent and anoro            Relevant Medications    fexofenadine (Allegra Allergy) 180 MG tablet    DAFNE (obstructive sleep apnea)     Patient is using his CPAP at night            RESOLVED: Chronic obstructive pulmonary disease (HCC)    Relevant Medications    fexofenadine (Allegra Allergy) 180 MG tablet    RESOLVED: Acute respiratory failure with hypoxia ([facility] [address] )       Cardiovascular and Mediastinum    Hypertension     Patient will be on the Losartan 50 mg daily and will stop the HCTZ            Relevant Medications    losartan (COZAAR) 50 mg tablet    Other Relevant Orders    CBC and differential    Comprehensive metabolic panel       Nervous and Auditory    Neuropathy    Lewy body dementia without behavioral disturbance (HCC)     Taking the Sinemet 2 tablets 3 times a day and Razadyne 8 mg daily has a f/u with neurology               Other    Hypercholesterolemia     Will update his labs           Relevant Orders    Lipid Panel with Direct LDL reflex    Abnormality of gait due to impairment of balance    Balance problem     Patient is doing well with his exercises and keeping active and attending his therapy            REM behavioral disorder     Patient is sleeping and is very pleased with this            Moderate episode of recurrent major depressive disorder ([address] )    Bad oral taste    Relevant Orders    Ambulatory Referral to Otolaryngology      Other Visit Diagnoses     Allergy, initial encounter        Seasonal allergic rhinitis due to pollen        Relevant Medications    fexofenadine (Allegra Allergy) 180 MG tablet            Subjective:      Patient ID: Walter Angel is a 74 y o  male  Patient here today for his check up patient reports that he is not having any acute issues  Patient reports that he has bad dry mouth and at times he has no dry mouth has a change in his taste and smell  The following portions of the patient's history were reviewed and updated as appropriate:   He  has a past medical history of Alopecia, Arthritis, Back pain, Benign essential hypertension, Chronic obstructive pulmonary disease (COPD) ([address] ), Depression, Diabetes mellitus ([address] ), Emphysema, interstitial ([address] ), Fatigue, Hypercholesterolemia, Hypersomnia, persistent, Hypothyroidism, Low testosterone, Memory loss, Oral candidiasis (5/11/2020), Sinusitis, and Thyroid goiter    He   Patient Active Problem List    Diagnosis Date Noted    Moderate episode of recurrent major depressive disorder ([address] ) 06/03/2022    Bad oral taste 06/03/2022    Disturbance of smell 06/03/2022    REM behavioral disorder 10/20/2021    Restless legs syndrome 07/15/2021    Constipation, chronic 04/12/2021    Lewy body dementia without behavioral disturbance ([address] ) 12/05/2018    Pulmonary nodule 10/03/2018    Balance problem 09/26/2018    Neuropathy 09/26/2018    Memory impairment 08/24/2018    Episodic paroxysmal hemicrania, 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    Abnormality of gait due to impairment of balance 08/24/2018    Former smoker 06/14/2018    Centrilobular emphysema ([address] ) 04/06/2018    DAFNE (obstructive sleep apnea) 04/06/2018    Low testosterone 06/29/2017    Psoriasis 04/13/2016    Goiter, nontoxic simple 06/22/2015    Depression 06/28/2013    Hypercholesterolemia 06/27/2013    Hypertension 06/27/2013    Hypothyroidism 06/27/2013    Diabetes mellitus without complication ([facility] ) 58/56/2479     He  has a past surgical history that includes Appendectomy; Hernia repair; Tonsillectomy; and ADENOIDECTOMY  His family history includes Aneurysm in his father; Hashimoto's thyroiditis in his daughter; Hypertension in his father; Hypothyroidism in his daughter; Lupus in his mother  He  reports that he has quit smoking  His smoking use included cigarettes  He has a 20 00 pack-year smoking history  He has never used smokeless tobacco  He reports that he does not drink alcohol and does not use drugs  Current Outpatient Medications   Medication Sig Dispense Refill    albuterol (PROVENTIL HFA,VENTOLIN HFA) 90 mcg/act inhaler Inhale 2 puffs every 6 (six) hours as needed for wheezing      buPROPion (WELLBUTRIN XL) 300 mg 24 hr tablet TAKE 1 TABLET BY MOUTH EVERY DAY IN THE MORNING 90 tablet 3    carbidopa-levodopa (SINEMET)  mg per tablet Take 2 tablets by mouth 3 (three) times a day 180 tablet 11    escitalopram (LEXAPRO) 20 mg tablet TAKE 2 TABLETS DAILY 180 tablet 1    fexofenadine (Allegra Allergy) 180 MG tablet Take 1 tablet (180 mg total) by mouth daily 90 tablet 2    fexofenadine (ALLEGRA) 180 MG tablet TAKE 1 TABLET BY MOUTH EVERY DAY 30 tablet 11    fluticasone (FLONASE) 50 mcg/act nasal spray 1 spray into each nostril daily      gabapentin (Neurontin) 300 mg capsule Take 1 capsule (300 mg total) by mouth daily at bedtime 30 capsule 4    galantamine (RAZADYNE ER) 8 MG 24 hr capsule TAKE 1 CAPSULE BY MOUTH DAILY WITH BREAKFAST   90 capsule 1    glimepiride (AMARYL) 1 mg tablet Take 1 tablet (1 mg total) by mouth daily with breakfast 90 tablet 3    ipratropium (ATROVENT) 0 02 % nebulizer solution Take 2 5 mL (0 5 mg total) by nebulization 4 (four) times a day 62 5 mL 3    levothyroxine 150 mcg tablet Take 150 mcg by mouth daily      LORazepam (Ativan) 0 5 mg tablet Take 1 tablet (0 5 mg total) by mouth daily at bedtime 90 tablet 0    losartan (COZAAR) 50 mg tablet Take 1 tablet (50 mg total) by mouth daily 90 tablet 3    metFORMIN (GLUCOPHAGE) 500 mg tablet TAKE 1 TABLET (500 MG TOTAL) BY MOUTH 2 (TWO) TIMES A  tablet 3    Naloxegol Oxalate (Movantik) 12 5 MG TABS Take 1 tablet (12 5 mg total) by mouth in the morning Take 1 hour before or 2 hours after breakfast 180 tablet 2    testosterone (ANDROGEL) 1% Place 0 05 g on the skin      True Metrix Blood Glucose Test test strip TESTING TWICE A DAY E 11 9      umeclidinium-vilanterol (Anoro Ellipta) 62 5-25 MCG/INH inhaler Inhale 1 puff daily 60 blister 3    Bevespi Aerosphere 9-4 8 MCG/ACT inhaler  (Patient not taking: Reported on 6/3/2022)       No current facility-administered medications for this visit  He is allergic to cimetidine, codeine, contrast  [iodinated diagnostic agents], metrizamide, prednisone, simvastatin, and statins       Review of Systems   Constitutional: Positive for appetite change  Negative for activity change, chills, diaphoresis, fatigue, fever and unexpected weight change  Related to change in his taste    HENT: Negative for congestion, ear pain, hearing loss, postnasal drip, rhinorrhea, sinus pressure, sinus pain, sneezing, sore throat, tinnitus, trouble swallowing and voice change  Eyes: Negative for pain, redness and visual disturbance  Respiratory: Negative for cough and shortness of breath  Cardiovascular: Negative for chest pain and leg swelling  Gastrointestinal: Negative for abdominal pain, diarrhea, nausea and vomiting  Endocrine: Negative for cold intolerance, heat intolerance, polydipsia, polyphagia and polyuria  Genitourinary: Negative  Musculoskeletal: Positive for arthralgias  Skin: Negative  Allergic/Immunologic: Negative      Neurological: Positive for tremors and weakness  Negative for dizziness, seizures, syncope, facial asymmetry, speech difficulty, light-headedness, numbness and headaches  Hematological: Negative for adenopathy  Does not bruise/bleed easily  Psychiatric/Behavioral: Negative for agitation, behavioral problems, confusion, decreased concentration, dysphoric mood and self-injury  Objective:      /74 (BP Location: Left arm, Patient Position: Sitting)   Pulse 64   Temp 98 °F (36 7 °C) (Temporal)   Ht 5' 10" (1 778 m)   Wt 109 kg (241 lb 6 4 oz)   SpO2 98%   BMI 34 64 kg/m²          Physical Exam  Vitals and nursing note reviewed  Constitutional:       General: He is not in acute distress  Appearance: He is well-developed  HENT:      Head: Normocephalic and atraumatic  Right Ear: Tympanic membrane normal       Left Ear: Tympanic membrane normal       Ears:      Comments: Wearing b/l hearing aides      Nose: Nose normal       Mouth/Throat:      Mouth: Mucous membranes are moist    Eyes:      Pupils: Pupils are equal, round, and reactive to light  Neck:      Thyroid: No thyromegaly  Cardiovascular:      Rate and Rhythm: Normal rate and regular rhythm  Pulses: no weak pulses          Dorsalis pedis pulses are 2+ on the right side and 2+ on the left side  Posterior tibial pulses are 2+ on the right side and 2+ on the left side  Heart sounds: Normal heart sounds  No murmur heard  Pulmonary:      [place]: Pulmonary effort is normal  No respiratory distress  Breath sounds: Normal breath sounds  No wheezing  Abdominal:      General: Bowel sounds are normal       Palpations: Abdomen is soft  Musculoskeletal:         General: Normal range of motion  Cervical back: Normal range of motion  Feet:      Right foot:      Skin integrity: No ulcer, skin breakdown, erythema, warmth, callus or dry skin  Left foot:      Skin integrity: No ulcer, skin breakdown, erythema, warmth, callus or dry skin  Skin:     General: Skin is warm and dry  Neurological:      General: No focal deficit present  Mental Status: He is alert and oriented to person, place, and time  GCS: GCS eye subscore is 4  GCS verbal subscore is 5  GCS motor subscore is 6  Cranial Nerves: Cranial nerves are intact  Sensory: Sensation is intact  Motor: Tremor present  Psychiatric:         Mood and Affect: Mood normal          Behavior: Behavior normal          Thought Content: Thought content normal          Judgment: Judgment normal          Patient's shoes and socks removed  Right Foot/Ankle   Right Foot Inspection  Skin Exam: skin normal and skin intact  No dry skin, no warmth, no callus, no erythema, no maceration, no abnormal color, no pre-ulcer, no ulcer and no callus  Toe Exam: ROM and strength within normal limits  Sensory   Vibration: intact  Proprioception: intact  Monofilament testing: intact    Vascular  Capillary refills: < 3 seconds  The right DP pulse is 2+  The right PT pulse is 2+  Left Foot/Ankle  Left Foot Inspection  Skin Exam: skin normal and skin intact  No dry skin, no warmth, no erythema, no maceration, normal color, no pre-ulcer, no ulcer and no callus  Toe Exam: ROM and strength within normal limits  Sensory   Vibration: intact  Proprioception: intact  Monofilament testing: intact    Vascular  Capillary refills: < 3 seconds  The left DP pulse is 2+  The left PT pulse is 2+       Assign Risk Category  No deformity present  No loss of protective sensation  No weak pulses  Risk: 0

## 2022-06-08 ENCOUNTER — APPOINTMENT (OUTPATIENT)
Dept: LAB | Facility: CLINIC | Age: 67
End: 2022-06-08
Payer: MEDICARE

## 2022-06-08 DIAGNOSIS — I10 PRIMARY HYPERTENSION: ICD-10-CM

## 2022-06-08 DIAGNOSIS — E11.9 DIABETES MELLITUS WITHOUT COMPLICATION (HCC): ICD-10-CM

## 2022-06-08 DIAGNOSIS — E78.00 HYPERCHOLESTEROLEMIA: ICD-10-CM

## 2022-06-08 DIAGNOSIS — E78.2 MIXED HYPERLIPIDEMIA: ICD-10-CM

## 2022-06-08 DIAGNOSIS — E03.9 ACQUIRED HYPOTHYROIDISM: ICD-10-CM

## 2022-06-08 DIAGNOSIS — E03.9 HYPOTHYROIDISM, UNSPECIFIED TYPE: ICD-10-CM

## 2022-06-08 LAB
ALBUMIN SERPL BCP-MCNC: 3.8 G/DL (ref 3.5–5)
ALP SERPL-CCNC: 72 U/L (ref 46–116)
ALT SERPL W P-5'-P-CCNC: 28 U/L (ref 12–78)
ANION GAP SERPL CALCULATED.3IONS-SCNC: 9 MMOL/L (ref 4–13)
AST SERPL W P-5'-P-CCNC: 32 U/L (ref 5–45)
BASOPHILS # BLD AUTO: 0.05 THOUSANDS/ΜL (ref 0–0.1)
BASOPHILS NFR BLD AUTO: 1 % (ref 0–1)
BILIRUB SERPL-MCNC: 0.54 MG/DL (ref 0.2–1)
BUN SERPL-MCNC: 12 MG/DL (ref 5–25)
CALCIUM SERPL-MCNC: 10.1 MG/DL (ref 8.3–10.1)
CHLORIDE SERPL-SCNC: 103 MMOL/L (ref 100–108)
CHOLEST SERPL-MCNC: 157 MG/DL
CO2 SERPL-SCNC: 29 MMOL/L (ref 21–32)
CREAT SERPL-MCNC: 1.2 MG/DL (ref 0.6–1.3)
EOSINOPHIL # BLD AUTO: 0.2 THOUSAND/ΜL (ref 0–0.61)
EOSINOPHIL NFR BLD AUTO: 4 % (ref 0–6)
ERYTHROCYTE [DISTWIDTH] IN BLOOD BY AUTOMATED COUNT: 13.2 % (ref 11.6–15.1)
GFR SERPL CREATININE-BSD FRML MDRD: 63 ML/MIN/1.73SQ M
GLUCOSE P FAST SERPL-MCNC: 122 MG/DL (ref 65–99)
HCT VFR BLD AUTO: 44.7 % (ref 36.5–49.3)
HDLC SERPL-MCNC: 36 MG/DL
HGB BLD-MCNC: 14.3 G/DL (ref 12–17)
IMM GRANULOCYTES # BLD AUTO: 0.01 THOUSAND/UL (ref 0–0.2)
IMM GRANULOCYTES NFR BLD AUTO: 0 % (ref 0–2)
LDLC SERPL CALC-MCNC: 78 MG/DL (ref 0–100)
LYMPHOCYTES # BLD AUTO: 1.85 THOUSANDS/ΜL (ref 0.6–4.47)
LYMPHOCYTES NFR BLD AUTO: 34 % (ref 14–44)
MCH RBC QN AUTO: 29.9 PG (ref 26.8–34.3)
MCHC RBC AUTO-ENTMCNC: 32 G/DL (ref 31.4–37.4)
MCV RBC AUTO: 93 FL (ref 82–98)
MONOCYTES # BLD AUTO: 0.62 THOUSAND/ΜL (ref 0.17–1.22)
MONOCYTES NFR BLD AUTO: 11 % (ref 4–12)
NEUTROPHILS # BLD AUTO: 2.8 THOUSANDS/ΜL (ref 1.85–7.62)
NEUTS SEG NFR BLD AUTO: 50 % (ref 43–75)
NONHDLC SERPL-MCNC: 121 MG/DL
NRBC BLD AUTO-RTO: 0 /100 WBCS
PLATELET # BLD AUTO: 266 THOUSANDS/UL (ref 149–390)
PMV BLD AUTO: 9.4 FL (ref 8.9–12.7)
POTASSIUM SERPL-SCNC: 4 MMOL/L (ref 3.5–5.3)
PROT SERPL-MCNC: 7.4 G/DL (ref 6.4–8.2)
RBC # BLD AUTO: 4.79 MILLION/UL (ref 3.88–5.62)
SODIUM SERPL-SCNC: 141 MMOL/L (ref 136–145)
T4 FREE SERPL-MCNC: 1.09 NG/DL (ref 0.76–1.46)
TRIGL SERPL-MCNC: 215 MG/DL
TSH SERPL DL<=0.05 MIU/L-ACNC: <0.007 UIU/ML (ref 0.45–4.5)
WBC # BLD AUTO: 5.53 THOUSAND/UL (ref 4.31–10.16)

## 2022-06-08 PROCEDURE — 83036 HEMOGLOBIN GLYCOSYLATED A1C: CPT

## 2022-06-08 PROCEDURE — 84402 ASSAY OF FREE TESTOSTERONE: CPT

## 2022-06-08 PROCEDURE — 84439 ASSAY OF FREE THYROXINE: CPT

## 2022-06-08 PROCEDURE — 84403 ASSAY OF TOTAL TESTOSTERONE: CPT

## 2022-06-08 PROCEDURE — 80061 LIPID PANEL: CPT

## 2022-06-08 PROCEDURE — 85025 COMPLETE CBC W/AUTO DIFF WBC: CPT

## 2022-06-08 PROCEDURE — 80053 COMPREHEN METABOLIC PANEL: CPT

## 2022-06-08 PROCEDURE — 84443 ASSAY THYROID STIM HORMONE: CPT

## 2022-06-08 PROCEDURE — 36415 COLL VENOUS BLD VENIPUNCTURE: CPT

## 2022-06-09 LAB
EST. AVERAGE GLUCOSE BLD GHB EST-MCNC: 143 MG/DL
HBA1C MFR BLD: 6.6 %
TESTOST FREE SERPL-MCNC: 14.7 PG/ML (ref 6.6–18.1)
TESTOST SERPL-MCNC: 363 NG/DL (ref 264–916)

## 2022-06-12 DIAGNOSIS — E03.9 HYPOTHYROIDISM, UNSPECIFIED TYPE: Primary | ICD-10-CM

## 2022-06-12 RX ORDER — LEVOTHYROXINE SODIUM 137 UG/1
137 TABLET ORAL DAILY
Qty: 90 TABLET | Refills: 2 | Status: SHIPPED | OUTPATIENT
Start: 2022-06-12 | End: 2022-09-10

## 2022-06-30 ENCOUNTER — TELEPHONE (OUTPATIENT)
Dept: NEUROLOGY | Facility: CLINIC | Age: 67
End: 2022-06-30

## 2022-07-27 NOTE — ASSESSMENT & PLAN NOTE
[employer] performed today . Stable . Patient understands condition, prognosis and need for follow up care. Patient following with neurology and has difficulty with following with neurology and movement disorders

## 2022-07-31 DIAGNOSIS — F33.1 MODERATE EPISODE OF RECURRENT MAJOR DEPRESSIVE DISORDER (HCC): ICD-10-CM

## 2022-08-01 RX ORDER — ESCITALOPRAM OXALATE 20 MG/1
TABLET ORAL
Qty: 180 TABLET | Refills: 2 | Status: SHIPPED | OUTPATIENT
Start: 2022-08-01

## 2022-08-13 DIAGNOSIS — J44.9 CHRONIC OBSTRUCTIVE PULMONARY DISEASE, UNSPECIFIED COPD TYPE (HCC): ICD-10-CM

## 2022-08-13 DIAGNOSIS — I10 PRIMARY HYPERTENSION: ICD-10-CM

## 2022-08-15 ENCOUNTER — TELEPHONE (OUTPATIENT)
Dept: NEUROLOGY | Facility: CLINIC | Age: 67
End: 2022-08-15

## 2022-08-15 DIAGNOSIS — I10 PRIMARY HYPERTENSION: ICD-10-CM

## 2022-08-15 DIAGNOSIS — G31.83 LEWY BODY DEMENTIA WITHOUT BEHAVIORAL DISTURBANCE (HCC): Primary | ICD-10-CM

## 2022-08-15 DIAGNOSIS — F02.80 LEWY BODY DEMENTIA WITHOUT BEHAVIORAL DISTURBANCE (HCC): Primary | ICD-10-CM

## 2022-08-15 RX ORDER — RIVASTIGMINE TARTRATE 1.5 MG/1
1.5 CAPSULE ORAL 2 TIMES DAILY
Qty: 90 CAPSULE | Refills: 3 | Status: SHIPPED | OUTPATIENT
Start: 2022-08-15 | End: 2022-09-01 | Stop reason: ALTCHOICE

## 2022-08-15 RX ORDER — LOSARTAN POTASSIUM 50 MG/1
50 TABLET ORAL DAILY
Qty: 90 TABLET | Refills: 0 | Status: SHIPPED | OUTPATIENT
Start: 2022-08-15

## 2022-08-15 RX ORDER — LOSARTAN POTASSIUM 50 MG/1
50 TABLET ORAL DAILY
Qty: 90 TABLET | Refills: 0 | Status: SHIPPED | OUTPATIENT
Start: 2022-08-15 | End: 2022-08-15 | Stop reason: SDUPTHER

## 2022-08-15 NOTE — TELEPHONE ENCOUNTER
Task recd from Dr Jaylin Ware:    Just sent script for rivastigmine 1 5mg BID which was the dosing Dr Jacob Barber previously recommended before being denied for galantamine    Patient's daughter aware  Please provide clarification if galantamine should be weaned or just stopped  She said patient was on 8 mg daily for approximately the past 4 months  She also said it was effective for only the first few days and thereafter did not relieve  symptoms so she is receptive for patient switching to rivastigmine  Please provide input if weaning required or patient can just switch medications    Thank you

## 2022-08-15 NOTE — TELEPHONE ENCOUNTER
Received call requesting call back [phone]  Patient of Dr Mireya Melgoza last visit 5/ 5, history of Lewy body dementia    Returned call; patient's daughter Jhonny Rivera advising insurance will not approve galantamine; only provided temporary supply as  noted by letter she attached from [employer]  alternatives on the insurance letter include donepezil( however she said the patient could not tolerate due to side effect of headaches)  Other alternative includes rivastigmine  Daughter said patient  receptive to trying rivastigmine as galantamine not covered and not effective in relieving symptoms anyway      Please provide recommendations thank you

## 2022-08-15 NOTE — TELEPHONE ENCOUNTER
From Dr Lacy Hough; Thank you for letting me know about this  I would recommend cutting by half to 4mg daily for a week before stopping galantamine altogether       Patient's family, Aminata Messer, aware

## 2022-08-15 NOTE — TELEPHONE ENCOUNTER
Please see full communication thread thank you    ----- Message from Jono Malone RN sent at 8/12/2022  2:39 PM EDT -----  Regarding: FW: Medication change    ----- Message -----  From: Roque Hunt  Sent: 8/12/2022  12:15 PM EDT  To: Neurology [place] Clinical Team 4  Subject: Medication change                                Carroll Esposito has been on Galantamine cap 8 mg ER  with not much improvement  Carroll Esposito now has different coverageSuzie SULLIVAN send a letter this company will cover Rivastigmine CAPS  This is the medicine that the Doctor originally wanted Carroll Esposito to be on     Can you please call in the Rivastigmine to [facility] in [place]   Thank you (Alba Arora

## 2022-08-29 ENCOUNTER — TELEPHONE (OUTPATIENT)
Dept: NEUROLOGY | Facility: CLINIC | Age: 67
End: 2022-08-29

## 2022-08-29 NOTE — TELEPHONE ENCOUNTER
THE [facility] to confirm patient's 9/1/2022 @ 2:30 pm appointment with Dr Yoselyn Infante at the [facility]  Call back number given [phone]

## 2022-09-01 ENCOUNTER — OFFICE VISIT (OUTPATIENT)
Dept: NEUROLOGY | Facility: CLINIC | Age: 67
End: 2022-09-01
Payer: MEDICARE

## 2022-09-01 VITALS
BODY MASS INDEX: 33.43 KG/M2 | HEART RATE: 59 BPM | WEIGHT: 233 LBS | SYSTOLIC BLOOD PRESSURE: 110 MMHG | DIASTOLIC BLOOD PRESSURE: 60 MMHG

## 2022-09-01 DIAGNOSIS — G20 PRIMARY PARKINSONISM (HCC): ICD-10-CM

## 2022-09-01 DIAGNOSIS — G25.81 RESTLESS LEGS SYNDROME (RLS): ICD-10-CM

## 2022-09-01 DIAGNOSIS — F02.80 LEWY BODY DEMENTIA WITHOUT BEHAVIORAL DISTURBANCE (HCC): Primary | ICD-10-CM

## 2022-09-01 DIAGNOSIS — G31.83 LEWY BODY DEMENTIA WITHOUT BEHAVIORAL DISTURBANCE (HCC): Primary | ICD-10-CM

## 2022-09-01 PROCEDURE — 99213 OFFICE O/P EST LOW 20 MIN: CPT | Performed by: STUDENT IN AN ORGANIZED HEALTH CARE EDUCATION/TRAINING PROGRAM

## 2022-09-01 RX ORDER — GABAPENTIN 300 MG/1
300 CAPSULE ORAL
Qty: 90 CAPSULE | Refills: 2 | Status: SHIPPED | OUTPATIENT
Start: 2022-09-01

## 2022-09-01 RX ORDER — GALANTAMINE HYDROBROMIDE 8 MG/1
8 CAPSULE, EXTENDED RELEASE ORAL
Qty: 90 CAPSULE | Refills: 1 | Status: SHIPPED | OUTPATIENT
Start: 2022-09-01 | End: 2022-10-21

## 2022-09-01 NOTE — PATIENT INSTRUCTIONS
Continue gabapentin 300mg nightly    Continue galantamine 8mg ER every nightly    Continue the sinemet dosing as previously instructed    Folowup in 6 months

## 2022-09-01 NOTE — PROGRESS NOTES
Patient ID: Sendy Coker is a 74 y o  male  Assessment/Plan:    Lewy body dementia without behavioral disturbance [facility])  Patient is a 74year old man with hx of htn, depression, neuropathy presenting for followup for Lewy Body Dementia and Restless Legs Syndrome  Patient's Cognition, fatigue have been stable  Patient's mood issues have been more controlled and he is much less agitated since the last visit  They did note that patient has been off the galantamine which was previously prescribed for his Lewy Body Dementia but the galantamine seemed to have really helped with patient's cognition, mood, fatigue, appetite and family wants to continue on this medication  Patient was not able to take the rivastigmine while off galantamine (rivastigmine was ordered but unable to be taken)  Unfortunately, patient was off the galantamine for one week and during this time, patient had severely decreased appetite, increased fatigue, increased cognitive deficits  Plan:  Continue galantamine 8mg ER every nightly (Sent to [facility] for prior authorization)  Continue the sinemet dosing as previously instructed (Sinemet 25/100mg 2 tablets TID)- Refilled per family request  Patient agreeable to above  Krystyna Franco in 6 months    Restless legs syndrome  Family states, patient having restless legs much less frequently and no longer kicking the other person in bed  Family and patient happy with current dosing and is reluctant to increase dosing due to currently patient's RLS not causing further issues for patient and family  Gabapentin 300mg nightly for sleep (Refill made)  Family and patient agreeable to above  Diagnoses and all orders for this visit:    Restless legs syndrome (RLS)  -     gabapentin (Neurontin) 300 mg capsule; Take 1 capsule (300 mg total) by mouth daily at bedtime    Primary parkinsonism (HCC)  -     carbidopa-levodopa (SINEMET)  mg per tablet;  Take 2 tablets by mouth 3 (three) times a day Subjective:    Patient is a 74year old man with hx of htn, depression, neuropathy, presenting for followup for parkinsonian tremors and RLD in setting of Lewy Body Dementia  Patient currently on sinemet 25/100 2 tablets TID and gabpentin 300mg daily at bedtime  Patient had fluctuations in fatigue and mood and cognitive impairments since last visit but also had problems had waxing and waning  This was drastically worse when he was off the galantamine daily for a week as he had no appetite, increased fatigue, and cognitive impairment  Mood issues overall has improved  Patient had drastically reduced frequency of the RLS with practically no RLS while on the gabapentin 300mg nightly  No futher complaints from family while patient is sleeping  For review from last visit 5/5/2022:  "Patient going for pulmonary rehab but has been getting difficulty with SOB but has had COVID pneumonia on 1/24/2022  Also has been having memory problems that has deteriorated since having COVID  Patient is going back to pulmonary rehab to help with his breathing  Patient also noted dizziness which is described as "near passing out" and thus more lightheadedness especially when standing up lately      Patient was more unbalanced and falling out of bed more frequently and also getting more disoriented at night coming out fo the bathroom  Patient feeling more lightheaded when he stands up, No syncopal episodes and no falling  He sways to one side more often than the other  Also notes more agitation of legs when sleeping as well      Tremors are on both hands and to the fingers and right leg but improved with improvement in shakiness but continues to not help with daily activities   These symptoms became much worse with the COVID but has been steadily declining since      Patient states he has been on aricept which was switched to rivastigmine generic for memory problems but has been having diffculty with memory      Patient having chemical phantom smells for a week which he has been for daily  However, no loss of consciousness  Family also noted staring spells but they were easily redirectable      No numbness, no weakness, no new visual deficits, no dysarthria, no dysphagia at this time       To summarize from previous visits,  Patient was last seen by [facility] in 8/26/2020 and was noted that cognition declined with short temper  PET scan showed no decrease in uptake compatible with Alzheimer's , FTD, or LBD  Patient was on sinemet  1 5TID  Patient previous movement specialist soctor was Dr Wagner Mcclure and [address] on 2/2020 was 23/30 tested by [facility]      Patient had worsening right hand tremors mostly at rest or when distracted  Also noted slower but no shuffling gait, rigidicty with movment, No difficulty turning around  Patient has times when he dropped objects  There was also concern for mood problems, with short temper, and frustration  Also increased restless legs in bed  Sinemet dosing was then increased to 2 tablet TID and recommended patient discuss adjustments of mood stabilizers concerning sertraline to help with mood "         The following portions of the patient's history were reviewed and updated as appropriate:   He  has a past medical history of Alopecia, Arthritis, Back pain, Benign essential hypertension, Chronic obstructive pulmonary disease (COPD) ([address] ), Depression, Diabetes mellitus ([address] ), Emphysema, interstitial ([address] ), Fatigue, Hypercholesterolemia, Hypersomnia, persistent, Hypothyroidism, Low testosterone, Memory loss, Oral candidiasis (5/11/2020), Sinusitis, and Thyroid goiter    He   Patient Active Problem List    Diagnosis Date Noted    Moderate episode of recurrent major depressive disorder ([address] ) 06/03/2022    Bad oral taste 06/03/2022    Disturbance of smell 06/03/2022    REM behavioral disorder 10/20/2021    Restless legs syndrome 07/15/2021    Constipation, chronic 04/12/2021    Lewy body dementia without behavioral disturbance ([facility] ) 12/05/2018    Pulmonary nodule 10/03/2018    Balance problem 09/26/2018    Neuropathy 09/26/2018    Memory impairment 08/24/2018    Episodic paroxysmal hemicrania, 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    Abnormality of gait due to impairment of balance 08/24/2018    Former smoker 06/14/2018    Centrilobular emphysema ([place] [address] ) 04/06/2018    DAFNE (obstructive sleep apnea) 04/06/2018    Low testosterone 06/29/2017    Psoriasis 04/13/2016    Goiter, nontoxic simple 06/22/2015    Depression 06/28/2013    Hypercholesterolemia 06/27/2013    Hypertension 06/27/2013    Hypothyroidism 06/27/2013    Diabetes mellitus without complication ([place] [address] ) 17/81/0568     He  has a past surgical history that includes Appendectomy; Hernia repair; Tonsillectomy; and ADENOIDECTOMY  His family history includes Aneurysm in his father; Hashimoto's thyroiditis in his daughter; Hypertension in his father; Hypothyroidism in his daughter; Lupus in his mother  He  reports that he has quit smoking  His smoking use included cigarettes  He has a 20 00 pack-year smoking history  He has never used smokeless tobacco  He reports that he does not drink alcohol and does not use drugs    Current Outpatient Medications   Medication Sig Dispense Refill    buPROPion (WELLBUTRIN XL) 300 mg 24 hr tablet TAKE 1 TABLET BY MOUTH EVERY DAY IN THE MORNING 90 tablet 3    carbidopa-levodopa (SINEMET)  mg per tablet Take 2 tablets by mouth 3 (three) times a day 180 tablet 12    escitalopram (LEXAPRO) 20 mg tablet TAKE 2 TABLETS BY MOUTH EVERY  tablet 2    gabapentin (Neurontin) 300 mg capsule Take 1 capsule (300 mg total) by mouth daily at bedtime 90 capsule 2    galantamine (RAZADYNE ER) 8 MG 24 hr capsule Take 1 capsule (8 mg total) by mouth daily with breakfast 90 capsule 1    glimepiride (AMARYL) 1 mg tablet Take 1 tablet (1 mg total) by mouth daily with breakfast 90 tablet 3    losartan (COZAAR) 50 mg tablet Take 1 tablet (50 mg total) by mouth daily 90 tablet 0    metFORMIN (GLUCOPHAGE) 500 mg tablet TAKE 1 TABLET (500 MG TOTAL) BY MOUTH 2 (TWO) TIMES A  tablet 3    True Metrix Blood Glucose Test test strip TESTING TWICE A DAY E 11 9      umeclidinium-vilanterol (Anoro Ellipta) 62 5-25 MCG/INH inhaler Inhale 1 puff daily 60 blister 0    albuterol (PROVENTIL HFA,VENTOLIN HFA) 90 mcg/act inhaler Inhale 2 puffs every 6 (six) hours as needed for wheezing      Bevespi Aerosphere 9-4 8 MCG/ACT inhaler  (Patient not taking: Reported on 6/3/2022)      fexofenadine (ALLEGRA) 180 MG tablet TAKE 1 TABLET BY MOUTH EVERY DAY 30 tablet 11    fexofenadine (ALLEGRA) 180 MG tablet TAKE 1 TABLET BY MOUTH EVERY DAY 90 tablet 2    fluticasone (FLONASE) 50 mcg/act nasal spray 1 spray into each nostril daily      ipratropium (ATROVENT) 0 02 % nebulizer solution Take 2 5 mL (0 5 mg total) by nebulization 4 (four) times a day 62 5 mL 3    levothyroxine 137 mcg tablet Take 1 tablet (137 mcg total) by mouth daily 90 tablet 2    LORazepam (Ativan) 0 5 mg tablet Take 1 tablet (0 5 mg total) by mouth daily at bedtime 90 tablet 0    Naloxegol Oxalate (Movantik) 12 5 MG TABS Take 1 tablet (12 5 mg total) by mouth in the morning Take 1 hour before or 2 hours after breakfast 180 tablet 2    testosterone (ANDROGEL) 1% Place 0 05 g on the skin       No current facility-administered medications for this visit       Current Outpatient Medications on File Prior to Visit   Medication Sig    buPROPion (WELLBUTRIN XL) 300 mg 24 hr tablet TAKE 1 TABLET BY MOUTH EVERY DAY IN THE MORNING    escitalopram (LEXAPRO) 20 mg tablet TAKE 2 TABLETS BY MOUTH EVERY DAY    glimepiride (AMARYL) 1 mg tablet Take 1 tablet (1 mg total) by mouth daily with breakfast    losartan (COZAAR) 50 mg tablet Take 1 tablet (50 mg total) by mouth daily    metFORMIN (GLUCOPHAGE) 500 mg tablet TAKE 1 TABLET (500 MG TOTAL) BY MOUTH 2 (TWO) TIMES A DAY    True Metrix Blood Glucose Test test strip TESTING TWICE A DAY E 11 9    umeclidinium-vilanterol (Anoro Ellipta) 62 5-25 MCG/INH inhaler Inhale 1 puff daily    [DISCONTINUED] carbidopa-levodopa (SINEMET)  mg per tablet Take 2 tablets by mouth 3 (three) times a day    albuterol (PROVENTIL HFA,VENTOLIN HFA) 90 mcg/act inhaler Inhale 2 puffs every 6 (six) hours as needed for wheezing    Bevespi Aerosphere 9-4 8 MCG/ACT inhaler  (Patient not taking: Reported on 6/3/2022)    fexofenadine (ALLEGRA) 180 MG tablet TAKE 1 TABLET BY MOUTH EVERY DAY    fexofenadine (ALLEGRA) 180 MG tablet TAKE 1 TABLET BY MOUTH EVERY DAY    fluticasone (FLONASE) 50 mcg/act nasal spray 1 spray into each nostril daily    ipratropium (ATROVENT) 0 02 % nebulizer solution Take 2 5 mL (0 5 mg total) by nebulization 4 (four) times a day    levothyroxine 137 mcg tablet Take 1 tablet (137 mcg total) by mouth daily    LORazepam (Ativan) 0 5 mg tablet Take 1 tablet (0 5 mg total) by mouth daily at bedtime    Naloxegol Oxalate (Movantik) 12 5 MG TABS Take 1 tablet (12 5 mg total) by mouth in the morning Take 1 hour before or 2 hours after breakfast    testosterone (ANDROGEL) 1% Place 0 05 g on the skin    [DISCONTINUED] gabapentin (Neurontin) 300 mg capsule Take 1 capsule (300 mg total) by mouth daily at bedtime    [DISCONTINUED] rivastigmine (EXELON) 1 5 mg capsule Take 1 capsule (1 5 mg total) by mouth 2 (two) times a day     No current facility-administered medications on file prior to visit  He is allergic to cimetidine, codeine, contrast  [iodinated diagnostic agents], metrizamide, prednisone, simvastatin, and statins            Objective:    Blood pressure 110/60, pulse 59, weight 106 kg (233 lb)  Physical Exam  Eyes:      Extraocular Movements: Extraocular movements intact  Neurological:      Coordination: Coordination is intact  Romberg sign negative        Deep Tendon Reflexes: Strength normal and reflexes are normal and symmetric  Psychiatric:         Speech: Speech normal          Neurological Exam  Mental Status  Awake, alert and oriented to person, place and time  Speech is normal  Language is fluent with no aphasia  MOCA 21/30  Cranial Nerves  CN II: Visual acuity is normal   CN III, IV, VI: Extraocular movements intact bilaterally  CN V: Facial sensation is normal   CN VII: Full and symmetric facial movement  CN XI: Shoulder shrug strength is normal   CN XII: Tongue midline without atrophy or fasciculations  Motor  Normal muscle bulk throughout  No fasciculations present  Normal muscle tone  No abnormal involuntary movements  Strength is 5/5 throughout all four extremities  Sensory  Sensation is intact to light touch, pinprick, vibration and proprioception in all four extremities  Reflexes  Deep tendon reflexes are 2+ and symmetric in all four extremities  Coordination    Finger-to-nose, rapid alternating movements and heel-to-shin normal bilaterally without dysmetria  Gait  Normal casual, toe, heel and tandem gait  Romberg is absent  ROS:    Review of Systems   Constitutional: Positive for appetite change  HENT: Negative for facial swelling  Respiratory: Negative for chest tightness  Cardiovascular: Negative for palpitations  Gastrointestinal: Negative for abdominal pain and rectal pain  Genitourinary: Negative for flank pain  Skin: Negative for rash  Neurological: Negative for dizziness and weakness

## 2022-09-01 NOTE — TELEPHONE ENCOUNTER
Patient's daughter called in today as she left a message to [employer] but was trying to also send mychart message to Dr Jcarlos Aguilar and was unable to  She has some questions/concerns regarding the galantapine 8mg (as discussed above)  She wanted to speak to the nurses prior to her father coming in to see Dr Jong Ruffin today at 2:30pm  Please assist when able to, thank you! Ragini Truong can be reached at [phone]

## 2022-09-01 NOTE — ASSESSMENT & PLAN NOTE
Family states, patient having restless legs much less frequently and no longer kicking the other person in bed  Family and patient happy with current dosing and is reluctant to increase dosing due to currently patient's RLS not causing further issues for patient and family  Gabapentin 300mg nightly for sleep (Refill made)  Family and patient agreeable to above

## 2022-09-01 NOTE — TELEPHONE ENCOUNTER
Call returned to [facility], she is concerned regarding galantamine 8mg  Patient actually feels the medication is working  However insurance is requesting an "exception" because medication is not a preferred drug  [phone]  ID #: [id number]     Galantamine er 8mg - 1 capsule daily 90 per 30    Call placed to [employer] [facility] line  PA initiated  Determination to be received in 1-3 days  -[id number]  Dr Fung - patient/family would like to continue with galantamine  Nvere started rivastigmine  PA initiated as plan is no longer covering galantamine  Please reorder

## 2022-09-01 NOTE — ASSESSMENT & PLAN NOTE
Patient is a 74year old man with hx of htn, depression, neuropathy presenting for followup for Lewy Body Dementia and Restless Legs Syndrome  Patient's Cognition, fatigue have been stable  Patient's mood issues have been more controlled and he is much less agitated since the last visit  They did note that patient has been off the galantamine which was previously prescribed for his Lewy Body Dementia but the galantamine seemed to have really helped with patient's cognition, mood, fatigue, appetite and family wants to continue on this medication  Patient was not able to take the rivastigmine while off galantamine (rivastigmine was ordered but unable to be taken)  Unfortunately, patient was off the galantamine for one week and during this time, patient had severely decreased appetite, increased fatigue, increased cognitive deficits      Plan:  Continue galantamine 8mg ER every nightly (Sent to [facility] for prior authorization)  Continue the sinemet dosing as previously instructed (Sinemet 25/100mg 2 tablets TID)- Refilled per family request  Patient agreeable to above  Artist Person in 6 months

## 2022-09-06 ENCOUNTER — TELEPHONE (OUTPATIENT)
Dept: NEUROLOGY | Facility: CLINIC | Age: 67
End: 2022-09-06

## 2022-09-06 NOTE — TELEPHONE ENCOUNTER
Samreen Llamas left message on nursing line regarding galantamine pa  Form found in faxes  Completed form and faxed to [employer]

## 2022-09-08 NOTE — TELEPHONE ENCOUNTER
Call placed to [facility]  [phone]  Follow up on original denial  PA was re-forwarded for review with additional information       REF# [id number]

## 2022-10-21 DIAGNOSIS — F02.80 LEWY BODY DEMENTIA WITHOUT BEHAVIORAL DISTURBANCE (HCC): ICD-10-CM

## 2022-10-21 DIAGNOSIS — G31.83 LEWY BODY DEMENTIA WITHOUT BEHAVIORAL DISTURBANCE (HCC): ICD-10-CM

## 2022-10-21 RX ORDER — GALANTAMINE HYDROBROMIDE 8 MG/1
CAPSULE, EXTENDED RELEASE ORAL
Qty: 90 CAPSULE | Refills: 2 | Status: SHIPPED | OUTPATIENT
Start: 2022-10-21 | End: 2022-10-26 | Stop reason: ALTCHOICE

## 2022-10-26 ENCOUNTER — OFFICE VISIT (OUTPATIENT)
Dept: NEUROLOGY | Facility: CLINIC | Age: 67
End: 2022-10-26
Payer: MEDICARE

## 2022-10-26 VITALS
SYSTOLIC BLOOD PRESSURE: 132 MMHG | DIASTOLIC BLOOD PRESSURE: 70 MMHG | HEART RATE: 60 BPM | BODY MASS INDEX: 33.06 KG/M2 | WEIGHT: 230.4 LBS

## 2022-10-26 DIAGNOSIS — R63.0 DECREASED APPETITE: ICD-10-CM

## 2022-10-26 DIAGNOSIS — G31.83 LEWY BODY DEMENTIA WITHOUT BEHAVIORAL DISTURBANCE (HCC): Primary | ICD-10-CM

## 2022-10-26 DIAGNOSIS — F02.80 LEWY BODY DEMENTIA WITHOUT BEHAVIORAL DISTURBANCE (HCC): Primary | ICD-10-CM

## 2022-10-26 DIAGNOSIS — G25.81 RESTLESS LEGS SYNDROME: ICD-10-CM

## 2022-10-26 DIAGNOSIS — G47.52 REM BEHAVIORAL DISORDER: ICD-10-CM

## 2022-10-26 PROCEDURE — 99215 OFFICE O/P EST HI 40 MIN: CPT | Performed by: PSYCHIATRY & NEUROLOGY

## 2022-10-26 RX ORDER — RIVASTIGMINE 4.6 MG/24H
1 PATCH, EXTENDED RELEASE TRANSDERMAL DAILY
Qty: 30 PATCH | Refills: 5 | Status: SHIPPED | OUTPATIENT
Start: 2022-10-26

## 2022-10-26 NOTE — ASSESSMENT & PLAN NOTE
Progressive cognitive decline over years along with the development of bradykinesia, rest tremor and postural instability partially responsive to levodopa  Motor symptoms appear to be fairly well controlled  Main concern today is more recent decline in cognition and reduced appetite  Also with concerns with regards to daytime fatigue and sedation  He has a history of obstructive sleep apnea and wears his CPAP consistently  Cognitively he was initially doing well when he 1st started galantamine ER 8mg daily  Previously unable to tolerate donepezil due to headaches  He was noted by his wife to have reduced appetite following the addition of galantamine  Gabapentin was later headed for restless leg symptoms/ PLMS which has greatly improved  He continues on gabapentin 300 mg q h s  there is a question as to whether this may be also contributing to his recent decline in cognition  Time spent discussing options  Reduced appetite is in part related to poor taste which may be related to anosmia  He also attributes this to bouts of dry mouth  He has tried all the over-the-counter medications for dry mouth without success  Given onset was reported to be around the time starting galantamine we could consider switching his medication to see if this may be related  We discussed reduced appetite being potential side effects of all medications  After much discussion we opted to try switching his galantamine ER to rivastigmine patch to see if this would be better tolerated  If better tolerated but not effective, we can increase the dose  If not tolerated, consider adding memantine instead  There was a question as to whether Provigil may be help with fatigue  Lack of eating assistance may be contributing to fatigue  I would not recommend starting an additional medication at this time for be considered in the future if fatigue persists    He will continue on Sinemet and gabapentin with no change

## 2022-10-26 NOTE — PROGRESS NOTES
Review of Systems   Constitutional: Positive for appetite change (Less hungry) and fatigue  Negative for fever  HENT: Negative for hearing loss, tinnitus, trouble swallowing and voice change  At times has a certain smell in his nose  Taste has been off     Eyes: Negative  Negative for photophobia, pain and visual disturbance  Respiratory: Negative  Negative for shortness of breath  Cardiovascular: Negative  Negative for palpitations  Gastrointestinal: Negative  Negative for nausea and vomiting  Endocrine: Negative  Negative for cold intolerance  Genitourinary: Negative  Negative for dysuria, frequency and urgency  Musculoskeletal: Negative for gait problem, myalgias and neck pain  A little bit of balance Issue  Restless Leg at times, gabapentin is helping but still occurs     Skin: Negative  Negative for rash  Allergic/Immunologic: Negative  Neurological: Positive for dizziness (Sometimes not often) and tremors (Both hands but Right hand is worse)  Negative for seizures, syncope, facial asymmetry, speech difficulty, weakness, light-headedness, numbness and headaches  Hematological: Negative  Does not bruise/bleed easily  Psychiatric/Behavioral: Positive for sleep disturbance (At times)  Negative for confusion and hallucinations  Memory Issues   All other systems reviewed and are negative

## 2022-10-26 NOTE — PROGRESS NOTES
Patient ID: Sendy Vang is a 74 y o  male    Assessment/Plan:    Lewy body dementia without behavioral disturbance (HCC)  Progressive cognitive decline over years along with the development of bradykinesia, rest tremor and postural instability partially responsive to levodopa  Motor symptoms appear to be fairly well controlled  Main concern today is more recent decline in cognition and reduced appetite  Also with concerns with regards to daytime fatigue and sedation  He has a history of obstructive sleep apnea and wears his CPAP consistently  Cognitively he was initially doing well when he 1st started galantamine ER 8mg daily  Previously unable to tolerate donepezil due to headaches  He was noted by his wife to have reduced appetite following the addition of galantamine  Gabapentin was later headed for restless leg symptoms/ PLMS which has greatly improved  He continues on gabapentin 300 mg q h s  there is a question as to whether this may be also contributing to his recent decline in cognition  Time spent discussing options  Reduced appetite is in part related to poor taste which may be related to anosmia  He also attributes this to bouts of dry mouth  He has tried all the over-the-counter medications for dry mouth without success  Given onset was reported to be around the time starting galantamine we could consider switching his medication to see if this may be related  We discussed reduced appetite being potential side effects of all medications  After much discussion we opted to try switching his galantamine ER to rivastigmine patch to see if this would be better tolerated  If better tolerated but not effective, we can increase the dose  If not tolerated, consider adding memantine instead  There was a question as to whether Provigil may be help with fatigue  Lack of eating assistance may be contributing to fatigue    I would not recommend starting an additional medication at this time for be considered in the future if fatigue persists    He will continue on Sinemet and gabapentin with no change  Diagnoses and all orders for this visit:    Lewy body dementia without behavioral disturbance ([facility] [address] )  -     Ambulatory Referral to Physical Therapy; Future  -     rivastigmine (EXELON) 4 6 mg/24 hr TD 24 hr patch; Place 1 patch on the skin daily    Restless legs syndrome    REM behavioral disorder    Decreased appetite    Spent over 40 minutes on patient visit, counseling, and partial documentation  Subjective:      Yusef Elena is a 74year old with HTN, depression, RLS, DAFNE, and neuropathy who presents for movement follow up for Lewy Body dementia  He follows with Dr Jaison Lundberg in [place] [address] clinic but wishes to be seen in movement clinic yearly  To review, onset in 2015 with forgetfulness and depression (wife ill at time)  He was diagnosed with Parkinson's disease in 2017 and stopped working  Carbidopa/levodopa with little initial improvement more apparent in recent year with increase  PET scan 3/6/2020 that showed no decreased uptake compatible with Alzheimer's, FTD, or LBD  First seen 10/2021 and onset of given fluctuating cognition and parkinsonian symptoms diagnosis most likely dementia with Lewy Bodies  Donepezil led to headache  Family report rivastigmine never taken because it was not covered  Current relevant medications:   galantamine 8mg ER qhs  Sinemet 25/100mg 2 tablets TID   Gabapentin 300mg qhs     Rest tremor still present and unchanged  He has reduced appetite  When mouth is dry food taste terrible  He tried OTC options for dry mouth with no success  Dry mouth can occur anytime of day  He drinks a lot of water in the day  He has lost weight  He stopped drinking coffee  No difficulty chewing and swallowing  He started drinking Boost twice daily  When he first started galantamine ER it appeared to help his memory  In recent months his memory is worse   He has trouble finishing conversations at times  He cannot be interrupted  Gabapentin at night helped nighttime leg movement but hey are not sure if this is increasing memory issues  "RLS" described as leg movements while asleep and leg cramping  He did have some prior RLS symptoms in the evening which has resolved  Sleeps well  There is mild daytime sedation  He can have daytime fatigue  He has sleep apnea and uses CPAP nightly  Mild progression in cognition  No hallucinations  Objective:    /70 (BP Location: Left arm, Patient Position: Sitting, Cuff Size: Standard)   Pulse 60   Wt 105 kg (230 lb 6 4 oz)   BMI 33 06 kg/m²       Physical Exam  Eyes:      Extraocular Movements: Extraocular movements intact  Pupils: Pupils are equal, round, and reactive to light  Neurological:      Mental Status: He is alert  Deep Tendon Reflexes: Strength normal          Neurological Exam  Mental Status  Alert  Oriented to person, place, time and situation  Memory: recall 2/5  Unable to copy figure  Clock drawing is abnormal  Numbers  Speech: hypophonia  Language is fluent with no aphasia  Language: difficulty repeating  Unable to perform serial calculations  Digit span was 5 forward  MOCA 20/30  Cranial Nerves  CN II: Visual fields full to confrontation  CN III, IV, VI: Extraocular movements intact bilaterally  Pupils equal round and reactive to light bilaterally  CN V: Facial sensation is normal   CN VII: Full and symmetric facial movement  CN VIII: Hearing is normal   CN IX, X: Palate elevates symmetrically  CN XI: Shoulder shrug strength is normal   CN XII: Tongue midline without atrophy or fasciculations  Motor   Normal muscle tone  Strength is 5/5 throughout all four extremities  Sensory  Light touch is normal in upper and lower extremities       Coordination  Right: Finger-to-nose normal  Rapid alternating movement abnormality:Left: Finger-to-nose normal  Rapid alternating movement abnormality:  See MDS UPDRS III  Gait  Casual gait: Normal pull test  Able to rise from chair without using arms      MDS UPDRS III                                 Time since last dose:    Speech  0   Facial Expression  0   Rigidity - Neck  0   Rigidity - Upper Extremity (Right)  0   Rigidity - Upper Extremity (Left)   0   Rigidity - Lower Extremity (Right)  0   Rigidity - Lower Extremity (Left)   0   Finger Taps (Right)   1   Finger Taps (Left)   1   Hand Movement (Right)  0   Hand Movement (Left)   1   Pronation/Supination (Right)  1   Pronation/Supination (Left)   1   Toe Tapping (Right)    Toe Tapping (Left)    Leg Agility (Right)  0   Leg Agility (Left)   0   Arising from Chair   0   Gait   0   Freezing of Gait 0   Postural Stability   0   Posture 0   Global spontaneity of movement 0   Postural Tremor (Right) 1   Postural Tremor (Left) 1   Kinetic Tremor (Right)  0   Kinetic Tremor (Left)  0   Rest tremor amplitude RUE 0   Rest tremor amplitude LUE 0   Rest tremor amplitude RLE 0   Reset tremor amplitude LLE 0   Lip/Jaw Tremor  0   Consistency of tremor 0   Motor Exam Total:                Current Outpatient Medications on File Prior to Visit   Medication Sig Dispense Refill   • albuterol (PROVENTIL HFA,VENTOLIN HFA) 90 mcg/act inhaler Inhale 2 puffs every 6 (six) hours as needed for wheezing     • buPROPion (WELLBUTRIN XL) 300 mg 24 hr tablet TAKE 1 TABLET BY MOUTH EVERY DAY IN THE MORNING 90 tablet 3   • carbidopa-levodopa (SINEMET)  mg per tablet Take 2 tablets by mouth 3 (three) times a day 180 tablet 12   • escitalopram (LEXAPRO) 20 mg tablet TAKE 2 TABLETS BY MOUTH EVERY  tablet 2   • fexofenadine (ALLEGRA) 180 MG tablet TAKE 1 TABLET BY MOUTH EVERY DAY 30 tablet 11   • fexofenadine (ALLEGRA) 180 MG tablet TAKE 1 TABLET BY MOUTH EVERY DAY 90 tablet 2   • fluticasone (FLONASE) 50 mcg/act nasal spray 1 spray into each nostril if needed     • gabapentin (Neurontin) 300 mg capsule Take 1 capsule (300 mg total) by mouth daily at bedtime 90 capsule 2   • glimepiride (AMARYL) 1 mg tablet Take 1 tablet (1 mg total) by mouth daily with breakfast 90 tablet 3   • ipratropium (ATROVENT) 0 02 % nebulizer solution Take 2 5 mL (0 5 mg total) by nebulization 4 (four) times a day (Patient taking differently: Take 0 5 mg by nebulization if needed) 62 5 mL 3   • levothyroxine 137 mcg tablet Take 1 tablet (137 mcg total) by mouth daily 90 tablet 2   • LORazepam (Ativan) 0 5 mg tablet Take 1 tablet (0 5 mg total) by mouth daily at bedtime 90 tablet 0   • losartan (COZAAR) 50 mg tablet Take 1 tablet (50 mg total) by mouth daily 90 tablet 0   • Naloxegol Oxalate (Movantik) 12 5 MG TABS Take 1 tablet (12 5 mg total) by mouth in the morning Take 1 hour before or 2 hours after breakfast 180 tablet 2   • testosterone (ANDROGEL) 1% Place 0 05 g on the skin     • True Metrix Blood Glucose Test test strip TESTING TWICE A DAY E 11 9     • umeclidinium-vilanterol (Anoro Ellipta) 62 5-25 MCG/INH inhaler Inhale 1 puff daily 60 blister 0   • [DISCONTINUED] galantamine (RAZADYNE ER) 8 MG 24 hr capsule TAKE 1 CAPSULE BY MOUTH DAILY WITH BREAKFAST  90 capsule 2   • Bevespi Aerosphere 9-4 8 MCG/ACT inhaler  (Patient not taking: No sig reported)     • metFORMIN (GLUCOPHAGE) 500 mg tablet TAKE 1 TABLET (500 MG TOTAL) BY MOUTH 2 (TWO) TIMES A DAY (Patient not taking: Reported on 10/26/2022) 180 tablet 3     No current facility-administered medications on file prior to visit           Malcolm Henry MD  Movement disorder physician  [employer]

## 2022-10-26 NOTE — PATIENT INSTRUCTIONS
We will see if we can switch galantamine ER to rivastigmine patch to see if this may be better tolerated  There is a question as to whether galantamine is causing reduced appetite  Will continue on Sinemet with no change

## 2022-11-04 DIAGNOSIS — J30.1 SEASONAL ALLERGIC RHINITIS DUE TO POLLEN: ICD-10-CM

## 2022-11-04 RX ORDER — FEXOFENADINE HCL 180 MG/1
TABLET ORAL
Qty: 90 TABLET | Refills: 2 | Status: SHIPPED | OUTPATIENT
Start: 2022-11-04

## 2022-11-06 DIAGNOSIS — I10 PRIMARY HYPERTENSION: ICD-10-CM

## 2022-11-07 RX ORDER — LOSARTAN POTASSIUM 50 MG/1
TABLET ORAL
Qty: 90 TABLET | Refills: 0 | Status: SHIPPED | OUTPATIENT
Start: 2022-11-07

## 2022-11-14 ENCOUNTER — EVALUATION (OUTPATIENT)
Dept: PHYSICAL THERAPY | Facility: MEDICAL CENTER | Age: 67
End: 2022-11-14

## 2022-11-14 DIAGNOSIS — G31.83 LEWY BODY DEMENTIA WITHOUT BEHAVIORAL DISTURBANCE (HCC): Primary | ICD-10-CM

## 2022-11-14 DIAGNOSIS — F02.80 LEWY BODY DEMENTIA WITHOUT BEHAVIORAL DISTURBANCE (HCC): Primary | ICD-10-CM

## 2022-11-14 NOTE — PROGRESS NOTES
PT Evaluation          Today's date: 2022  Patient name: Den Glass  : 1955  MRN: [id number]  Referring provider: Nimesh Monroy MD  Dx:   Encounter Diagnosis     ICD-10-CM    1  Lewy body dementia without behavioral disturbance [facility])  G31 83 Ambulatory Referral to Physical Therapy    F02 80          Assessment  Assessment details: Patient is a 74 y o  Male who presents to skilled outpatient PT with Lewy Body Dementia  Patient presents currently with decreased balance, proprioception, stamina, and endurance due to 5x STS, TUG, 6 MWT, and 10 MWT, and 6 minute walk test scores as well as tested outcome measures  Patient challenged with outcome measures today showing increased trunk sway, decreased gait speed, and short step lengths which could lead to falls and gait instability, especially due to her confidence with ABC scale and FGA and DGI for dynamic balance  Slight challenges with vestibular component of balance as noted by mCTSIB  Due to these impairments, patient would benefit from balance, vestibular, and endurance training  Patient will benefit for occupational therapy as well for dexterity challenges and cognitive challenge reports  Patient will benefit from a structured exercise program such as LSVT BIG  Patient requires skilled PT in order to improve his overall balance and weakness in order to maximize function with neurodegenerative diagnosis        Impairments: Abnormal coordination, Abnormal gait, Abnormal muscle tone, Abnormal or restricted ROM, Activity intolerance, Impaired balance, Impaired physical strength, Lacks appropriate HEP, Poor posture, Poor body mechanics, Pain with function, Safety issue, Weight-bearing intolerance, Scapular dyskinesis, Abnormal movement, Difficulty understanding and Abnormal muscle firing     Patient verbalized understanding of POC      Please contact me if you have any questions or recommendations  Thank you for the referral and the opportunity to share in [employer] care          Plan  Plan details: Balance, strengthening, gait, endurance, fall risk     Patient would benefit from: PT Eval, Skilled PT, OT Eval and Skilled OT  Planned modality interventions: Biofeedback, Cryotherapy, TENS, Thermotherapy: Hydrocollator Packs and Traction  Planned therapy interventions: Abdominal trunk stabilization, ADL training, Balance, Balance/WB training, Breathing training, Body mechanics training, Coordination, Functional ROM exercises, Gait training, HEP, Joint mobilization, Manual therapy, Talavera taping, Motor coordination training, Neuromuscular re-education, Patient education, Postural training, Prosthetic fitting/training, Strengthening, Stretching, Therapeutic activities, Therapeutic exercises, Therapeutic training, Transfer training, Activity modification and Work reintegration  Frequency: 2x/wk  Duration in weeks: 12  Plan of Care beginning date: 11/14/2022  Plan of Care expiration date: 3 months - 2/14/2022  Treatment plan discussed with: Patient and Family        Goals  Short Term Goals (4 weeks):    - Patient will improve time on TUG by 2 9 seconds  to facilitate improved safety in all ambulation  - Patient will be independent in basic HEP 2-3 weeks  - Patient will improve 5xSTS score by 2 3 seconds to promote improved LE functional strength needed for ADLs  - Patient will complete components of HiMAT to promote agility necessary for sports related tasks     Long Term Goals (12 weeks):  - Patient will be independent in a comprehensive home exercise program  - Patient will improve scoring on DGI by 2 6 points to progress safety  - Patient will improve gait speed by 0 18 m/s to improve safety with community ambulation  - Patient will improve PENDLETON by 6 points in order to improve static balance and reduce risk for falls  - Patient will improve scoring on FGA by 4 points from to progress safety with dynamic tasks  - Patient will be able to demonstrate HT in gait without veering  - Patient will improve 6 Minute Walk Test score by 190 feet to promote improved cardiovascular endurance  - Patient will report 50% reduction in near falls in order to improve safety with functional tasks and reduce his risk for falls  - Patient will report going on walks at least 3 days per week to promote independence and improved cardiovascular endurance  - Patient will be able to ascend/descend stairs reciprocally with 1 UE assist to promote independence and safety with ADLs  - Patient will report 50% reduction in near falls when ambulating on uneven terrain     All date taken from [facility] Neuro Section or Rehab Measures      Wells/64  MDC: 6 pts  Age Norms:  61-76: M - 54   F - 55  70-79: M - 47   F - 53  80-89: M - 48   F - 50 5xSTS: Kyle et al 2010  MDC: 2 3 sec  Age Norms:  60-69: 11 1 sec  70-79: 12 6 sec  80-89: 14 8 sec   TUG  MDC: 4 14 sec  Cut off score:  >13 5 sec community dwelling adults  >32 2 frail elderly  <20 I for basic transfers  >30 dependent on transfers 10 Meter Walk Test: Kyler [place] al 2011  MDC: 0 18 m/s  20-29: M - 1 35 m   F - 1 34 m  30-39: M - 1 43 m   F - 1 34 m  40-49: M - 1 43 m   F - 1 39 m  50-59: M - 1 43 m   F - 1 31 m  60-69: M - 1 34 m   F - 1 24 m  70-79: M - 1 26 m   F - 1 13 m  80-89: M - 0 97 m   F - 0 94 m    Household Ambulator < 0 4 m/s  Limited Community Ambulator 0 4 - 0 8 m/s  [employer] Ambulator 0 8 - 1 2 m/s  Safely cross the street > 1 2 m/s   FGA  MCID: 4 pts  Geriatrics/community < 22/30 fall risk  Geriatrics/community < 20/30 unexplained falls    DGI  MDC: vestibular - 4 pts  MDC: geriatric/community - 3 pts  Falls risk <19/24 mCTSIB  Norm: 20-60 yrs  Eyes open firm: norm sway 0 21-0 48  Eyes closed firm: norm sway 0 48-0 99  Eyes open foam: norm sway 0 38-0 71  Eyes closed foam: norm sway 0 70-2 22   6 Minute Walk Test  MDC: 190 98 ft  MCID: 164 ft    Age Norms  60-69: M - 1876 ft (571 80 m)  F - 5844 ft (537 98 m)  70-79: M - 1729 ft (527 00 m)  F - 1545 ft (470 92 m)  80-89: M - 1368 ft (416 97 m)  F - 1286 ft (391 97 m) ABC: Tiarra Evans, 2003  <67% increased risk for falls         Subjective    History of Present Illness  - Mechanism of injury: Patient is a 74year old male reporting to skilled PT with reports of Lewy Body Dementia as diagnosed by Neurologist  Patient recently saw neurologist, summary noted below:       Assessment/Plan:     Lewy body dementia without behavioral disturbance (HCC)  Progressive cognitive decline over years along with the development of bradykinesia, rest tremor and postural instability partially responsive to levodopa  Motor symptoms appear to be fairly well controlled  Main concern today is more recent decline in cognition and reduced appetite  Also with concerns with regards to daytime fatigue and sedation  He has a history of obstructive sleep apnea and wears his CPAP consistently      Cognitively he was initially doing well when he 1st started galantamine ER 8mg daily  Previously unable to tolerate donepezil due to headaches  He was noted by his wife to have reduced appetite following the addition of galantamine  Gabapentin was later headed for restless leg symptoms/ PLMS which has greatly improved  He continues on gabapentin 300 mg q h s  there is a question as to whether this may be also contributing to his recent decline in cognition       Time spent discussing options  Reduced appetite is in part related to poor taste which may be related to anosmia  He also attributes this to bouts of dry mouth  He has tried all the over-the-counter medications for dry mouth without success  Given onset was reported to be around the time starting galantamine we could consider switching his medication to see if this may be related  We discussed reduced appetite being potential side effects of all medications    After much discussion we opted to try switching his galantamine ER to rivastigmine patch to see if this would be better tolerated  If better tolerated but not effective, we can increase the dose  If not tolerated, consider adding memantine instead      There was a question as to whether Provigil may be help with fatigue  Lack of eating assistance may be contributing to fatigue  I would not recommend starting an additional medication at this time for be considered in the future if fatigue persists     He will continue on Sinemet and gabapentin with no change         Diagnoses and all orders for this visit:     Lewy body dementia without behavioral disturbance ([facility] [address] )  -     Ambulatory Referral to Physical Therapy; Future  -     rivastigmine (EXELON) 4 6 mg/24 hr TD 24 hr patch; Place 1 patch on the skin daily"         - Primary AD: None  - Assist level at home: Mod independent  - Decreased fine motor tasks: Yes      Pain  - Current pain ratin/10  - At best pain ratin/10  - At worst pain ratin/10  - Location: no pain reported    Social Support  - Steps to enter house: 5  - Stairs in house: 12  - Lives in: multi level home  - Lives with: wife    - Employment status: Retired  - Hand dominance: R    Treatments  - Previous treatment: None  - Current treatment: PT EVAL  - Diagnostic Testing: PMH      Objective     LE MMT  - R Hip Flexion: 4+/5   L Hip Flexion: 4+/5  - R Hip Extension: 4+/5  L Hip Extension: 4+/5  - R Hip Abduction: 4+/5  L Hip Abduction: 4+/5  - R Hip Adduction: 4+/5  L Hip Adduction: 4+/5  - R Knee Extension: 4+/5  L Knee Extension: 4+/5  - R Knee Flexion: 4+/5   L Knee Flexion: 4+/5  - R Ankle DF: 4+/5   L Ankle DF: 4+/5  - R Ankle PF: 4+/5   L Ankle PF: 4+/5    Sensation  - Light touch: WNL bilaterally   - Deep pressure: WNL bilaterally   - Pain: WNL bilaterally   - Temperature:  WNL bilateral     Coordination  - Heel to Shin: Dyscoordination bilaterally   - Alternate Toe Taps: Dyscoordination bilaterally   - Finger to Nose: Dyscoordination bilaterally   - Finger to Finger: Dyscoordination bilaterally     Postural Screen  - Observation: Mild forward head, rounded shoulders  - Improvement in symptoms with correction of posture: No    Gait  - Abnormalities: Postural instability,       Outcome Measures Initial Eval  11/14/2022        5xSTS 10 16 sec        TUG  - Regular  - Cognitive   8 56 sec  10 23 sec        10 meter 1 07 m/s        PENDLETON 45/56        FGA 23/30        DGI 21/24        mCTSIB  - FTEO (firm)  - FTEC (firm)  - FTEO (foam)  - FTEC (foam)   30 sec  30 sec  30 sec  10 sec        6MWT 1100 ft        30 second 15 reps no UE                            Precautions:   Past Medical History:   Diagnosis Date   • Alopecia    • Arthritis    • Back pain    • Benign essential hypertension    • Chronic obstructive pulmonary disease (COPD) (HCC)    • Depression    • Diabetes mellitus (HCC)    • Emphysema, interstitial (HCC)    • Fatigue    • Hypercholesterolemia    • Hypersomnia, persistent    • Hypothyroidism    • Low testosterone    • Memory loss    • Oral candidiasis 5/11/2020   • Sinusitis    • Thyroid goiter

## 2022-11-16 DIAGNOSIS — G20 PRIMARY PARKINSONISM (HCC): ICD-10-CM

## 2022-11-19 DIAGNOSIS — G31.83 LEWY BODY DEMENTIA WITHOUT BEHAVIORAL DISTURBANCE (HCC): ICD-10-CM

## 2022-11-19 DIAGNOSIS — F02.80 LEWY BODY DEMENTIA WITHOUT BEHAVIORAL DISTURBANCE (HCC): ICD-10-CM

## 2022-11-21 RX ORDER — RIVASTIGMINE 4.6 MG/24H
PATCH, EXTENDED RELEASE TRANSDERMAL
Qty: 90 PATCH | Refills: 2 | Status: SHIPPED | OUTPATIENT
Start: 2022-11-21

## 2022-11-28 ENCOUNTER — RA CDI HCC (OUTPATIENT)
Dept: OTHER | Facility: HOSPITAL | Age: 67
End: 2022-11-28

## 2022-11-28 ENCOUNTER — OFFICE VISIT (OUTPATIENT)
Dept: PHYSICAL THERAPY | Facility: MEDICAL CENTER | Age: 67
End: 2022-11-28

## 2022-11-28 ENCOUNTER — EVALUATION (OUTPATIENT)
Dept: OCCUPATIONAL THERAPY | Facility: MEDICAL CENTER | Age: 67
End: 2022-11-28

## 2022-11-28 DIAGNOSIS — F02.80 LEWY BODY DEMENTIA WITHOUT BEHAVIORAL DISTURBANCE (HCC): Primary | ICD-10-CM

## 2022-11-28 DIAGNOSIS — G31.83 LEWY BODY DEMENTIA WITHOUT BEHAVIORAL DISTURBANCE (HCC): Primary | ICD-10-CM

## 2022-11-28 NOTE — PROGRESS NOTES
[facility] [address]  coding opportunities       Chart reviewed, no opportunity found: CHART REVIEWED, NO OPPORTUNITY FOUND        Patients Insurance     Medicare Insurance: Medicare

## 2022-11-28 NOTE — PROGRESS NOTES
Daily Note     Today's date: 2022  Patient name: Charley Howe  : 1955  MRN: [id number]  Referring provider: Gareth Ayala MD  Dx:   Encounter Diagnosis     ICD-10-CM    1  Lew[facility] body dementia without behavioral disturbance ([facility] )  G31 83     F02 80           Start Time: 8188  Stop Time: 2393  Total time in clinic (min): 45 minutes    Subjective: Patient reported no adverse findings, presented from OT evaluation  Objective: See treatment diary below- 2# cuff weights donned for all intervention    BOSU step ups- forward, laterally, backwards- 20 reps each, 1 UE  Tandem walking- 10 cycles of 10 feet firm  Hurdles- 9", forward, laterally- 10 cycles of 10 feet  Cone taps- forward and laterally 1 UE support- 10 cycles of 10 feet     Assessment: Tolerated treatment well  Patient demonstrated fatigue post treatment, exhibited good technique with therapeutic exercises and would benefit from continued PT to maximize function post dx of Lewy body dementia  Patient struggles and challenged with compliant surfaces today, patient improving with recovery but requires consistent verbal and tactile cuing in order for safe performance of interventions  Plan: Continue per plan of care  Progress treatment as tolerated

## 2022-11-28 NOTE — PROGRESS NOTES
OCCUPATIONAL THERAPY INITIAL EVALUATION    11/28/2022  Wade Escobedo  1955  [id number]  Richard Moritz, MD  No diagnosis found  POC START DATE: 11/28/2022  POC END DATE: 2/13/2022  NEXT PN DUE: 12/28/2022  FREQUENCY: 1-2x wk for 12 weeks    SUBJECTIVE: Pt presents to OT evaluation on 11/28/2022 secondary to Lewy Body Dementia  Hard time with fine motor coordination  "I was always an active person  I am not happy with how I am anymore " Diagnosis of Lewy Body Dementia about 7 years ago  Difficulty with changing hearing aid batteries at beginning of session  Concerns with balance, close to falling at home  Concerns with dexterity and fine motor coordination  Tremors are worse in the R, R hand dominant  Takes Parkinson's Medication 3 times a day  Decreased cognition over the last few years  Work: Retired  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, [employer],   Retired because of diagnosis  IADLs: Tremors do not impact eating  Occasional difficulty with buttoning and zippers  No difficulty with driving  Difficulty with putting a key into a door  Difficulty with tool work  Uses medication box and wife helps with medication management  Sleep: No difficulty with sleeping  Sleeps 8-10 hours  Lives with his wife  Skilled Analysis:    Pt presents to OT evaluation on 11/28/2022 secondary to Lewy Body Dementia  Pt reports diagnosis approximately 7 years ago  Concerns with decreased fine motor coordination, tremors, and cognition  Pt demonstrates good UE strength, 5/5 MMT  Normal ROM with UE's  Good  strength, improved on R dominant hand compared to L nondominant hand  Pt utilized board and trunk occasionally for manipulation during nine hole peg test and functional dexterity testing  Diminished protective sensation on R hand as indicated by Nano Alvarez on R hand  MOCA assessment indicates mild cognitive impairment  Decreased immediate and delayed memory recall with CMT   Pt would benefit from OT skilled therapy to address decreased cognition and fine motor deficits  Pt will be seen for OT services 2x/wk for 12 weeks  POC was reviewed with the pt, pt understands and agrees with POC  PATIENT GOAL: "To be able to work with my hands better "    HISTORY OF PRESENT ILLNESS:     PMH:   Past Medical History:   Diagnosis Date   • Alopecia    • Arthritis    • Back pain    • Benign essential hypertension    • Chronic obstructive pulmonary disease (COPD) ([facility] [address] )    • Depression    • Diabetes mellitus ([facility] )    • Emphysema, interstitial (HCC)    • Fatigue    • Hypercholesterolemia    • Hypersomnia, persistent    • Hypothyroidism    • Low testosterone    • Memory loss    • Oral candidiasis 2020   • Sinusitis    • Thyroid goiter      Objective     9 HOLE PEG TEST: performed 9 hole peg test to assess dexterity/fine motor coordination with pt scoring 28 s on R hand (affected) and 28s (2 drops and impulsive and forceful movements) seconds on L hand (affected) side  Functional Dexterity Test for in-hand manipulation    R UE: (use of body for stabilization for first trial, completed again)  Drops: 2, 38s    L UE: 39s    [place] Cognitive Assessment Version 8 1 (MoCA V8 1)  Visuospatial/executive functionin/5  Naming:  3/3  Memory: 1st trial:  25, 2nd trial:  3/5  Attention/concentration: 0/2  List of letters: 1/  Serial Seven Subtraction:  1/3   Language/sentence repetition: 0/2  Language Fluency:  1/1  Abstract/Correlational Thinkin/2  Delayed Recall:  35  Orientation:  6/6  MoCA V1 8 1 Raw Score:  19/30 indicative of mild neurocognitive impairments       Impairments Section:  UE Strength:              NILO: RUE: 106/200 LUE: 78/200              2 point pinch: RUE: 19, LUE: 19    SEMMES DELPHINE - sensation  RUE: 2 83 on thumb, 3 61 on all digits   LUE: 2 83 on all digits                 Range of Motion:  AROM:   RUE/LUE WNL  -  shoulder flexion  - elbow flexion  -  elbow extension   -   wrist flexion  -  wrist extension    LUE within normal limits     MMT:  R UE: 5/5 in all pivots   -  shoulder flexion  - elbow flexion  -  elbow extension   -   wrist flexion  -  wrist extension    LUE within normal limits    L UE 5/5 in all pivots   -  shoulder flexion  - elbow flexion  -  elbow extension   -   wrist flexion  -  wrist extension    LUE within normal limits     Pt is R hand dominant    CONTEXTUAL MEMORY TEST (CMT)      Version: Rest     Immediate Recall Raw Score: 10/20    Delayed Recall Raw Score: 8/20        Assessment/Plan    GOALS    STG    -     Pt will express understanding for HEP and demonstrate carry over in therapy sessions   - Pt will maintain attention to task for 15 minutes in multimodal environment for baseline performance, improved role performance and to improve learning and to simulate return to leisure and IADLs  - Pt will demonstrate ability to participate in dual tasking/divided attention task with >75 % accuracy in multimodal environment to simulate return to leisure and IADLs  - Pt will demonstrate ability to alternate attention between 2 tasks with cog loading and >75% accuracy with good retention of task directions in multimodal environment to simulate return to leisure and IADLs  - Pt will exhibit improved immediate and delayed recall on the Contextual Memory Test when compared to initial evaluation   - Pt will demonstrate improved precision with fine motor coordination by 3+ seconds L/R on Nine Hole Peg test when compared to IE  - Pt will demonstrate improved fine motor coordination on Functional Dexterity Test by 3+ seconds L/R when compared to IE    LTG  - Pt will maintain attention to task for 30 minutes in multimodal environment for baseline performance, improved role performance and to improve learning and to simulate return to leisure and IADLs  - Pt will demonstrate ability to participate in dual tasking/divided attention task with >85% accuracy in multimodal environment to simulate return to leisure and IADLs  - Pt will demonstrate ability to alternate attention between 2 tasks with cog loading and >85% accuracy with good retention of task directions in multimodal environment to simulate return to leisure and IADLs  - Pt will exhibit improved immediate and delayed recall on the Contextual Memory Test when compared to initial evaluation         -     Pt will demonstrate improved precision with fine motor coordination by 5+ seconds L/R on Nine Hole Peg test when compared to IE        -     Pt will demonstrate improved fine motor coordination on Functional Dexterity Test by 5+ seconds L/R when compared to IE  - Pt will report feeling >75% back to self with increased independence on daily tasks  - Pt will express readiness for discharge with reported improved confidence with fine motor tasks and memory  Planned Treatment Interventions  Internal and external memory aides   Multimatrix for saccades/ visual clutter/attention  Hypersensitivity strategies education  Multi-modal environment  Sustained/alternating/divided attention  Tracking tube  Oculomotor control:  saccades, con/divergence  Conv /div   Dynamic tasks  Work stations with timed transitions  Temporal Awareness: Organize the Hour activities  Memory and mental manipulation  Auditory processing with immediate recall  Memory retention with immediate and delayed recall  Edu on cog/vision apps  Seven chapman scanning sheets

## 2022-11-29 ENCOUNTER — APPOINTMENT (OUTPATIENT)
Dept: OCCUPATIONAL THERAPY | Facility: MEDICAL CENTER | Age: 67
End: 2022-11-29

## 2022-11-29 ENCOUNTER — TELEPHONE (OUTPATIENT)
Dept: GASTROENTEROLOGY | Facility: CLINIC | Age: 67
End: 2022-11-29

## 2022-11-29 NOTE — TELEPHONE ENCOUNTER
Pt daughter called in stating pt received a referral from PCP and would like to schedule an appt  Pt daughter states that pt really needs to be seen as soon as possible  Please call to schedule

## 2022-12-01 ENCOUNTER — OFFICE VISIT (OUTPATIENT)
Dept: OCCUPATIONAL THERAPY | Facility: MEDICAL CENTER | Age: 67
End: 2022-12-01

## 2022-12-01 ENCOUNTER — OFFICE VISIT (OUTPATIENT)
Dept: PHYSICAL THERAPY | Facility: MEDICAL CENTER | Age: 67
End: 2022-12-01

## 2022-12-01 DIAGNOSIS — F02.80 LEWY BODY DEMENTIA WITHOUT BEHAVIORAL DISTURBANCE (HCC): Primary | ICD-10-CM

## 2022-12-01 DIAGNOSIS — G31.83 LEWY BODY DEMENTIA WITHOUT BEHAVIORAL DISTURBANCE (HCC): Primary | ICD-10-CM

## 2022-12-01 NOTE — PROGRESS NOTES
Daily Note     Today's date: 2022  Patient name: Natalie Antonio  : 1955  MRN: [id number]  Referring provider: Mike Goldstein MD  Dx:   Encounter Diagnosis     ICD-10-CM    1  [facility] body [facility]  G31 83     F02 80           Start Time: 1700  Stop Time: 7689  Total time in clinic (min): 45 minutes    Subjective: Patient reported no adverse findings, presented from OT session, no soreness from last session  Objective: See treatment diary below- 3# cuff weights donned for all intervention    BOSU step ups- forward, laterally, backwards- 20 reps each, 1 UE  Tandem walking- 10 cycles of 10 feet firm  Side stepping on foam beam- 10 cycles of 10 feet  Hurdles- 9", forward, laterally, added backwards- 10 cycles of 10 feet  Sit to stand with overhead press with tidal tank- 20 reps  Core rotations- 20 reps each direction holding tidal tank- horiziontal, vertical, D2 flexion    Assessment: Tolerated treatment well  Patient demonstrated fatigue post treatment, exhibited good technique with therapeutic exercises and would benefit from continued PT to maximize function post dx of Lewy body dementia  Patient struggles and challenged with compliant surfaces today, patient improving with recovery but requires consistent verbal and tactile cuing in order for safe performance of interventions  Plan: Continue per plan of care  Progress treatment as tolerated

## 2022-12-02 NOTE — PROGRESS NOTES
Occupational Therapy Daily Note:    Today's date: 2022  Patient name: George Lopez  : 1955  MRN: [id number]  Referring provider: Marilyn Hodgkins, MD  Dx:   Encounter Diagnosis   Name Primary? • Lewy body dementia without behavioral disturbance ([facility] [address] ) Yes     POC START DATE: 2022  POC END DATE: 2022  NEXT PN DUE: 2022  FREQUENCY: 1-2x wk for 12 weeks    Subjective: "The tremors are way worse in the mornings "    Objective:     Neuromuscular Re-Education -     Theraputty Exercises     Patient educated and instructed on home exercise program for FMS/FMC with use of soft/medium resist theraputty to inc indep with ADL fxn including container management, fastener management, and item retrieval  Pt provided with demonstration and verbal instruction and demo G understanding of task  Exercises instructed as followed: , Pinch, Roll    Parkinson's Exercises (HEP) - Quick & Forceful Movements  - B/L Flicking Fingers  - B/L Wrist Flexion/Extension  - B/L Forearm Supination & Pronation  - B/L MP Flexion/Extension  - B/L [place] "Claws"   - B/L Digit Opposition D1-D5    Adaptive Equipment Training  Handiweight with Grooved Pegboard  Handwriting with Weighted Pen  Weighted Utensils    Therapeutic Activity -     Education on Compensatory Memory Strategies  Immediate Memory Recall without chunkin10  Immediate Memory Recall with chuking: 10/10  Delayed Recall: 10/10 and 1 vc     Verbal Memory Recall  Word List Retention x 10  Functional Memory Tasks Exercise 7 and 8    Assessment: Tolerated treatment well  Pt session began with education of Parkinsons Exercises and theraputty HEP for hand strengthening and tremor reduction  Educated on use of adaptive equipment, handiweight, weighted pen, and weighted utensils  Education on compensatory memory strategies followed by visual memory recall with pictures  Good carry over of chunking memory strategy demonstrated   Increased difficulty with verbal memory recall, pt indicates this area is something he struggles with when communicating to his wife  Patient would benefit from continued skilled OT  Plan: Continued skilled OT per POC

## 2022-12-05 ENCOUNTER — OFFICE VISIT (OUTPATIENT)
Dept: PHYSICAL THERAPY | Facility: MEDICAL CENTER | Age: 67
End: 2022-12-05

## 2022-12-05 ENCOUNTER — OFFICE VISIT (OUTPATIENT)
Dept: OCCUPATIONAL THERAPY | Facility: MEDICAL CENTER | Age: 67
End: 2022-12-05

## 2022-12-05 DIAGNOSIS — F02.80 LEWY BODY DEMENTIA WITHOUT BEHAVIORAL DISTURBANCE (HCC): Primary | ICD-10-CM

## 2022-12-05 DIAGNOSIS — G31.83 LEWY BODY DEMENTIA WITHOUT BEHAVIORAL DISTURBANCE (HCC): Primary | ICD-10-CM

## 2022-12-05 NOTE — PROGRESS NOTES
Occupational Therapy Daily Note:    Today's date: 2022  Patient name: Catherine Hammer  : 1955  MRN: [id number]  Referring provider: Eneida Cash MD  Dx:   Encounter Diagnosis   Name Primary? • Lewy body dementia without behavioral disturbance ([facility] [address] ) Yes     POC START DATE: 2022  POC END DATE: 2022  NEXT PN DUE: 2022  FREQUENCY: 1-2x wk for 12 weeks    Subjective: "Tremors are fine "    Objective: Therapeutic Activity:    Visual Scanning, Visual Perception  Multimatrix Symbol Matching   Visual Closure and Figure Ground Symbol Card  Mod vc for identification  Time: 30 minutes    NeuroMuscular Re-education:    Functional Reaching, Fine Motor Coordination, Tremor Management  Connect 4 Reaching Translation of Chip in finger one time  Connect 4 Reaching Palm to Fingertip and Fingertip to Palm Translation 2 at a time  Time: 15 minutes    Assessment: Tolerated treatment well  Pt session began with UE functional reaching L/R with fine motor coordination translation of chips to place into connect 4  Visual perception card for connect 4 with few errors able to be self-identified  Pt required mod vc and clarification from therapist while completing multimatrix with visual perception visual closure and figure ground cards  Patient would benefit from continued skilled OT  Plan: Continued skilled OT per POC

## 2022-12-05 NOTE — PROGRESS NOTES
Daily Note     Today's date: 2022  Patient name: Cee Beckham  : 1955  MRN: [id number]  Referring provider: Cedric Hutson MD  Dx:   Encounter Diagnosis     ICD-10-CM    1  Lewy body dementia without behavioral [facility]  G31 83     F02 80           Start Time: 7686  Stop Time: 7883  Total time in clinic (min): 45 minutes    Subjective: Patient reported no adverse findings, patient notes no falls  Objective: See treatment diary below- 4# cuff weights donned for all intervention    BOSU step ups- forward, laterally, backwards- 20 reps each, 1 UE  Tandem walking- 10 cycles of 10 feet firm  Side stepping on foam beam- 10 cycles of 10 feet  Hurdles- 9", forward, laterally, added backwards- 10 cycles of 10 feet  Sit to stand with overhead press with tidal tank- 20 reps  Core rotations- 20 reps each direction holding tidal tank- horiziontal, vertical, D2 flexion  Walking with tidal tank-10 cycles of 10 feet forward, laterally, backwards    Assessment: Tolerated treatment well  Patient demonstrated fatigue post treatment, exhibited good technique with therapeutic exercises and would benefit from continued PT to maximize function post dx of Lewy body dementia  Patient requires increased rest breaks between sessions due to fatigue and hx of COPD  Patient noted 1 LOB with posterior unsupported stepping on BOSU ball  Plan: Continue per plan of care  Progress treatment as tolerated

## 2022-12-08 ENCOUNTER — OFFICE VISIT (OUTPATIENT)
Dept: PHYSICAL THERAPY | Facility: MEDICAL CENTER | Age: 67
End: 2022-12-08

## 2022-12-08 ENCOUNTER — OFFICE VISIT (OUTPATIENT)
Dept: OCCUPATIONAL THERAPY | Facility: MEDICAL CENTER | Age: 67
End: 2022-12-08

## 2022-12-08 DIAGNOSIS — F02.80 LEWY BODY DEMENTIA WITHOUT BEHAVIORAL DISTURBANCE (HCC): Primary | ICD-10-CM

## 2022-12-08 DIAGNOSIS — G31.83 LEWY BODY DEMENTIA WITHOUT BEHAVIORAL DISTURBANCE (HCC): Primary | ICD-10-CM

## 2022-12-08 NOTE — PROGRESS NOTES
Daily Note     Today's date: 2022  Patient name: Izabela Valverde  : 1955  MRN: [id number]  Referring provider: Salvatore Guevara MD  Dx:   Encounter Diagnosis     ICD-10-CM    1  Lewy body dementia without [facility] ([facility]  G31 83     F02 80           Start Time: 1530  Stop Time: 2290  Total time in clinic (min): 45 minutes    Subjective: Patient reported no adverse findings, patient notes no falls, patient challenged with overall intervention activities     Objective: See treatment diary below- 5# cuff weights donned for all intervention    BOSU step ups- forward, laterally, backwards- 20 reps each, 1 UE  Tandem walking- 10 cycles of 10 feet firm  Backwards tandem walking- 10 cycles of 10 feet on firm  Side stepping on foam beam- 10 cycles of 10 feet  Tandem walking on foam beam- 10 cycles of 10 feet   Hurdles- 12", forward, laterally, added backwards- 10 cycles of 10 feet  Sit to stand with overhead press with tidal tank- 20 reps  Core rotations- 20 reps each direction holding tidal tank- horiziontal, vertical, D2 flexion  Walking with tidal tank-10 cycles of 10 feet forward, laterally, backwards    Assessment: Tolerated treatment well  Patient demonstrated fatigue post treatment, exhibited good technique with therapeutic exercises and would benefit from continued PT to maximize function post dx of Lewy body dementia  Patient challenged with unsupported balance activities and challenges with narrow base compliant surface training simulating tasks such as working in tight spaces  Tolerated increase in weight well today  Plan: Continue per plan of care  Progress treatment as tolerated

## 2022-12-08 NOTE — PROGRESS NOTES
Occupational Therapy Daily Note:    Today's date: 2022  Patient name: Rony Ambrose  : 1955  MRN: [id number]  Referring provider: Ney Lewis MD  Dx:   No diagnosis found  POC START DATE: 2022  POC END DATE: 2022  NEXT PN DUE: 2022  FREQUENCY: 1-2x wk for 12 weeks    Subjective: "I forgot to put on different shoes today to come here "    Objective: Therapeutic Activity    Problem Solving, Sustained Attention  Word Scramble   Time: 15 minutes  Immediate Recall: 3/10  Immediate Recall x 2: 5/10    Verbal Memory Recall  Logic Memory #1  Trial 1: 2  Trial 2: 5  Trial 3: 11  Trial 4: 12    Visual Memory Recall  Newspaper Advertisements  Completed x 3, approximately 60% accuracy    Assessment: Tolerated treatment well  Pt demonstrated good sustained attention throughout session  Decreased confidence with all therapeutic activities today  Decreased immediate recall abilities with word scramble  Improved recall with each trial during, however, decreased performance  Approximately 60% accuracy with visual memory recall  Patient would benefit from continued skilled OT  Plan: Continued skilled OT per POC

## 2022-12-12 ENCOUNTER — OFFICE VISIT (OUTPATIENT)
Dept: OCCUPATIONAL THERAPY | Facility: MEDICAL CENTER | Age: 67
End: 2022-12-12

## 2022-12-12 ENCOUNTER — OFFICE VISIT (OUTPATIENT)
Dept: PHYSICAL THERAPY | Facility: MEDICAL CENTER | Age: 67
End: 2022-12-12

## 2022-12-12 DIAGNOSIS — F02.80 LEWY BODY DEMENTIA WITHOUT BEHAVIORAL DISTURBANCE (HCC): Primary | ICD-10-CM

## 2022-12-12 DIAGNOSIS — G31.83 LEWY BODY DEMENTIA WITHOUT BEHAVIORAL DISTURBANCE (HCC): Primary | ICD-10-CM

## 2022-12-12 NOTE — PROGRESS NOTES
Daily Note     Today's date: 2022  Patient name: Silvestre Farris  : 1955  MRN: [id number]  Referring provider: Madison Mccullough MD  Dx:   Encounter Diagnosis     ICD-10-CM    1  Lewy body dementia without behavioral disturbance [facility]  G31 83     F02 80           Start Time: 6090  Stop Time: 8383  Total time in clinic (min): 45 minutes    Subjective: Patient reported no adverse findings, patient notes no falls, patient notes he has been improving his endurance over the past few weeks  Objective: See treatment diary below- 16# vest donned during all interventions      BOSU step ups- forward, laterally, backwards- 20 reps each, 1 UE  Tandem walking- 10 cycles of 10 feet firm  Backwards tandem walking- 10 cycles of 10 feet on firm  Side stepping on foam beam- 10 cycles of 10 feet  Tandem walking on foam beam- 10 cycles of 10 feet   Hurdles- 12", forward, laterally, added backwards- 10 cycles of 10 feet  Sit to stand with overhead press with tidal tank- 20 reps  Core rotations- 20 reps each direction holding tidal tank- horiziontal, vertical, D2 flexion  Walking with tidal tank-10 cycles of 10 feet forward, laterally, backwards    Goals  Short Term Goals (4 weeks):    - Patient will improve time on TUG by 2 9 seconds  to facilitate improved safety in all ambulation- NOT MET  - Patient will be independent in basic HEP 2-3 weeks- NOT MET  - Patient will improve 5xSTS score by 2 3 seconds to promote improved LE functional strength needed for ADLs- NOT MET  - Patient will complete components of HiMAT to promote agility necessary for sports related tasks- NOT MET     Long Term Goals (12 weeks):  - Patient will be independent in a comprehensive home exercise program- NOT MET  - Patient will improve scoring on DGI by 2 6 points to progress safety- MET  - Patient will improve gait speed by 0 18 m/s to improve safety with community ambulation- NOT MET  - Patient will improve PENDLETON by 6 points in order to improve static balance and reduce risk for falls- NOT MET  - Patient will improve scoring on FGA by 4 points from to progress safety with dynamic tasks- NOT MET  - Patient will be able to demonstrate HT in gait without veering- MET  - Patient will improve 6 Minute Walk Test score by 190 feet to promote improved cardiovascular endurance- NOT MET  - Patient will report 50% reduction in near falls in order to improve safety with functional tasks and reduce his risk for falls- NOT MET  - Patient will report going on walks at least 3 days per week to promote independence and improved cardiovascular endurance- NOT MET  - Patient will be able to ascend/descend stairs reciprocally with 1 UE assist to promote independence and safety with ADLs- MET  - Patient will report 50% reduction in near falls when ambulating on uneven terrain- MET        Outcome Measures Initial Eval  11/14/2022 Progress  Note  12/12/2022       5xSTS 10 16 sec 9 56 seconds       TUG  - Regular  - Cognitive   8 56 sec  10 23 sec   8 11 sec  10 13 sec       10 meter 1 07 m/s 1 08 m/s       PENDLETON 45/56 47/56       FGA 23/30 25/30       DGI 21/24 23/24       mCTSIB  - FTEO (firm)  - FTEC (firm)  - FTEO (foam)  - FTEC (foam)   30 sec  30 sec  30 sec  10 sec   30 sec  30 sec  30 sec  15 sec       6MWT 1100 ft 1210 feet       30 second 15 reps no UE 15 reps no UE                    Assessment: Tolerated treatment well  Patient demonstrated fatigue post treatment, exhibited good technique with therapeutic exercises and would benefit from continued PT to maximize function post dx of Lewy body dementia  Patient tolerated all interventions with weighted vest well today, noted an increase in fatigue with all intervention activities today   Patient progressing in his fall risk stratification as well as changes with his overall intervention abilities today, patient noting endurance challenges such as 30 second sit to stand and 6 minute walk test        Plan: Continue per plan of care  Progress treatment as tolerated

## 2022-12-12 NOTE — PROGRESS NOTES
Occupational Therapy Daily Note:    Today's date: 2022  Patient name: Owen Gabriel  : 1955  MRN: [id number]  Referring provider: Ulises Rodriguez MD  Dx:   No diagnosis found  POC START DATE: 2022  POC END DATE: 2022  NEXT PN DUE: 2022  FREQUENCY: 1-2x wk for 12 weeks    Subjective: "I am trying to use my brain more at home  Doing projects with numbers "    Objective: Therapeutic Activity    Connect the Dots Alphabetical  Mod vc for sequencing of the alphabet  Time: 15 minutes    Problem Solving, Direction Following, Sustained Attention  Word Transformation Letter Placement  Completed x 4 with mod vc    Sustained Attention, Numerical Sequences  Completed #1-18 with mod vc    Assessment: Tolerated treatment well  Pt demonstrated good sustained attention throughout session  Pt required mod vc for sequencing today with numerical sequences and picture connect the dots for alphabet  Difficulty with direction following letter placement, mod vc required for accuracy  Patient would benefit from continued skilled OT  Plan: Continued skilled OT per POC

## 2022-12-15 ENCOUNTER — APPOINTMENT (OUTPATIENT)
Dept: PHYSICAL THERAPY | Facility: MEDICAL CENTER | Age: 67
End: 2022-12-15

## 2022-12-15 ENCOUNTER — APPOINTMENT (OUTPATIENT)
Dept: OCCUPATIONAL THERAPY | Facility: MEDICAL CENTER | Age: 67
End: 2022-12-15

## 2022-12-15 ENCOUNTER — TELEPHONE (OUTPATIENT)
Dept: GASTROENTEROLOGY | Facility: CLINIC | Age: 67
End: 2022-12-15

## 2022-12-15 NOTE — TELEPHONE ENCOUNTER
LMOM for patient to phone back if patient can accept a sooner appt   Ezra Baxter Appt offered is 12/16  Ezra Baxter

## 2022-12-19 ENCOUNTER — OFFICE VISIT (OUTPATIENT)
Dept: OCCUPATIONAL THERAPY | Facility: MEDICAL CENTER | Age: 67
End: 2022-12-19

## 2022-12-19 ENCOUNTER — OFFICE VISIT (OUTPATIENT)
Dept: PHYSICAL THERAPY | Facility: MEDICAL CENTER | Age: 67
End: 2022-12-19

## 2022-12-19 DIAGNOSIS — G31.83 LEWY BODY DEMENTIA WITHOUT BEHAVIORAL DISTURBANCE (HCC): Primary | ICD-10-CM

## 2022-12-19 DIAGNOSIS — F02.80 LEWY BODY DEMENTIA WITHOUT BEHAVIORAL DISTURBANCE (HCC): Primary | ICD-10-CM

## 2022-12-19 NOTE — PROGRESS NOTES
Occupational Therapy Daily Note:    Today's date: 2022  Patient name: Marisabel Mendieta  : 1955  MRN: [id number]  Referring provider: Judy Eddy MD  Dx:   No diagnosis found  POC START DATE: 2022  POC END DATE: 2022  NEXT PN DUE: 2022  FREQUENCY: 1-2x wk for 12 weeks    Subjective: "This is going to be really hard for me " (pertaining to arrow word search)    Objective: Therapeutic Activity    Visual Scanning, Visual Perception  Arrows Word Search  Task Terminated due to difficulty    Visual Perception, Sustained Attention  Pixy Cubes  Completed x 4  Min vc provided     Sequencing, Sustained Attention  Community Sequencing Completed #1-5  HEP provided     Assessment: Tolerated treatment well  Pt demonstrated difficulty with visual perception today  Unable to complete arrows crossword, decreased visual perceptual skills  Min vc required for Pixy Cube puzzles  Provided with community sequencing #1-4 with fair abilities, few errors with performance  Increased tremor noted throughout session today with manipulation of pixy cubesPatient would benefit from continued skilled OT  Plan: Continued skilled OT per POC

## 2022-12-19 NOTE — PROGRESS NOTES
Daily Note     Today's date: 2022  Patient name: Kathy Ojeda  : 1955  MRN: [id number]  Referring provider: Benjamin Pinto MD  Dx:   Encounter Diagnosis     ICD-10-CM    1  Lewy body dementia without behavioral disturbance ([facility] )  G31 83     F02 80           Start Time: 6341  Stop Time: 1630  Total time in clinic (min): 60 minutes    Subjective: Patient reported no adverse findings, patient reported no falls  Objective: See treatment diary below- 16# vest donned    BOSU step ups- forward, laterally, backwards- 20 reps each, 1 UE  Tandem walking- 10 cycles of 10 feet foam beam  Backwards tandem walking- 10 cycles of 10 feet on foam beam  Side stepping on foam beam- 10 cycles of 10 feet  Hurdles- 12", forward, laterally, added backwards- 10 cycles of 10 feet  Sit to stand with overhead press with tidal tank- 20 reps  Core rotations- 20 reps each direction holding tidal tank- horiziontal, vertical, D2 flexion  Walking with tidal tank-10 cycles of 10 feet forward, laterally, backwards  Cone taps- laterally and tandem, added foam beam- 10 cycles of 10 feet     Assessment: Tolerated treatment well  Patient demonstrated fatigue post treatment, exhibited good technique with therapeutic exercises and would benefit from continued PT to maximize function post dx of Lewy body dementia  Patient progressed with weighted vest today, patient noted no overt difficulties, challenged with overall endurance and postural stability with foam activities, noted haptic touch required to prevent LOB, patient notes posterior LOB with step ups on BOSU ball  Plan: Continue per plan of care  Progress treatment as tolerated

## 2022-12-22 ENCOUNTER — APPOINTMENT (OUTPATIENT)
Dept: OCCUPATIONAL THERAPY | Facility: MEDICAL CENTER | Age: 67
End: 2022-12-22

## 2022-12-22 ENCOUNTER — APPOINTMENT (OUTPATIENT)
Dept: PHYSICAL THERAPY | Facility: MEDICAL CENTER | Age: 67
End: 2022-12-22

## 2022-12-23 ENCOUNTER — APPOINTMENT (OUTPATIENT)
Dept: OCCUPATIONAL THERAPY | Facility: MEDICAL CENTER | Age: 67
End: 2022-12-23

## 2022-12-23 ENCOUNTER — APPOINTMENT (OUTPATIENT)
Dept: PHYSICAL THERAPY | Facility: MEDICAL CENTER | Age: 67
End: 2022-12-23

## 2022-12-28 ENCOUNTER — TELEPHONE (OUTPATIENT)
Dept: FAMILY MEDICINE CLINIC | Facility: CLINIC | Age: 67
End: 2022-12-28

## 2022-12-28 DIAGNOSIS — I10 PRIMARY HYPERTENSION: Primary | ICD-10-CM

## 2022-12-28 DIAGNOSIS — E78.00 HYPERCHOLESTEROLEMIA: ICD-10-CM

## 2022-12-28 DIAGNOSIS — G62.9 NEUROPATHY: ICD-10-CM

## 2022-12-28 DIAGNOSIS — E61.1 IRON DEFICIENCY: ICD-10-CM

## 2022-12-28 DIAGNOSIS — E03.9 HYPOTHYROIDISM, UNSPECIFIED TYPE: ICD-10-CM

## 2022-12-28 DIAGNOSIS — E11.9 DIABETES MELLITUS WITHOUT COMPLICATION (HCC): ICD-10-CM

## 2022-12-28 NOTE — TELEPHONE ENCOUNTER
Pt will be getting labs done Fri day and daughter is requesting that full iron panel / ferraton be added    Pt has to  the hard copies of the labs as he doesn't go to a [facility] lab     She also requested that she get a cll back to let her know the iron panel has been added  Odilia Kam She wants to be sure everything is in order for when he comes Friday

## 2023-01-05 ENCOUNTER — OFFICE VISIT (OUTPATIENT)
Dept: OCCUPATIONAL THERAPY | Facility: MEDICAL CENTER | Age: 68
End: 2023-01-05

## 2023-01-05 ENCOUNTER — OFFICE VISIT (OUTPATIENT)
Dept: PHYSICAL THERAPY | Facility: MEDICAL CENTER | Age: 68
End: 2023-01-05

## 2023-01-05 DIAGNOSIS — G31.83 LEWY BODY DEMENTIA WITHOUT BEHAVIORAL DISTURBANCE (HCC): Primary | ICD-10-CM

## 2023-01-05 DIAGNOSIS — F02.80 LEWY BODY DEMENTIA WITHOUT BEHAVIORAL DISTURBANCE (HCC): Primary | ICD-10-CM

## 2023-01-05 NOTE — PROGRESS NOTES
Daily Note     Today's date: 2023  Patient name: Izabela Valverde  : 1955  MRN: [id number]  Referring provider: Salvatore Guevara MD  Dx:   Encounter Diagnosis     ICD-10-CM    1  Lewy body dementia without behavioral disturbance ([email or web address] )  G31 83     F02 80           Start Time: 1500  Stop Time: 1600  Total time in clinic (min): 60 minutes    Subjective: Patient reported no loss of balance or falls in the past few weeks  Objective: See treatment diary below- 16# vest donned, 5# weights on bilateral LE    BOSU step ups- forward, laterally, backwards- 20 reps each, 1 UE  Tandem walking- 10 cycles of 10 feet foam beam  Backwards tandem walking- 10 cycles of 10 feet on foam beam  Side stepping on foam beam- 10 cycles of 10 feet  Hurdles- 12", forward, laterally, added backwards- 10 cycles of 10 feet  Sit to stand with overhead press with tidal tank- 20 reps, 10#  Core rotations- 20 reps each direction holding tidal tank- horiziontal, vertical, D2 flexion, 2# weight  Walking with tidal tank-10 cycles of 10 feet forward, laterally, backwards  Cone taps- laterally and tandem, added foam beam- 10 cycles of 10 feet   Tandem walking through cones- 10 cycles of 10 feet  Backward walking through cones- 10 cycles of 10 feet     Assessment: Tolerated treatment well  Patient demonstrated fatigue post treatment, exhibited good technique with therapeutic exercises and would benefit from continued PT to maximize function post dx of Lewy body dementia  Patient progressed with weighted vest today and weights donned on bilateral LE  Patient reported increased fatigue and by his reports "making my COPD worse"  Patient demonstrated NM control challenges and foot placement challenges with his placement over hurdles and through cones as well as cone tapping  Patient challenged with dual tasking  Plan: Continue per plan of care  Progress treatment as tolerated

## 2023-01-05 NOTE — PROGRESS NOTES
Occupational Therapy Daily Note:    Today's date: 2023  Patient name: Tima Coburn  : 1955  MRN: [id number]  Referring provider: Samuel Barber MD  Dx:   Encounter Diagnosis   Name Primary? • Lewy body dementia without behavioral disturbance ([facility] ) Yes     POC START DATE: 2022  POC END DATE: 2022  NEXT PN DUE: 2022  FREQUENCY: 1-2x wk for 12 weeks    Subjective: "My wife handles all of this (scheduling) "    Objective:     Neuromuscular Re-education -     Working Memory, Memory Recall  40 Card Sort with Working Memory  Memorization of two cards at a time  Mod vc for completion   Re-checks required   Time: 20 minutes    Alternating Attention, Sustained Attention  Connect the Dots - Alternating in Conjunction with Multimatrix Trail Making   Max vc for completion  Time: 25 minutes    Therapeutic Activity     Direction Following, Sustained Attention  Connect The Dots - Integration  Time: 5 minutes     Pattern Recognition, Sequencing, Problem Solving  Picture Analogies, Completed x 3  1 vc for 100% completion  Time: 5 minutes    Assessment: Tolerated treatment well  Pt demonstrated difficulty with memorization of two cards with 40 card sort  Mod vc and rechecks required for completion indicating continued deficits with memory recall  Pt unable to maintain hold of cards in hand, indicating decreased fine motor coordination  Pt exhibited difficulty with connect the dots trail making, multimatrix was utilized to downgrade task  Max vc for accurate completion indicating deficits of alternating attention  Improved direction following towards end of task with connecting the dots integrated  Good pattern recognition with visual objects  Patient would benefit from continued skilled OT  Plan: Continued skilled OT per POC

## 2023-01-10 ENCOUNTER — EVALUATION (OUTPATIENT)
Dept: OCCUPATIONAL THERAPY | Facility: MEDICAL CENTER | Age: 68
End: 2023-01-10

## 2023-01-10 ENCOUNTER — OFFICE VISIT (OUTPATIENT)
Dept: PHYSICAL THERAPY | Facility: MEDICAL CENTER | Age: 68
End: 2023-01-10

## 2023-01-10 DIAGNOSIS — F02.80 LEWY BODY DEMENTIA WITHOUT BEHAVIORAL DISTURBANCE (HCC): Primary | ICD-10-CM

## 2023-01-10 DIAGNOSIS — G31.83 LEWY BODY DEMENTIA WITHOUT BEHAVIORAL DISTURBANCE (HCC): Primary | ICD-10-CM

## 2023-01-10 NOTE — PROGRESS NOTES
Daily Note/Progress Note  POC- 2022 to 2023      Today's date: 1/10/2023  Patient name: Arjun Velez  : 1955  MRN: [id number]  Referring provider: Ronnie Yen MD  Dx:   Encounter Diagnosis     ICD-10-CM    1  Lewy body dementia without behavioral disturbance ([facility] )  G31 83     F02 80           Start Time: 6617  Stop Time: 6086  Total time in clinic (min): 45 minutes    Subjective: Patient reported no adverse findings, patient notes no updates, feels that "we are on the right track"  Notes progression throughout PT POC and feels he is improving  Objective: See treatment diary below- 16# vest donned during all interventions      BOSU step ups- forward, laterally, backwards- 20 reps each, 1 UE  Hurdles- 12", forward, laterally, added backwards- 10 cycles of 10 feet, no UE  Sit to stand with overhead press with kettle bell- 10#  Sit to stands on foam eyes open, sit to stand on foam eyes closed- 10 reps each no UE      Goals  Short Term Goals (4 weeks):    - Patient will improve time on TUG by 2 9 seconds  to facilitate improved safety in all ambulation- NOT MET  - Patient will be independent in basic HEP 2-3 weeks- NOT MET  - Patient will improve 5xSTS score by 2 3 seconds to promote improved LE functional strength needed for ADLs- NOT MET  - Patient will complete components of HiMAT to promote agility necessary for sports related tasks- NOT MET     Long Term Goals (12 weeks):  - Patient will be independent in a comprehensive home exercise program- NOT MET  - Patient will improve scoring on DGI by 2 6 points to progress safety- MET  - Patient will improve gait speed by 0 18 m/s to improve safety with community ambulation- NOT MET  - Patient will improve PENDLETON by 6 points in order to improve static balance and reduce risk for falls- NOT MET  - Patient will improve scoring on FGA by 4 points from to progress safety with dynamic tasks- NOT MET  - Patient will be able to demonstrate HT in gait without veering- MET  - Patient will improve 6 Minute Walk Test score by 190 feet to promote improved cardiovascular endurance- NOT MET  - Patient will report 50% reduction in near falls in order to improve safety with functional tasks and reduce his risk for falls- NOT MET  - Patient will report going on walks at least 3 days per week to promote independence and improved cardiovascular endurance- NOT MET  - Patient will be able to ascend/descend stairs reciprocally with 1 UE assist to promote independence and safety with ADLs- MET  - Patient will report 50% reduction in near falls when ambulating on uneven terrain- MET    All date taken from [facility] or Rehab Measures      Wells/64  MDC: 6 pts  Age Norms:  61-76: M - 54   F - 55  70-79: M - 47   F - 53  80-89: M - 48   F - 50 5xSTS: Kyle et al 2010  MDC: 2 3 sec  Age Norms:  60-69: 11 1 sec  70-79: 12 6 sec  80-89: 14 8 sec   TUG  MDC: 4 14 sec  Cut off score:  >13 5 sec community dwelling adults  >32 2 frail elderly  <20 I for basic transfers  >30 dependent on transfers 10 Meter Walk Test: Mandi del toro 2011  MDC: 0 18 m/s  20-29: M - 1 35 m   F - 1 34 m  30-39: M - 1 43 m   F - 1 34 m  40-49: M - 1 43 m   F - 1 39 m  50-59: M - 1 43 m   F - 1 31 m  60-69: M - 1 34 m   F - 1 24 m  70-79: M - 1 26 m   F - 1 13 m  80-89: M - 0 97 m   F - 0 94 m    Household Ambulator < 0 4 m/s  Limited Community Ambulator 0 4 - 0 8 m/s  [employer] Ambulator 0 8 - 1 2 m/s  Safely cross the street > 1 2 m/s   FGA  MCID: 4 pts  Geriatrics/community < 22/30 fall risk  Geriatrics/community < 20/30 unexplained falls    DGI  MDC: vestibular - 4 pts  MDC: geriatric/community - 3 pts  Falls risk <19/24 mCTSIB  Norm: 20-60 yrs  Eyes open firm: norm sway 0 21-0 48  Eyes closed firm: norm sway 0 48-0 99  Eyes open foam: norm sway 0 38-0 71  Eyes closed foam: norm sway 0 70-2 22   6 Minute Walk Test  MDC: 190 98 ft  MCID: 164 ft    Age Norms  60-69: M - 1876 ft (571 80 m) F - 1765 ft (537 98 m)  70-79: M - 1729 ft (527 00 m)  F - 1545 ft (470 92 m)  80-89: M - 1368 ft (416 97 m)  F - 1286 ft (391 97 m) ABC: Olga Ours, 2003  <67% increased risk for falls           Outcome Measures Initial Eval  11/14/2022 Progress  Note  12/12/2022 Progress Note  1/10/2023      5xSTS 10 16 sec 9 56 seconds 9 10 seconds      TUG  - Regular  - Cognitive   8 56 sec  10 23 sec   8 11 sec  10 13 sec   7 51 sec  10 63 sec      10 meter 1 07 m/s 1 08 m/s 1 11 m/s cued to increase speed      PENDLETON 45/56 47/56 49/56      FGA 23/30 25/30 26/30      DGI 21/24 23/24 23/24      mCTSIB  - FTEO (firm)  - FTEC (firm)  - FTEO (foam)  - FTEC (foam)   30 sec  30 sec  30 sec  10 sec   30 sec  30 sec  30 sec  15 sec   30 sec  30 sec  30 sec  30 sec      6MWT 1100 ft 1210 feet 1250 feet      30 second 15 reps no UE 15 reps no UE 15 reps no UE                   Assessment: Tolerated treatment well  Patient demonstrated fatigue post treatment, exhibited good technique with therapeutic exercises and would benefit from continued PT to maximize function post dx of Lewy body dementia  Patient tolerated all interventions with weighted vest well today, noted an increase in fatigue with all intervention activities today  Patient continuing to progress with his functionality with his overall intervention tolerance, although endurance continually limited with his overall function as noted with 6 minute walk test and 30 second sit to stand test, potential limit may be that patient has Hx of COPD  Plan: Continue per plan of care  Progress treatment as tolerated

## 2023-01-10 NOTE — PROGRESS NOTES
OCCUPATIONAL THERAPY RE EVALUATION    1/10/2023  Suzie Sumner  1955  [id number]  Temo Rowell MD  1  Lewy body dementia without behavioral disturbance ([facility] )        POC START DATE: 11/28/2022  POC END DATE: 2/13/2022  NEXT PN DUE: 12/28/2022  FREQUENCY: 1-2x wk for 12 weeks    SUBJECTIVE: Pt presents to OT evaluation on 11/28/2022 secondary to Lewy Body Dementia  Hard time with fine motor coordination  "I was always an active person  I am not happy with how I am anymore " Diagnosis of Lewy Body Dementia about 7 years ago  Difficulty with changing hearing aid batteries at beginning of session  Concerns with balance, close to falling at home  Concerns with dexterity and fine motor coordination  Tremors are worse in the R, R hand dominant  Takes Parkinson's Medication 3 times a day  Decreased cognition over the last few years  Work: Retired  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, [employer]  Retired because of diagnosis  IADLs: Tremors do not impact eating  Occasional difficulty with buttoning and zippers  No difficulty with driving  Difficulty with putting a key into a door  Difficulty with tool work  Uses medication box and wife helps with medication management  Sleep: No difficulty with sleeping  Sleeps 8-10 hours  Lives with his wife  At re-evaluation on 1/10: "Not bad " "Memory is still an issue but it is getting better  I am noticing that in everyday life " "My tremors are an issue when I am spackle "    Skilled Analysis:    Pt presents to OT evaluation on 11/28/2022 secondary to Lewy Body Dementia  Pt reports diagnosis approximately 7 years ago  Concerns with decreased fine motor coordination, tremors, and cognition  Pt demonstrates good UE strength, 5/5 MMT  Normal ROM with UE's  Good  strength, improved on R dominant hand compared to L nondominant hand  Pt utilized board and trunk occasionally for manipulation during nine hole peg test and functional dexterity testing  Diminished protective sensation on R hand as indicated by Jovita Dress on R hand  MOCA assessment indicates mild cognitive impairment  Decreased immediate and delayed memory recall with CMT  Pt would benefit from OT skilled therapy to address decreased cognition and fine motor deficits  Pt will be seen for OT services 2x/wk for 12 weeks  POC was reviewed with the pt, pt understands and agrees with POC  Pt presents to OT evaluation on 1/10/2023 secondary to Lewy Body Dementia  Pt reports minimal concerns with tremors at this point  Pt presents flustered and distracted throughout treatment sessions and during administration of RBANS today  Pt scored within the extremely low range overall on the RBANS when compared to norms  Deficits of immediate memory, visuospatial, and delayed recall  Low average range for the language and attention categories  Pt unable to complete Trail Making B alternating attention between letters and numbers  Discussed deficits of impulsivity of behaviors with completion of cognitive tasks  Pt expresses that he is also impulsive at home  Pt would benefit from OT skilled therapy to address decreased cognition  Pt will be seen for OT services 2x/wk for 12 weeks  POC was reviewed with the pt, pt understands and agrees with POC         PATIENT GOAL: "To be able to work with my hands better "    HISTORY OF PRESENT ILLNESS:     PMH:   Past Medical History:   Diagnosis Date   • Alopecia    • Arthritis    • Back pain    • Benign essential hypertension    • Chronic obstructive pulmonary disease (COPD) ([facility] )    • Depression    • Diabetes mellitus ([facility] )    • Emphysema, interstitial (HCC)    • Fatigue    • Hypercholesterolemia    • Hypersomnia, persistent    • Hypothyroidism    • Low testosterone    • Memory loss    • Oral candidiasis 5/11/2020   • Sinusitis    • Thyroid goiter      Objective     Assessments  The Repeatable Battery for the Assessment of Neuropsychological Status (RBANS) is a brief, individually-administered assessment which measures attention, language, visuospatial/constructional abilities, and immediate & delayed memory  The RBANS is intended for use with adolescents to adults, ages 15 to 80 years  The following results were obtained during the administration of the assessment  Form: A    Cognitive Domain/Subtest: Index Score: Percentile Rank: Classification: IE: Status:   IMMEDIATE MEMORY 49 0 1%ile Extremely Low          1  List Learning (15/40)          2  Story Memory (2/24)           VISUOSPATIAL/  CONSTRUCTIONAL 58 0 3%ile Extremely Low          3  Figure Copy (12/20)          4  Line Orientation (7/20)           LANGUAGE 87 22%ile Low Average          5  Picture Naming (10/10)          6  Semantic Fluency (14/40)           ATTENTION 88 23%ile Low Average          7  Digit Span (11/16)          8  Coding (28/89)           DELAYED MEMORY 56 0 3%ile Extremely Low          9  List Recall (1/10)          10  List Recognition (16/20)          11  Story Recall (4/12)          12  Figure Recall (5/20)       Sum of Index Scores:  338      Total Score:  59      Percentile: 0 3%ile      Classification: Extremely Low        “IE” indicates the scores from the initial evaluation (1/10/2022)  Form: A    Trail Making   B (Alternating Attention): 3:23 seconds with maximum errors  SHORT TERM GOALS:      Pt will increase immediate list memory recall being able to recall > 18/40 items on RBANS     Pt will increase immediate story memory recall being able to recall > 4/24 on RBANs    Pt will increase delayed list memory recall being able to recall > 3/10 on RBANS    Pt will increase delayed story memory recall being able to recall > 6/12 on RBANS    Pt will maintain attention to task for >25 minutes in semi modal environment for baseline performance,  improved role performance and to improve learning and to simulate return to IADLs and complete cooking/cleaning tasks       Pt will demo ability to participate in dual tasking/divided attention task with >65% accuracy in multimodal environment to simulate return to IADL roles    Pt will demo ability to alternate attention between 2 tasks with cog loading and >65% accuracy with G retention of task directions in multimodal environment to simulate return to IADL roles     [place]    Pt will increase immediate list memory recall being able to recall > 20/40 items on RBANS     Pt will increase immediate story memory recall being able to recall > 8/24 on RBANs    Pt will increase delayed list memory recall being able to recall > 5/10 on RBANS    Pt will increase delayed story memory recall being able to recall > 8/12 on RBANS    Pt will maintain attention to task for 45 minutes in semi modal environment for baseline performance,  improved role performance and to improve learning and to simulate return to IADLs and complete cooking/cleaning tasks  Pt will demo ability to participate in dual tasking/divided attention task with >75% accuracy in multimodal environment to simulate return to IADL roles    Pt will demo ability to alternate attention between 2 tasks with cog loading and >75% accuracy with G retention of task directions in multimodal environment to simulate return to IADL roles     TIME SPENT 90 min for administration, documentation, interpretation, scoring adn POC development RBANS    Assessment/Plan    GOALS - DISCHARGE ALL GOALS BELOW  Reference Additional Goals      STG    -     Pt will express understanding for HEP and demonstrate carry over in therapy sessions   - Pt will maintain attention to task for 15 minutes in multimodal environment for baseline performance, improved role performance and to improve learning and to simulate return to leisure and IADLs  - Pt will demonstrate ability to participate in dual tasking/divided attention task with >75 % accuracy in multimodal environment to simulate return to leisure and IADLs  - Pt will demonstrate ability to alternate attention between 2 tasks with cog loading and >75% accuracy with good retention of task directions in multimodal environment to simulate return to leisure and IADLs  - Pt will exhibit improved immediate and delayed recall on the Contextual Memory Test when compared to initial evaluation   - Pt will demonstrate improved precision with fine motor coordination by 3+ seconds L/R on Nine Hole Peg test when compared to IE  - Pt will demonstrate improved fine motor coordination on Functional Dexterity Test by 3+ seconds L/R when compared to IE    LTG  - Pt will maintain attention to task for 30 minutes in multimodal environment for baseline performance, improved role performance and to improve learning and to simulate return to leisure and IADLs  - Pt will demonstrate ability to participate in dual tasking/divided attention task with >85% accuracy in multimodal environment to simulate return to leisure and IADLs  - Pt will demonstrate ability to alternate attention between 2 tasks with cog loading and >85% accuracy with good retention of task directions in multimodal environment to simulate return to leisure and IADLs  - Pt will exhibit improved immediate and delayed recall on the Contextual Memory Test when compared to initial evaluation         -     Pt will demonstrate improved precision with fine motor coordination by 5+ seconds L/R on Nine Hole Peg test when compared to IE        -     Pt will demonstrate improved fine motor coordination on Functional Dexterity Test by 5+ seconds L/R when compared to IE  - Pt will report feeling >75% back to self with increased independence on daily tasks  - Pt will express readiness for discharge with reported improved confidence with fine motor tasks and memory       Planned Treatment Interventions  Internal and external memory aides   Multimatrix for saccades/ visual clutter/attention  Hypersensitivity strategies education  Multi-modal environment  Sustained/alternating/divided attention  Tracking tube  Oculomotor control:  saccades, con/divergence  Conv /div   Dynamic tasks  Work stations with timed transitions  Temporal Awareness: Organize the Hour activities  Memory and mental manipulation  Auditory processing with immediate recall  Memory retention with immediate and delayed recall  Edu on cog/vision apps  Radha chapman scanning sheets

## 2023-01-11 DIAGNOSIS — J30.1 SEASONAL ALLERGIC RHINITIS DUE TO POLLEN: ICD-10-CM

## 2023-01-11 LAB — HBA1C MFR BLD HPLC: 6.5 %

## 2023-01-11 RX ORDER — FEXOFENADINE HCL 180 MG/1
TABLET ORAL
Qty: 90 TABLET | Refills: 3 | Status: SHIPPED | OUTPATIENT
Start: 2023-01-11 | End: 2023-01-19

## 2023-01-12 ENCOUNTER — TELEPHONE (OUTPATIENT)
Dept: FAMILY MEDICINE CLINIC | Facility: CLINIC | Age: 68
End: 2023-01-12

## 2023-01-12 ENCOUNTER — OFFICE VISIT (OUTPATIENT)
Dept: OCCUPATIONAL THERAPY | Facility: MEDICAL CENTER | Age: 68
End: 2023-01-12

## 2023-01-12 ENCOUNTER — OFFICE VISIT (OUTPATIENT)
Dept: PHYSICAL THERAPY | Facility: MEDICAL CENTER | Age: 68
End: 2023-01-12

## 2023-01-12 DIAGNOSIS — G31.83 LEWY BODY DEMENTIA WITHOUT BEHAVIORAL DISTURBANCE (HCC): Primary | ICD-10-CM

## 2023-01-12 DIAGNOSIS — R79.0 LOW SERUM FERRITIN LEVEL: Primary | ICD-10-CM

## 2023-01-12 DIAGNOSIS — F02.80 LEWY BODY DEMENTIA WITHOUT BEHAVIORAL DISTURBANCE (HCC): ICD-10-CM

## 2023-01-12 DIAGNOSIS — G31.83 LEWY BODY DEMENTIA WITHOUT BEHAVIORAL DISTURBANCE (HCC): ICD-10-CM

## 2023-01-12 DIAGNOSIS — F02.80 LEWY BODY DEMENTIA WITHOUT BEHAVIORAL DISTURBANCE (HCC): Primary | ICD-10-CM

## 2023-01-12 NOTE — PROGRESS NOTES
Daily Note   Last PN 1/10/2023  POC- 2022 to 2023    Today's date: 2023  Patient name: Gregor Narayanan  : 1955  MRN: [id number]  Referring provider: Lien Cabral,*  Dx:   Encounter Diagnosis     ICD-10-CM    1  Lewy body [facility] without [facility]  G31 83     F02 80           Start Time: 1400  Stop Time: 2797  Total time in clinic (min): 45 minutes    Subjective: Patient reported no loss of balance or falls in the past few weeks  Objective: See treatment diary below- 16# vest donned, 5# weights on bilateral LE for all interventions below    BOSU step ups- forward, laterally, backwards- 20 reps each, 1 UE  Tandem walking- 10 cycles of 10 feet foam beam  Backwards tandem walking- 10 cycles of 10 feet on foam beam  Side stepping on foam beam- 10 cycles of 10 feet  Tandem walking with cone taps- 10 cycles of 10 feet  Lateral stepping with cone taps- 10 cycles of 10 feet  Hurdles- 12", forward, laterally, added backwards- 10 cycles of 10 feet  Core rotations- 20 reps each direction holding tidal tank- horiziontal, vertical, D2 flexion, 2# weight  Tandem walking through cones- 10 cycles of 10 feet  Backward walking through cones- 10 cycles of 10 feet   FTEO on foam with head turns- horizontal, vertical, Diagonals- 20 reps, 3 second holds    Assessment: Tolerated treatment well  Patient demonstrated fatigue post treatment, exhibited good technique with therapeutic exercises and would benefit from continued PT to maximize function post dx of Lewy body dementia  Patient struggles with vestibular rehabilitation with his head movements on compliant surfaces, patient challenged with those movements and posterior LOB noted, patient requires haptic touch for all interventions of compliant surfaces  Plan: Continue per plan of care  Progress treatment as tolerated

## 2023-01-12 NOTE — PROGRESS NOTES
Occupational Therapy Daily Note:    Today's date: 2023  Patient name: Silvestre Farris  : 1955  MRN: [id number]  Referring provider: Juhi Lin,*  Dx:   Encounter Diagnosis   Name Primary? • Lewy body dementia without behavioral disturbance ([facility] [address] ) Yes     POC START DATE: 2022  POC END DATE: 2022  NEXT PN DUE: 2022  FREQUENCY: 1-2x wk for 12 weeks    Subjective: "Feeling good today "    Objective: Therapeutic Activity     Working Memory, Visuospatial, Problem Solving, Executive Functioning, Sustained Attention   Map with Q & A  Min vc for completion   Complete x 2   Time: 20 minutes    Executive Function, Sustained Attention, Problem Solving, Sequencing  Alphabetize [place]  Mod vc for completion   Up to letter "M"  Time: 25 Minutes    Assessment: Tolerated treatment well  Pt demonstrated sustained attention during map activity and required min vc for completion and accuracy  Pt exhibited difficulty with alphabetizing [place] and required mod vc for directions, sequencing of letters  Pt is unable to sustain attention on therapeutic activities while holding conversation with therapist, indicating decreased divided attention abilities  Reviewed RBANS scoring from last visit and discussed deficits and targeted cognitive skills that will be practiced during treatment sessions  Patient would benefit from continued skilled OT  Plan: Continued skilled OT per POC

## 2023-01-16 ENCOUNTER — OFFICE VISIT (OUTPATIENT)
Dept: PHYSICAL THERAPY | Facility: MEDICAL CENTER | Age: 68
End: 2023-01-16

## 2023-01-16 ENCOUNTER — TELEPHONE (OUTPATIENT)
Dept: FAMILY MEDICINE CLINIC | Facility: CLINIC | Age: 68
End: 2023-01-16

## 2023-01-16 ENCOUNTER — OFFICE VISIT (OUTPATIENT)
Dept: OCCUPATIONAL THERAPY | Facility: MEDICAL CENTER | Age: 68
End: 2023-01-16

## 2023-01-16 DIAGNOSIS — G31.83 LEWY BODY DEMENTIA WITHOUT BEHAVIORAL DISTURBANCE (HCC): Primary | ICD-10-CM

## 2023-01-16 DIAGNOSIS — F02.80 LEWY BODY DEMENTIA WITHOUT BEHAVIORAL DISTURBANCE (HCC): Primary | ICD-10-CM

## 2023-01-16 DIAGNOSIS — E03.9 HYPOTHYROIDISM, UNSPECIFIED TYPE: ICD-10-CM

## 2023-01-16 DIAGNOSIS — I10 PRIMARY HYPERTENSION: ICD-10-CM

## 2023-01-16 RX ORDER — LEVOTHYROXINE SODIUM 112 UG/1
112 TABLET ORAL DAILY
Qty: 90 TABLET | Refills: 0 | Status: SHIPPED | OUTPATIENT
Start: 2023-01-16 | End: 2023-04-17

## 2023-01-16 RX ORDER — LOSARTAN POTASSIUM 50 MG/1
TABLET ORAL
Qty: 90 TABLET | Refills: 0 | Status: SHIPPED | OUTPATIENT
Start: 2023-01-16

## 2023-01-16 RX ORDER — LEVOTHYROXINE SODIUM 137 UG/1
TABLET ORAL
Qty: 90 TABLET | Refills: 2 | Status: SHIPPED | OUTPATIENT
Start: 2023-01-16 | End: 2023-01-16 | Stop reason: SDUPTHER

## 2023-01-16 RX ORDER — LEVOTHYROXINE SODIUM 0.12 MG/1
137 TABLET ORAL DAILY
Qty: 90 TABLET | Refills: 0 | Status: SHIPPED | OUTPATIENT
Start: 2023-01-16 | End: 2023-01-16 | Stop reason: SDUPTHER

## 2023-01-16 NOTE — TELEPHONE ENCOUNTER
Please review labs for thyroid  Daughter thinks medication should be lowered    Malia Rosales [phone]

## 2023-01-16 NOTE — PROGRESS NOTES
Daily Note   Last PN 1/10/2023  POC- 2022 to 2023    Today's date: 2023  Patient name: Juni Moseley  : 1955  MRN: [id number]  Referring provider: Lia Morin,*  Dx:   Encounter Diagnosis     ICD-10-CM    1  Lewy body dementia without behavioral disturbance ([facility] )  G31 83     F02 80           Start Time:   Stop Time:   Total time in clinic (min): 45 minutes    Subjective: Patient reported no loss of balance or falls in the past few weeks  Patient notes that his endurance has been challenged due to hx of COPD  Objective: See treatment diary below- 16# vest donned    BOSU step ups- forward, laterally, backwards- 20 reps each, 1 UE  Tandem walking- 10 cycles of 10 feet foam beam  Backwards tandem walking- 10 cycles of 10 feet on foam beam  Side stepping on foam beam- 10 cycles of 10 feet  Tandem walking with cone taps- 10 cycles of 10 feet  Lateral stepping with cone taps- 10 cycles of 10 feet  Hurdles- 12", forward, laterally, backwards- 10 cycles of 10 feet  Cone taps stationary on BOSU ball- 20 reps 1 UE  Tandem walking through cones- 10 cycles of 10 feet  Backward walking through cones- 10 cycles of 10 feet   FTEO on foam with head turns- horizontal, vertical, Diagonals- 20 reps, 3 second holds  FTEC on foam with head turns- horizontal, vertical, Diagonals- 20 reps, 3 second holds    Assessment: Tolerated treatment well  Patient demonstrated fatigue post treatment, exhibited good technique with therapeutic exercises and would benefit from continued PT to maximize function post dx of Lewy body dementia  Patient continues to struggle with vestibular rehabilitation with his head movements on compliant surfaces, attempting to add eyes closed to interventions today, patient also challenged with proprioception and kinesthesia with hurdles, patient shows a tendency for impulsive nature when stepping without adjusting to environment  Plan: Continue per plan of care  Progress treatment as tolerated

## 2023-01-16 NOTE — PROGRESS NOTES
Occupational Therapy Daily Note:    Today's date: 2023  Patient name: Eron Cash  : 1955  MRN: [id number]  Referring provider: Den Richards,*  Dx:   Encounter Diagnosis   Name Primary? • Lewy body dementia without behavioral disturbance ([facility] [address] ) Yes        POC START DATE: 2022  POC END DATE: 2022  NEXT PN DUE: 2022  FREQUENCY: 1-2x wk for 12 weeks    Subjective: "Today, is okay  Not much changed since  "    Objective: Therapeutic Activity     Visuospatial, Problem Solving, Executive Functioning, Sustained Attention   Cognitive Crossword w/ Fill in Letters   Mod-Min vc for completion   Time: 20 minutes    Executive Function, Sustained Attention, Alternating Attention, Sequencing   Embedded Words: Part 2   Min-Mod vc for accuracy and directions  Time: 15 Minutes    Visuospatial, Saccades, Executive Function  Pixy Cubes  Puzzle x 1   Min vc for accuracy   Upgraded to add category naming objects of colors of block  Times: 10 minutes     Assessment: Tolerated treatment well  Pt maintained attention and demonstrated good problem solving during cognitive crossword task  Required mod-min vc for completion  Pt maintained attention and when distracted was able to redirect back to task  Pt completed embedded words activity with some difficulty with organization and following multi-step directions  Required mod-min vc for accuracy and sequencing  Pt demonstrated fair visuospatial awareness with completion of puzzle and self correction of errors  Tremors and decreased precision noted with pixy cube blocks  Pt would continue to benefit from skilled OT  Plan: Future OT sessions to focus on sustained and divided attention and executive functioning with functional problem solving  Continued skilled OT per POC  Treatment conducted by student supervised and directed by Andrew Pereira MS, OTR/L, CSRS, SNEHAL, DCAT   I agree with all treatment methods performed during this session by Mary Herrera, OTS  Student was directly supervised by me during the entirety of session  Patient consent given to be treated by student

## 2023-01-17 ENCOUNTER — TELEPHONE (OUTPATIENT)
Dept: HEMATOLOGY ONCOLOGY | Facility: CLINIC | Age: 68
End: 2023-01-17

## 2023-01-17 RX ORDER — MOMETASONE FUROATE 1 MG/G
CREAM TOPICAL
COMMUNITY
Start: 2022-11-13

## 2023-01-19 ENCOUNTER — OFFICE VISIT (OUTPATIENT)
Dept: GASTROENTEROLOGY | Facility: CLINIC | Age: 68
End: 2023-01-19

## 2023-01-19 VITALS
OXYGEN SATURATION: 98 % | DIASTOLIC BLOOD PRESSURE: 70 MMHG | HEART RATE: 56 BPM | WEIGHT: 219 LBS | SYSTOLIC BLOOD PRESSURE: 126 MMHG | HEIGHT: 70 IN | BODY MASS INDEX: 31.35 KG/M2

## 2023-01-19 DIAGNOSIS — K59.1 FUNCTIONAL DIARRHEA: ICD-10-CM

## 2023-01-19 DIAGNOSIS — R63.4 WEIGHT LOSS: Primary | ICD-10-CM

## 2023-01-19 DIAGNOSIS — D68.52 HETEROZYGOUS FOR PROTHROMBIN G20210A MUTATION (HCC): ICD-10-CM

## 2023-01-19 RX ORDER — CHOLESTYRAMINE 4 G/9G
1 POWDER, FOR SUSPENSION ORAL DAILY
Qty: 30 PACKET | Refills: 12 | Status: SHIPPED | OUTPATIENT
Start: 2023-01-19

## 2023-01-19 NOTE — PATIENT INSTRUCTIONS
Scheduled date of EGD/colonoscopy (as of today):4/26/23  Physician performing EGD/colonoscopy: Sukhdeep  Location of EGD/colonoscopy:[facility]  Desired bowel prep reviewed with patient:miralax/dulcolax  Instructions reviewed with patient by:Inés LAUREN  Clearances:   none

## 2023-01-19 NOTE — PROGRESS NOTES
Consultation - [address] [facility]  Lindy Cai 1955 74 y o  male     ASSESSMENT @ PLAN:   He is a 63-year-old male with Parkinson's with no appetite and aversion to food with taste and smells with weight loss of 40 pounds  He had a negative stool Cologuard last year  He reports having a negative endoscopy and colonoscopy in 2014 with Dr Crystal Boykin  1 we will do EGD and colonoscopy to investigate    2 he is hyperthyroid and his dose of levothyroxine has been just adjusted and I told him this can cause some of his symptomatology and must be corrected    3 of the endoscopy and colonoscopy are negative you will need a malabsorption work-up; specifically fecal elastase testing and nutritional markers    4 if the endoscopy and colonoscopy are negative he will also need abdominal imaging    His wife is my patient    Chief Complaint: Weight loss and lack of appetite    HPI: He is a 63-year-old male with Parkinson's disease  He has been avoiding food  And eating less  Taste and smells have been bothering him  He is also lactose intolerant  He is also diarrhea since October  He had a few episodes in October of fecal incontinence  He is not having that anymore  He has no bleeding or hardness of stools or melena  He has no heartburn regurgitation no nausea no vomiting  He is hyperthyroid and this is being worked on by his primary  His dose was just adjusted  He has lost 40 pounds  He had negative endoscopy and colonoscopy in 2015 with Dr Crystal Boykin  He had a normal stool Cologuard in 2022  He does not have abdominal pain  He does not have cramping or bloating before his loose stools  Now he is having loose stool 1 day and then a normal through the next day  If he drinks milk he will have bloating and distention of his abdomen with loose stools  REVIEW OF SYSTEMS:     CONSTITUTIONAL: Denies any fever, chills, or rigors  Good appetite, and no recent weight loss  HEENT: No earache or tinnitus  Denies hearing loss or visual disturbances  CARDIOVASCULAR: No chest pain or palpitations  RESPIRATORY: Denies any cough, hemoptysis, shortness of breath or dyspnea on exertion  GASTROINTESTINAL: As noted in the History of Present Illness  GENITOURINARY: No problems with urination  Denies any hematuria or dysuria  NEUROLOGIC: No dizziness or vertigo, denies headaches  MUSCULOSKELETAL: Denies any muscle or joint pain  SKIN: Denies skin rashes or itching  ENDOCRINE: Denies excessive thirst  Denies intolerance to heat or cold  PSYCHOSOCIAL: Denies depression or anxiety  Denies any recent memory loss       Past Medical History:   Diagnosis Date   • Alopecia    • Arthritis    • Back pain    • Benign essential hypertension    • Chronic obstructive pulmonary disease (COPD) ([facility])    • Depression    • Diabetes mellitus (HCC)    • Emphysema, interstitial (HCC)    • Fatigue    • Hypercholesterolemia    • Hypersomnia, persistent    • Hypothyroidism    • Low testosterone    • Memory loss    • Oral candidiasis 5/11/2020   • Sinusitis    • Thyroid goiter       Past Surgical History:   Procedure Laterality Date   • ADENOIDECTOMY     • APPENDECTOMY     • HERNIA REPAIR     • TONSILLECTOMY       Social History     Socioeconomic History   • Marital status: /Civil Union     Spouse name: Not on file   • Number of children: Not on file   • Years of education: Not on file   • Highest education level: Not on file   Occupational History   • Not on file   Tobacco Use   • Smoking status: Former     Packs/day: 1 00     Years: 20 00     Pack years: 20 00     Types: Cigarettes   • Smokeless tobacco: Never   Vaping Use   • Vaping Use: Never used   Substance and Sexual Activity   • Alcohol use: Never   • Drug use: No   • Sexual activity: Not on file   Other Topics Concern   • Not on file   Social History Narrative   • Not on file     Social Determinants of Health     Financial Resource Strain: Not on file   Food Insecurity: No Food Insecurity   • Worried About Running Out of Food in the Last Year: Never true   • Ran Out of Food in the Last Year: Never true   Transportation Needs: No Transportation Needs   • Lack of Transportation (Medical): No   • Lack of Transportation (Non-Medical): No   Physical Activity: Not on file   Stress: Not on file   Social Connections: Not on file   Intimate Partner Violence: Not on file   Housing Stability: Unknown   • Unable to Pay for Housing in the Last Year: No   • Number of Places Lived in the Last Year: 1   • Unstable Housing in the Last Year: Not on file     Family History   Problem Relation Age of Onset   • Hypertension Father    • Aneurysm Father         cerebral   • Lupus Mother    • Hypothyroidism Daughter    • Hashimoto's thyroiditis Daughter      Cimetidine, Codeine, Contrast  [iodinated contrast media], Metrizamide, Prednisone, Simvastatin, and Statins  Current Outpatient Medications   Medication Sig Dispense Refill   • albuterol (PROVENTIL HFA,VENTOLIN HFA) 90 mcg/act inhaler Inhale 2 puffs every 6 (six) hours as needed for wheezing     • buPROPion (WELLBUTRIN XL) 300 mg 24 hr tablet TAKE 1 TABLET BY MOUTH EVERY DAY IN THE MORNING 90 tablet 3   • carbidopa-levodopa (SINEMET)  mg per tablet TAKE 2 TABLETS BY MOUTH 3 TIMES A DAY   540 tablet 5   • cholestyramine (QUESTRAN) 4 g packet Take 1 packet (4 g total) by mouth in the morning 30 packet 12   • escitalopram (LEXAPRO) 20 mg tablet TAKE 2 TABLETS BY MOUTH EVERY  tablet 2   • fluticasone (FLONASE) 50 mcg/act nasal spray 1 spray into each nostril if needed     • gabapentin (Neurontin) 300 mg capsule Take 1 capsule (300 mg total) by mouth daily at bedtime 90 capsule 2   • glimepiride (AMARYL) 1 mg tablet Take 1 tablet (1 mg total) by mouth daily with breakfast 90 tablet 3   • ipratropium (ATROVENT) 0 02 % nebulizer solution Take 2 5 mL (0 5 mg total) by nebulization 4 (four) times a day (Patient taking differently: Take 0 5 mg by nebulization if needed) 62 5 mL 3   • levothyroxine 112 mcg tablet Take 1 tablet (112 mcg total) by mouth daily 90 tablet 0   • losartan (COZAAR) 50 mg tablet TAKE 1 TABLET BY MOUTH EVERY DAY 90 tablet 0   • mometasone (ELOCON) 0 1 % cream      • rivastigmine (EXELON) 4 6 mg/24 hr TD 24 hr patch APPLY 1 PATCH EVERY DAY 90 patch 2   • testosterone (ANDROGEL) 1% Place 0 05 g on the skin     • True Metrix Blood Glucose Test test strip TESTING TWICE A DAY E 11 9     • LORazepam (Ativan) 0 5 mg tablet Take 1 tablet (0 5 mg total) by mouth daily at bedtime 90 tablet 0   • metFORMIN (GLUCOPHAGE) 500 mg tablet TAKE 1 TABLET (500 MG TOTAL) BY MOUTH 2 (TWO) TIMES A DAY (Patient not taking: Reported on 10/26/2022) 180 tablet 3   • umeclidinium-vilanterol (Anoro Ellipta) 62 5-25 MCG/INH inhaler Inhale 1 puff daily 60 blister 0     No current facility-administered medications for this visit  Blood pressure 126/70, pulse 56, height 5' 10" (1 778 m), weight 99 3 kg (219 lb), SpO2 98 %  PHYSICAL EXAM:     General Appearance:   Alert, cooperative, no distress, appears stated age    HEENT:   Normocephalic, atraumatic, anicteric      Neck:  Supple, symmetrical, trachea midline, no adenopathy;    thyroid: no enlargement/tenderness/nodules; no carotid  bruit or JVD    Lungs:   Clear to auscultation bilaterally; no rales, rhonchi or wheezing; respirations unlabored    Heart[de-identified]   S1 and S2 normal; regular rate and rhythm; no murmur, rub, or gallop     Abdomen:   Soft, non-tender, non-distended; normal bowel sounds; no masses, no organomegaly    Genitalia:   Deferred    Rectal:   Deferred    Extremities:  No cyanosis, clubbing or edema    Pulses:  2+ and symmetric all extremities    Skin:  Skin color, texture, turgor normal, no rashes or lesions    Lymph nodes:  No palpable cervical, axillary or inguinal lymphadenopathy        Lab Results   Component Value Date    WBC 5 53 06/08/2022    HGB 14 3 06/08/2022    HCT 44 7 06/08/2022 MCV 93 06/08/2022     06/08/2022     Lab Results   Component Value Date    CALCIUM 10 1 06/08/2022    K 4 0 06/08/2022    CO2 29 06/08/2022     06/08/2022    BUN 12 06/08/2022    CREATININE 1 20 06/08/2022     Lab Results   Component Value Date    ALT 28 06/08/2022    AST 32 06/08/2022    ALKPHOS 72 06/08/2022     Lab Results   Component Value Date    INR 1 00 01/24/2022    PROTIME 12 8 01/24/2022           Answers for HPI/ROS submitted by the patient on 1/18/2023  Chronicity: new  Onset: more than 1 month ago  Onset quality: gradual  Frequency: intermittently  Episode duration: 1 Hours  Progression since onset: gradually improving  Pain location: generalized abdominal region  Pain - numeric: 5/10  Pain quality: dull  Radiates to: does not radiate  anorexia: Yes  arthralgias: No  belching: No  constipation: No  diarrhea: Yes  dysuria: No  fever: No  flatus: No  frequency: No  headaches: No  hematochezia: No  hematuria: No  melena: No  myalgias: No  nausea:  No  weight loss: Yes  vomiting: No  Aggravated by: bowel movement  Relieved by: bowel movements

## 2023-01-23 ENCOUNTER — OFFICE VISIT (OUTPATIENT)
Dept: OCCUPATIONAL THERAPY | Facility: MEDICAL CENTER | Age: 68
End: 2023-01-23

## 2023-01-23 ENCOUNTER — OFFICE VISIT (OUTPATIENT)
Dept: PHYSICAL THERAPY | Facility: MEDICAL CENTER | Age: 68
End: 2023-01-23

## 2023-01-23 DIAGNOSIS — F02.80 LEWY BODY DEMENTIA WITHOUT BEHAVIORAL DISTURBANCE (HCC): Primary | ICD-10-CM

## 2023-01-23 DIAGNOSIS — G31.83 LEWY BODY DEMENTIA WITHOUT BEHAVIORAL DISTURBANCE (HCC): Primary | ICD-10-CM

## 2023-01-23 NOTE — PROGRESS NOTES
Occupational Therapy Daily Note:    Today's date: 2023  Patient name: Rosa Thompson  : 1955  MRN: [id number]  Referring provider: Andrea White,*  Dx:   Encounter Diagnosis   Name Primary? • Lewy body dementia without behavioral disturbance ([facility] [address] ) Yes        POC START DATE: 2022  POC END DATE: 2022  NEXT PN DUE: 2022  FREQUENCY: 1-2x wk for 12 weeks    Subjective: "Today, is a good day "    Objective: Therapeutic Activity     Visuospatial, Problem Solving, Executive Functioning, Sustained Attention   I-Spy Dig In   Min vc for recall   Time: 15 minutes    Executive Function, Sustained Attention, Immediate Recall   Functional Prescriptions Q&A  Min vc required  Time: 10 Minutes    Executive Function, Visuospatial,   Target Area 4: Letter Placement / Letter Orientation  Min vc required   Times: 15 minutes     Assessment: Tolerated treatment well  Pt maintained attention and demonstrated good immediate recall with memorization and finding up to 3 objects in Ispy Dig in  Upgraded activity to include recall of 4 objects but required mod vc for accuracy  Pt demonstrated a decrease in immediate memory and recall with Functional Prescription Q&A due to difficulty with sustained attention  Pt exhibited decreased attention as session progressed and needed to be redirected to task  Pt demonstrated good executive functioning and problem solving with letter placement and orientation task, min vc required  Pt would continue to benefit from skilled OT  Plan: Future OT sessions to focus on attention, memory, and executive functioning with functional problem solving  Continued skilled OT per POC  Treatment conducted by student supervised and directed by Mendoza Cruz MS, OTR/L, CSRS, SNEHAL, DCAT  I agree with all treatment methods performed during this session by THEODORE Dc  Student was directly supervised by me during the entirety of session   Patient consent given to be treated by student

## 2023-01-23 NOTE — PROGRESS NOTES
Daily Note   Last PN 1/10/2023  POC- 2022 to 2023    Today's date: 2023  Patient name: Anil Hinton  : 1955  MRN: [id number]  Referring provider: Yuliana Myers,*  Dx:   Encounter Diagnosis     ICD-10-CM    1  Lewy body dementia without behavioral disturbance ([facility] [address] )  G31 83     F02 80           Start Time: 3455  Stop Time: 6285  Total time in clinic (min): 45 minutes    Subjective: Patient reported no updates  Objective: See treatment diary below- 16# vest donned    BOSU step ups- forward, laterally, backwards- 20 reps each, 1 UE  Tandem walking- 10 cycles of 10 feet foam beam  Backwards tandem walking- 10 cycles of 10 feet on foam beam  Side stepping on foam beam- 10 cycles of 10 feet  Tandem walking with cone taps on foam beam- 10 cycles of 10 feet  Lateral stepping with cone taps on foam beam- 10 cycles of 10 feet  Hurdles- 12", forward, laterally- performed on foam beam  Cone taps stationary on BOSU ball- 20 reps 1 UE  Tandem walking through cones- 10 cycles of 10 feet  Backward walking through cones- 10 cycles of 10 feet   FTEC on foam with head turns- horizontal, vertical, Diagonals- 20 reps, 3 second holds    Assessment: Tolerated treatment well  Patient demonstrated fatigue post treatment, exhibited good technique with therapeutic exercises and would benefit from continued PT to maximize function post dx of Lewy body dementia  Patient continues to struggle with proprioception and kinesthesia over the hurdles today on foam beam  Patient challenged with overall intervention tolerance today, patient requires continued rest breaks with increased cardiovascular activities  Plan: Continue per plan of care  Progress treatment as tolerated

## 2023-01-26 ENCOUNTER — OFFICE VISIT (OUTPATIENT)
Dept: PHYSICAL THERAPY | Facility: MEDICAL CENTER | Age: 68
End: 2023-01-26

## 2023-01-26 ENCOUNTER — OFFICE VISIT (OUTPATIENT)
Dept: OCCUPATIONAL THERAPY | Facility: MEDICAL CENTER | Age: 68
End: 2023-01-26

## 2023-01-26 DIAGNOSIS — G31.83 LEWY BODY DEMENTIA WITHOUT BEHAVIORAL DISTURBANCE (HCC): Primary | ICD-10-CM

## 2023-01-26 DIAGNOSIS — F02.80 LEWY BODY DEMENTIA WITHOUT BEHAVIORAL DISTURBANCE (HCC): Primary | ICD-10-CM

## 2023-01-26 NOTE — PROGRESS NOTES
Daily Note   Last PN 1/10/2023  POC- 2022 to 2023    Today's date: 2023  Patient name: James Brannon  : 1955  MRN: [id number]  Referring provider: Catina Gutierrez,*  Dx:   Encounter Diagnosis     ICD-10-CM    1  Lewy body dementia without behavioral disturbance ([facility] [address] )  G31 83     F02 80           Start Time: 2456  Stop Time: 3514  Total time in clinic (min): 45 minutes    Subjective: Patient reported no updates, presents from OT, notes that he is feeling "pretty good"  Objective: See treatment diary below- 16# vest donned    BOSU step ups- forward, laterally, backwards- 20 reps each, 0 UE  Tandem walking- 10 cycles of 10 feet foam beam  Backwards tandem walking- 10 cycles of 10 feet on foam beam  Side stepping on foam beam- 10 cycles of 10 feet  Tandem walking with cone taps on foam beam- 10 cycles of 10 feet  Lateral stepping with cone taps on foam beam- 10 cycles of 10 feet  Hurdles- 12", forward, laterally- performed on foam beam, 10 cycles of 10 feet   FTEC on BOSU with head turns- horizontal, vertical, Diagonals- 20 reps, 3 second holds  Sit to stands from table with overhead press with tidal tank- 20 reps, 10#    Assessment: Tolerated treatment well  Patient demonstrated fatigue post treatment, exhibited good technique with therapeutic exercises and would benefit from continued PT to maximize function post dx of Lewy body dementia  Reduction of UE today, no UE utilized today, patient wants to utilize haptic touch to improve balance, patient educated on importance of using own balance capabilities to improve his efficiency  Plan: Continue per plan of care  Progress treatment as tolerated

## 2023-01-26 NOTE — PROGRESS NOTES
Occupational Therapy Daily Note:    Today's date: 2023  Patient name: Gisela Palafox  : 1955  MRN: [id number]  Referring provider: Mena Lange,*  Dx:   Encounter Diagnosis   Name Primary? • Lewy body dementia without behavioral disturbance ([facility] [address] ) Yes      POC START DATE: 2023  POC END DATE: 2023  NEXT PN DUE: 2023  FREQUENCY: 1-2x wk for 12 weeks    Subjective: "I am feeling good today "    Objective: Therapeutic Activity     Memory, Attention  Sentri Game #3   Immediate Memory: 9/15  Delayed Recall: 8/15  Time: 5 minutes     Visuospatial, Sustained Attention, Problem Solving - Task terminated  Fill in the Missing Vowel  Mod-Min vc required for accuracy #1-7  Time: 10 Minutes    Attention, Sequencing, Executive Function  Letter & Shape Reasoning Skills  Times: 15 minutes     Neuro Re-education -     Visuospatial, Sequencing, Executive Functioning, Alternating Attention   Multi-Matrix 1-24 Sequencing forwards/backwards  Alternating R/L Hands  Alternating odd and even numbers  Min vc for accuracy L/R hands   Time: 15 minutes    Assessment: Pt tolerated treatment well  Pt demonstrated good alternating attention and sustained attention during Multi-Matrix  Required min vc for accuracy of naming odd/even numbers  Pt exhibited fair immediate/delay recall with chunking memory strategies  Difficulty with problem solving and letter placement with Fill in the missing vowels  Required mod vc #1-6, max vc #7  Task was terminated due to max difficulty  Pt completed the letter and shape reasoning skills with no difficulty  Demonstrated good executive functioning and problem solving with letter and shape reasoning  Pt would continue to benefit from skilled OT  Plan: Future OT sessions to focus on attention, memory, and executive functioning with functional problem solving  Continued skilled OT per POC      Treatment conducted by student supervised and directed by Michael Choi MS, OTR/L, CSRS, SNEHAL, DCAT  I agree with all treatment methods performed during this session by THEODORE Steinberg  Student was directly supervised by me during the entirety of session  Patient consent given to be treated by student

## 2023-01-30 ENCOUNTER — OFFICE VISIT (OUTPATIENT)
Dept: OCCUPATIONAL THERAPY | Facility: MEDICAL CENTER | Age: 68
End: 2023-01-30

## 2023-01-30 ENCOUNTER — OFFICE VISIT (OUTPATIENT)
Dept: PHYSICAL THERAPY | Facility: MEDICAL CENTER | Age: 68
End: 2023-01-30

## 2023-01-30 ENCOUNTER — TELEPHONE (OUTPATIENT)
Dept: HEMATOLOGY ONCOLOGY | Facility: CLINIC | Age: 68
End: 2023-01-30

## 2023-01-30 DIAGNOSIS — G31.83 LEWY BODY DEMENTIA WITHOUT BEHAVIORAL DISTURBANCE (HCC): Primary | ICD-10-CM

## 2023-01-30 DIAGNOSIS — F02.80 LEWY BODY DEMENTIA WITHOUT BEHAVIORAL DISTURBANCE (HCC): Primary | ICD-10-CM

## 2023-01-30 NOTE — PROGRESS NOTES
Daily Note   Last PN 1/10/2023  POC- 2022 to 2023    Today's date: 2023  Patient name: Medardo Wills  : 1955  MRN: [id number]  Referring provider: Jean Sanches,*  Dx:   Encounter Diagnosis     ICD-10-CM    1  Lewy body dementia without behavioral disturbance ([facility] )  G31 83     F02 80           Start Time:   Stop Time:   Total time in clinic (min): 46 minutes    Subjective: Patient reported no updates, presents from OT, notes that he feels good today, notes no falls, patient frequency decreased to 1x/week  Objective: See treatment diary below- 16# vest donned    BOSU step ups- forward, laterally, backwards- 20 reps each, 0 UE  Tandem walking- 10 cycles of 10 feet foam beam  Backwards tandem walking- 10 cycles of 10 feet on foam beam  Side stepping on foam beam- 10 cycles of 10 feet  Incline walking on Treadmill- 10 minutes 2 3 mph, 3% grade, vest donned for cardiovascular endurance, vitals monitored pre/post- post 96 bpm, 98 spo2  Wobble board- 20 reps AP, 20 reps laterally  Reciprocal walking with balance and coordination- 10 cycles of 15 feet, 3# and 4# kettle bell with UE movements    Assessment: Tolerated treatment well  Patient demonstrated fatigue post treatment, exhibited good technique with therapeutic exercises and would benefit from continued PT to maximize function post dx of Lewy body dementia  Challenged with coordination, tolerated cardiovascular endurance activities well today  Patient improving with recovery, delayed posterior reactions with wobble board  Plan: Continue per plan of care  Progress treatment as tolerated

## 2023-01-30 NOTE — PROGRESS NOTES
Occupational Therapy Daily Note:    Today's date: 2023  Patient name: Abhinav Junior  : 1955  MRN: [id number]  Referring provider: Lito Rose,*  Dx:   Encounter Diagnosis   Name Primary? • Lewy body dementia without behavioral disturbance ([facility] ) Yes      POC START DATE: 2023  POC END DATE: 2023  NEXT PN DUE: 2023  FREQUENCY: 1-2x wk for 12 weeks    Subjective: "There's a lot of stuff going on "    Objective: Therapeutic Activity     Memory, Attention  Word List Retention   Immediate Memory 2/10  Max vc required  Time: 5 minutes     Attention, Problem Solving, Executive Function, Visuospatial   Target Area 3: Following Directions: Spatial Relationships  Mod vc for accuracy  Time: 15 minutes     Attention, Memory, Logic, Executive Function  Story Inferences  Min vc required   Time: 5 minutes     Neuro Re-education -     Visuospatial, Sequencing, Executive Functioning, Alternating Attention   Multi-Matrix Number Memory Sequence w/ Naming Green Objects   Memorizing 2 numbers  Min vc for accuracy   Time: 20 minutes    Assessment: Pt tolerated treatment well  Pt demonstrated poor/fair memory with verbal and written word list retention scoring 2/10  Downgraded task to include visual memory and recall  Required max vc and repetition of words for success  Pt exhibited good logic and reasoning with story inferences and required only min vc for problem solving  Pt demonstrated impaired memory with Multi-Matrix in conjunction with activity of listing objects that are green, indicating difficulty with dividing attention and multitasking  Pt able to recall up to 2 numbers at a time and required min vc for accuracy and directions  Pt would continue to benefit from skilled OT  Plan: Future OT sessions to focus on attention, memory, and executive functioning with functional problem solving  Continued skilled OT per POC      Treatment conducted by student supervised and directed by Clint White MS, OTR/L, CSRS, SNEHAL, DCAT  I agree with all treatment methods performed during this session by THEODORE Haile  Student was directly supervised by me during the entirety of session  Patient consent given to be treated by student

## 2023-02-06 ENCOUNTER — OFFICE VISIT (OUTPATIENT)
Dept: PHYSICAL THERAPY | Facility: MEDICAL CENTER | Age: 68
End: 2023-02-06

## 2023-02-06 ENCOUNTER — OFFICE VISIT (OUTPATIENT)
Dept: OCCUPATIONAL THERAPY | Facility: MEDICAL CENTER | Age: 68
End: 2023-02-06

## 2023-02-06 DIAGNOSIS — F02.80 LEWY BODY DEMENTIA WITHOUT BEHAVIORAL DISTURBANCE (HCC): Primary | ICD-10-CM

## 2023-02-06 DIAGNOSIS — G31.83 LEWY BODY DEMENTIA WITHOUT BEHAVIORAL DISTURBANCE (HCC): Primary | ICD-10-CM

## 2023-02-06 DIAGNOSIS — F51.04 PSYCHOPHYSIOLOGICAL INSOMNIA: ICD-10-CM

## 2023-02-06 RX ORDER — LORAZEPAM 0.5 MG/1
0.5 TABLET ORAL
Qty: 90 TABLET | Refills: 0 | Status: SHIPPED | OUTPATIENT
Start: 2023-02-06 | End: 2023-03-08

## 2023-02-06 NOTE — PROGRESS NOTES
Occupational Therapy Daily Note:    Today's date: 2023  Patient name: Chrissy Wilson  : 1955  MRN: [id number]  Referring provider: Duke Jones,*  Dx:   Encounter Diagnosis   Name Primary? • Lewy body dementia without behavioral disturbance ([facility] ) Yes      POC START DATE: 2023  POC END DATE: 2023  NEXT PN DUE: 2023  FREQUENCY: 1-2x wk for 12 weeks    Subjective: "It's okay today  I have an appointment with my endocrinologist today because I've been losing so much weight "    Objective: Therapeutic Activity     Memory, Attention, Recall  Immediate Recall 9/15  Delayed Recall 8/15  Delayed Recall w/ category clue 10/15  Time: 10 minutes     Attention, Logic, Problem Solving, Executive Function  Expressive Categorization  Recall 10-15 Items   Animals, States, Cities, Last names, Girls first names, Cold items, Items in hopsital, Makes/Model Cars,   Time: 15 minutes    Attention, Problem Solving, Executive Function, Visuospatial   Target Area 6: [employer] - Code 1, Code 2  Min vc for sequencing and accuracy   Time: 20 minutes     Assessment: Pt tolerated treatment well  Pt demonstrated fair immediate recall with 9/15 on memory task  Pt demonstrated use of chunking as a memory strategy  Pt had difficulty with expressive words and word retrieval during expressive categorization task  Required min vc for recall of items  Pt demonstrated good problem solving and logic during decoding task but had difficulty keeping place on page  Required min vc for accuracy and sequencing letters for correct placement  Pt demonstrated good sustained attention throughout session  Pt would continue to benefit from skilled OT  Plan: Future OT sessions to focus on expressive categorize, attention, memory, and executive functioning with functional problem solving  Continued skilled OT per POC      Treatment conducted by student supervised and directed by Dejuan Zimmerman MS, OTR/L, CSRS, SNEHAL, DCAT  I agree with all treatment methods performed during this session by THEODORE Jason  Student was directly supervised by me during the entirety of session  Patient consent given to be treated by student

## 2023-02-06 NOTE — PROGRESS NOTES
PT Re-Evaluation /Daily Note      Today's date: 2023  Patient name: Clint Poe  : 1955  MRN: [id number]  Referring provider: Al Yee,*  Dx:   Encounter Diagnosis     ICD-10-CM    1  Lewy body dementia without behavioral disturbance [facility])  G31 83     F02 80             Assessment  Assessment details: Patient is a 74 y o  Male who presents to skilled outpatient PT with Lewy Body Dementia  Patient has demonstrated improvements in his static and dynamic balance via outcome measures below  Patient continues to show slight challenges with postural instability with his treatment interventions, but noted improvements with his balance  Endurance in 6 minute walk test still below age related norms Patient will benefit from a structured exercise program such as LSVT BIG or PWR in order to maintain gains, patient endurance improvements noted as a primary goal as well as functional/ADL improvements  Patient requires skilled PT in order to improve his overall balance and weakness in order to maximize function with neurodegenerative diagnosis        Impairments: Abnormal coordination, Abnormal gait, Abnormal muscle tone, Abnormal or restricted ROM, Activity intolerance, Impaired balance, Impaired physical strength, Lacks appropriate HEP, Poor posture, Poor body mechanics, Pain with function, Safety issue, Weight-bearing intolerance, Scapular dyskinesis, Abnormal movement, Difficulty understanding and Abnormal muscle firing     Patient verbalized understanding of POC  Please contact me if you have any questions or recommendations  Thank you for the referral and the opportunity to share in [employer] care          Plan  Plan details: Balance, strengthening, gait, endurance, fall risk     Patient would benefit from: PT Eval, Skilled PT, OT Eval and Skilled OT  Planned modality interventions: Biofeedback, Cryotherapy, TENS, Thermotherapy: Hydrocollator Packs and Traction  Planned therapy interventions: Abdominal trunk stabilization, ADL training, Balance, Balance/WB training, Breathing training, Body mechanics training, Coordination, Functional ROM exercises, Gait training, HEP, Joint mobilization, Manual therapy, Talavera taping, Motor coordination training, Neuromuscular re-education, Patient education, Postural training, Prosthetic fitting/training, Strengthening, Stretching, Therapeutic activities, Therapeutic exercises, Therapeutic training, Transfer training, Activity modification and Work reintegration  Frequency: 2x/wk  Duration in weeks: 12  Plan of Care beginning date: 2/6/2023  Plan of Care expiration date: 3 months - 5/6/2023  Treatment plan discussed with: Patient and Family        Goals  Short Term Goals (4 weeks):    - Patient will improve time on TUG by 2 9 seconds  to facilitate improved safety in all ambulation- NOT MET  - Patient will be independent in basic HEP 2-3 weeks- NOT MET  - Patient will improve 5xSTS score by 2 3 seconds to promote improved LE functional strength needed for ADLs- NOT MET  - Patient will complete components of HiMAT to promote agility necessary for sports related tasks- NOT MET     Long Term Goals (12 weeks):  - Patient will be independent in a comprehensive home exercise program- NOT MET  - Patient will improve scoring on DGI by 2 6 points to progress safety- MET  - Patient will improve gait speed by 0 18 m/s to improve safety with community ambulation- NOT MET  - Patient will improve PENDLETON by 6 points in order to improve static balance and reduce risk for falls- NOT MET  - Patient will improve scoring on FGA by 4 points from to progress safety with dynamic tasks- NOT MET  - Patient will be able to demonstrate HT in gait without veering- MET  - Patient will improve 6 Minute Walk Test score by 190 feet to promote improved cardiovascular endurance- NOT MET  - Patient will report 50% reduction in near falls in order to improve safety with functional tasks and reduce his risk for falls- NOT MET  - Patient will report going on walks at least 3 days per week to promote independence and improved cardiovascular endurance- NOT MET  - Patient will be able to ascend/descend stairs reciprocally with 1 UE assist to promote independence and safety with ADLs- MET  - Patient will report 50% reduction in near falls when ambulating on uneven terrain- MET    New goals- 2023 (8 weeks)     - Patient will score a low risk for falls 2/4 fall risks measures  - Patient will score age norm values for 1/2 endurance measures  - Patient will be able to ambulate on uneven surfaces with 50% reduction in near falls to increase safety with community mobility  - Patient will be able to perform a floor transfer without physical assistance to assist with fall recovery at home and in the community  - Patient will be able to walk up incline with decreased difficulty and no loss of balance in order to improve functional mobility throughout the community  - Patient will be able to perform sit to stands from softer surfaces such as a couch or bed in order to improvement function with transfers  - Patient will be consistent with program for at least 1 day per week to assist with management of condition and functional independence of their condition       All date taken from [facility] Neuro Section or Rehab Measures      Wells/64  MDC: 6 pts  Age Norms:  61-76: M - 54   F - 55  70-79: M - 47   F - 53  80-89: M - 48   F - 50 5xSTS: Kyle et al 2010  MDC: 2 3 sec  Age Norms:  60-69: 11 1 sec  70-79: 12 6 sec  80-89: 14 8 sec   TUG  MDC: 4 14 sec  Cut off score:  >13 5 sec community dwelling adults  >32 2 frail elderly  <20 I for basic transfers  >30 dependent on transfers 10 Meter Walk Test: Fletcher del toro 2011  Jada Nehem[address]: 0 18 m/s  20-29: M - 1 35 m   F - 1 34 m  30-39: M - 1 43 m   F - 1 34 m  40-49: M - 1 43 m   F - 1 39 m  50-59: M - 1 43 m   F - 1 31 m  60-69: M - 1 34 m   F - 1 24 m  70-79: M - 1 26 m   F - 1 13 m  80-89: M - 0 97 m   F - 0 94 m    Household Ambulator < 0 4 m/s  Limited [employer] 0 4 - 0 8 m/s  [employer] 0 8 - 1 2 m/s  Safely cross the street > 1 2 m/s   FGA  MCID: 4 pts  Geriatrics/community < 22/30 fall risk  Geriatrics/community < 20/30 unexplained falls    DGI  MDC: vestibular - 4 pts  MDC: geriatric/community - 3 pts  Falls risk <19/24 mCTSIB  Norm: 20-60 yrs  Eyes open firm: norm sway 0 21-0 48  Eyes closed firm: norm sway 0 48-0 99  Eyes open foam: norm sway 0 38-0 71  Eyes closed foam: norm sway 0 70-2 22   6 Minute Walk Test  MDC: 190 98 ft  MCID: 164 ft    Age Norms  61-76: M - 1876 ft (571 80 m)  F - 1765 ft (537 98 m)  70-79: M - 1729 ft (527 00 m)  F - 1545 ft (470 92 m)  80-89: M - 1368 ft (416 97 m)  F - 1286 ft (391 97 m) ABC: Liseth Daugherty, 2003  <67% increased risk for falls         Subjective    History of Present Illness  - Mechanism of injury: Patient is a 74year old male reporting to skilled PT with reports of Lewy Body Dementia as diagnosed by Neurologist  Patient recently saw neurologist, summary noted below:       Assessment/Plan:     Lewy body dementia without behavioral disturbance (HCC)  Progressive cognitive decline over years along with the development of bradykinesia, rest tremor and postural instability partially responsive to levodopa  Motor symptoms appear to be fairly well controlled  Main concern today is more recent decline in cognition and reduced appetite  Also with concerns with regards to daytime fatigue and sedation  He has a history of obstructive sleep apnea and wears his CPAP consistently      Cognitively he was initially doing well when he 1st started galantamine ER 8mg daily  Previously unable to tolerate donepezil due to headaches  He was noted by his wife to have reduced appetite following the addition of galantamine    Gabapentin was later headed for restless leg symptoms/ PLMS which has greatly improved  He continues on gabapentin 300 mg q h s  there is a question as to whether this may be also contributing to his recent decline in cognition       Time spent discussing options  Reduced appetite is in part related to poor taste which may be related to anosmia  He also attributes this to bouts of dry mouth  He has tried all the over-the-counter medications for dry mouth without success  Given onset was reported to be around the time starting galantamine we could consider switching his medication to see if this may be related  We discussed reduced appetite being potential side effects of all medications  After much discussion we opted to try switching his galantamine ER to rivastigmine patch to see if this would be better tolerated  If better tolerated but not effective, we can increase the dose  If not tolerated, consider adding memantine instead      There was a question as to whether Provigil may be help with fatigue  Lack of eating assistance may be contributing to fatigue  I would not recommend starting an additional medication at this time for be considered in the future if fatigue persists     He will continue on Sinemet and gabapentin with no change         Diagnoses and all orders for this visit:     Lewy body dementia without behavioral disturbance ([facility] )  -     Ambulatory Referral to Physical Therapy; Future  -     rivastigmine (EXELON) 4 6 mg/24 hr TD 24 hr patch; Place 1 patch on the skin daily"      2/6/2023  Patient reports that he is noticing endurance improvements with his activities, but notes that he is continually having challenges with his overall intervention tolerance with challenges with needing rest breaks during his activities during the day as well as in therapy during his treatment sessions  Patient notes no falls in the past month           - Primary AD: None  - Assist level at home:  Mod independent  - Decreased fine motor tasks: Yes      Pain  - Current pain ratin/10  - At best pain ratin/10  - At worst pain ratin/10  - Location: no pain reported    Social Support  - Steps to enter house: 5  - Stairs in house: 12  - Lives in: multi level home  - Lives with: wife    - Employment status: Retired  - Hand dominance: R    Treatments  - Previous treatment: None  - Current treatment: PT EVAL  - Diagnostic Testing: PMH      Objective     LE MMT  - R Hip Flexion: 5/5   L Hip Flexion: 5/5  - R Hip Extension: 5/5   L Hip Extension: 5/5  - R Hip Abduction: 5/5   L Hip Abduction: 5/5  - R Hip Adduction: 5/5   L Hip Adduction: 5/5  - R Knee Extension: 5/5  L Knee Extension: 5/5  - R Knee Flexion: 5/5   L Knee Flexion: 5/5  - R Ankle DF: 4+/5   L Ankle DF: 4+/5  - R Ankle PF: 5/5   L Ankle PF: 5/5    Sensation  - Light touch: WNL bilaterally   - Deep pressure: WNL bilaterally   - Pain: WNL bilaterally   - Temperature:  WNL bilateral     Coordination  - Heel to Shin: Dyscoordination bilaterally   - Alternate Toe Taps: Dyscoordination bilaterally   - Finger to Nose: Dyscoordination bilaterally   - Finger to Finger: Dyscoordination bilaterally     Postural Screen  - Observation: Mild forward head, rounded shoulders  - Improvement in symptoms with correction of posture: No    Gait  - Abnormalities: Postural instability        Outcome Measures Initial Eval  2022 Progress  Note  2022 Progress Note  1/10/2023 Re-Evaluation  2023     5xSTS 10 16 sec 9 56 seconds 9 10 seconds 8 89 seconds no UE     TUG  - Regular  - Cognitive   8 56 sec  10 23 sec   8 11 sec  10 13 sec   7 51 sec  10 63 sec   7 36 sec  10 75 sec     10 meter 1 07 m/s 1 08 m/s 1 11 m/s cued to increase speed 1 15 m/s  Cued to increase speed     PENDLETON 45/56 47/56 49/56 50/56     FGA      DGI      mCTSIB  - FTEO (firm)  - FTEC (firm)  - FTEO (foam)  - FTEC (foam)   30 sec  30 sec  30 sec  10 sec   30 sec  30 sec  30 sec  15 sec   30 sec  30 sec  30 sec  30 sec 30 sec  30 sec  30 sec  30 sec     6MWT 1100 ft 1210 feet 1250 feet 1310 feet     30 second 15 reps no UE 15 reps no UE 15 reps no UE 16 reps no UE                Daily Note 2/6/2023- 16# vest donned for interventions    BOSU step ups- forward, laterally, backwards- 20 reps each, 0 UE  Hurdles forward, laterally, backwards holding tidal tank- 10#, 10 cycles of 10 feet   Resisted walking for balance control- forward, laterally, backwards- 10 cycles of 10 feet, 50#  Wobble board- 20 reps AP, 20 reps laterally  Reciprocal walking with balance and coordination- 10 cycles of 15 feet, 3# and 4# kettle bell with UE movements    Precautions:   Past Medical History:   Diagnosis Date   • Alopecia    • Arthritis    • Back pain    • Benign essential hypertension    • Chronic obstructive pulmonary disease (COPD) (HCC)    • Depression    • Diabetes mellitus (HCC)    • Emphysema, interstitial (HCC)    • Fatigue    • Hypercholesterolemia    • Hypersomnia, persistent    • Hypothyroidism    • Low testosterone    • Memory loss    • Oral candidiasis 5/11/2020   • Sinusitis    • Thyroid goiter

## 2023-02-09 ENCOUNTER — APPOINTMENT (OUTPATIENT)
Dept: PHYSICAL THERAPY | Facility: MEDICAL CENTER | Age: 68
End: 2023-02-09

## 2023-02-09 ENCOUNTER — APPOINTMENT (OUTPATIENT)
Dept: OCCUPATIONAL THERAPY | Facility: MEDICAL CENTER | Age: 68
End: 2023-02-09

## 2023-02-11 DIAGNOSIS — K59.1 FUNCTIONAL DIARRHEA: ICD-10-CM

## 2023-02-11 RX ORDER — CHOLESTYRAMINE 4 G/9G
1 POWDER, FOR SUSPENSION ORAL DAILY
Qty: 90 PACKET | Refills: 5 | Status: SHIPPED | OUTPATIENT
Start: 2023-02-11

## 2023-02-13 ENCOUNTER — APPOINTMENT (OUTPATIENT)
Dept: PHYSICAL THERAPY | Facility: MEDICAL CENTER | Age: 68
End: 2023-02-13

## 2023-02-13 ENCOUNTER — OFFICE VISIT (OUTPATIENT)
Dept: PHYSICAL THERAPY | Facility: MEDICAL CENTER | Age: 68
End: 2023-02-13

## 2023-02-13 ENCOUNTER — OFFICE VISIT (OUTPATIENT)
Dept: OCCUPATIONAL THERAPY | Facility: MEDICAL CENTER | Age: 68
End: 2023-02-13

## 2023-02-13 DIAGNOSIS — G31.83 LEWY BODY DEMENTIA WITHOUT BEHAVIORAL DISTURBANCE (HCC): Primary | ICD-10-CM

## 2023-02-13 DIAGNOSIS — F02.80 LEWY BODY DEMENTIA WITHOUT BEHAVIORAL DISTURBANCE (HCC): Primary | ICD-10-CM

## 2023-02-13 NOTE — PROGRESS NOTES
Occupational Therapy Daily Note:    Today's date: 2023  Patient name: James Brannon  : 1955  MRN: [id number]  Referring provider: Catina Gutierrez,*  Dx:   Encounter Diagnosis   Name Primary? • Lewy body dementia without behavioral disturbance ([facility] ) Yes      POC START DATE: 2023  POC END DATE: 2023  NEXT PN DUE: 2023  FREQUENCY: 1-2x wk for 12 weeks    Subjective: "There was a lot of traffic and a tractor turned over on the off ramp  I would like to continue coming once a week until March "    Objective: Therapeutic Activity     Attention, Logic, Problem Solving, Executive Function  Math Logic & Identifying Patterns  Mod vc accuracy    Time: 15 minutes    Attention, Recall, Visual Scanning  Classified Ads Functional Memory w/ Q& A  Trial 1:   Trial 2: 3/5  Trial 3:   Time: 10 minutes     Memory, Attention, Recall  Functional Objects (Bean bag, fork, knife, marble, marker, pencil)   Immediate Recall   Delayed Recall   Time: 10 minutes     Attention, Divided Attention, Executive Function,Working Memory  Word Scramble in conjunction w/ Memory Task   Memorize 5 items   Time: 10 minutes     Assessment: Pt tolerated treatment well  Pt presented for OT session very distracted  Pt had difficulty maintaining attention and required mod vc for accurately identifying math patterns and following directions indicating impaired attention  Displayed fair immediate recall with classified ad questions indicating decreased attention  Exhibited good working memory with immediate and delayed recall using functional items in treatment room (stapler, bean bag, pencil, marble, fork, spoon etc)  Pt demonstrated good divided attention by alternating between memorization task and word scramble requiring min vc for clues and letter placement  Will continue to progress client as tolerated        Plan: Future OT sessions to focus on expressive categorize, attention, memory, and executive functioning with functional problem solving  Continued skilled OT per POC  Treatment conducted by student supervised and directed by Eduardo Doll, MS, OTR/L, CSRS, SNEHAL, DCAT  I agree with all treatment methods performed during this session by THEODORE Barragan  Student was directly supervised by me during the entirety of session  Patient consent given to be treated by student

## 2023-02-13 NOTE — PROGRESS NOTES
Daily Note   Last PN 2023  POC- 2023-2023    Today's date: 2023  Patient name: Gilford Falconer  : 1955  MRN: [id number]  Referring provider: Shazia Rajput,*  Dx:   Encounter Diagnosis     ICD-10-CM    1  [facility] body [facility] without [facility] )  G31 83     F02 80           Start Time:   Stop Time:   Total time in clinic (min): 45 minutes    Subjective: Patient reported no updates, presents from OT, notes that he feels good today, patient desires to return to 2x/week  Objective: See treatment diary below- 16# vest donned for all interventions    Reciprocal walking with balance and coordination- 10 cycles of 15 feet, 3# and 4# kettle bell with UE movements    Reciprocal Side stepping with balance and coordination- 10 cycles of 15 feet, 3# and 4# kettle bell with UE movements    Reciprocal walking with balance and coordination- 10 cycles of 15 feet, 3# and 4# kettle bell with UE movements, performed with 6" hurdles    Reciprocal Side stepping with balance and coordination- 10 cycles of 15 feet, 3# and 4# kettle bell with UE movements, performed with 6" hurdles    Physiologic stress exposure, balance/postural reactions with incline treadmill walking- 15 minutes, vitals monitored throughout- 4 0% grade, 120 bpm, 95% SPO2,  85 miles    Tandem ambulation with ball toss- 10 cycles of 10 feet    Karaoke walking- 10 cycles of 10 feet      Assessment: Tolerated treatment well  Patient demonstrated fatigue post treatment, exhibited good technique with therapeutic exercises and would benefit from continued PT to maximize function post dx of Lewy body dementia  Challenged with coordination, tolerated cardiovascular endurance activities well today  Patient demonstrates challenges with dual tasking with narrow base of support and ball toss, dividing attention causes increases in LOB  Plan: Continue per plan of care  Progress treatment as tolerated

## 2023-02-20 ENCOUNTER — EVALUATION (OUTPATIENT)
Dept: OCCUPATIONAL THERAPY | Facility: MEDICAL CENTER | Age: 68
End: 2023-02-20

## 2023-02-20 ENCOUNTER — OFFICE VISIT (OUTPATIENT)
Dept: PHYSICAL THERAPY | Facility: MEDICAL CENTER | Age: 68
End: 2023-02-20

## 2023-02-20 DIAGNOSIS — F02.80 LEWY BODY DEMENTIA WITHOUT BEHAVIORAL DISTURBANCE (HCC): Primary | ICD-10-CM

## 2023-02-20 DIAGNOSIS — G31.83 LEWY BODY DEMENTIA WITHOUT BEHAVIORAL DISTURBANCE (HCC): Primary | ICD-10-CM

## 2023-02-20 NOTE — PROGRESS NOTES
Daily Note   Last PN 2023  POC- 2023-2023    Today's date: 2023  Patient name: Rony Ambrose  : 1955  MRN: [id number]  Referring provider: Perla Wilson,*  Dx:   Encounter Diagnosis     ICD-10-CM    1  Lewy body [facility] without behavioral disturbance ([facility] )  G31 83     F02 80           Start Time: 99  Stop Time: 351  Total time in clinic (min): 45 minutes    Subjective: Patient reported no updates, presents from OT, notes no falls or challenges  Objective: See treatment diary below- 16# vest donned for all interventions      Reciprocal walking with balance and coordination- 10 cycles of 15 feet, 3# and 4# kettle bell with UE movements, performed with 6" hurdles    Reciprocal Side stepping with balance and coordination- 10 cycles of 15 feet, 3# and 4# kettle bell with UE movements, performed with 6" hurdles    Physiologic stress exposure, balance/postural reactions with incline treadmill walking- 15 minutes, vitals monitored throughout- 5% grade, 130 bpm, 94% SPO2,  90 miles    Step ups on BOSU ball- 20 reps forward, laterally, backward    Seated on Gabriela disc- 20 reps each, 3 second holds, LAQ and Hip flexion     Reverse walking- 100#, 10 cycles of 10 feet    Control walking with CC- 100#, 10 cycles of 10 feet     Marching with Tidal Tank- 10 cycles of 10 feet    Side stepping with Tidal tank- 10 cycles of 10 feet    Walking with holding tidal tank overhead- 10 cycles of 10 feet       Assessment: Tolerated treatment well  Patient demonstrated fatigue post treatment, exhibited good technique with therapeutic exercises and would benefit from continued PT to maximize function post dx of Lewy body dementia  Challenged with coordination, patient improving with endurance with his overall intervention activities with added external weight as well as less rest breaks needed  Patient challenged with compliant surfaces with standing today and stepping up         Plan: Continue per plan of care   Progress treatment as tolerated

## 2023-02-20 NOTE — PROGRESS NOTES
OCCUPATIONAL THERAPY RE EVALUATION / MAINTENANCE PROGRAM    2/20/2023  [facility]  1955  [id number]  Zainab Briones,*  1  Lewy body dementia without behavioral disturbance ([facility] )        POC START DATE: 2/20/2023  POC END DATE: 5/8/2023  NEXT PN DUE: 3/054368  FREQUENCY: 1-2x wk for 12 weeks    SUBJECTIVE: Pt presents to OT evaluation on 11/28/2022 secondary to Lewy Body Dementia  Hard time with fine motor coordination  "I was always an active person  I am not happy with how I am anymore " Diagnosis of Lewy Body Dementia about 7 years ago  Difficulty with changing hearing aid batteries at beginning of session  Concerns with balance, close to falling at home  Concerns with dexterity and fine motor coordination  Tremors are worse in the R, R hand dominant  Takes Parkinson's Medication 3 times a day  Decreased cognition over the last few years  Work: Retired  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, [employer],   Retired because of diagnosis  IADLs: Tremors do not impact eating  Occasional difficulty with buttoning and zippers  No difficulty with driving  Difficulty with putting a key into a door  Difficulty with tool work  Uses medication box and wife helps with medication management  Sleep: No difficulty with sleeping  Sleeps 8-10 hours  Lives with his wife  Pt has been seen for OT services and would benefit from transitioning to maintenance program  Pt presents for outpatient OT reevaluation on 02/2023 secondary to Lewy Body Dementia  RBANS assessment used to evaluate short term and delayed memory, attention, language and visuospatial/constructional skills  "Watching TV is hard  I just cannot focus so it is pointless " Noticing improvement with STM and functional use in everyday activities  Discussed strategies how to improve attention with watching TV  Expressing interest in continuing therapy      Skilled Analysis:    Pt has been seen for OT services and would benefit from transitioning to maintenance program  Pt presents for outpatient OT reevaluation on 02/2023 secondary to Lewy Body Dementia  RBANS assessment used to evaluate short term and delayed memory, attention, language and visuospatial/constructional skills  Overall, pt scored in the 0 5% percentile falling into the extremely low category compared to norms  Pt scored extremely low in all areas except for language (27 percentile - average) and attention (5 percentile)  Pt demonstrating impairments in short term memory, delayed memory, visuospatial awareness effecting performance in everyday life tasks  Noman Zimmerman (2013): a [facility] decision that sought to clarify that Medicare will in fact cover skilled therapy services to maintain a patient’s current condition or prevent/slow further deterioration  Pt would benefit from maintenance OT services focusing on impairments for the next 2x/wk for  12 weeks  Pt educated on OT services and maintianence  POC was reviewed with pt, pt understands and agrees with POC       PATIENT GOAL: "To be able to work with my hands better "    HISTORY OF PRESENT ILLNESS:     PMH:   Past Medical History:   Diagnosis Date   • Alopecia    • Arthritis    • Back pain    • Benign essential hypertension    • Chronic obstructive pulmonary disease (COPD) ([facility] )    • Depression    • Diabetes mellitus ([facility] )    • Emphysema, interstitial (HCC)    • Fatigue    • Hypercholesterolemia    • Hypersomnia, persistent    • Hypothyroidism    • Low testosterone    • Memory loss    • Oral candidiasis 5/11/2020   • Sinusitis    • Thyroid goiter      Objective       IMPAIRMENTS SECTION:   CONCUSSION COGNITIVE CHECKLIST:  *Patient indicated that he is experiencing the following symptoms:     · Memory:  · Remembering people's names  · Sequencing activities  · Attention:   · Keeping your attention/concentrating on a task  · Processing:   · Responding to questions in a timely manner  · Executive Functions:   · Self-correcting or recognizing mistakes  · Communication:  · Using non-verbal cues  · Visual:   · Losing spot on the page when reading  · Increased sensitivities to:   · Lighting  · Noise  · Crowds or busy environments      Assessments  The Repeatable Battery for the Assessment of Neuropsychological Status (RBANS) is a brief, individually-administered assessment which measures attention, language, visuospatial/constructional abilities, and immediate & delayed memory  The RBANS is intended for use with adolescents to adults, ages 15 to 80 years  The following results were obtained during the administration of the assessment  Form: A    Cognitive Domain/Subtest: Index Score: Percentile Rank: Classification: IE: Status:   IMMEDIATE MEMORY 49 <0 1 %ile Extremely Low 49         1  List Learning (13/40)          2  Story Memory (5/24)           VISUOSPATIAL/  CONSTRUCTIONAL 62 0 6%ile Extremely Low 58         3  Figure Copy (15/20)          4  Line Orientation (6/20)           LANGUAGE 92 27%ile Average 87         5  Picture Naming (10/10)          6  Semantic Fluency (27/35)           ATTENTION 75 5%ile Borderline 88         7  Digit Span (8/16)          8  Coding (29/89)           DELAYED MEMORY 68 1 8 %ile Extremely Low 56         9  List Recall (0/10)          10  List Recognition (16/20)          11  Story Recall (5/12)          12  Figure Recall (11/20)       Sum of Index Scores:  346 (previously 338)      Total Score:  61 (previously 59)      Percentile: 0 5% (previously 0 3%ile)      Classification: Extremely Low        “IE” indicates the scores from the initial evaluation (2/20/2023)  Form: B    [place]   Able to complete 1A, 2B  Errors from there  Terminated after 2:52 seconds      SHORT TERM GOALS:     Pt will maintain immediate list memory recall being able to recall 13/40 items on RBANS     Pt will maintain immediate story memory recall being able to recall 5/24 on RBANs     Pt will maintain delayed story memory recall being able to recall 5/12 on RBANS    Pt will maintain attention to task for 45 minutes in semi modal environment for baseline performance, improved role performance and to improve learning and to simulate return to IADLs and complete cooking/cleaning tasks  Pt will demo ability to participate in dual tasking/divided attention task with >75% accuracy in multimodal environment to simulate return to IADL roles    Pt will demo ability to alternate attention between 2 tasks with cog loading and >75% accuracy with G retention of task directions in multimodal environment to simulate return to IADL roles  LONG TERM GOALS    Pt will maintain immediate list memory recall being able to recall 13/40 items on RBANS     Pt will maintain immediate story memory recall being able to recall 5/24 on RBANs     Pt will maintain delayed story memory recall being able to recall 5/12 on RBANS    Pt will maintain attention to task for 45 minutes in semi modal environment for baseline performance, improved role performance and to improve learning and to simulate return to IADLs and complete cooking/cleaning tasks  Pt will demo ability to participate in dual tasking/divided attention task with >75% accuracy in multimodal environment to simulate return to IADL roles    Pt will demo ability to alternate attention between 2 tasks with cog loading and >75% accuracy with G retention of task directions in multimodal environment to simulate return to IADL roles     TIME SPENT 60 min for administration, documentation, interpretation, scoring adn POC development RBANS    Planned Treatment Interventions  Internal and external memory aides   Multimatrix for saccades/ visual clutter/attention  Hypersensitivity strategies education  Multi-modal environment  Sustained/alternating/divided attention  Tracking tube  Oculomotor control:  saccades, con/divergence  Conv /div   Dynamic tasks  Work stations with timed transitions  Temporal Awareness: Organize the Hour activities  Memory and mental manipulation  Auditory processing with immediate recall  Memory retention with immediate and delayed recall  Edu on cog/vision apps  [facility] scanning sheets

## 2023-02-27 ENCOUNTER — OFFICE VISIT (OUTPATIENT)
Dept: OCCUPATIONAL THERAPY | Facility: MEDICAL CENTER | Age: 68
End: 2023-02-27

## 2023-02-27 ENCOUNTER — OFFICE VISIT (OUTPATIENT)
Dept: PHYSICAL THERAPY | Facility: MEDICAL CENTER | Age: 68
End: 2023-02-27

## 2023-02-27 DIAGNOSIS — G31.83 LEWY BODY DEMENTIA WITHOUT BEHAVIORAL DISTURBANCE (HCC): Primary | ICD-10-CM

## 2023-02-27 DIAGNOSIS — F02.80 LEWY BODY DEMENTIA WITHOUT BEHAVIORAL DISTURBANCE (HCC): Primary | ICD-10-CM

## 2023-02-27 NOTE — PROGRESS NOTES
Daily Note   Last PN 2023  POC- 2023-2023    Today's date: 2023  Patient name: Rosa Thompson  : 1955  MRN: [id number]  Referring provider: Andrea White,*  Dx:   Encounter Diagnosis     ICD-10-CM    1  Lewy body dementia without behavioral disturbance ([facility] [address] )  G31 83     F02 80           Start Time: 7336  Stop Time: 2411  Total time in clinic (min): 45 minutes    Subjective: Patient reported no updates, presents from OT, notes no falls or challenges, patient reports he was able to finish remodeling his bathroom  Bozena Wheeler (daughter)- [phone]    Objective: See treatment diary below- 16# vest donned for all interventions      Reciprocal walking with balance and coordination- 10 cycles of 15 feet, 3# and 4# kettle bell with UE movements, performed with 6" hurdles, reciprocal movements opposite UE and LE    Reciprocal Side stepping with balance and coordination- 10 cycles of 15 feet, 3# and 4# kettle bell with UE movements, performed with 6" hurdles    Physiologic stress exposure, balance/postural reactions with incline treadmill walking- 15 minutes, vitals monitored throughout- 6% grade, 3 5 mph 145 bpm, 94% SPO2,  88 miles    Step ups on BOSU ball- 20 reps forward, laterally, backward, 1 UE    Head turns on BOSU ball- 20 reps each direction- horizontal, vertical, diagonals    Reverse walking- 100#, 10 cycles of 10 feet, holding onto CC    Control walking with CC- 100#, 10 cycles of 10 feet, control walking forward     Marching with Tidal Tank- 10 cycles of 10 feet    Side stepping with Tidal tank- 10 cycles of 10 feet    Walking with holding tidal tank overhead- 10 cycles of 10 feet     Cone taps- forward and laterally- 10 cycles of 10 feet no UE    Cone taps- forward and laterally holding tidal tank- 10 cycles of 10 feet, 10#    Assessment: Tolerated treatment well   Patient demonstrated fatigue post treatment, exhibited good technique with therapeutic exercises and would benefit from continued PT to maximize function post dx of Lewy body dementia  Challenged with coordination, patient improving with endurance with his overall intervention activities with added external weight as well as less rest breaks needed  Patient challenged with compliant surfaces with standing today and stepping up  Patient challenged with dual tasking with proprioception with cone taps as well as with coordination activities and efforts  Plan: Continue per plan of care  Progress treatment as tolerated

## 2023-02-27 NOTE — PROGRESS NOTES
Occupational Therapy Daily Note:    Today's date: 2023  Patient name: Shayne Park  : 1955  MRN: [id number]  Referring provider: Angelita Wortyh,*  Dx:   Encounter Diagnosis   Name Primary? • Lewy body dementia without behavioral disturbance ([facility] ) Yes      POC START DATE: 2023  POC END DATE: 2023  NEXT PN DUE: 2023  FREQUENCY: 1-2x wk for 12 weeks    Subjective: "My COPD is bothering me today "    Objective: Therapeutic Activity     Attention, Logic, Problem Solving, Executive Function  Target Area 3: Word Formation Missing one letter   Min vc for accuracy and word formation  Time: 20 minutes     Attention, Recall, Executive Function   Target Area 3: Word Definition & Usage  Min vc for word formation   Time: 15 minutes     Attention, Recall, Executive Function  Functional Memory Task: Exercise 10   Trial 1:   Trial 2:   Trial 3:   Time: 10 minutes     Assessment: Pt tolerated treatment well  Pt presents to OT session elated today  Pt demonstrated good sustained attention today with word formation and definition usage  However, rushes through tasks without checking for accuracy, required min vc or accuracy  Good executive function with word definition and usage required min vc for spelling  Pt demonstrated good-fair immediate recall with functional story memory task  Pt reports he has been using subtitles on the T V with improvement in comprehension  Pt education on slowing down when completing task to improve attention to details, reduce COPD symptoms with pursed lip breathing reduce anxiety and stress  Pt would benefit from skilled OT  Will continue to progress as tolerated  Plan: Future OT sessions to focus on expressive categorize, attention, memory, and executive functioning with functional problem solving  Continued skilled OT per POC  Treatment conducted by student supervised and directed by Karen Sanchez, MS, OTR/L, CSRS, SNEHAL, DCAT   I agree with all treatment methods performed during this session by THEODORE Jason  Student was directly supervised by me during the entirety of session  Patient consent given to be treated by student

## 2023-03-02 ENCOUNTER — OFFICE VISIT (OUTPATIENT)
Dept: PHYSICAL THERAPY | Facility: MEDICAL CENTER | Age: 68
End: 2023-03-02

## 2023-03-02 ENCOUNTER — OFFICE VISIT (OUTPATIENT)
Dept: OCCUPATIONAL THERAPY | Facility: MEDICAL CENTER | Age: 68
End: 2023-03-02

## 2023-03-02 DIAGNOSIS — F02.80 LEWY BODY DEMENTIA WITHOUT BEHAVIORAL DISTURBANCE (HCC): Primary | ICD-10-CM

## 2023-03-02 DIAGNOSIS — G31.83 LEWY BODY DEMENTIA WITHOUT BEHAVIORAL DISTURBANCE (HCC): Primary | ICD-10-CM

## 2023-03-02 NOTE — PROGRESS NOTES
Occupational Therapy Daily Note:    Today's date: 3/2/2023  Patient name: Lindy Dolan  : 1955  MRN: [id number]  Referring provider: Perlita Bauer,*  Dx:   Encounter Diagnosis   Name Primary? • Lewy body dementia without behavioral disturbance ([facility] [address] ) Yes      POC START DATE: 2023  POC END DATE: 2023  NEXT PN DUE: 3/215839  FREQUENCY: 1-2x wk for 12 weeks    Subjective: "I am doing good  People are crazy drivers out there "    Objective:     Neuromuscular Re-education    Attention, Recall, Divided Attention, Alternating Attention, Executive Function  Memorization of Letters & Numbers in conjunction w/ Trail making w/ Multi-Matrix in conjunction w/ distraction task of Double Word Meaning Deduction  Memorization of one item (4 trials 100% accuracy)  Memorization of two items (4 trials 62% accuracy)   Min vc for accuracy   Time: 30 minutes     Attention, Recall, Executive Function   Memory Matching on Vertical Surface & Table   Min vc for memorization    Time: 15 minutes     Assessment: Pt tolerated treatment well  Pt demonstrated good sustained attention with memorization and 2 distraction tasks today  Able to correctly recall 62% of memorization tasks today with 2 items indicating good internal memorization strategies  Pt able to complete tasks with 75% accuracy indicating good divided attention  Pt demonstrated fair memory and immediate recall with memory matching card task, required min vc for correct placement and memory strategies  Will continue to progress pt as tolerated  Plan: Future OT sessions to focus on expressive categorize, attention, memory, and executive functioning with functional problem solving  Continued skilled OT per POC  Treatment conducted by student supervised and directed by Rukhsana Plaza MS, OTR/L, CSRS, SNEHAL, DCAT  I agree with all treatment methods performed during this session by THEODORE Corado   Student was directly supervised by me during the entirety of session  Patient consent given to be treated by student

## 2023-03-02 NOTE — PROGRESS NOTES
Daily Note   Last PN 2023  POC- 2023-2023    Today's date: 3/2/2023  Patient name: Ace Gaffney  : 1955  MRN: [id number]  Referring provider: Sirena Tolbert,*  Dx:   Encounter Diagnosis     ICD-10-CM    1  [facility] body dementia without [facility]  G31 83     F02 80           Start Time: 2579  Stop Time: 1700  Total time in clinic (min): 45 minutes    Subjective: Patient reported no updates, notes that his "copd" was acting up  Maximo Ocasio (daughter)- [phone]    Objective: See treatment diary below- 16# vest donned for all interventions    Pushing Sled- 100#- 10 cycles of push, 10 cycles of pull, 30 feet    Reciprocal walking with balance and coordination- 10 cycles of 15 feet, 3# and 4# kettle bell with UE movements, performed with 6" hurdles, reciprocal movements opposite UE and LE    Reciprocal Side stepping with balance and coordination- 10 cycles of 15 feet, 3# and 4# kettle bell with UE movements, performed with 6" hurdles    Physiologic stress exposure, balance/postural reactions with incline treadmill walking- 15 minutes, vitals monitored throughout- 7% grade, 3 7 mph 147 bpm, 95% SPO2,  90 miles    Step ups on BOSU ball- 20 reps forward, laterally, backward, 1 UE, blue theraband around knees for resistance    Head turns on BOSU ball- 20 reps each direction- horizontal, vertical, diagonals, eyes open and eyes closed    Marching with Tidal Tank- 10 cycles of 10 feet    Side stepping with Tidal tank- 10 cycles of 10 feet    Walking with holding tidal tank overhead- 10 cycles of 10 feet       Assessment: Tolerated treatment well  Patient demonstrated fatigue post treatment, exhibited good technique with therapeutic exercises and would benefit from continued PT to maximize function post dx of Lewy body dementia   Requires consistent feedback to improve coordination, patient challenged with reciprocal stepping, patient challenged but improving with endurance, patient noting distance and physiologic tolerance increases each session, specifically to target PMH of COPD      Plan: Continue per plan of care  Progress treatment as tolerated

## 2023-03-06 ENCOUNTER — APPOINTMENT (OUTPATIENT)
Dept: OCCUPATIONAL THERAPY | Facility: MEDICAL CENTER | Age: 68
End: 2023-03-06

## 2023-03-06 ENCOUNTER — APPOINTMENT (OUTPATIENT)
Dept: PHYSICAL THERAPY | Facility: MEDICAL CENTER | Age: 68
End: 2023-03-06

## 2023-03-06 NOTE — PROGRESS NOTES
"Occupational Therapy Daily Note:    Today's date: 3/6/2023  Patient name: Srinivasa Tamez  : 1955  MRN: [id number]  Referring provider: Live Hutson,*  Dx:   No diagnosis found  POC START DATE: 2023  POC END DATE: 2023  NEXT PN DUE: 3/136284  FREQUENCY: 1-2x wk for 12 weeks    Subjective: \"I am doing good  People are crazy drivers out there  \"    Objective:     Neuromuscular Re-education    Attention, Recall, Divided Attention, Alternating Attention, Executive Function  Memorization of Letters & Numbers in conjunction w/ Trail making w/ Multi-Matrix in conjunction w/ distraction task of Double Word Meaning Deduction  Memorization of one item (4 trials 100% accuracy)  Memorization of two items (4 trials 62% accuracy)   Min vc for accuracy   Time: 30 minutes     Attention, Recall, Executive Function   Memory Matching on Vertical Surface & Table   Min vc for memorization    Time: 15 minutes     Assessment: Pt tolerated treatment well  Pt demonstrated good sustained attention with memorization and 2 distraction tasks today  Able to correctly recall 62% of memorization tasks today with 2 items indicating good internal memorization strategies  Pt able to complete tasks with 75% accuracy indicating good divided attention  Pt demonstrated fair memory and immediate recall with memory matching card task, required min vc for correct placement and memory strategies  Will continue to progress pt as tolerated  Plan: Future OT sessions to focus on expressive categorize, attention, memory, and executive functioning with functional problem solving  Continued skilled OT per POC  Treatment conducted by student supervised and directed by Mireya Virgen MS, OTR/L, CSRS, SNEHAL, DCAT  I agree with all treatment methods performed during this session by THEODORE Howell  Student was directly supervised by me during the entirety of session  Patient consent given to be treated by student     "

## 2023-03-07 ENCOUNTER — APPOINTMENT (OUTPATIENT)
Dept: OCCUPATIONAL THERAPY | Facility: MEDICAL CENTER | Age: 68
End: 2023-03-07

## 2023-03-07 ENCOUNTER — APPOINTMENT (OUTPATIENT)
Dept: PHYSICAL THERAPY | Facility: MEDICAL CENTER | Age: 68
End: 2023-03-07

## 2023-03-08 ENCOUNTER — TELEPHONE (OUTPATIENT)
Dept: NEUROLOGY | Facility: CLINIC | Age: 68
End: 2023-03-08

## 2023-03-08 NOTE — TELEPHONE ENCOUNTER
Left voicemail message for patient to call back to confirm appt in [facility] [address] office in neurology

## 2023-03-09 ENCOUNTER — OFFICE VISIT (OUTPATIENT)
Dept: PHYSICAL THERAPY | Facility: MEDICAL CENTER | Age: 68
End: 2023-03-09

## 2023-03-09 ENCOUNTER — OFFICE VISIT (OUTPATIENT)
Dept: OCCUPATIONAL THERAPY | Facility: MEDICAL CENTER | Age: 68
End: 2023-03-09

## 2023-03-09 DIAGNOSIS — G31.83 LEWY BODY DEMENTIA WITHOUT BEHAVIORAL DISTURBANCE (HCC): Primary | ICD-10-CM

## 2023-03-09 DIAGNOSIS — F02.80 LEWY BODY DEMENTIA WITHOUT BEHAVIORAL DISTURBANCE (HCC): Primary | ICD-10-CM

## 2023-03-09 NOTE — PROGRESS NOTES
PT Re-Evaluation /progress note/daily note        Today's date: 3/9/2023  Patient name: Ace Gaffney  : 1955  MRN: [id number]  Referring provider: Sirena Tolbert,*  Dx:   Encounter Diagnosis     ICD-10-CM    1  Lewy body dementia without behavioral disturbance [facility])  G31 83     F02 80             Assessment  Assessment details: Patient is a 79 y o  Male who has been making steady gains in skilled outpatient PT since 2022 with reported 0 falls  However, high risk for falls remains as he has demonstrated results below age matched normative values for the following outcome measures: 6 minute walk test, as well as due to his neurodegenerative diagnosis of Lewy Body Dementia  At this time the patient is appropriate to transition to maintenance skilled PT to maintain current gains and prevent regression  Regression is likely, due to history or regression noted, history of falls with injury, sedentary lifestyle, presence of neurodegenerative/neurocognitive disorder, no access at home to fall prevention system to perform HEP and limited ability for family member/caregiver to assist with HEP  Both the patient and caregiver would be at risk for increased injury if patient had LOB or fall due to inadequate safety equipment, which could lead to hospitalization and increased healthcare costs  New goals have been created below to reflect new focus of maintenance therapy  Pt and family in agreement with POC  Patient dx of Lewy body dementia  Nomna Zimmerman (2013): a [facility] decision that sought to clarify that Medicare will in fact cover skilled therapy services to maintain a patient’s current condition or prevent/slow further deterioration  Patient verbalized understanding of POC  Please contact me if you have any questions or recommendations  Thank you for the referral and the opportunity to share in [employer] care      Cut off score All date taken from [facility] or Rehab Measures    [place]/64  MDC: 6 pts  Age Norms:  61-76: M - 54   F - 55  70-79: M - 47   F - 53  80-89: M - 48   F - 50 5xSTS: Kyle et al 2010  MDC: 2 3 sec  Age Norms:  60-69: 11 1 sec  70-79: 12 6 sec  80-89: 14 8 sec   TUG  MDC: 4 14 sec  Cut off score:  >13 5 sec community dwelling adults  >32 2 frail elderly  <20 I for basic transfers  >30 dependent on transfers 10 Meter Walk Test: Dodie del toro 2011  20-29: M - 1 35 m   F - 1 34 m  30-39: M - 1 43 m   F - 1 34 m  40-49: M - 1 43 m   F - 1 39 m  50-59: M - 1 43 m   F - 1 31 m  60-69: M - 1 34 m   F - 1 24 m  70-79: M - 1 26 m   F - 1 13 m  80-89: M - 0 97 m   F - 0 94 m   FGA  MCID: 4 pts  Geriatrics/community < 22/30 fall risk  Geriatrics/community < 20/30 unexplained falls DGI  MDC: vestibular - 4 pts  MDC: geriatric/community - 3 pts  Falls risk <19/24   6 Minute Walk Test  Age Norms  61-76: M - 1876 ft   F - 1765 ft  70-79: M - 1729 ft   F - 1545 ft  80-89: M - 1368 ft   F - 1286 ft mCTSIB  Norm: 20-60 yrs  Eyes open firm: norm sway 0 21-0 48  Eyes closed firm: norm sway 0 48-0 99  Eyes open foam: norm sway 0 38-0 71  Eyes closed foam: norm sway 0 70-2 22             Impairments: abnormal coordination, abnormal gait, abnormal muscle tone, abnormal or restricted ROM, activity intolerance, impaired balance, impaired physical strength, lacks appropriate HEP, poor posture, poor body mechanics, pain with function, scapular dyskinesis and abnormal movement  Understanding of Dx/Px/POC: Excellent  Prognosis: Good      Goals (16 weeks):    - Patient will ambulate outdoors on uneven surfaces with limited assistance to facilitate safe community ambulation  - Patient will maintain gait speed per 10 meter walk test at 1 41 m/s to facilitate continued safety with community ambulation  - Patient will continue to score at least 50/56 on PENDLETON to maintain fall risk status to reduce risk of injury  - Patient will be able to ambulate at least 1800 ft during 6 Minute Walk Test to maintain current cardiovascular capacity  - Patient will attend PT 1x/week with focus on balance and gait training to maintain functional capacity  - Patient will remain at  seconds on TUG and TUG variations without AD to maintain fall risk status  - Patient will maintain LE strength at current level of 4+/5 strength to maintain level of independence with transfers        Plan  Plan details:   Patient would benefit from: Skilled PT  Planned modality interventions: Biofeedback, Cryotherapy, TENS, Thermotherapy: Hydrocollator Packs and Traction  Planned therapy interventions: abdominal trunk stabilization, ADL training, breathing training, body mechanics training, coordination, flexibility, functional ROM exercises, HEP, joint mobilization, motor coordination training, neuromuscular re-education, patient education, postural training, strengthening, stretching, therapeutic activities, therapeutic exercises, activity modification and ADL retraining  Frequency: 1x/wk  Duration in weeks: 16  Plan of Care beginning date: 3/9/2023  Plan of Care expiration date: 3 months - 2023  Treatment plan discussed with: Patient        Subjective Evaluation    History of Present Illness  Mechanism of injury: Patient has been attending skilled PT since 2022, patient has made tremendous progress since the initiation of his restoratives PT  Due to his diagnosis of neurodegenerative nature, patient would benefit from skilled maintenance therapy       Pain  Current pain ratin/10  At best pain ratin/10  At worst pain ratin/10  Location: none reported  Aggravating factors: none reported    Social Support  - Steps to enter house: 5  - Stairs in house: 12  - Lives in: multi level home  - Lives with: wife     Employment status: Retired  Hand dominance: R handed    Treatments  Previous treatment: PT and OT  Current treatment: PT and OT  Diagnostic Testing: See PMH      Objective     Orientation  - A&O x 3     Coordination  - Heel to Shin: Dyscoordination bilaterally   - Alternate Toe Taps: Dyscoordination bilaterally   - Finger to Nose: Dyscoordination bilaterally   - Finger to Finger: Dyscoordination bilaterally       Sensation  - Light touch: WNL bilaterally   - Deep pressure: WNL bilaterally   - Pain: WNL bilaterally   - Temperature:  WNL bilateral       Objective     LE MMT  - R Hip Flexion: 4+/5   L Hip Flexion: 4+/5  - R Hip Extension: 4+/5  L Hip Extension: 4+/5  - R Hip Abduction: 4+/5  L Hip Abduction: 4+/5  - R Hip Adduction: 4+/5  L Hip Adduction: 4+/5  - R Knee Extension: 4+/5  L Knee Extension: 4+/5  - R Knee Flexion: 4+/5   L Knee Flexion: 4+/5  - R Ankle DF: 4+/5   L Ankle DF: 4+/5  - R Ankle PF: 4+/5   L Ankle PF: 4+/5      Anticipatory Reactions  - Anterior Balance Reaction: Catch with 1 large step  - Lateral Balance Reaction: Catch with 1 large step  - Posterior Balance Reaction: Catch with 1 large step      Transfers and Mobility  - Assistance Level with Ambulation: Independent   - Primary AD: None    Assistance Level with Transfers:  - STS: independent  - Return to sit: independent  - Floor to stand: independent       Gait  - Observed gait abnormalities: Path deviation, decreased step and stride length, decreased step height  - Gait speed: 1 41 ms/sec  - Difficulty with environmental obstacles such as: Step height challenges, uneven surfaces     Outcome Measures IE  3/9/2023     5x STS 9 03 sec no UE     TUG  TUG manual  TUG cog 5 35 sec  5 62 sec  6 28 sec     10 Meter Walk Test 10/7 10= 1 41 m/s     6 Minute Walk Test 1800 ft     FGA 28/30     PENDLETON 50/56     30 second sit to stand 15 reps no UE         Previous assessments from POC:    Outcome Measures Initial Eval  11/14/2022 Progress  Note  12/12/2022 Progress Note  1/10/2023 Re-Evaluation  2/6/2023     5xSTS 10 16 sec 9 56 seconds 9 10 seconds 8 89 seconds no UE     TUG  - Regular  - Cognitive 8 56 sec  10 23 sec   8 11 sec  10 13 sec   7 51 sec  10 63 sec   7 36 sec  10 75 sec     10 meter 1 07 m/s 1 08 m/s 1 11 m/s cued to increase speed 1 15 m/s  Cued to increase speed     PENDLETON 45/56 47/56 49/56 50/56     FGA 23/30 25/30 26/30 28/30     DGI 21/24 23/24 23/24 24/24     mCTSIB  - FTEO (firm)  - FTEC (firm)  - FTEO (foam)  - FTEC (foam)   30 sec  30 sec  30 sec  10 sec   30 sec  30 sec  30 sec  15 sec   30 sec  30 sec  30 sec  30 sec   30 sec  30 sec  30 sec  30 sec     6MWT 1100 ft 1210 feet 1250 feet 1310 feet     30 second 15 reps no UE 15 reps no UE 15 reps no UE 16 reps no UE                  Daily Note- 3/9/2023  Step ups on BOSU ball- 20 reps forward, laterally, backward, 1 UE, blue theraband around knees for resistance     Head turns on BOSU ball- 20 reps each direction- horizontal, vertical, diagonals, eyes open and eyes closed    Precautions: Lewy Body  Past Medical History:   Diagnosis Date   • Alopecia    • Arthritis    • Back pain    • Benign essential hypertension    • Chronic obstructive pulmonary disease (COPD) (HCC)    • Depression    • Diabetes mellitus (HCC)    • Emphysema, interstitial (HCC)    • Fatigue    • Hypercholesterolemia    • Hypersomnia, persistent    • Hypothyroidism    • Low testosterone    • Memory loss    • Oral candidiasis 5/11/2020   • Sinusitis    • Thyroid goiter

## 2023-03-09 NOTE — PROGRESS NOTES
Occupational Therapy Daily Note:    Today's date: 3/9/2023  Patient name: Gisela Palafox  : 1955  MRN: [id number]  Referring provider: Mena Lange,*  Dx:   Encounter Diagnosis   Name Primary? • Lewy body dementia without behavioral disturbance ([facility] [address] ) Yes      POC START DATE: 2023  POC END DATE: 2023  NEXT PN DUE: 3/261612  FREQUENCY: 1-2x wk for 12 weeks    Subjective: "Nothing has changed "    Objective: Therapeutic Activity -     Attention, Functional Task, Executive Function  Functional Medication Task  Min vc for accuracy / 1 correction   Time: 15 minutes     Alternating/Divided Attention, Memory Recall  Writing & Naming Alternating Boys & Girls Names A-Z  Writing & Naming Alternating places & vegetables  Min-Mod vc for word finding & sequencing ABC  Time: 15 minutes     Attention, Recall, Memory  [address] Tracking   Min-mod vc for sequencing ABC  Time: 15 minutes     Assessment: Pt tolerated treatment well  Pt demonstrated fair executive function with medication task  Made one error with PRN pill indicating impaired attention  Pt was able to understand and correct mistake with medication task  Pt correctly reported the 2 medications he takes PRN and when he should take them  Poor sequencing of alphabet w/ [address] tracking task and alternating tasks, however pt exhibited fair alternating attention with ability to alternate between names, places and vegetables  Plan: Future OT sessions to focus on expressive categorize, attention, memory, and executive functioning with functional problem solving  Continued skilled OT per POC  Treatment conducted by student supervised and directed by Michael Choi MS, OTR/L, CSRS, SNEHAL, DCAT  I agree with all treatment methods performed during this session by THEODORE Boles  Student was directly supervised by me during the entirety of session  Patient consent given to be treated by student

## 2023-03-13 ENCOUNTER — TELEPHONE (OUTPATIENT)
Dept: HEMATOLOGY ONCOLOGY | Facility: CLINIC | Age: 68
End: 2023-03-13

## 2023-03-13 ENCOUNTER — OFFICE VISIT (OUTPATIENT)
Dept: PHYSICAL THERAPY | Facility: MEDICAL CENTER | Age: 68
End: 2023-03-13

## 2023-03-13 ENCOUNTER — OFFICE VISIT (OUTPATIENT)
Dept: OCCUPATIONAL THERAPY | Facility: MEDICAL CENTER | Age: 68
End: 2023-03-13

## 2023-03-13 DIAGNOSIS — F02.80 LEWY BODY DEMENTIA WITHOUT BEHAVIORAL DISTURBANCE (HCC): Primary | ICD-10-CM

## 2023-03-13 DIAGNOSIS — G31.83 LEWY BODY DEMENTIA WITHOUT BEHAVIORAL DISTURBANCE (HCC): Primary | ICD-10-CM

## 2023-03-13 NOTE — PROGRESS NOTES
Occupational Therapy Daily Note:    Today's date: 3/13/2023  Patient name: Chau Bernal  : 1955  MRN: [id number]  Referring provider: Luis Enrique Lan,*  Dx:   Encounter Diagnosis   Name Primary? • Lewy body dementia without behavioral disturbance ([facility] [address] ) Yes      POC START DATE: 2023  POC END DATE: 2023  NEXT PN DUE: 3/054573  FREQUENCY: 1-2x wk for 12 weeks    Subjective: "I'm good "    Objective: Therapeutic Activity -     Attention, Recall, Memory  Functional Memory Task /  Objects  Immediate Recall:   Delayed Recall:   Time: 10 minutes     Alternating/Divided Attention, Memory Recall  Trail Making on Vertical Wall A-L & 1-12  Alterating R/L Hands   Time: 15 minutes     Attention, Recall, Memory  Memory & Picture Retention  Exercise 20  Time: 10 minutes     Visuospatial, Executive Function   Target Area 1: Word Formation Color Crossword  Independent  Time: 5 minutes     Assessment: Pt tolerated treatment well  Pt demonstrated good immediate and delayed recall with functional memory task today  Poor alternating attention with trail making on vertical wall indicating impaired attention and sequencing  Fair immediate recall with picture memory with 7/10 questions correct  Good visuospatial awareness and executive function with word formation crossword, able to verbalize and sequence words mentally  Will continue to progress pt as tolerated  Plan: Future OT sessions to focus on expressive categorize, attention, memory, and executive functioning with functional problem solving  Continued skilled OT per POC  Treatment conducted by student supervised and directed by Juan Luis Mckeon MS, OTR/L, CSRS, SNEHAL, DCAT  I agree with all treatment methods performed during this session by THEODORE Olvera  Student was directly supervised by me during the entirety of session  Patient consent given to be treated by student

## 2023-03-13 NOTE — TELEPHONE ENCOUNTER
Called 03/13/2023 @12:09PM to change to a virtual appointment, patients daughter verified that works for them

## 2023-03-13 NOTE — PROGRESS NOTES
Telemedicine consent    Patient: Eugene Garcia  Provider: Maira Funez PA-C  Provider located at  [place] [id number]    The patient was identified by name and date of birth  Eugene Garcia was informed that this is a telemedicine visit and that the visit is being conducted through the [address] platform  He agrees to proceed     My office door was closed  No one else was in the room  He acknowledged consent and understanding of privacy and security of the video platform  The patient has agreed to participate and understands they can discontinue the visit at any time  Patient and daughter are aware this is a billable service  I spent 20 minutes with the patient during this visit  HEMATOLOGY CLINIC NOTE    Primary Care Provider: VIVIANA Jamison  Referring Provider: Ninoska Berumen  MRN: [id number]  : 1955    Assessment / Plan:   1  Low serum ferritin level  This is a 79year old male with very mildly low ferritin  Recent labs are as follows -->- 2023: 14 6g/dL, MCV 92, rest of CBC-D normal  Iron 110, TIBC 355, iron sat 31%, ferritin 27  Patient has no bleeding anywhere  Has been taking twice daily oral iron with Vitamin C without much improvement  He has an EGD, colonoscopy scheduled for next month  Visit was primarily with daughter due to patient's dementia, poor historian  It is unlikely patient's symptoms are related to his mildly low ferritin  However, patient's daughter would like to trial Venofer 200mg x 1 to see if there is improvement in symptoms  We will schedule this and have patient obtain CBC-D, iron panel Q3m  F/U in 6m  After IV iron is done, patient will start oral iron EOD with Vitamin C     - Ambulatory Referral to Hematology / Oncology  - CBC and differential; Standing  - Iron Panel (Includes Ferritin, Iron Sat%, Iron, and TIBC);  Standing  - CBC and differential  - Iron Panel (Includes Ferritin, Iron Sat%, Iron, and TIBC)    Patient education provided regarding IV iron  Side effects include but are not limited to the following: hypotension, muscle aches, headache, chills, diarrhea, nausea, lightheadedness, rashes, and very rarely anaphylactic reaction  Patient understands & would still like to proceed with treatment  · Discussion of decision making    I personally reviewed the following lab results, the image studies, pathology, other specialty/physicians consult notes and recommendations, and outside medical records from [facility] Shasha Swanmadhavi  I had a lengthy discussion with the patient and shared the work-up findings  I spent 20 minutes reviewing the records (labs, clinician notes, outside records, medical history, ordering medicine/tests/procedures, interpreting the imaging/labs previously done) and coordination of care as well as direct time with the patient today, of which greater than 50% of the time was spent in counseling and coordination of care with the patient/family  · Plan/Labs  · Venofer 200mg x 1   · CBC-D, iron panel Q3m x2  · After IV iron, start oral iron EOD with Vitamin C    Follow Up: 6 months     All questions were answered to the patient's satisfaction during this encounter  The patient knows the contact information for our office and knows to reach out for any relevant concerns related to this encounter  They are to call for any temperature 100 4 or higher, new symptoms including but not restricted to shaking chills, decreased appetite, nausea, vomiting, diarrhea, increased fatigue, shortness of breath or chest pain, confusion, and not feeling the strength to come to the clinic  For all other listed problems and medical diagnosis in their chart - they are managed by PCP and/or other specialists, which the patient acknowledges  Thank you very much for your consultation and making us a part of this patient's care   We are continuing to follow closely with you  Please do not hesitate to reach out to me with any additional questions or concerns  Reason for visit:       Chief Complaint   Patient presents with   • Consult       History of Hematology Illness:     Trang Mcconnell is a 79 y o  male who came in for consultation  1  Low Ferritin, likely poor diet related  - Patient was feeling cold, has slight restless legs  Patient's daughter requested iron panel testing    - 1/2023: 14 6g/dL, MCV 92, rest of CBC-D normal  Iron 110, TIBC 355, iron sat 31%, ferritin 27     2  Lewy Body Dementia  - Patient's daughter provided history  Interval History:   03/14/23: This is a 79year old male with a PMH of alopecia, arthritis, HTN, COPD, depression, emphysema, hypercholesterolemia, hypothyroidism, oral candidiasis, lewy body dementia and more presenting for consultation  Patients daughter provided history  This visit was virtual over video call due to inclement weather  Patient has coldness, restlessness, some mental fog increase  No pica  No bleeding anywhere  Has been taking oral iron twice daily with Vitamin C for 1 year with some constipation  Patient only consumes 1-2 meals a day  Never eats red meat  He has no malabsorptive conditions diagnosed  No bleeding anywhere  Has an EGD, colonoscopy scheduled for next month  No hx of blood transfusion or IV iron  Not on blood thinners  Not on PPI  No hiatal hernia  Former smoker  Quit 25 years ago  Does not drink  Retired  No cancer hx  No FHx of cancer  uptodate on screenings    Problem list:       Patient Active Problem List   Diagnosis   • Depression   • Diabetes mellitus without complication ([facility] [address] )   • Hypercholesterolemia   • Hypertension   • Hypothyroidism   • Low testosterone   • Psoriasis   • Centrilobular emphysema ([facility] [address] )   • Former smoker   • DAFNE (obstructive sleep apnea)   • Memory impairment   • Episodic paroxysmal hemicrania, not intractable   • Abnormality of gait due to impairment of balance   • Balance problem   • Neuropathy   • Goiter, nontoxic simple   • Lewy body dementia without behavioral disturbance (HCC)   • Pulmonary nodule   • Constipation, chronic   • Restless legs syndrome   • REM behavioral disorder   • Moderate episode of recurrent major depressive disorder (HCC)   • Bad oral taste   • Disturbance of smell   • Decreased appetite   • Weight loss   • Functional diarrhea   • 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)   • Low iron       REVIEW OF SYMPTOMS:   Review of Systems   Constitutional: Positive for fatigue  Negative for activity change, appetite change, chills, diaphoresis, fever and unexpected weight change  HENT: Negative for nosebleeds  Respiratory: Negative for apnea, cough, chest tightness and shortness of breath  Cardiovascular: Negative for chest pain, palpitations and leg swelling  Gastrointestinal: Negative for abdominal distention, abdominal pain, anal bleeding, blood in stool, constipation, diarrhea, nausea and vomiting  Endocrine: Positive for cold intolerance  Genitourinary: Negative for hematuria  Skin: Negative for color change and pallor  Neurological: Negative for dizziness, weakness, light-headedness and headaches  Hematological: Negative for adenopathy  Does not bruise/bleed easily  Psychiatric/Behavioral:        Restless legs       PHYSICAL EXAMINATION:     Vital Signs: There were no vitals taken for this visit  There is no height or weight on file to calculate BSA     Ht Readings from Last 8 Encounters:   01/19/23 5' 10" (1 778 m)   06/03/22 5' 10" (1 778 m)   05/05/22 5' 10" (1 778 m)   04/26/22 5' 10" (1 778 m)   02/24/22 5' 10" (1 778 m)   02/04/22 5' 10" (1 778 m)   11/15/21 5' 10" (1 778 m)   07/15/21 5' 10" (1 778 m)       Wt Readings from Last 8 Encounters:   01/19/23 99 3 kg (219 lb)   10/26/22 105 kg (230 lb 6 4 oz)   09/01/22 106 kg (233 lb)   06/03/22 109 kg (241 lb 6 4 oz)   05/05/22 111 kg (245 lb)   04/26/22 111 kg (245 lb)   02/24/22 112 kg (248 lb)   02/04/22 112 kg (248 lb)          Physical Exam  Vitals reviewed: could not evaluate  video only visit with primarily daughter  Reviewed historical information        PAST MEDICAL HISTORY:    Past Medical History:   Diagnosis Date   • Alopecia    • Arthritis    • Back pain    • Benign essential hypertension    • Chronic obstructive pulmonary disease (COPD) ([facility])    • Depression    • Diabetes mellitus (HCC)    • Emphysema, interstitial (HCC)    • Fatigue    • Hypercholesterolemia    • Hypersomnia, persistent    • Hypothyroidism    • Low testosterone    • Memory loss    • Oral candidiasis 5/11/2020   • Sinusitis    • Thyroid goiter        PAST SURGICAL HISTORY:    Past Surgical History:   Procedure Laterality Date   • ADENOIDECTOMY     • APPENDECTOMY     • HERNIA REPAIR     • TONSILLECTOMY           CURRENT MEDICATIONS:     Current Outpatient Medications:   •  albuterol (PROVENTIL HFA,VENTOLIN HFA) 90 mcg/act inhaler, Inhale 2 puffs every 6 (six) hours as needed for wheezing, Disp: , Rfl:   •  buPROPion (WELLBUTRIN XL) 300 mg 24 hr tablet, TAKE 1 TABLET BY MOUTH EVERY DAY IN THE MORNING, Disp: 90 tablet, Rfl: 3  •  carbidopa-levodopa (SINEMET)  mg per tablet, TAKE 2 TABLETS BY MOUTH 3 TIMES A DAY , Disp: 540 tablet, Rfl: 5  •  cholestyramine (QUESTRAN) 4 g packet, TAKE 1 PACKET (4 G TOTAL) BY MOUTH IN THE MORNING, Disp: 90 packet, Rfl: 5  •  escitalopram (LEXAPRO) 20 mg tablet, TAKE 2 TABLETS BY MOUTH EVERY DAY, Disp: 180 tablet, Rfl: 2  •  fluticasone (FLONASE) 50 mcg/act nasal spray, 1 spray into each nostril if needed, Disp: , Rfl:   •  gabapentin (Neurontin) 300 mg capsule, Take 1 capsule (300 mg total) by mouth daily at bedtime, Disp: 90 capsule, Rfl: 2  •  glimepiride (AMARYL) 1 mg tablet, Take 1 tablet (1 mg total) by mouth daily with breakfast, Disp: 90 tablet, Rfl: 3  •  ipratropium (ATROVENT) 0 02 % nebulizer solution, Take 2 5 mL (0 5 mg total) by nebulization 4 (four) times a day (Patient taking differently: Take 0 5 mg by nebulization if needed), Disp: 62 5 mL, Rfl: 3  •  levothyroxine 112 mcg tablet, Take 1 tablet (112 mcg total) by mouth daily, Disp: 90 tablet, Rfl: 0  •  LORazepam (Ativan) 0 5 mg tablet, Take 1 tablet (0 5 mg total) by mouth daily at bedtime, Disp: 90 tablet, Rfl: 0  •  losartan (COZAAR) 50 mg tablet, TAKE 1 TABLET BY MOUTH EVERY DAY, Disp: 90 tablet, Rfl: 0  •  metFORMIN (GLUCOPHAGE) 500 mg tablet, TAKE 1 TABLET (500 MG TOTAL) BY MOUTH 2 (TWO) TIMES A DAY (Patient not taking: Reported on 10/26/2022), Disp: 180 tablet, Rfl: 3  •  mometasone (ELOCON) 0 1 % cream, , Disp: , Rfl:   •  rivastigmine (EXELON) 4 6 mg/24 hr TD 24 hr patch, APPLY 1 PATCH EVERY DAY, Disp: 90 patch, Rfl: 2  •  testosterone (ANDROGEL) 1%, Place 0 05 g on the skin, Disp: , Rfl:   •  True Metrix Blood Glucose Test test strip, TESTING TWICE A DAY E 11 9, Disp: , Rfl:   •  umeclidinium-vilanterol (Anoro Ellipta) 62 5-25 MCG/INH inhaler, Inhale 1 puff daily, Disp: 60 blister, Rfl: 0    SOCIAL HISTORY:    Social History     Tobacco Use   • Smoking status: Former     Packs/day: 1 00     Years: 20 00     Pack years: 20 00     Types: Cigarettes   • Smokeless tobacco: Never   Vaping Use   • Vaping Use: Never used   Substance Use Topics   • Alcohol use: Never   • Drug use: No       FAMILY HISTORY:    Family History   Problem Relation Age of Onset   • Hypertension Father    • Aneurysm Father         cerebral   • Lupus Mother    • Hypothyroidism Daughter    • Hashimoto's thyroiditis Daughter        ALLERGIES:    Allergies   Allergen Reactions   • Cimetidine    • Codeine    • Contrast  [Iodinated Contrast Media]    • Metrizamide    • Prednisone Other (See Comments)     ALL STEROIDS  COMBATIVE   • Simvastatin Other (See Comments)     ALL STATINS  MUSCLE CRAMPS   • Statins          LAB:    Lab Results   Component Value Date    WBC 5 53 06/08/2022    HGB 14 3 06/08/2022    HCT 44 7 06/08/2022 MCV 93 06/08/2022     06/08/2022       Lab Results   Component Value Date    SODIUM 141 06/08/2022    K 4 0 06/08/2022     06/08/2022    CO2 29 06/08/2022    AGAP 9 06/08/2022    BUN 12 06/08/2022    CREATININE 1 20 06/08/2022    GLUC 193 (H) 04/26/2022    GLUF 122 (H) 06/08/2022    CALCIUM 10 1 06/08/2022    AST 32 06/08/2022    ALT 28 06/08/2022    ALKPHOS 72 06/08/2022    TP 7 4 06/08/2022    TBILI 0 54 06/08/2022    EGFR 63 06/08/2022       IMAGING:  XR chest pa & lateral  Narrative: CHEST     INDICATION:   J42: Unspecified chronic bronchitis  R06 02: Shortness of breath  COMPARISON:  Two-view chest 3/1/2022    EXAM PERFORMED/VIEWS:  XR CHEST PA & LATERAL    FINDINGS:    Cardiomediastinal silhouette appears unremarkable  Prior groundglass infiltrates have resolved  No airspace consolidation, pneumothorax, pulmonary edema, or pleural effusion  Hyperlucent changes suggestive of COPD  Multilevel thoracic spondylosis  Impression: No radiographic evidence of acute intrathoracic process      Workstation performed: [id number]

## 2023-03-13 NOTE — PROGRESS NOTES
Daily Note   Last PN 3/9/2023  POC- 3/9/2023-2023    Today's date: 3/13/2023  Patient name: Josi Pappas  : 1955  MRN: [id number]  Referring provider: Alejandra Kong,*  Dx:   Encounter Diagnosis     ICD-10-CM    1  Lewy body dementia without behavioral disturbance ([facility] [address] )  G31 83     F02 80           Start Time:   Stop Time:   Total time in clinic (min): 45 minutes    Subjective: Patient notes no updates upon arrival, patient states no falls  Heddy Pay (daughter)- [phone]    Objective: See treatment diary below- 16# vest donned for all interventions    Pushing Sled- 100#- 10 cycles of push, 10 cycles of pull, 30 feet    Reciprocal walking with balance and coordination- 10 cycles of 15 feet, 3# and 4# kettle bell with UE movements, performed with 6" hurdles, reciprocal movements opposite UE and LE    Reciprocal Side stepping with balance and coordination- 10 cycles of 15 feet, 3# and 4# kettle bell with UE movements, performed with 6" hurdles    Physiologic stress exposure, balance/postural reactions with incline treadmill walking- 15 minutes, vitals monitored throughout- 8% grade, 3 7 mph 151 bpm, 96% SPO2,  88 miles    Step ups on BOSU ball- 20 reps forward, laterally, backward, 1 UE, blue theraband around knees for resistance    Head turns on BOSU ball- 20 reps each direction- horizontal, vertical, diagonals, eyes open and eyes closed    Speed Ladder- forward stepping, lateral stepping, icky shuffle- 10 cycles of 10 feet each direction    Karaoke- 10 cycles of 10 feet, only front foot forward    Lateral stepping on foam beam- 10 cycles of 10 feet    Tandem stepping on firm- 10 cycles of 10 feet    Lateral and tandem cone taps on firm- 10 cycles of 10 feet      Assessment: Tolerated treatment well  Patient demonstrated fatigue post treatment, exhibited good technique with therapeutic exercises and would benefit from continued PT to maximize function post dx of Lewy body dementia  Requires consistent feedback to improve coordination, patient challenged with speed ladder and karaoke exercise due to foot placement and following coordination commands, patient verbalized understanding to suggestions but challenges persisted throughout session, patient noting grade to treadmill and physiologic tolerance increases each session, specifically to target PMH of COPD      Plan: Continue per plan of care  Progress treatment as tolerated

## 2023-03-14 ENCOUNTER — TELEMEDICINE (OUTPATIENT)
Dept: HEMATOLOGY ONCOLOGY | Facility: CLINIC | Age: 68
End: 2023-03-14

## 2023-03-14 ENCOUNTER — TELEPHONE (OUTPATIENT)
Dept: HEMATOLOGY ONCOLOGY | Facility: CLINIC | Age: 68
End: 2023-03-14

## 2023-03-14 DIAGNOSIS — R79.0 LOW SERUM FERRITIN LEVEL: Primary | ICD-10-CM

## 2023-03-14 DIAGNOSIS — G31.83 LEWY BODY DEMENTIA WITHOUT BEHAVIORAL DISTURBANCE (HCC): ICD-10-CM

## 2023-03-14 DIAGNOSIS — F02.80 LEWY BODY DEMENTIA WITHOUT BEHAVIORAL DISTURBANCE (HCC): ICD-10-CM

## 2023-03-14 PROBLEM — E61.1 LOW IRON: Status: ACTIVE | Noted: 2023-03-14

## 2023-03-14 RX ORDER — SODIUM CHLORIDE 9 MG/ML
20 INJECTION, SOLUTION INTRAVENOUS ONCE
Status: CANCELLED | OUTPATIENT
Start: 2023-03-20

## 2023-03-14 NOTE — TELEPHONE ENCOUNTER
I saw patient virtually  Please call patient daughter to schedule Venofer 200mg x 1  Also needs a 6 month follow up  Thanks!

## 2023-03-14 NOTE — TELEPHONE ENCOUNTER
Left message on answering machine with my teams number to return my call for scheduling preferences

## 2023-03-15 NOTE — TELEPHONE ENCOUNTER
While we try to accommodate patient requests, our priority is to schedule treatment according to Doctor's orders and site availability  Does the Provider use the intake sheet or checkout note? What would be a preferred day of the week that would work best for your infusion appointment? Wednesday   Do you prefer mornings or afternoons for your appointments? 12 or after   Are there any days or dates that do not work for your schedule, including any upcoming vacations? N/A  We are going to try our best to schedule you at the infusion center closest to your home  In the event that we are unable to what would be your next preferred infusion site or sites? 1  [facility] infusion     Do you have transportation to take you to all of your appointments?  Yes   Would you like the infusion center to draw labs from your port? (disregard if patient doesn't have a port or need labs for infusion appointment)

## 2023-03-15 NOTE — PROGRESS NOTES
Patient ID: Josi Pappas is a 79 y o  male  Assessment/Plan:    No problem-specific Assessment & Plan notes found for this encounter  {Assess/PlanSmartLinks:28353}       Subjective:    HPI    {[facility] HPI texts:24641}    {Common ambulatory SmartLinks:34415}         Objective: There were no vitals taken for this visit      Physical Exam    Neurological Exam      ROS:    Review of Systems

## 2023-03-15 NOTE — TELEPHONE ENCOUNTER
Spoke to patients daughter Bearl Pay, per Bearl Pay will like to reschedule to Thursday 3/23 due to patient having an appt on 3/22  Please reschedule infusion for 3/23 between 11AM-2PM          Thank you!

## 2023-03-16 ENCOUNTER — TELEPHONE (OUTPATIENT)
Dept: HEMATOLOGY ONCOLOGY | Facility: CLINIC | Age: 68
End: 2023-03-16

## 2023-03-16 ENCOUNTER — TELEMEDICINE (OUTPATIENT)
Dept: NEUROLOGY | Facility: CLINIC | Age: 68
End: 2023-03-16

## 2023-03-16 DIAGNOSIS — U07.1 PNEUMONIA DUE TO COVID-19 VIRUS: ICD-10-CM

## 2023-03-16 DIAGNOSIS — R26.89 ABNORMALITY OF GAIT DUE TO IMPAIRMENT OF BALANCE: ICD-10-CM

## 2023-03-16 DIAGNOSIS — G25.81 RESTLESS LEGS SYNDROME (RLS): ICD-10-CM

## 2023-03-16 DIAGNOSIS — F02.80 LEWY BODY DEMENTIA WITHOUT BEHAVIORAL DISTURBANCE (HCC): Primary | ICD-10-CM

## 2023-03-16 DIAGNOSIS — G31.83 LEWY BODY DEMENTIA WITHOUT BEHAVIORAL DISTURBANCE (HCC): Primary | ICD-10-CM

## 2023-03-16 DIAGNOSIS — J12.82 PNEUMONIA DUE TO COVID-19 VIRUS: ICD-10-CM

## 2023-03-16 DIAGNOSIS — R41.3 MEMORY IMPAIRMENT: ICD-10-CM

## 2023-03-16 DIAGNOSIS — G25.81 RESTLESS LEGS SYNDROME: ICD-10-CM

## 2023-03-16 RX ORDER — GABAPENTIN 300 MG/1
300 CAPSULE ORAL
Qty: 90 CAPSULE | Refills: 2 | Status: SHIPPED | OUTPATIENT
Start: 2023-03-16

## 2023-03-16 RX ORDER — LEVOTHYROXINE SODIUM 0.07 MG/1
TABLET ORAL
COMMUNITY
Start: 2023-03-02

## 2023-03-16 NOTE — ASSESSMENT & PLAN NOTE
Patient is a 79 yr old man with hx of htn, depression, neuropathy presenting for followup of Parkinson's, RLS  Patient stable since last visit  Memory is stable with rivastigmine  Tremors continue to improve and is not debilitating and balance issues and movement issues are well controlled with physical and occupational therapy  Patient following up about appetite problems and weight problems with PCP  No falls, no new ambulatory dysfunction, no new numbness, no new weakness, no new tremors  Limited video exam showed smooth symmetric strength and movement of all 4 extremities and smooth gait and good en bloc turning on exam  Patient tolerating sinemet 2 tabs TID      Plan:  Continue Sinemet 25/100 2 tabs TID  Continue gabapentin 300mg daily at bedtime (refilled at this appointment  Continue exelon 4 6mg patch daily  Continue to followup with sleep medicine  Patient to followup in 6 months

## 2023-03-16 NOTE — PROGRESS NOTES
Virtual Regular Visit    Verification of patient location:    Patient is located in the following state in which I hold an active license [place]      Assessment/Plan:    Problem List Items Addressed This Visit        Nervous and Auditory    Lewy body dementia without behavioral disturbance ([facility] [address] ) - Primary     Patient is a 79 yr old man with hx of htn, depression, neuropathy presenting for followup of Parkinson's, RLS  Patient stable since last visit  Memory is stable with rivastigmine  Tremors continue to improve and is not debilitating and balance issues and movement issues are well controlled with physical and occupational therapy  Patient following up about appetite problems and weight problems with PCP  No falls, no new ambulatory dysfunction, no new numbness, no new weakness, no new tremors  Limited video exam showed smooth symmetric strength and movement of all 4 extremities and smooth gait and good en bloc turning on exam  Patient tolerating sinemet 2 tabs TID  Plan:  Continue Sinemet 25/100 2 tabs TID  Continue gabapentin 300mg daily at bedtime (refilled at this appointment  Continue exelon 4 6mg patch daily  Continue to followup with sleep medicine  Patient to followup in 6 months           Relevant Medications    gabapentin (Neurontin) 300 mg capsule       Other    Memory impairment     Patient to continue rivastigmine patch 4 6mg daily for memory  Abnormality of gait due to impairment of balance    Restless legs syndrome     Patient happy with current dose of gabapentin 300mg daily for RLS  Patient to followup with Hematology for low iron levels    If iron levels are treated, we can consider reducing and stopping the gabapentin          Other Visit Diagnoses     Restless legs syndrome (RLS)        Relevant Medications    gabapentin (Neurontin) 300 mg capsule        Discussed with Dr Madhu Arita       Reason for visit is   Chief Complaint   Patient presents with   • Virtual Regular Visit Encounter provider Ivar Libman, MD    Provider located at [address] [place] [address] [facility] [address]  [phone]      Recent Visits  No visits were found meeting these conditions  Showing recent visits within past 7 days and meeting all other requirements  Today's Visits  Date Type Provider Dept   03/16/23 Telemedicine Ivar Libman, MD [facility]   Showing today's visits and meeting all other requirements  Future Appointments  No visits were found meeting these conditions  Showing future appointments within next 150 days and meeting all other requirements       The patient was identified by name and date of birth  Gilford Falconer was informed that this is a telemedicine visit and that the visit is being conducted through the [address] Now platform  He agrees to proceed     My office door was closed  The patient was notified the following individuals were present in the room Dr Marcus Rivera  He acknowledged consent and understanding of privacy and security of the video platform  The patient has agreed to participate and understands they can discontinue the visit at any time  Patient is aware this is a billable service  Subjective  Gilford Falconer is a 79 y o  male presenting for followup for Parkinson's, RLS  Patient is a 79 yr old man with hx of htn, depression, neuropathy presenting for followup of Parkinson's, RLS  Patient is doing well since last visit  Patient is doing physical and occupational therapy 2 times a week  Patient is doing exelon patch which is helping with his memory  Patient goes outside to walk dog for exercise  Tremors have been lot less  No change in movement problems in bradykinesia, balance and this seems to be much more improved with physical and occupational therapy  Patient continues to take sinemet 2 tabs TID and no side effects with the medication and only having mild constipation  Gabapentin has been very helpful with RLS  Patient have had weight loss to 210 lbs which the PCP is adjusting with levothyroxine  Patient following up with hematology due to low iron levels  Appetite level and weight loss started since a year ago in December  No falls since last visit  From Previous visits:  Patient was last seen by [facility] in 8/26/2020 and was noted that cognition declined with short temper  PET scan showed no decrease in uptake compatible with Alzheimer's , FTD, or LBD  Patient was on sinemet  1 5TID  Patient previous movement specialist soctor was Dr Suellen Tatum and [facility] on 2/2020 was 23/30 tested by [facility]      Patient had worsening right hand tremors mostly at rest or when distracted  Also noted slower but no shuffling gait, rigidicty with movment, No difficulty turning around  Patient has times when he dropped objects  There was also concern for mood problems, with short temper, and frustration  Also increased restless legs in bed  Sinemet dosing was then increased to 2 tablet TID and recommended patient discuss adjustments of mood stabilizers concerning sertraline to help with mood    Patient then on 5/5/2022, patient was feeling more unbalanced and falling out of bed, more disoriented at night coming out of the bathroom  Tremors of hands bilaterally which were improved but still affecting daily life  Staring spells which were not likely seizures and most likely due to COVID  Sinemet continued at this time  Gabapentin trialed for RLS     9/1/2022 Patient stable and needed to be placed on galantamine as patient did well with the medication and continue Sinemet at that time  Gabapentin continued for RLS due to continued benefit         Past Medical History:   Diagnosis Date   • Alopecia    • Arthritis    • Back pain    • Benign essential hypertension    • Chronic obstructive pulmonary disease (COPD) (HCC)    • Depression    • Diabetes mellitus (HCC)    • Emphysema, interstitial (HCC)    • Fatigue    • Hypercholesterolemia • Hypersomnia, persistent    • Hypothyroidism    • Low testosterone    • Memory loss    • Oral candidiasis 5/11/2020   • Sinusitis    • Thyroid goiter        Past Surgical History:   Procedure Laterality Date   • ADENOIDECTOMY     • APPENDECTOMY     • HERNIA REPAIR     • TONSILLECTOMY         Current Outpatient Medications   Medication Sig Dispense Refill   • albuterol (PROVENTIL HFA,VENTOLIN HFA) 90 mcg/act inhaler Inhale 2 puffs every 6 (six) hours as needed for wheezing     • buPROPion (WELLBUTRIN XL) 300 mg 24 hr tablet TAKE 1 TABLET BY MOUTH EVERY DAY IN THE MORNING 90 tablet 3   • carbidopa-levodopa (SINEMET)  mg per tablet TAKE 2 TABLETS BY MOUTH 3 TIMES A DAY  540 tablet 5   • cholestyramine (QUESTRAN) 4 g packet TAKE 1 PACKET (4 G TOTAL) BY MOUTH IN THE MORNING 90 packet 5   • escitalopram (LEXAPRO) 20 mg tablet TAKE 2 TABLETS BY MOUTH EVERY  tablet 2   • fluticasone (FLONASE) 50 mcg/act nasal spray 1 spray into each nostril if needed     • gabapentin (Neurontin) 300 mg capsule Take 1 capsule (300 mg total) by mouth daily at bedtime 90 capsule 2   • glimepiride (AMARYL) 1 mg tablet Take 1 tablet (1 mg total) by mouth daily with breakfast 90 tablet 3   • levothyroxine 112 mcg tablet Take 1 tablet (112 mcg total) by mouth daily 90 tablet 0   • levothyroxine 75 mcg tablet TAKE 1 TABLET BY MOUTH DAILY   TAKE ON EMPTY STOMACH 2 HOURS APART FROM OTHER MEDICATIONS AND FOODS     • losartan (COZAAR) 50 mg tablet TAKE 1 TABLET BY MOUTH EVERY DAY 90 tablet 0   • mometasone (ELOCON) 0 1 % cream      • rivastigmine (EXELON) 4 6 mg/24 hr TD 24 hr patch APPLY 1 PATCH EVERY DAY 90 patch 2   • testosterone (ANDROGEL) 1% Place 0 05 g on the skin     • True Metrix Blood Glucose Test test strip TESTING TWICE A DAY E 11 9     • ipratropium (ATROVENT) 0 02 % nebulizer solution Take 2 5 mL (0 5 mg total) by nebulization 4 (four) times a day (Patient not taking: Reported on 3/16/2023) 62 5 mL 3   • LORazepam (Ativan) 0 5 mg tablet Take 1 tablet (0 5 mg total) by mouth daily at bedtime 90 tablet 0   • metFORMIN (GLUCOPHAGE) 500 mg tablet TAKE 1 TABLET (500 MG TOTAL) BY MOUTH 2 (TWO) TIMES A DAY (Patient not taking: Reported on 10/26/2022) 180 tablet 3   • umeclidinium-vilanterol (Anoro Ellipta) 62 5-25 MCG/INH inhaler Inhale 1 puff daily 60 blister 0     No current facility-administered medications for this visit  Allergies   Allergen Reactions   • Cimetidine    • Codeine    • Contrast  [Iodinated Contrast Media]    • Metrizamide    • Prednisone Other (See Comments)     ALL STEROIDS  COMBATIVE   • Simvastatin Other (See Comments)     ALL STATINS  MUSCLE CRAMPS   • Statins        Review of Systems   Constitutional: Positive for appetite change  HENT: Negative for mouth sores and nosebleeds  Eyes: Negative for pain and redness  Respiratory: Negative for shortness of breath  Cardiovascular: Negative for palpitations  Gastrointestinal: Positive for constipation (Mild)  Genitourinary: Negative for hematuria  Musculoskeletal: Positive for gait problem (Improving with physical therapy)  Allergic/Immunologic: Negative for food allergies  Neurological: Negative for light-headedness and numbness  Psychiatric/Behavioral: Negative for behavioral problems  Video Exam    There were no vitals filed for this visit  Physical Exam  HENT:      Head: Normocephalic and atraumatic  Eyes:      General: No visual field deficit  Extraocular Movements: Extraocular movements intact  Pupils: Pupils are equal, round, and reactive to light  Neurological:      Mental Status: He is alert and oriented to person, place, and time  Cranial Nerves: No cranial nerve deficit, dysarthria or facial asymmetry  Motor: Motor function is intact  Coordination: Coordination normal       Gait: Gait is intact        Comments: Limited neurologic exam due to nature of video exam          I spent 30 minutes directly with the patient during this visit

## 2023-03-16 NOTE — ASSESSMENT & PLAN NOTE
Patient happy with current dose of gabapentin 300mg daily for RLS  Patient to followup with Hematology for low iron levels    If iron levels are treated, we can consider reducing and stopping the gabapentin

## 2023-03-16 NOTE — PATIENT INSTRUCTIONS
Followup with Hematology and Sleep medicine    Continue Gabapentin 300mg nightly (Refill has been sent)    Continue sinemet 2 tabs three times a day    Continue exelon patch daily for memory    Followup in 6 months

## 2023-03-16 NOTE — TELEPHONE ENCOUNTER
Patient daughter call and ask for a virtual visit do to patient is under the weather  I advise patient daughter that we can change visit to virtual at this time  I also informed patient daughter that if Dr Henny De La Torre needs to see patient in person we will call back and reschedule the appointment  Dr Henny De La Torre please advise if virtual is okay for patient today thanks

## 2023-03-16 NOTE — TELEPHONE ENCOUNTER
Infusion was scheduled  However appt as requested was not scheduled  Can you call patient's daughter and make appt? Thanks!

## 2023-03-20 ENCOUNTER — OFFICE VISIT (OUTPATIENT)
Dept: OCCUPATIONAL THERAPY | Facility: MEDICAL CENTER | Age: 68
End: 2023-03-20

## 2023-03-20 ENCOUNTER — OFFICE VISIT (OUTPATIENT)
Dept: PHYSICAL THERAPY | Facility: MEDICAL CENTER | Age: 68
End: 2023-03-20

## 2023-03-20 DIAGNOSIS — G31.83 LEWY BODY DEMENTIA WITHOUT BEHAVIORAL DISTURBANCE (HCC): Primary | ICD-10-CM

## 2023-03-20 DIAGNOSIS — F02.80 LEWY BODY DEMENTIA WITHOUT BEHAVIORAL DISTURBANCE (HCC): Primary | ICD-10-CM

## 2023-03-20 NOTE — PROGRESS NOTES
Occupational Therapy Daily Note:    Today's date: 3/20/2023  Patient name: Suzie Sumner  : 1955  MRN: [id number]  Referring provider: Blanca Blue,*  Dx:   Encounter Diagnosis   Name Primary? • Lewy body dementia without behavioral disturbance ([facility] [address] ) Yes      POC START DATE: 2023  POC END DATE: 2023  NEXT PN DUE: 3/476823  FREQUENCY: 1-2x wk for 12 weeks    Subjective: "I had to go outside to cool off after physical therapy "    Objective: Therapeutic Activity -     Attention, Recall, Memory  Felt Memory Board - 10 Food items  Immediate Recall: 6/10  Delayed Recall: 7/10  Time: 5 minutes     Attention, Memory, Recall, Sequencing, Executive Funciton  Following Recipe & Grocery Task  - Reading recipe  - Making grocery list  - Choosing items from grocery board  - Sequencing the recipe steps   - Memorize grocery Items on list   - Mental Math with grocery items on list   Min vc for sequencing & direction following   Time: 30  minutes     Attention, Executive Function   ABC DeCoding: [employer]  Independent  Time: 10 minutes     Assessment: Pt tolerated treatment well  Pt demonstrated fair immediate and memory with Good executive function with recipe/grocery task and sequencing the steps  Pt demonstrated good mental math strategies and problem solving  Pt required min vc for direction following and attention to details  Pt demonstrated good memory strategies with mental rehearsal and visualization  Pt would benefit from skilled OT  Will continue to progress pt as tolerated  Plan: Future OT sessions to focus on expressive categorize, attention, memory, and executive functioning with functional problem solving  Continued skilled OT per POC  Treatment conducted by student supervised and directed by Aron Westfall MS, OTR/L, CSRS, NSEHAL, DCAT  I agree with all treatment methods performed during this session by THEODORE Bell   Student was directly supervised by me during the entirety of session  Patient consent given to be treated by student

## 2023-03-20 NOTE — PROGRESS NOTES
Daily Note   Last PN 3/9/2023  POC- 3/9/2023-2023    Today's date: 3/20/2023  Patient name: Arjun Velez  : 1955  MRN: [id number]  Referring provider: Nathalia Montemayor,*  Dx:   Encounter Diagnosis     ICD-10-CM    1  Lewy body dementia without behavioral disturbance ([facility] [address] )  G31 83     F02 80                      Subjective: Patient notes no updates upon arrival, patient states no falls  Marilee Teixeira (daughter)- [phone]    Objective: See treatment diary below- 16# vest donned for all interventions    Pushing Sled- 100#- 10 cycles of push, 10 cycles of pull, 30 feet    Speed Ladder- forward stepping, lateral stepping, BWD stepping, icky shuffle- 10 cycles of 10 feet each direction    CW 12" hurdles w/ foam pads: 10 laps    CCW 12" hurdles w/ foam pads: 10 laps     Lateral stepping on foam beam- 10 cycles of 10 feet    Tandem stepping on firm- 10 cycles of 10 feet    Physiologic stress exposure, balance/postural reactions with incline treadmill walking- 15 minutes, vitals monitored throughout- 3% grade, 2 mph 122 bpm, 97% SPO2    Assessment: Patient tolerated treatment well  Challenged patient LE coordination with speed ladder, increasing difficulty from adding backwards and icky shuffle while providing patient with visual and verbal cueing with overall poor carryover  When on unstable foam beam, patient tends to lose balance posteriorly using stepping reaction or UE support to regain balance without PT assist  Patient demonstrated fatigue post treatment, exhibited good technique with therapeutic exercises and would benefit from continued PT to maximize function post dx of Lewy body dementia  Plan: Continue per plan of care  Progress treatment as tolerated

## 2023-03-21 DIAGNOSIS — E61.1 LOW IRON: Primary | ICD-10-CM

## 2023-03-21 RX ORDER — SODIUM CHLORIDE 9 MG/ML
20 INJECTION, SOLUTION INTRAVENOUS ONCE
Status: CANCELLED | OUTPATIENT
Start: 2023-03-23

## 2023-03-23 ENCOUNTER — HOSPITAL ENCOUNTER (OUTPATIENT)
Dept: INFUSION CENTER | Facility: CLINIC | Age: 68
Discharge: HOME | End: 2023-03-23

## 2023-03-23 ENCOUNTER — APPOINTMENT (OUTPATIENT)
Dept: PHYSICAL THERAPY | Facility: MEDICAL CENTER | Age: 68
End: 2023-03-23

## 2023-03-23 ENCOUNTER — APPOINTMENT (OUTPATIENT)
Dept: OCCUPATIONAL THERAPY | Facility: MEDICAL CENTER | Age: 68
End: 2023-03-23

## 2023-03-23 VITALS
BODY MASS INDEX: 31.73 KG/M2 | TEMPERATURE: 97.1 F | HEART RATE: 63 BPM | WEIGHT: 214.2 LBS | HEIGHT: 69 IN | SYSTOLIC BLOOD PRESSURE: 99 MMHG | DIASTOLIC BLOOD PRESSURE: 58 MMHG | RESPIRATION RATE: 16 BRPM

## 2023-03-23 DIAGNOSIS — E61.1 LOW IRON: Primary | ICD-10-CM

## 2023-03-23 RX ORDER — SODIUM CHLORIDE 9 MG/ML
20 INJECTION, SOLUTION INTRAVENOUS ONCE
Status: COMPLETED | OUTPATIENT
Start: 2023-03-23 | End: 2023-03-23

## 2023-03-23 RX ORDER — SODIUM CHLORIDE 9 MG/ML
20 INJECTION, SOLUTION INTRAVENOUS ONCE
Status: CANCELLED | OUTPATIENT
Start: 2023-03-23

## 2023-03-23 RX ADMIN — IRON SUCROSE 200 MG: 20 INJECTION, SOLUTION INTRAVENOUS at 14:20

## 2023-03-23 RX ADMIN — SODIUM CHLORIDE 20 ML/HR: 9 INJECTION, SOLUTION INTRAVENOUS at 14:16

## 2023-03-23 NOTE — PROGRESS NOTES
Pt presents for venofer infusion offering no complaints  Pt tolerated treatment without incident  PIV removed    AVS declined, next appointment reviewed

## 2023-03-27 ENCOUNTER — OFFICE VISIT (OUTPATIENT)
Dept: PHYSICAL THERAPY | Facility: MEDICAL CENTER | Age: 68
End: 2023-03-27

## 2023-03-27 ENCOUNTER — OFFICE VISIT (OUTPATIENT)
Dept: OCCUPATIONAL THERAPY | Facility: MEDICAL CENTER | Age: 68
End: 2023-03-27

## 2023-03-27 DIAGNOSIS — F02.80 LEWY BODY DEMENTIA WITHOUT BEHAVIORAL DISTURBANCE (HCC): Primary | ICD-10-CM

## 2023-03-27 DIAGNOSIS — G31.83 LEWY BODY DEMENTIA WITHOUT BEHAVIORAL DISTURBANCE (HCC): Primary | ICD-10-CM

## 2023-03-27 NOTE — PROGRESS NOTES
Daily Note   Last PN 3/9/2023  POC- 3/9/2023-2023    Today's date: 3/27/2023  Patient name: Elie Bravo  : 1955  MRN: [id number]  Referring provider: Angelique Davidson,*  Dx:   Encounter Diagnosis     ICD-10-CM    1  [facility]  G31 83     F02 80           Start Time: 4252  Stop Time: 7273  Total time in clinic (min): 45 minutes    Subjective: Patient notes no updates upon arrival, patient states no falls  Niall Velasco (daughter)- [phone]    Objective: See treatment diary below- 16# vest donned for all interventions    Neuro Boxing instruction  Shadow boxing  1 through 6     Hitting mitts- 20 reps each  1 through 6  1-2  3-4  5-6     1,2,3,4  3,4,5,6  1,2,3,4,5,6     Physiologic stress exposure, balance/postural reactions with incline treadmill walking- 15 minutes, vitals monitored throughout- 4% grade, 3 2 mph 141 bpm, 96% SPO2    Assessment: Patient tolerated treatment well  Patient challenged with maintaining amplitude, patient challenged with overall function with velocity and memorizing combinations, patient fatigued post session, increased complexity of boxing exercises  Required cuing to increase speed of movement during boxing interventions today  Patient demonstrated fatigue post treatment and would benefit from continued PT to maximize function post dx of Lewy body dementia  Plan: Continue per plan of care  Progress treatment as tolerated

## 2023-03-27 NOTE — PROGRESS NOTES
"Occupational Therapy Daily Note:    Today's date: 3/27/2023  Patient name: Francisca Ball  : 1955  MRN: [id number]  Referring provider: James Espino,*  Dx:   Encounter Diagnosis   Name Primary? • Lewy body dementia without behavioral disturbance ([facility] [address] ) Yes      POC START DATE: 2023  POC END DATE: 2023  NEXT PN DUE: 3/390716  FREQUENCY: 1-2x wk for 12 weeks    Subjective: \"I am doing fine  \"    Objective: Therapeutic Activity -     Visual Perception, Visual Scanning  Pixy Cubes Completed x 1  Min vc - Two colors  Time: 15 minutes    Sustained Attention, Executive Functioning  Exclusion Function  Completed x 4 independently  Time: 10 minutes    Auditory Memory Recall, Visual Perception  Reading Directions out loud, pt drawing   Reviewed Response to Directions   Completed x 2  Time: 10 minutes    Memory Recall  [employer] Brain Box   Completed x 5    Assessment: Pt tolerated treatment well  Pt required min vc for completion of pixy cube puzzle  Independent with exclusion function indicating fair executive functioning abilities  Few errors with auditory memory recall  Able to identify mistakes with response to directions wkst  Fair visual memory recall abilities with [employer] Brain Box  Will continue to progress pt as tolerated  Plan: Future OT sessions to focus on expressive categorize, attention, memory, and executive functioning with functional problem solving  Continued skilled OT per POC      "

## 2023-03-30 ENCOUNTER — EVALUATION (OUTPATIENT)
Dept: OCCUPATIONAL THERAPY | Facility: MEDICAL CENTER | Age: 68
End: 2023-03-30

## 2023-03-30 ENCOUNTER — OFFICE VISIT (OUTPATIENT)
Dept: PHYSICAL THERAPY | Facility: MEDICAL CENTER | Age: 68
End: 2023-03-30

## 2023-03-30 DIAGNOSIS — F02.80 LEWY BODY DEMENTIA WITHOUT BEHAVIORAL DISTURBANCE (HCC): Primary | ICD-10-CM

## 2023-03-30 DIAGNOSIS — G31.83 LEWY BODY DEMENTIA WITHOUT BEHAVIORAL DISTURBANCE (HCC): Primary | ICD-10-CM

## 2023-03-30 NOTE — PROGRESS NOTES
"Daily Note   Last PN 3/9/2023  POC- 3/9/2023-2023    Today's date: 3/30/2023  Patient name: Tracy Patel  : 1955  MRN: [id number]  Referring provider: Vargas Williamson,*  Dx:   Encounter Diagnosis     ICD-10-CM    1  [facility]  G31 83     F02 80                      Subjective: Patient notes no updates upon arrival, patient states no falls  Stella Arrington (daughter)- [phone]    Objective: See treatment diary below- 16# vest donned for all interventions       Hitting mitts- 20 reps each  1 through 6  1-2  3-4  5-6     1,2,3,4  3,4,5,6  1,2,3,4,5,6    Hurdles while punching- forward- 1-2, laterally- 3-4, 6\"     Physiologic stress exposure, balance/postural reactions with incline treadmill walking- 15 minutes, vitals monitored throughout- 5% grade, 3 1 mph 143 bpm, 97% SPO2    Assessment: Patient tolerated treatment well  Patient challenged with combination memorization, patient performs better with external cues of saying numbers, decrease in physical performance with dual task with hurdles today and fluidity of combinations  Patient demonstrated fatigue post treatment and would benefit from continued PT to maximize function post dx of Lewy body dementia  Plan: Continue per plan of care  Progress treatment as tolerated      "

## 2023-03-30 NOTE — PROGRESS NOTES
"OCCUPATIONAL THERAPY RE EVALUATION / MAINTENANCE PROGRAM    3/30/2023  Clayborn Meals  1955  [id number]  Anselmo Keyanna Durant,*  1  Lewy body dementia without behavioral disturbance ([facility] [address] )        POC START DATE: 2/20/2023  POC END DATE: 5/8/2023  NEXT PN DUE: 3/20/2023  FREQUENCY: 1-2x wk for 12 weeks    SUBJECTIVE: Pt presents to OT evaluation on 11/28/2022 secondary to Lewy Body Dementia  Hard time with fine motor coordination  \"I was always an active person  I am not happy with how I am anymore  \" Diagnosis of Lewy Body Dementia about 7 years ago  Difficulty with changing hearing aid batteries at beginning of session  Concerns with balance, close to falling at home  Concerns with dexterity and fine motor coordination  Tremors are worse in the R, R hand dominant  Takes Parkinson's Medication 3 times a day  Decreased cognition over the last few years  Work: Retired  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, [employer],   Retired because of diagnosis  IADLs: Tremors do not impact eating  Occasional difficulty with buttoning and zippers  No difficulty with driving  Difficulty with putting a key into a door  Difficulty with tool work  Uses medication box and wife helps with medication management  Sleep: No difficulty with sleeping  Sleeps 8-10 hours  Lives with his wife  Pt has been seen for OT services on 3/30/2023 and would benefit from continued OT services to address cognition  Pt feels like he is noticing a difference and is maintaining his cognitive abilities  Skilled Analysis:    Pt has been seen for OT services and would benefit from transitioning to maintenance program  Pt presents for outpatient OT reevaluation on 02/2023 secondary to Lewy Body Dementia  RBANS assessment used to evaluate short term and delayed memory, attention, language and visuospatial/constructional skills  Overall, pt scored in the 0 5% percentile falling into the extremely low category compared to norms   Pt scored " "extremely low in all areas except for language (27 percentile - average) and attention (5 percentile)  Pt demonstrating impairments in short term memory, delayed memory, visuospatial awareness effecting performance in everyday life tasks  Noman Zimmerman (2013): a [facility] decision that sought to clarify that Medicare will in fact cover skilled therapy services to maintain a patient’s current condition or prevent/slow further deterioration  Pt would benefit from maintenance OT services focusing on impairments for the next 2x/wk for  12 weeks  Pt educated on OT services and maintianence  POC was reviewed with pt, pt understands and agrees with POC  Pt presents for OT re-evaluation today on 3/30/2023 secondary to Lewy Body Dementia  RBANS administered today to assess progress and continued deficits  Pt overall demonstrate maintenance of cognitive abilities  Pt declined in attention and delayed recall abilities, however, improved with immediate recall and language  Noman Zimmerman (2013): a [facility] decision that sought to clarify that Medicare will in fact cover skilled therapy services to maintain a patient’s current condition or prevent/slow further deterioration  Pt would benefit from maintenance OT services focusing on impairments for the next 2x/wk for  12 weeks  Pt educated on OT services and maintianence  POC was reviewed with pt, pt understands and agrees with POC  PATIENT GOAL: \"To be able to work with my hands better  \"    HISTORY OF PRESENT ILLNESS:     PMH:   Past Medical History:   Diagnosis Date   • Alopecia    • Arthritis    • Back pain    • Benign essential hypertension    • Chronic obstructive pulmonary disease (COPD) ([facility] [address] )    • Depression    • Diabetes mellitus (HCC)    • Emphysema, interstitial (HCC)    • Fatigue    • Hypercholesterolemia    • Hypersomnia, persistent    • Hypothyroidism    • Low testosterone    • Memory loss    • Oral candidiasis " 5/11/2020   • Sinusitis    • Thyroid goiter      Objective         Assessments  The Repeatable Battery for the Assessment of Neuropsychological Status (RBANS) is a brief, individually-administered assessment which measures attention, language, visuospatial/constructional abilities, and immediate & delayed memory  The RBANS is intended for use with adolescents to adults, ages 15 to 80 years  The following results were obtained during the administration of the assessment  Form: C    Cognitive Domain/Subtest: Index Score: Percentile Rank: Classification: IE: Status:   IMMEDIATE MEMORY 53 <0 1 %ile Extremely Low 49         1  List Learning (15/40)          2  Story Memory (5/24)           VISUOSPATIAL/  CONSTRUCTIONAL 58 0 3%ile Extremely Low 58         3  Figure Copy (12/20)          4  Line Orientation (8/20)           LANGUAGE 92 27%ile Average 87         5  Picture Naming (10/10)          6  Semantic Fluency (08/19)           ATTENTION 72 3%ile Borderline 88         7  Digit Span (8/16)          8  Coding (25/89)           DELAYED MEMORY 52 0 1%ile Extremely Low 56         9  List Recall (2/10)          10  List Recognition (12/20)          11  Story Recall (3/12)          12  Figure Recall (9/20)       Sum of Index Scores:  327 ( (previously 338 & 346)      Total Score:  55 (previously 59)      Percentile: 0 1% (previously 0 5%ile)      Classification: Extremely Low        “IE” indicates the scores from the initial evaluation (2/20/2023)   Form: B    SHORT TERM GOALS:     Pt will maintain immediate list memory recall being able to recall 13/40 items on RBANS - MET    Pt will maintain immediate story memory recall being able to recall 5/24 on RBANs - MET    Pt will maintain delayed story memory recall being able to recall 5/12 on RBANS - NOT MET    Pt will maintain attention to task for 45 minutes in semi modal environment for baseline performance, improved role performance and to improve learning and to simulate return to IADLs and complete cooking/cleaning tasks  - MET    Pt will demo ability to participate in dual tasking/divided attention task with >75% accuracy in multimodal environment to simulate return to IADL roles - PROGRESSING    Pt will demo ability to alternate attention between 2 tasks with cog loading and >75% accuracy with G retention of task directions in multimodal environment to simulate return to IADL roles  - PROGRESSING     LONG TERM GOALS    Pt will maintain immediate list memory recall being able to recall 13/40 items on RBANS - MET    Pt will maintain immediate story memory recall being able to recall 5/24 on RBANs - MET     Pt will maintain delayed story memory recall being able to recall 5/12 on RBANS - NOT MET    Pt will maintain attention to task for 45 minutes in semi modal environment for baseline performance, improved role performance and to improve learning and to simulate return to IADLs and complete cooking/cleaning tasks  - MET    Pt will demo ability to participate in dual tasking/divided attention task with >75% accuracy in multimodal environment to simulate return to IADL roles - PROGRESSING    Pt will demo ability to alternate attention between 2 tasks with cog loading and >75% accuracy with G retention of task directions in multimodal environment to simulate return to IADL roles - Theresa Barragan 82 90 min for administration, documentation, interpretation, scoring adn POC development RBANS    Planned Treatment Interventions  Internal and external memory aides   Multimatrix for saccades/ visual clutter/attention  Hypersensitivity strategies education  Multi-modal environment  Sustained/alternating/divided attention  Tracking tube  Oculomotor control:  saccades, con/divergence  Conv /div   Dynamic tasks  Work stations with timed transitions  Temporal Awareness: Organize the Hour activities  Memory and mental manipulation  Auditory processing with immediate recall  Memory retention with immediate and delayed recall  Edu on cog/vision apps  Larry chapman scanning sheets

## 2023-04-04 ENCOUNTER — OFFICE VISIT (OUTPATIENT)
Dept: OCCUPATIONAL THERAPY | Facility: MEDICAL CENTER | Age: 68
End: 2023-04-04

## 2023-04-04 ENCOUNTER — OFFICE VISIT (OUTPATIENT)
Dept: PHYSICAL THERAPY | Facility: MEDICAL CENTER | Age: 68
End: 2023-04-04

## 2023-04-04 DIAGNOSIS — G31.83 LEWY BODY DEMENTIA WITHOUT BEHAVIORAL DISTURBANCE (HCC): Primary | ICD-10-CM

## 2023-04-04 DIAGNOSIS — F02.80 LEWY BODY DEMENTIA WITHOUT BEHAVIORAL DISTURBANCE (HCC): Primary | ICD-10-CM

## 2023-04-04 NOTE — PROGRESS NOTES
"Daily Note   Last PN 3/9/2023  POC- 3/9/2023-2023    Today's date: 2023  Patient name: Cody Smith  : 1955  MRN: [id number]  Referring provider: Saturnino Desai,*  Dx:   Encounter Diagnosis     ICD-10-CM    1  [facility]  G31 83     F02 80           Start Time: 3242  Stop Time: 4418  Total time in clinic (min): 45 minutes    Subjective: Patient notes no updates upon arrival, patient challenged with endurance  Rahat Smith (daughter)- [phone]    Objective: See treatment diary below- 16# vest donned for all interventions    All, standing on foam     Hitting mitts- 20 reps each  1 through 6  1-2  3-4  5-6     1,2,3,4  3,4,5,6  1,2,3,4,5,6    Hurdles while punching- forward- 1-2, laterally- 3-4, 6\"    Speed ladder- forward, laterally, icky shuffle- 10 cycles of 10 feet    Sit to stand with combo- 20 reps, 1-2, 3-4, 5-6    Physiologic stress exposure, balance/postural reactions with incline treadmill walking- 1 minutes, vitals monitored throughout- 8% grade, 2 5 mph 139 bpm, 98% SPO2    Assessment: Patient tolerated treatment well  Patient challenged with stability on foam today, patient loses stability in all directions with increased amplitude  Patient demonstrated fatigue post treatment and would benefit from continued PT to maximize function post dx of Lewy body dementia  Plan: Continue per plan of care  Progress treatment as tolerated      "

## 2023-04-04 NOTE — PROGRESS NOTES
"Occupational Therapy Daily Note:    Today's date: 2023  Patient name: Johnathan Bumpers  : 1955  MRN: [id number]  Referring provider: Luda Gonzales,*  Dx:   Encounter Diagnosis   Name Primary? • Lewy body dementia without behavioral disturbance ([facility] [address] ) Yes      POC START DATE: 2023  POC END DATE: 2023  NEXT PN DUE: 3/853361  FREQUENCY: 1-2x wk for 12 weeks    Subjective: \"Nothing changes  \"    Objective: Therapeutic Activity -     Visual Scanning, Visual Perception  Q-Bitz, Completed x 3  Time: 15 minutes    Neuromuscular Re-education -     Tangram on [place] Board  Walking back and forth   Remembering two pieces at a time  Time: 10 minutes    Grocery Store Board, Matching  Walking back and forth   Remembering two pieces at a time  Time: 10 minutes    Assessment: Pt tolerated treatment well  Pt session focused on working memory walking back and forth matching grocery store items  Progressed from pixy cube visual perception puzzle to Q-bitz visual perception puzzle, requiring min vc for completion  Few re-checks required for tangram puzzle on felt puzzle  Will continue to progress pt as tolerated  Plan: Future OT sessions to focus on expressive categorize, attention, memory, and executive functioning with functional problem solving  Continued skilled OT per POC    "

## 2023-04-06 ENCOUNTER — OFFICE VISIT (OUTPATIENT)
Dept: OCCUPATIONAL THERAPY | Facility: MEDICAL CENTER | Age: 68
End: 2023-04-06

## 2023-04-06 ENCOUNTER — OFFICE VISIT (OUTPATIENT)
Dept: PHYSICAL THERAPY | Facility: MEDICAL CENTER | Age: 68
End: 2023-04-06

## 2023-04-06 DIAGNOSIS — G31.83 LEWY BODY DEMENTIA WITHOUT BEHAVIORAL DISTURBANCE (HCC): Primary | ICD-10-CM

## 2023-04-06 DIAGNOSIS — F02.80 LEWY BODY DEMENTIA WITHOUT BEHAVIORAL DISTURBANCE (HCC): Primary | ICD-10-CM

## 2023-04-06 NOTE — PROGRESS NOTES
Occupational Therapy Daily Note:    Today's date: 2023  Patient name: Deena Troy  : 1955  MRN: [id number]  Referring provider: Author Zeng,*  Dx:   Encounter Diagnosis   Name Primary? • Lewy body dementia without behavioral disturbance ([facility] [address] ) Yes      POC START DATE: 2023  POC END DATE: 2023  NEXT PN DUE: 3/720144  FREQUENCY: 1-2x wk for 12 weeks    Subjective: Reports no significant changes  Objective: Therapeutic Activity -     Immediate Memory Recall  Memorization of two cards at a time  Re-checks required for accuracy  Time: 15 minutes    Hard Word Scramble   Able to complete with 1 vc   Immediate Memory Recall:     Alternating Attention  Trail Making Completed x 2  Approximately 5 errors total  Time: 10 minutes    Assessment: Pt tolerated treatment well  Pt demonstrated fair alternating attention abilities, few errors with trail making wkst  Poor memory recall with random word list  Able to unscramble word search independently with 100% accuracy  Re-checks required for accuracy with two card memorization  Will continue to progress pt as tolerated  Plan: Future OT sessions to focus on expressive categorize, attention, memory, and executive functioning with functional problem solving  Continued skilled OT per POC

## 2023-04-06 NOTE — PROGRESS NOTES
Progress Note/Daily Note   PN 2023    Today's date: 2023  Patient name: Laura Phan  : 1955  MRN: [id number]  Referring provider: Stefany Urban,*  Dx:   Encounter Diagnosis     ICD-10-CM    1  [facility] body [facility] without [facility]  G31 83     F02 80           Start Time: 8628  Stop Time: 9138  Total time in clinic (min): 43 minutes    Subjective: Patient notes no updates upon arrival, patient reports that he is enjoying the boxing  Notes no falls in the last month  Julissa Eduardo (daughter)- [phone]    Objective: See treatment diary below- 16# vest donned for all interventions    All, standing on foam     Hitting mitts- 30 reps each  1 through 6  1-2  3-4  5-6     1,2,3,4  3,4,5,6  1,2,3,4,5,6      Noman Zimmerman (2013): a [facility] decision that sought to clarify that Medicare will in fact cover skilled therapy services to maintain a patient’s current condition or prevent/slow further deterioration      Cut off score   All date taken from [facility] Neuro Section or Rehab Measures    Wells/56  MDC: 6 pts  Age Norms:  61-76: M - 54   F - 55  70-79: M - 47   F - 53  80-89: M - 48   F - 50 5xSTS: Kyle et al 2010  MDC: 2 3 sec  Age Norms:  60-69: 11 1 sec  70-79: 12 6 sec  80-89: 14 8 sec   TUG  MDC: 4 14 sec  Cut off score:  >13 5 sec community dwelling adults  >32 2 frail elderly  <20 I for basic transfers  >30 dependent on transfers 10 Meter Walk Test: Khalif del toro 2011  20-29: M - 1 35 m   F - 1 34 m  30-39: M - 1 43 m   F - 1 34 m  40-49: M - 1 43 m   F - 1 39 m  50-59: M - 1 43 m   F - 1 31 m  60-69: M - 1 34 m   F - 1 24 m  70-79: M - 1 26 m   F - 1 13 m  80-89: M - 0 97 m   F - 0 94 m   FGA  MCID: 4 pts  Geriatrics/community < 22/30 fall risk  Geriatrics/community < 20/30 unexplained falls DGI  MDC: vestibular - 4 pts  MDC: geriatric/community - 3 pts  Falls risk <19/24   6 Minute Walk Test  Age Norms  61-76: M - 1876 ft   F - 8457 ft  70-79: M - 1729 ft   F - 1545 ft  80-89: M - 1368 ft   F - 1286 ft mCTSIB  Norm: 20-60 yrs  Eyes open firm: norm sway 0 21-0 48  Eyes closed firm: norm sway 0 48-0 99  Eyes open foam: norm sway 0 38-0 71  Eyes closed foam: norm sway 0 70-2 22   LE MMT  - R Hip Flexion: 4+/5   L Hip Flexion: 4+/5  - R Hip Extension: 4+/5  L Hip Extension: 4+/5  - R Hip Abduction: 4+/5  L Hip Abduction: 4+/5  - R Hip Adduction: 4+/5  L Hip Adduction: 4+/5  - R Knee Extension: 4+/5  L Knee Extension: 4+/5  - R Knee Flexion: 4+/5  L Knee Flexion: 4+/5  - R Ankle DF: 4+/5   L Ankle DF: 4+/5  - R Ankle PF: 4+/5   L Ankle PF: 4+/5      Outcome Measures IE  3/9/2023 Progress Note: 4/6/2023    5x STS 9 03 sec no UE 7 09 sec no UE    TUG  TUG manual  TUG cog 5 35 sec  5 62 sec  6 28 sec 4 73 sec  4 52 sec  5 88 sec    10 Meter Walk Test 10/7 10= 1 41 m/s 10/7 46s= 1 34 m/s    6 Minute Walk Test 1800 ft 1950 feet    FGA 28/30 29/30    PENDLETON 50/56 50/56    30 second sit to stand 15 reps no UE 18 reps no UE      Previous POC  Outcome Measures Initial Eval  11/14/2022 Progress  Note  12/12/2022 Progress Note  1/10/2023 Re-Evaluation  2/6/2023   5xSTS 10 16 sec 9 56 seconds 9 10 seconds 8 89 seconds no UE   TUG  - Regular  - Cognitive   8 56 sec  10 23 sec   8 11 sec  10 13 sec   7 51 sec  10 63 sec   7 36 sec  10 75 sec   10 meter 1 07 m/s 1 08 m/s 1 11 m/s cued to increase speed 1 15 m/s  Cued to increase speed   PENDLETON 45/56 47/56 49/56 50/56   FGA 23/30 25/30 26/30 28/30   DGI 21/24 23/24 23/24 24/24   mCTSIB  - FTEO (firm)  - FTEC (firm)  - FTEO (foam)  - FTEC (foam)   30 sec  30 sec  30 sec  10 sec   30 sec  30 sec  30 sec  15 sec   30 sec  30 sec  30 sec  30 sec   30 sec  30 sec  30 sec  30 sec   6MWT 1100 ft 1210 feet 1250 feet 1310 feet   30 second 15 reps no UE 15 reps no UE 15 reps no UE 16 reps no UE                Goals (16 weeks):    - Patient will ambulate outdoors on uneven surfaces with limited assistance to facilitate safe community ambulation- MET  - Patient will maintain gait speed per 10 meter walk test at 1 41 m/s to facilitate continued safety with community ambulation- NOT MET  - Patient will continue to score at least 50/56 on PENDLETON to maintain fall risk status to reduce risk of injury- MET  - Patient will be able to ambulate at least 1800 ft during 6 Minute Walk Test to maintain current cardiovascular capacity- MET  - Patient will attend PT 1x/week with focus on balance and gait training to maintain functional capacity- MET  - Patient will remain at approximately 6 seconds on TUG and TUG variations without AD to maintain fall risk status- MET  - Patient will maintain LE strength at current level of 4+/5 strength to maintain level of independence with transfers- MET    Assessment: Patient tolerated treatment well  Patient progressing in certain outcome measures noted above, patient challenged with gait speed with age related norms as well as other outcome measures noted above  Progressions noted above  Patient continues to be limited in static balance via PENDLETON balance as well  Patient challenged with posterior balance losses today with overhead motion  Patient demonstrated fatigue post treatment and would benefit from continued PT to maximize function post dx of Lewy body dementia  Plan: Continue per plan of care  Progress treatment as tolerated

## 2023-04-07 DIAGNOSIS — I10 PRIMARY HYPERTENSION: ICD-10-CM

## 2023-04-07 RX ORDER — LOSARTAN POTASSIUM 50 MG/1
TABLET ORAL
Qty: 90 TABLET | Refills: 0 | Status: SHIPPED | OUTPATIENT
Start: 2023-04-07

## 2023-04-25 ENCOUNTER — OFFICE VISIT (OUTPATIENT)
Dept: PHYSICAL THERAPY | Facility: MEDICAL CENTER | Age: 68
End: 2023-04-25

## 2023-04-25 ENCOUNTER — OFFICE VISIT (OUTPATIENT)
Dept: OCCUPATIONAL THERAPY | Facility: MEDICAL CENTER | Age: 68
End: 2023-04-25

## 2023-04-25 DIAGNOSIS — F02.80 LEWY BODY DEMENTIA WITHOUT BEHAVIORAL DISTURBANCE (HCC): Primary | ICD-10-CM

## 2023-04-25 DIAGNOSIS — G31.83 LEWY BODY DEMENTIA WITHOUT BEHAVIORAL DISTURBANCE (HCC): Primary | ICD-10-CM

## 2023-04-25 NOTE — PROGRESS NOTES
Occupational Therapy Daily Note:    Today's date: 2023  Patient name: Cody Smith  : 1955  MRN: [id number]  Referring provider: Saturnino Desai,*  Dx:   Encounter Diagnosis   Name Primary? • Lewy body dementia without behavioral disturbance ([facility] [address] ) Yes      POC START DATE: 2023  POC END DATE: 2023  NEXT PN DUE: 3/696313  FREQUENCY: 1-2x wk for 12 weeks    Subjective: Reports no significant changes  Objective:     Neuromuscular Re-education    Working Memory, [address] and State one at a time  Walking back and forth between rooms  Memorizing 5 number zip code  Walking back and forth between rooms  Time: 25 minutes    Therapeutic Activity -     Visual Perception  Mirror Images  Min vc to start, independent by end  Completed x 6    Reading Comprehension  Smokey the Bear   Time: 10 minutes    Assessment: Pt tolerated treatment well  Pt session began with working memory walking back and forth between rooms  Improved performance with memory recall of numbers compared to words  Min vc for beginning of mirror image task, independent by end  Slowed performance with reading comprehension, however, good accuracy  Will continue to progress pt as tolerated  Plan: Future OT sessions to focus on expressive categorize, attention, memory, and executive functioning with functional problem solving  Continued skilled OT per POC

## 2023-04-25 NOTE — PROGRESS NOTES
"Daily Note   Last PN 2023  POC- 3/9/2023-2023    Today's date: 2023  Patient name: Betsy Pro  : 1955  MRN: [id number]  Referring provider: Randee Gore,*  Dx:   Encounter Diagnosis     ICD-10-CM    1  Lewy body dementia without behavioral disturbance ([facility] [address] )  G31 83     F02 80           Start Time:   Stop Time:   Total time in clinic (min): 45 minutes    Subjective: Patient reported no updates today upon arrival      Alli Bunch (daughter)- [phone]    Objective: See treatment diary below- 16# vest     Side stepping along bar, 1 UE support, 10 cycle of 10 feet, placing squigz on mirror and taking them off, 10 squigz  Standing in place, putting squigz on mirror, crossing body- 10 squigz, 10 sets  Walking balance with holding tidal tank- forward, lateral, backward stepping- 10 cycles of 10 feet  Side stepping along bar, 1 UE support, 10 cycle of 10 feet, placing squigz on mirror and taking them off, 10 squigz on foam beam  -3 sets naming favorite foods, 3 sets naming yankee players, 4 sets naming brands of cars  Side stepping placing squigz on mirror with stepping over hurdles- 10 cycles of 10 feet, 6\", 1 UE  Walking on stepping stones- 10 cycles of 10 feet no UE        Assessment: Patient tolerated treatment well  Patient challenged with dual tasking interventions today, patient challenged with unsupported activities today, patient improving with his overall function but overhead activities are limited with balance and recovery, patient fatigued post appointment  Patient demonstrated fatigue post treatment and would benefit from continued PT to maximize function post dx of Lewy body dementia  Plan: Continue per plan of care  Progress treatment as tolerated      "

## 2023-04-26 ENCOUNTER — ANESTHESIA (OUTPATIENT)
Dept: GASTROENTEROLOGY | Facility: HOSPITAL | Age: 68
End: 2023-04-26

## 2023-04-26 ENCOUNTER — APPOINTMENT (OUTPATIENT)
Dept: LAB | Facility: HOSPITAL | Age: 68
End: 2023-04-26
Attending: INTERNAL MEDICINE

## 2023-04-26 ENCOUNTER — ANESTHESIA EVENT (OUTPATIENT)
Dept: GASTROENTEROLOGY | Facility: HOSPITAL | Age: 68
End: 2023-04-26

## 2023-04-26 ENCOUNTER — HOSPITAL ENCOUNTER (OUTPATIENT)
Dept: GASTROENTEROLOGY | Facility: HOSPITAL | Age: 68
Setting detail: OUTPATIENT SURGERY
Discharge: HOME | End: 2023-04-26
Attending: INTERNAL MEDICINE

## 2023-04-26 VITALS
DIASTOLIC BLOOD PRESSURE: 75 MMHG | RESPIRATION RATE: 20 BRPM | SYSTOLIC BLOOD PRESSURE: 135 MMHG | HEART RATE: 53 BPM | TEMPERATURE: 98.3 F | OXYGEN SATURATION: 98 %

## 2023-04-26 DIAGNOSIS — I10 PRIMARY HYPERTENSION: ICD-10-CM

## 2023-04-26 DIAGNOSIS — E78.00 HYPERCHOLESTEROLEMIA: ICD-10-CM

## 2023-04-26 DIAGNOSIS — E41 NUTRITIONAL MARASMUS (HCC): ICD-10-CM

## 2023-04-26 DIAGNOSIS — R79.9 ABNORMAL FINDING OF BLOOD CHEMISTRY, UNSPECIFIED: ICD-10-CM

## 2023-04-26 DIAGNOSIS — E61.1 IRON DEFICIENCY: ICD-10-CM

## 2023-04-26 DIAGNOSIS — G62.9 NEUROPATHY: ICD-10-CM

## 2023-04-26 DIAGNOSIS — K59.1 FUNCTIONAL DIARRHEA: ICD-10-CM

## 2023-04-26 DIAGNOSIS — E03.9 HYPOTHYROIDISM, UNSPECIFIED TYPE: ICD-10-CM

## 2023-04-26 DIAGNOSIS — R63.4 WEIGHT LOSS: ICD-10-CM

## 2023-04-26 DIAGNOSIS — E11.9 DIABETES MELLITUS WITHOUT COMPLICATION (HCC): ICD-10-CM

## 2023-04-26 LAB
ALBUMIN SERPL BCP-MCNC: 4.9 G/DL (ref 3.5–5)
ALP SERPL-CCNC: 81 U/L (ref 34–104)
ALT SERPL W P-5'-P-CCNC: 9 U/L (ref 7–52)
ANION GAP SERPL CALCULATED.3IONS-SCNC: 6 MMOL/L (ref 4–13)
AST SERPL W P-5'-P-CCNC: 25 U/L (ref 13–39)
BASOPHILS # BLD AUTO: 0.04 THOUSANDS/ΜL (ref 0–0.1)
BASOPHILS NFR BLD AUTO: 1 % (ref 0–1)
BILIRUB SERPL-MCNC: 0.89 MG/DL (ref 0.2–1)
BUN SERPL-MCNC: 11 MG/DL (ref 5–25)
CALCIUM SERPL-MCNC: 10.4 MG/DL (ref 8.4–10.2)
CHLORIDE SERPL-SCNC: 102 MMOL/L (ref 96–108)
CHOLEST SERPL-MCNC: 195 MG/DL
CO2 SERPL-SCNC: 31 MMOL/L (ref 21–32)
CREAT SERPL-MCNC: 0.95 MG/DL (ref 0.6–1.3)
EOSINOPHIL # BLD AUTO: 0.21 THOUSAND/ΜL (ref 0–0.61)
EOSINOPHIL NFR BLD AUTO: 3 % (ref 0–6)
ERYTHROCYTE [DISTWIDTH] IN BLOOD BY AUTOMATED COUNT: 12.9 % (ref 11.6–15.1)
FERRITIN SERPL-MCNC: 58 NG/ML (ref 8–388)
GFR SERPL CREATININE-BSD FRML MDRD: 83 ML/MIN/1.73SQ M
GLUCOSE P FAST SERPL-MCNC: 125 MG/DL (ref 65–99)
GLUCOSE SERPL-MCNC: 112 MG/DL (ref 65–140)
HCT VFR BLD AUTO: 46.6 % (ref 36.5–49.3)
HDLC SERPL-MCNC: 38 MG/DL
HGB BLD-MCNC: 15.4 G/DL (ref 12–17)
IMM GRANULOCYTES # BLD AUTO: 0.02 THOUSAND/UL (ref 0–0.2)
IMM GRANULOCYTES NFR BLD AUTO: 0 % (ref 0–2)
IRON SATN MFR SERPL: 29 % (ref 20–50)
IRON SERPL-MCNC: 116 UG/DL (ref 65–175)
LDLC SERPL CALC-MCNC: 102 MG/DL (ref 0–100)
LYMPHOCYTES # BLD AUTO: 1.5 THOUSANDS/ΜL (ref 0.6–4.47)
LYMPHOCYTES NFR BLD AUTO: 20 % (ref 14–44)
MCH RBC QN AUTO: 31.6 PG (ref 26.8–34.3)
MCHC RBC AUTO-ENTMCNC: 33 G/DL (ref 31.4–37.4)
MCV RBC AUTO: 96 FL (ref 82–98)
MONOCYTES # BLD AUTO: 0.62 THOUSAND/ΜL (ref 0.17–1.22)
MONOCYTES NFR BLD AUTO: 8 % (ref 4–12)
NEUTROPHILS # BLD AUTO: 4.97 THOUSANDS/ΜL (ref 1.85–7.62)
NEUTS SEG NFR BLD AUTO: 68 % (ref 43–75)
NRBC BLD AUTO-RTO: 0 /100 WBCS
PLATELET # BLD AUTO: 261 THOUSANDS/UL (ref 149–390)
PMV BLD AUTO: 9 FL (ref 8.9–12.7)
POTASSIUM SERPL-SCNC: 3.8 MMOL/L (ref 3.5–5.3)
PREALB SERPL-MCNC: 28.6 MG/DL (ref 18–40)
PROT SERPL-MCNC: 7.8 G/DL (ref 6.4–8.4)
RBC # BLD AUTO: 4.88 MILLION/UL (ref 3.88–5.62)
SODIUM SERPL-SCNC: 139 MMOL/L (ref 135–147)
TIBC SERPL-MCNC: 396 UG/DL (ref 250–450)
TRIGL SERPL-MCNC: 276 MG/DL
TSH SERPL DL<=0.05 MIU/L-ACNC: 1.91 UIU/ML (ref 0.45–4.5)
VIT B12 SERPL-MCNC: 222 PG/ML (ref 100–900)
WBC # BLD AUTO: 7.36 THOUSAND/UL (ref 4.31–10.16)

## 2023-04-26 RX ORDER — SODIUM CHLORIDE, SODIUM LACTATE, POTASSIUM CHLORIDE, CALCIUM CHLORIDE 600; 310; 30; 20 MG/100ML; MG/100ML; MG/100ML; MG/100ML
INJECTION, SOLUTION INTRAVENOUS CONTINUOUS PRN
Status: DISCONTINUED | OUTPATIENT
Start: 2023-04-26 | End: 2023-04-26

## 2023-04-26 RX ORDER — PROPOFOL 10 MG/ML
INJECTION, EMULSION INTRAVENOUS AS NEEDED
Status: DISCONTINUED | OUTPATIENT
Start: 2023-04-26 | End: 2023-04-26

## 2023-04-26 RX ORDER — LIDOCAINE HYDROCHLORIDE 20 MG/ML
INJECTION, SOLUTION EPIDURAL; INFILTRATION; INTRACAUDAL; PERINEURAL AS NEEDED
Status: DISCONTINUED | OUTPATIENT
Start: 2023-04-26 | End: 2023-04-26

## 2023-04-26 RX ORDER — PANTOPRAZOLE SODIUM 40 MG/1
40 TABLET, DELAYED RELEASE ORAL DAILY
Qty: 30 TABLET | Refills: 2 | Status: SHIPPED | OUTPATIENT
Start: 2023-04-26

## 2023-04-26 RX ADMIN — PROPOFOL 40 MG: 10 INJECTION, EMULSION INTRAVENOUS at 07:34

## 2023-04-26 RX ADMIN — PROPOFOL 20 MG: 10 INJECTION, EMULSION INTRAVENOUS at 07:40

## 2023-04-26 RX ADMIN — LIDOCAINE HYDROCHLORIDE 100 MG: 20 INJECTION, SOLUTION EPIDURAL; INFILTRATION; INTRACAUDAL at 07:32

## 2023-04-26 RX ADMIN — PROPOFOL 20 MG: 10 INJECTION, EMULSION INTRAVENOUS at 07:35

## 2023-04-26 RX ADMIN — PROPOFOL 140 MG: 10 INJECTION, EMULSION INTRAVENOUS at 07:32

## 2023-04-26 RX ADMIN — PROPOFOL 30 MG: 10 INJECTION, EMULSION INTRAVENOUS at 07:49

## 2023-04-26 RX ADMIN — SODIUM CHLORIDE, SODIUM LACTATE, POTASSIUM CHLORIDE, AND CALCIUM CHLORIDE: .6; .31; .03; .02 INJECTION, SOLUTION INTRAVENOUS at 07:25

## 2023-04-26 RX ADMIN — PROPOFOL 30 MG: 10 INJECTION, EMULSION INTRAVENOUS at 07:45

## 2023-04-26 RX ADMIN — PROPOFOL 40 MG: 10 INJECTION, EMULSION INTRAVENOUS at 07:37

## 2023-04-26 RX ADMIN — PROPOFOL 40 MG: 10 INJECTION, EMULSION INTRAVENOUS at 07:41

## 2023-04-26 NOTE — ANESTHESIA PREPROCEDURE EVALUATION
Procedure:  COLONOSCOPY  EGD    Relevant Problems   CARDIO   (+) Hypercholesterolemia   (+) Hypertension      ENDO   (+) Hypothyroidism      NEURO/PSYCH   (+) Depression   (+) Episodic paroxysmal hemicrania, not intractable   (+) Lewy body dementia without behavioral disturbance (HCC)   (+) Moderate episode of recurrent major depressive disorder (HCC)      PULMONARY   (+) Centrilobular emphysema (HCC)   (+) DAFNE (obstructive sleep apnea)        Physical Exam    Airway    Mallampati score: II  TM Distance: >3 FB  Neck ROM: full     Dental   upper dentures and lower dentures,     Cardiovascular      Pulmonary      Other Findings        Anesthesia Plan  ASA Score- 2     Anesthesia Type- IV sedation with anesthesia with ASA Monitors  Additional Monitors:   Airway Plan:           Plan Factors-    Chart reviewed  Patient summary reviewed  Induction- intravenous  Postoperative Plan-     Informed Consent- Anesthetic plan and risks discussed with patient  I personally reviewed this patient with the CRNA  Discussed and agreed on the Anesthesia Plan with the CRNA  Elissa Brown

## 2023-04-26 NOTE — H&P
History and Physical -  [facility]  Malick Claros 79 y o  male MRN: [id number]                  HPI: Malick Claros is a 79y o  year old male who presents for endoscopy and colonoscopy for evaluation of abdominal pain and weight loss      REVIEW OF SYSTEMS: Per the HPI, and otherwise unremarkable  Historical Information   Past Medical History:   Diagnosis Date    Alopecia     Arthritis     Back pain     Benign essential hypertension     Chronic obstructive pulmonary disease (COPD) ([address] )     Depression     Diabetes mellitus (HCC)     Emphysema, interstitial (HCC)     Fatigue     Hypercholesterolemia     Hypersomnia, persistent     Hypothyroidism     Low testosterone     Memory loss     Oral candidiasis 5/11/2020    Sinusitis     Thyroid goiter      Past Surgical History:   Procedure Laterality Date    ADENOIDECTOMY      APPENDECTOMY      HERNIA REPAIR      TONSILLECTOMY       Social History   Social History     Substance and Sexual Activity   Alcohol Use Never     Social History     Substance and Sexual Activity   Drug Use No     Social History     Tobacco Use   Smoking Status Former    Packs/day: 1 00    Years: 20 00    Pack years: 20 00    Types: Cigarettes   Smokeless Tobacco Never     Family History   Problem Relation Age of Onset    Hypertension Father     Aneurysm Father         cerebral    Lupus Mother     Hypothyroidism Daughter     Hashimoto's thyroiditis Daughter        Meds/Allergies     (Not in a hospital admission)      Allergies   Allergen Reactions    Cimetidine     Codeine     Contrast  [Iodinated Contrast Media]     Metrizamide     Prednisone Other (See Comments)     ALL STEROIDS  COMBATIVE    Simvastatin Other (See Comments)     ALL STATINS  MUSCLE CRAMPS    Statins        Objective     Blood pressure 161/80, pulse 63, temperature (!) 97 3 °F (36 3 °C), temperature source Temporal, resp  rate 15, SpO2 96 %        PHYSICAL EXAM    /80 Pulse 63   Temp (!) 97 3 °F (36 3 °C) (Temporal)   Resp 15   SpO2 96%       Gen: NAD  CV: RRR  CHEST: Clear  ABD: soft, NT/ND  EXT: no edema      ASSESSMENT/PLAN:  This is a 79y o  year old male here for endoscopy and colonoscopy, and he is stable and optimized for his procedure

## 2023-04-27 ENCOUNTER — APPOINTMENT (OUTPATIENT)
Dept: PHYSICAL THERAPY | Facility: MEDICAL CENTER | Age: 68
End: 2023-04-27

## 2023-04-27 ENCOUNTER — APPOINTMENT (OUTPATIENT)
Dept: OCCUPATIONAL THERAPY | Facility: MEDICAL CENTER | Age: 68
End: 2023-04-27

## 2023-04-29 LAB
EST. AVERAGE GLUCOSE BLD GHB EST-MCNC: 123 MG/DL
HBA1C MFR BLD: 5.9 %

## 2023-05-01 ENCOUNTER — EVALUATION (OUTPATIENT)
Dept: OCCUPATIONAL THERAPY | Facility: MEDICAL CENTER | Age: 68
End: 2023-05-01

## 2023-05-01 ENCOUNTER — OFFICE VISIT (OUTPATIENT)
Dept: PHYSICAL THERAPY | Facility: MEDICAL CENTER | Age: 68
End: 2023-05-01

## 2023-05-01 DIAGNOSIS — G31.83 LEWY BODY DEMENTIA WITHOUT BEHAVIORAL DISTURBANCE (HCC): Primary | ICD-10-CM

## 2023-05-01 DIAGNOSIS — F02.80 LEWY BODY DEMENTIA WITHOUT BEHAVIORAL DISTURBANCE (HCC): Primary | ICD-10-CM

## 2023-05-01 NOTE — PROGRESS NOTES
Daily Note   Last PN 2023  POC- 3/9/2023-2023    Today's date: 2023  Patient name: Lino Gregory  : 1955  MRN: [id number]  Referring provider: Antoni Sidhu,*  Dx:   Encounter Diagnosis     ICD-10-CM    1  Lewy body dementia without behavioral disturbance ([facility] [address] )  G31 83     F02 80                      Subjective: Patient reports no new changes or complaints since last session     Mana Ledesma (daughter)- [phone]    Objective: See treatment diary below- 16# vest       Hitting mitts- 30 reps each, standing on foam beam  1 through 6    Hitting mitts- 30 reps each, standing on foam wedge angulated forwards  Combos  1-2  3-4  5-6    Reciprocal LE jumping > combos    Sit to stand with combos- 20 reps 2 sets  Standing Tiny Tank swings- 20x ea  Walking balance with holding tidal tank- forward, lateral, backward stepping- 10 cycles of 10 feet  FWD/LAT/BWD stepping over hurdles on foam beam: 25x CW, 25x CCW  Walking on stepping stones- 10 cycles of 10 feet no UE        Assessment: Patient tolerated treatment well  He displays increased difficulty maintaining balance on unstable foam beam frequently stepping off to regain balance  Patient challenged with accurate punch recall, needing visual/verbal cueing for sequences >2 items  Patient demonstrated fatigue post treatment and would benefit from continued PT to maximize function post dx of Lewy body dementia  Plan: Continue per plan of care  Progress treatment as tolerated

## 2023-05-01 NOTE — PROGRESS NOTES
"OCCUPATIONAL THERAPY RE EVALUATION / MAINTENANCE PROGRAM    5/1/2023  Zina Slaughter  1955  [id number]  Tatum Briones,*  1  Lewy body dementia without behavioral disturbance ([facility] [address] )      POC START DATE: 5/1/2023  POC END DATE: 7/24/2023  NEXT PN DUE: 6/1/2023  FREQUENCY: 1-2x wk for 12 weeks    SUBJECTIVE: Pt presents to OT evaluation on 11/28/2022 secondary to Lewy Body Dementia  Hard time with fine motor coordination  \"I was always an active person  I am not happy with how I am anymore  \" Diagnosis of Lewy Body Dementia about 7 years ago  Difficulty with changing hearing aid batteries at beginning of session  Concerns with balance, close to falling at home  Concerns with dexterity and fine motor coordination  Tremors are worse in the R, R hand dominant  Takes Parkinson's Medication 3 times a day  Decreased cognition over the last few years  Work: Retired  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, [employer],   Retired because of diagnosis  IADLs: Tremors do not impact eating  Occasional difficulty with buttoning and zippers  No difficulty with driving  Difficulty with putting a key into a door  Difficulty with tool work  Uses medication box and wife helps with medication management  Sleep: No difficulty with sleeping  Sleeps 8-10 hours  Lives with his wife  Subjective on 5/1: \"I do feel like I am making improvement or at least maintaining  \"    Skilled Analysis:    Pt has been seen for OT services and would benefit from transitioning to maintenance program  Pt presents for outpatient OT reevaluation on 02/2023 secondary to Lewy Body Dementia  RBANS assessment used to evaluate short term and delayed memory, attention, language and visuospatial/constructional skills  Overall, pt scored in the 0 5% percentile falling into the extremely low category compared to norms  Pt scored extremely low in all areas except for language (27 percentile - average) and attention (5 percentile)   Pt " demonstrating impairments in short term memory, delayed memory, visuospatial awareness effecting performance in everyday life tasks  Noman Zimmerman (2013): a [facility] decision that sought to clarify that Medicare will in fact cover skilled therapy services to maintain a patients current condition or prevent/slow further deterioration  Pt would benefit from maintenance OT services focusing on impairments for the next 2x/wk for  12 weeks  Pt educated on OT services and maintianence  POC was reviewed with pt, pt understands and agrees with POC  Pt presents for OT re-evaluation today on 5/1/2023 secondary to Lewy Body Dementia  Pt is on a maintenance program  Pt demonstrated overall improved score on the RBANS today compared to last re-evaluation, however continues to fall within the extremely low range compared to norms  Scored within the average range on language  Continues to fall within the extremely low range on immediate memory recall, visuospatial, and delayed memory recall  Noman Zimmerman (2013): a [facility] decision that sought to clarify that Medicare will in fact cover skilled therapy services to maintain a patients current condition or prevent/slow further deterioration  Pt would benefit from maintenance OT services focusing on impairments for the next 2x/wk for  12 weeks  Pt educated on OT services and maintianence  POC was reviewed with pt, pt understands and agrees with POC       HISTORY OF PRESENT ILLNESS:     PMH:   Past Medical History:   Diagnosis Date    Alopecia     Arthritis     Back pain     Benign essential hypertension     Chronic obstructive pulmonary disease (COPD) ([facility] [address] )     Depression     Diabetes mellitus (HCC)     Emphysema, interstitial (HCC)     Fatigue     Hypercholesterolemia     Hypersomnia, persistent     Hypothyroidism     Low testosterone     Memory loss     Oral candidiasis 5/11/2020    Sinusitis     Thyroid goiter      Objective     Assessments  The Repeatable Battery for the Assessment of Neuropsychological Status (RBANS) is a brief, individually-administered assessment which measures attention, language, visuospatial/constructional abilities, and immediate & delayed memory  The RBANS is intended for use with adolescents to adults, ages 15 to 80 years  The following results were obtained during the administration of the assessment  Form: D    Cognitive Domain/Subtest: Index Score: Percentile Rank: Classification: IE: Re-evaluation: 5/1 (newest score)   IMMEDIATE MEMORY 53 0 1 %ile Extremely Low 49 57        1  List Learning (18/40)          2  Story Memory (6/24)           VISUOSPATIAL/  CONSTRUCTIONAL 58 0 3%ile Extremely Low 58 58        3  Figure Copy (14/20)          4  Line Orientation (5/20)           LANGUAGE 92 46%ile Average 87 98        5  Picture Naming (10/10)          6  Semantic Fluency (20/40)           ATTENTION 72 5%ile Borderline 88 75        7  Digit Span (8/16)          8  Coding (28/89)           DELAYED MEMORY 52 8%ile Extremely Low 56 78        9  List Recall (5/10)          10  List Recognition (17/20)          11  Story Recall (4/12)          12  Figure Recall (11/20)       Sum of Index Scores:  366 (previously 327)      Total Score:  66 (previously 55)      Percentile: 1% (previously 0 1%)      Classification: Extremely Low        IE indicates the scores from the initial evaluation (5/1/2023)   Form: C    SHORT TERM GOALS:     Pt will maintain immediate list memory recall being able to recall 13/40 items on RBANS - MET    Pt will maintain immediate story memory recall being able to recall 5/24 on RBANs - MET    Pt will maintain delayed story memory recall being able to recall 5/12 on RBANS - NOT MET    Pt will maintain attention to task for 45 minutes in semi modal environment for baseline performance, improved role performance and to improve learning and to simulate return to IADLs and complete cooking/cleaning tasks  - MET    Pt will demo ability to participate in dual tasking/divided attention task with >75% accuracy in multimodal environment to simulate return to IADL roles - PROGRESSING    Pt will demo ability to alternate attention between 2 tasks with cog loading and >75% accuracy with G retention of task directions in multimodal environment to simulate return to IADL roles  - PROGRESSING     LONG TERM GOALS    Pt will maintain immediate list memory recall being able to recall 13/40 items on RBANS - MET    Pt will maintain immediate story memory recall being able to recall 5/24 on RBANs - MET     Pt will maintain delayed story memory recall being able to recall 5/12 on RBANS - NOT MET    Pt will maintain attention to task for 45 minutes in semi modal environment for baseline performance, improved role performance and to improve learning and to simulate return to IADLs and complete cooking/cleaning tasks  - MET    Pt will demo ability to participate in dual tasking/divided attention task with >75% accuracy in multimodal environment to simulate return to IADL roles - PROGRESSING    Pt will demo ability to alternate attention between 2 tasks with cog loading and >75% accuracy with G retention of task directions in multimodal environment to simulate return to IADL roles - Theresa Barragan 82 90 min for administration, documentation, interpretation, scoring adn POC development RBANS    Planned Treatment Interventions  Internal and external memory aides   Multimatrix for saccades/ visual clutter/attention  Hypersensitivity strategies education  Multi-modal environment  Sustained/alternating/divided attention  Tracking tube  Oculomotor control:  saccades, con/divergence  Conv /div   Dynamic tasks  Work stations with timed transitions  Temporal Awareness: Organize the Hour activities  Memory and mental manipulation  Auditory processing with immediate recall  Memory retention with immediate and delayed recall  Edu on cog/vision apps  Deandre chapman scanning sheets

## 2023-05-04 ENCOUNTER — OFFICE VISIT (OUTPATIENT)
Dept: OCCUPATIONAL THERAPY | Facility: MEDICAL CENTER | Age: 68
End: 2023-05-04

## 2023-05-04 ENCOUNTER — OFFICE VISIT (OUTPATIENT)
Dept: PHYSICAL THERAPY | Facility: MEDICAL CENTER | Age: 68
End: 2023-05-04

## 2023-05-04 DIAGNOSIS — F02.80 LEWY BODY DEMENTIA WITHOUT BEHAVIORAL DISTURBANCE (HCC): Primary | ICD-10-CM

## 2023-05-04 DIAGNOSIS — G31.83 LEWY BODY DEMENTIA WITHOUT BEHAVIORAL DISTURBANCE (HCC): Primary | ICD-10-CM

## 2023-05-04 NOTE — PROGRESS NOTES
Progress Note/Daily Note   PN 2023    Today's date: 2023  Patient name: Selena Castaneda  : 1955  MRN: [id number]  Referring provider: Eulalia Deluna,*  Dx:   Encounter Diagnosis     ICD-10-CM    1  Lewy body dementia without behavioral disturbance ([facility] [address] )  G31 83     F02 80           Start Time: 0071  Stop Time: 1400  Total time in clinic (min): 45 minutes    Subjective: Patient notes no updates upon arrival, patient has been attending maintenance therapy consistently and is noting improvements in his day to day function  No falls reported in the last 3 months  Sugar Bowman (daughter)- [phone]    Objective: See treatment diary below-     Standing Tiny Tank swings- 20x ea, 5# tanks  Walking balance with holding tidal tank- forward, lateral, backward stepping- 10 cycles of 10 feet  FWD/LAT/BWD stepping over river rocks holding mini tidal tank (5#)  Walking on stepping stones- 10 cycles of 10 feet no UE      Noman vs  Erasmo (2013): a [facility] decision that sought to clarify that Medicare will in fact cover skilled therapy services to maintain a patients current condition or prevent/slow further deterioration      Cut off score   All date taken from [facility] Neuro Section or Rehab Measures    Wells/64  MDC: 6 pts  Age Norms:  61-76: M - 54   F - 55  70-79: M - 47   F - 53  80-89: M - 48   F - 50 5xSTS: Kyle et al 2010  MDC: 2 3 sec  Age Norms:  60-69: 11 1 sec  70-79: 12 6 sec  80-89: 14 8 sec   TUG  MDC: 4 14 sec  Cut off score:  >13 5 sec community dwelling adults  >32 2 frail elderly  <20 I for basic transfers  >30 dependent on transfers 10 Meter Walk Test: Ismael Goyal and Migel [place] al 2011  20-29: M - 1 35 m   F - 1 34 m  30-39: M - 1 43 m   F - 1 34 m  40-49: M - 1 43 m   F - 1 39 m  50-59: M - 1 43 m   F - 1 31 m  60-69: M - 1 34 m   F - 1 24 m  70-79: M - 1 26 m   F - 1 13 m  80-89: M - 0 97 m   F - 0 94 m   FGA  MCID: 4 pts  Geriatrics/community < 22/30 fall risk  Geriatrics/community < 20/30 unexplained falls DGI  MDC: vestibular - 4 pts  MDC: geriatric/community - 3 pts  Falls risk <19/24   6 Minute Walk Test  Age Norms  61-76: M - 1876 ft   F - 1765 ft  70-79: M - 1729 ft   F - 1545 ft  80-89: M - 1368 ft   F - 1286 ft mCTSIB  Norm: 20-60 yrs  Eyes open firm: norm sway 0 21-0 48  Eyes closed firm: norm sway 0 48-0 99  Eyes open foam: norm sway 0 38-0 71  Eyes closed foam: norm sway 0 70-2 22   LE MMT  - R Hip Flexion: 4+/5   L Hip Flexion: 4+/5  - R Hip Extension: 4+/5  L Hip Extension: 4+/5  - R Hip Abduction: 4+/5  L Hip Abduction: 4+/5  - R Hip Adduction: 4+/5  L Hip Adduction: 4+/5  - R Knee Extension: 4+/5  L Knee Extension: 4+/5  - R Knee Flexion: 4+/5   L Knee Flexion: 4+/5  - R Ankle DF: 4+/5   L Ankle DF: 4+/5  - R Ankle PF: 4+/5   L Ankle PF: 4+/5      Outcome Measures IE  3/9/2023 Progress Note: 4/6/2023 Progress Note:   5/4/2023   5x STS 9 03 sec no UE 7 09 sec no UE 7 92 sec no UE   TUG  TUG manual  TUG cog 5 35 sec  5 62 sec  6 28 sec 4 73 sec  4 52 sec  5 88 sec 4 21 sec  4 98 sec  6 10 sec   10 Meter Walk Test 10/7 10= 1 41 m/s 10/7 46s= 1 34 m/s 10/7 23s= 1 38 m/s   6 Minute Walk Test 1800 ft 1950 feet 1870 feet   FGA 28/30 29/30 29/30   PENDLETON 50/56 50/56 53/56   30 second sit to stand 15 reps no UE 18 reps no UE 17 reps no UE     Previous POC  Outcome Measures Initial Eval  11/14/2022 Progress  Note  12/12/2022 Progress Note  1/10/2023 Re-Evaluation  2/6/2023   5xSTS 10 16 sec 9 56 seconds 9 10 seconds 8 89 seconds no UE   TUG  - Regular  - Cognitive   8 56 sec  10 23 sec   8 11 sec  10 13 sec   7 51 sec  10 63 sec   7 36 sec  10 75 sec   10 meter 1 07 m/s 1 08 m/s 1 11 m/s cued to increase speed 1 15 m/s  Cued to increase speed   PENDLETON 45/56 47/56 49/56 50/56   FGA 23/30 25/30 26/30 28/30   DGI 21/24 23/24 23/24 24/24   mCTSIB  - FTEO (firm)  - FTEC (firm)  - FTEO (foam)  - FTEC (foam)   30 sec  30 sec  30 sec  10 sec   30 sec  30 sec  30 sec  15 sec 30 sec  30 sec  30 sec  30 sec   30 sec  30 sec  30 sec  30 sec   6MWT 1100 ft 1210 feet 1250 feet 1310 feet   30 second 15 reps no UE 15 reps no UE 15 reps no UE 16 reps no UE                Goals (16 weeks):    - Patient will ambulate outdoors on uneven surfaces with limited assistance to facilitate safe community ambulation- MET  - Patient will maintain gait speed per 10 meter walk test at 1 41 m/s to facilitate continued safety with community ambulation- NOT MET  - Patient will continue to score at least 50/56 on PENDLETON to maintain fall risk status to reduce risk of injury- MET  - Patient will be able to ambulate at least 1800 ft during 6 Minute Walk Test to maintain current cardiovascular capacity- MET  - Patient will attend PT 1x/week with focus on balance and gait training to maintain functional capacity- MET  - Patient will remain at approximately 6 seconds on TUG and TUG variations without AD to maintain fall risk status- MET  - Patient will maintain LE strength at current level of 4+/5 strength to maintain level of independence with transfers- MET    Assessment: Patient tolerated treatment well  Patient progressing in certain outcome measures noted above, patient challenged with gait speed with age related norms as well as other outcome measures noted above  Progressions and outcome measures noted above, maintenance noted in other outcome measures  Patient tolerated balance challenges well, cognitive tasks challenge patient's function  Patient demonstrated fatigue post treatment and would benefit from continued PT to maximize function post dx of Lewy body dementia  Plan: Continue per plan of care  Progress treatment as tolerated

## 2023-05-04 NOTE — PROGRESS NOTES
Occupational Therapy Daily Note:    Today's date: 2023  Patient name: Barbarann Libman  : 1955  MRN: [id number]  Referring provider: Jamar Stewart,*  Dx:   Encounter Diagnosis   Name Primary?  Lewy body dementia without behavioral disturbance ([facility] [address] ) Yes      POC START DATE: 2023  POC END DATE: 2023  NEXT PN DUE: 3/594641  FREQUENCY: 1-2x wk for 12 weeks    Subjective: Reports no significant changes  Objective: Therapeutic Activity -     Visual Scanning, Problem Solving  Color Cubes Visual Perception Brain Game  Mod vc, time: 15 minutes    Problem Solving, Visual Scanning  Letter Placement - Omitted Letter  Time: 25 minutes    Assessment: Pt tolerated treatment well  Pt demonstrates mod vc and difficulty with visual perception of color cubed brain game  Good accuracy with letter placement, omitted letter  Will continue to progress pt as tolerated  Plan: Future OT sessions to focus on expressive categorize, attention, memory, and executive functioning with functional problem solving  Continued skilled OT per POC

## 2023-05-06 DIAGNOSIS — F32.A DEPRESSION, UNSPECIFIED DEPRESSION TYPE: ICD-10-CM

## 2023-05-06 DIAGNOSIS — F33.1 MODERATE EPISODE OF RECURRENT MAJOR DEPRESSIVE DISORDER (HCC): ICD-10-CM

## 2023-05-08 ENCOUNTER — OFFICE VISIT (OUTPATIENT)
Dept: PHYSICAL THERAPY | Facility: MEDICAL CENTER | Age: 68
End: 2023-05-08

## 2023-05-08 ENCOUNTER — OFFICE VISIT (OUTPATIENT)
Dept: OCCUPATIONAL THERAPY | Facility: MEDICAL CENTER | Age: 68
End: 2023-05-08

## 2023-05-08 DIAGNOSIS — F02.80 LEWY BODY DEMENTIA WITHOUT BEHAVIORAL DISTURBANCE (HCC): Primary | ICD-10-CM

## 2023-05-08 DIAGNOSIS — G31.83 LEWY BODY DEMENTIA WITHOUT BEHAVIORAL DISTURBANCE (HCC): Primary | ICD-10-CM

## 2023-05-08 RX ORDER — BUPROPION HYDROCHLORIDE 300 MG/1
TABLET ORAL
Qty: 90 TABLET | Refills: 3 | Status: SHIPPED | OUTPATIENT
Start: 2023-05-08

## 2023-05-08 RX ORDER — ESCITALOPRAM OXALATE 20 MG/1
TABLET ORAL
Qty: 180 TABLET | Refills: 2 | Status: SHIPPED | OUTPATIENT
Start: 2023-05-08

## 2023-05-08 NOTE — PROGRESS NOTES
Daily Note   Last PN 2023  POC- 3/9/2023-2023    Today's date: 2023  Patient name: Glenys Guerrier  : 1955  MRN: [id number]  Referring provider: Anthony Loving,*  Dx:   Encounter Diagnosis     ICD-10-CM    1  Lewy body dementia without behavioral disturbance ([facility] [address] )  G31 83     F02 80                      Subjective: Patient reports no new changes or complaints since last session     Stephane Fraser (daughter)- [phone]    Objective: See treatment diary below- 16# vest     Standing Tiny Tank swings- 20x ea  Walking balance with holding tidal tank- forward, lateral, backward stepping- 10 cycles of 10 feet  FWD/LAT/BWD stepping over hurdles on foam beam: 25x CW, 25x CCW  Walking on stepping stones- 10 cycles of 10 feet no UE forward and laterally  Side stepping on river rocks- 10 cycles of 10 feet no UE  Tandem stepping on river rocks- 10 cycles of 10 feet no UE  Step up onto river rocks, place squigz on mirror- 9 squigz, 2 sets   Step up onto river rocks, remove squigz on mirror- 9 squigz, 2 sets   Reaching overhead to place squigz on mirror, 9 squigz, 2 sets  Lateral stepping on river rocks placed in diagonal directions- 10 cycles of 10 feet       Assessment: Patient tolerated treatment well  Patient progressed with increased treatment time, patient improving with overall intervention recovery but challenged with impulsivity with stepping, requires verbal and tactile cuing to improve his appropriateness and accuracy of stepping  Patient demonstrated fatigue post treatment and would benefit from continued PT to maximize function post dx of Lewy body dementia  Plan: Continue per plan of care  Progress treatment as tolerated  DC instructions

## 2023-05-08 NOTE — PROGRESS NOTES
Occupational Therapy Daily Note:    Today's date: 2023  Patient name: Marylu Sawant  : 1955  MRN: [id number]  Referring provider: Radha Silva,*  Dx:   Encounter Diagnosis   Name Primary? • Lewy body dementia without behavioral disturbance ([facility] [address] ) Yes      POC START DATE: 2023  POC END DATE: 2023  NEXT PN DUE: 3/936854  FREQUENCY: 1-2x wk for 12 weeks    Subjective: Reports no significant changes  Objective: Therapeutic Activity -     Visual Perception, Sustained Attention  Visual Closure  Completed x 2  Time: 10 minutes    Sequencing, Problem Solving  Sequencing Steps   Time: 10 minutes    Neuromuscular Re-education -     Memory Recall, Working Memory  Memory of Four Number Sequences  Walking back and forth between rooms  Time: 20 minutes    Assessment: Pt tolerated treatment well  Pt challenged with working memory today, walking back and forth memorizing four number sequences with fair accuracy and few re-checks  Good sequencing and problem solving demonstrated today  Will continue to progress pt as tolerated  Plan: Future OT sessions to focus on expressive categorize, attention, memory, and executive functioning with functional problem solving  Continued skilled OT per POC

## 2023-05-11 ENCOUNTER — OFFICE VISIT (OUTPATIENT)
Dept: OCCUPATIONAL THERAPY | Facility: MEDICAL CENTER | Age: 68
End: 2023-05-11

## 2023-05-11 ENCOUNTER — OFFICE VISIT (OUTPATIENT)
Dept: PHYSICAL THERAPY | Facility: MEDICAL CENTER | Age: 68
End: 2023-05-11

## 2023-05-11 DIAGNOSIS — F02.80 LEWY BODY DEMENTIA WITHOUT BEHAVIORAL DISTURBANCE (HCC): Primary | ICD-10-CM

## 2023-05-11 DIAGNOSIS — G31.83 LEWY BODY DEMENTIA WITHOUT BEHAVIORAL DISTURBANCE (HCC): Primary | ICD-10-CM

## 2023-05-11 NOTE — PROGRESS NOTES
Daily Note   Last PN 2023  POC- 3/9/2023-2023    Today's date: 2023  Patient name: Luz Weller  : 1955  MRN: [id number]  Referring provider: Dante Mendez,*  Dx:   Encounter Diagnosis     ICD-10-CM    1  Lewy body dementia without behavioral disturbance ([facility] [address] )  G31 83     F02 80           Start Time: 8064  Stop Time: 1400  Total time in clinic (min): 45 minutes    Subjective: Patient reports no new changes or complaints since last session     Vishal Nassar (daughter)- [phone]    Objective: See treatment diary below- 16# vest     Standing Tiny Tank swings- 20x ea, reciprocal walking   Step up onto [address] rocks (purple and blue)- holding mini tidal tanks- 10#, 20 reps each LE  Walking balance with holding tidal tank- forward, lateral, backward stepping- 10 cycles of 10 feet each  Walking on stepping stones- 10 cycles of 10 feet no UE forward and laterally  Side stepping on river rocks- 10 cycles of 10 feet no UE  Zig-Zag on river rocks- 10 cycles of 10 feet no UE  Tandem stepping on river rocks- 10 cycles of 10 feet no UE  Step up onto river rocks, place squigz on mirror- 9 squigz, 2 sets   Step up onto river rocks, remove squigz on mirror- 9 squigz, 2 sets   Reaching overhead to place squigz on mirror, 9 squigz, 2 sets   Step Up and down onto large river rocks and place squigz in colored area according to the color noted on mirror- 9 squigz 2 sets       Assessment: Patient tolerated treatment well  Patient progressed with reduced support on stepping stones, patient improving with his recovery but narrow base of support or smaller steps on other narrow objects, patient demonstrates distractibility with dual tasking and can lose balance more frequently  Patient demonstrated fatigue post treatment and would benefit from continued PT to maximize function post dx of Lewy body dementia  Plan: Continue per plan of care  Progress treatment as tolerated

## 2023-05-11 NOTE — PROGRESS NOTES
"Occupational Therapy Daily Note:    Today's date: 2023  Patient name: Cara Eastman  : 1955  MRN: [id number]  Referring provider: Gus Nichole,*  Dx:   Encounter Diagnosis   Name Primary? • Lewy body dementia without behavioral disturbance ([facility] [address] ) Yes      POC START DATE: 2023  POC END DATE: 2023  NEXT PN DUE: 3/105125  FREQUENCY: 1-2x wk for 12 weeks    Subjective: \"I think I will be ready to transition to once a week or discharge at the end of this month  \"   Objective: Therapeutic Activity -     Sustained Attention, Visual Scanning  O[place]ander Scanning Activity #1  Answering Questions 1-20  Required entirety of session for completion   Time: 45    Assessment: Pt tolerated treatment well  Pt demonstrated fair performance with magazine activity  Focus on sustaining attention to one task and encouraged to search for one question at a time  Pt demonstrated significant increase in time for task due to difficulty with attention and visual scanning  Will continue to progress pt as tolerated  Plan: Future OT sessions to focus on expressive categorize, attention, memory, and executive functioning with functional problem solving  Continued skilled OT per POC    "

## 2023-05-15 ENCOUNTER — OFFICE VISIT (OUTPATIENT)
Dept: PHYSICAL THERAPY | Facility: MEDICAL CENTER | Age: 68
End: 2023-05-15

## 2023-05-15 ENCOUNTER — OFFICE VISIT (OUTPATIENT)
Dept: OCCUPATIONAL THERAPY | Facility: MEDICAL CENTER | Age: 68
End: 2023-05-15

## 2023-05-15 DIAGNOSIS — G31.83 LEWY BODY DEMENTIA WITHOUT BEHAVIORAL DISTURBANCE (HCC): Primary | ICD-10-CM

## 2023-05-15 DIAGNOSIS — F02.80 LEWY BODY DEMENTIA WITHOUT BEHAVIORAL DISTURBANCE (HCC): Primary | ICD-10-CM

## 2023-05-15 NOTE — PROGRESS NOTES
Daily Note   Last PN 2023  POC- 3/9/2023-2023    Today's date: 5/15/2023  Patient name: Chad Fried  : 1955  MRN: [id number]  Referring provider: Dillon Tobin,*  Dx:   Encounter Diagnosis     ICD-10-CM    1  Lewy body dementia without behavioral disturbance ([facility] [address] )  G31 83     F02 80           Start Time: 3899  Stop Time: 1373  Total time in clinic (min): 45 minutes    Subjective: Patient reports no new changes or complaints since last session, patient denies falls, patient notes no changes with his overall function  Joe Shahid (daughter)- [phone]    Objective: See treatment diary below- 16# vest     Standing Tiny Tank swings- 20x ea, reciprocal walking   Step up onto [address] rocks (purple and blue)- holding mini tidal tanks- 10#, 20 reps each LE  Walking balance with holding tidal tank- forward, lateral, backward stepping- 10 cycles of 10 feet each  Walking on stepping stones- 10 cycles of 10 feet no UE forward and laterally, carrying mini tidal tank (5#)  Side stepping on river rocks- 10 cycles of 10 feet no UE, carrying mini tidal tank (5#)  Zig-Zag on river rocks- 10 cycles of 10 feet no UE, carrying mini tidal tank (5#)  Tandem stepping on river rocks- 10 cycles of 10 feet no UE, carrying mini tidal tank (5#)  Step up onto river rocks, place squigz on mirror- 9 squigz, 2 sets   Step up onto river rocks, remove squigz on mirror- 9 squigz, 2 sets   Reaching overhead to place squigz on mirror, 9 squigz, 2 sets, place above mirror, placement higher and outside of reach  Step Up and down onto large river rocks and place squigz in colored area according to the color noted on mirror- 9 squigz 2 sets  Step up to purple and green river rocks laterally, then step into the middle between the two river rocks, holding mini tidal tank (5#)       Assessment: Patient tolerated treatment well   Patient progressed with improvement in stepping and placement of feet, patient continuing to improve with stepping but continuous missed steps occurring with foot placement and challenges with carrying overall interventions with dual tasking  Patient and therapist discussed potential discharge at the end of the month, patient verbalized understanding and in agreement  Patient demonstrated fatigue post treatment and would benefit from continued PT to maximize function post dx of neurodegenerative disease of Lewy body dementia  Plan: Continue per plan of care  Progress treatment as tolerated

## 2023-05-15 NOTE — PROGRESS NOTES
"Occupational Therapy Daily Note:    Today's date: 5/15/2023  Patient name: Rena Solis  : 1955  MRN: [id number]  Referring provider: Bárbara Matta,*  Dx:   Encounter Diagnosis   Name Primary? • Lewy body dementia without behavioral disturbance ([facility] [address] ) Yes      POC START DATE: 2023  POC END DATE: 2023  NEXT PN DUE: 3/116553  FREQUENCY: 1-2x wk for 12 weeks    Subjective: \"I think I will be ready to transition to once a week or discharge at the end of this month  \"   Objective: Therapeutic Activity -     Memory Recall  Chinese Take Out Menu  Immediate Memory Recall: 4/10  Chunking/Categorization used  Time: 20 minutes    Executive Functioning, Problem Solving, Direction Following  Logical Solutions, Map Routes  Time: 10 minutes    Word Formation, Problem Solving  Fill in the missing words  Time: 10 minutes    Assessment: Pt tolerated treatment well  Pt session began with memory recall of chinese menu  Chunking/categorization used, but poor accuracy  Will trial [place] menu next session as it is more applicable to patients  Problem solving abilities challenged with map routes logic puzzle  Will continue to progress pt as tolerated  Plan: Future OT sessions to focus on expressive categorize, attention, memory, and executive functioning with functional problem solving  Continued skilled OT per POC    "

## 2023-05-18 ENCOUNTER — OFFICE VISIT (OUTPATIENT)
Dept: OCCUPATIONAL THERAPY | Facility: MEDICAL CENTER | Age: 68
End: 2023-05-18

## 2023-05-18 ENCOUNTER — OFFICE VISIT (OUTPATIENT)
Dept: PHYSICAL THERAPY | Facility: MEDICAL CENTER | Age: 68
End: 2023-05-18

## 2023-05-18 DIAGNOSIS — F02.80 LEWY BODY DEMENTIA WITHOUT BEHAVIORAL DISTURBANCE (HCC): Primary | ICD-10-CM

## 2023-05-18 DIAGNOSIS — G31.83 LEWY BODY DEMENTIA WITHOUT BEHAVIORAL DISTURBANCE (HCC): Primary | ICD-10-CM

## 2023-05-18 NOTE — PROGRESS NOTES
"Daily Note   Last PN 2023  POC- 3/9/2023-2023    Today's date: 2023  Patient name: Cara Eatsman  : 1955  MRN: [id number]  Referring provider: Gus Nichole,*  Dx:   Encounter Diagnosis     ICD-10-CM    1  Lewy body dementia without behavioral disturbance ([facility] [address] )  G31 83     F02 80           Start Time: 2600  Stop Time: 1400  Total time in clinic (min): 45 minutes    Subjective: Patient reports no new changes or complaints since last session, completed all yard work with no challenges  Benny Croft (daughter)- [phone]    Objective: See treatment diary below- 16# vest     Step up onto [address] rocks (purple and blue)- holding mini tidal tanks- 10#, 20 reps each LE  Walking balance with holding tidal tank- forward, lateral, backward stepping- 10 cycles of 10 feet each (10# tank)  Walking on stepping stones- 10 cycles of 10 feet no UE forward and laterally, carrying mini tidal tank (5#)  Side stepping on river rocks- 10 cycles of 10 feet no UE, carrying mini tidal tank (5#)  Zig-Zag on river rocks- 10 cycles of 10 feet no UE, carrying mini tidal tank (5#)  Tandem stepping on river rocks- 10 cycles of 10 feet no UE, carrying mini tidal tank (5#)  Wobble board- forward and laterally- 20 reps each direction   Reaching overhead to place squigz on mirror, 9 squigz, 2 sets, place above mirror, placement higher and outside of reach  Step Up and down onto large river rocks and place squigz in colored area according to the color noted on mirror- 9 squigz 2 sets  Step up onto river rocks laterally and then down to middle coordinating feet bilaterally- 20 reps each direction  Ambulation with ball toss- 10 cycles of 10 feet  4 square stepping with hurdles- 9\" hurdles 10 cycles clockwise, 10 cycles counterclockwise      Assessment: Patient tolerated treatment well   Patient progressed with improvement in stepping and placement of feet, posterior balance with stepping activities and 4 square stepping " challenges patient with recovery as well as narrow ERICK  Patient demonstrated fatigue post treatment and would benefit from continued PT to maximize function post dx of neurodegenerative disease of Lewy body dementia  Plan: Continue per plan of care  Progress treatment as tolerated

## 2023-05-20 DIAGNOSIS — R63.4 WEIGHT LOSS: ICD-10-CM

## 2023-05-21 RX ORDER — PANTOPRAZOLE SODIUM 40 MG/1
TABLET, DELAYED RELEASE ORAL
Qty: 90 TABLET | Refills: 1 | Status: SHIPPED | OUTPATIENT
Start: 2023-05-21

## 2023-05-22 ENCOUNTER — OFFICE VISIT (OUTPATIENT)
Dept: OCCUPATIONAL THERAPY | Facility: MEDICAL CENTER | Age: 68
End: 2023-05-22

## 2023-05-22 ENCOUNTER — OFFICE VISIT (OUTPATIENT)
Dept: PHYSICAL THERAPY | Facility: MEDICAL CENTER | Age: 68
End: 2023-05-22

## 2023-05-22 DIAGNOSIS — G31.83 LEWY BODY DEMENTIA WITHOUT BEHAVIORAL DISTURBANCE (HCC): Primary | ICD-10-CM

## 2023-05-22 DIAGNOSIS — F02.80 LEWY BODY DEMENTIA WITHOUT BEHAVIORAL DISTURBANCE (HCC): Primary | ICD-10-CM

## 2023-05-22 NOTE — PROGRESS NOTES
Occupational Therapy Daily Note:    Today's date: 2023  Patient name: Mahad Roblero  : 1955  MRN: [id number]  Referring provider: Jacquie Faust,*  Dx:   Encounter Diagnosis   Name Primary? • Lewy body dementia without behavioral disturbance ([facility] [address] ) Yes      POC START DATE: 2023  POC END DATE: 2023  NEXT PN DUE: 2023  FREQUENCY: 1-2x wk for 12 weeks     Subjective: No reported changes  Objective: Therapeutic Activity -     Executive Functioning, Problem Solving  Building PVC Pipes without key, requiring increased visual perception  Time: 25 minutes    Connecting the Dots - Pictures  Mod vc for completion  Time: 10 minutes    Auditory Memory Recall  Four and five unrelated word sequences   Approximately 60% accuracy    Assessment: Pt tolerated treatment well  Pt demonstrated good visual perception and problem solving with PVC pipe building  Mod vc for completion of connecting the dots with pictures  Difficulty with five unrelated word sequences, downgraded to four  Will continue to progress pt as tolerated  Plan: Future OT sessions to focus on expressive categorize, attention, memory, and executive functioning with functional problem solving  Continued skilled OT per POC

## 2023-05-22 NOTE — PROGRESS NOTES
"Daily Note   Last PN 2023  POC- 3/9/2023-2023    Today's date: 2023  Patient name: Chapito Lawson  : 1955  MRN: [id number]  Referring provider: Claudeen Bannister,*  Dx:   Encounter Diagnosis     ICD-10-CM    1  Lewy body dementia without behavioral disturbance ([facility] [address] )  G31 83     F02 80           Start Time: 7002  Stop Time: 3708  Total time in clinic (min): 45 minutes    Subjective: Patient reports no new changes or complaints since last session, anticipated D/C next session    Anisa Dorantes (daughter)- [phone]    Objective: See treatment diary below- 16# vest     Step up onto [address] rocks (purple and blue)- holding mini tidal tanks- 10#, 20 reps each LE, 2 tanks  Walking balance with holding tidal tank- forward, lateral, backward stepping- 10 cycles of 10 feet each (10# tank), weave between cones  Walking on stepping stones- 10 cycles of 10 feet no UE forward and laterally, carrying mini tidal tank (5#)  Side stepping on river rocks- 10 cycles of 10 feet no UE, carrying mini tidal tank (5#)  Zig-Zag on river rocks- 10 cycles of 10 feet no UE, carrying mini tidal tank (5#)  Tandem stepping on river rocks- 10 cycles of 10 feet no UE, carrying mini tidal tank (5#)  Wobble board- forward and laterally- 20 reps each direction no UE  Reaching overhead to place squigz on mirror, 15 squigz, 2 sets, place above mirror, placement higher and outside of reach  Step up onto river rocks laterally and then down to middle coordinating feet bilaterally- 20 reps each direction  Ambulation with head turns- horizontal, vertical, diagonal- 10 cycles of 10 feet each  4 square stepping with hurdles- 9\" hurdles 10 cycles clockwise, 10 cycles counterclockwise  [facility] Ball step ups- forward, laterally, backwards- 20 reps each direction      Assessment: Patient tolerated treatment well  Patient tolerated all interventions well, anticipated discharge next session   Patient demonstrated fatigue post treatment and would benefit " from continued PT to maximize function post dx of neurodegenerative disease of Lewy body dementia  Plan: Continue per plan of care  Progress treatment as tolerated

## 2023-05-25 ENCOUNTER — APPOINTMENT (OUTPATIENT)
Dept: OCCUPATIONAL THERAPY | Facility: MEDICAL CENTER | Age: 68
End: 2023-05-25
Payer: MEDICARE

## 2023-05-25 ENCOUNTER — APPOINTMENT (OUTPATIENT)
Dept: PHYSICAL THERAPY | Facility: MEDICAL CENTER | Age: 68
End: 2023-05-25
Payer: MEDICARE

## 2023-06-12 DIAGNOSIS — F51.04 PSYCHOPHYSIOLOGICAL INSOMNIA: ICD-10-CM

## 2023-06-12 RX ORDER — LORAZEPAM 0.5 MG/1
0.5 TABLET ORAL
Qty: 90 TABLET | Refills: 0 | Status: SHIPPED | OUTPATIENT
Start: 2023-06-12 | End: 2023-07-12

## 2023-06-26 DIAGNOSIS — I10 PRIMARY HYPERTENSION: ICD-10-CM

## 2023-06-26 RX ORDER — LOSARTAN POTASSIUM 50 MG/1
TABLET ORAL
Qty: 90 TABLET | Refills: 0 | Status: SHIPPED | OUTPATIENT
Start: 2023-06-26

## 2023-06-30 ENCOUNTER — TELEPHONE (OUTPATIENT)
Dept: NEUROLOGY | Facility: CLINIC | Age: 68
End: 2023-06-30

## 2023-06-30 ENCOUNTER — PATIENT MESSAGE (OUTPATIENT)
Dept: NEUROLOGY | Facility: CLINIC | Age: 68
End: 2023-06-30

## 2023-06-30 DIAGNOSIS — F02.80 LEWY BODY DEMENTIA WITHOUT BEHAVIORAL DISTURBANCE (HCC): ICD-10-CM

## 2023-06-30 DIAGNOSIS — G31.83 LEWY BODY DEMENTIA WITHOUT BEHAVIORAL DISTURBANCE (HCC): ICD-10-CM

## 2023-06-30 RX ORDER — RIVASTIGMINE 9.5 MG/24H
1 PATCH, EXTENDED RELEASE TRANSDERMAL DAILY
Qty: 90 PATCH | Refills: 1 | Status: SHIPPED | OUTPATIENT
Start: 2023-06-30

## 2023-06-30 NOTE — TELEPHONE ENCOUNTER
Talked with Skyla Leyva, daughter of patient and Health Care Representative of patient, that patient is having progressive memory loss despite being on exelon 4 6mg/24hr patch  Patient tolerating the rivastigmine very well with no side effects  Per 10/26/2022 note by Dr Madi Castellanos, rivastigmine can be increased if lower dose not effective but well tolerated  Ordered increase of rivastigmine to 9 5mg/24hr patch with linked order to Dr Madi Castellanos  Daughter is agreeable with this dose  Also discussed with daughter about patient following up with both Dr Madi Castellanos and residency clinic  Discussed about concerns of redundancy in following up with multiple neurologists  Daughter is aware and want to continue with following up with Dr Maid Castellanos and resident clinic as she states insurance pays for both and following up for mainly different reasons for both clinics  Per my note and daughter, patient seem to be missing a September visit with resident clinic  Daughter states she will call for an appointment Monday  Daughter appreciative of call and had no further questions

## 2023-07-06 ENCOUNTER — OFFICE VISIT (OUTPATIENT)
Dept: FAMILY MEDICINE CLINIC | Facility: CLINIC | Age: 68
End: 2023-07-06
Payer: MEDICARE

## 2023-07-06 VITALS
DIASTOLIC BLOOD PRESSURE: 84 MMHG | WEIGHT: 211.4 LBS | OXYGEN SATURATION: 95 % | HEIGHT: 69 IN | BODY MASS INDEX: 31.31 KG/M2 | TEMPERATURE: 97.8 F | HEART RATE: 74 BPM | SYSTOLIC BLOOD PRESSURE: 126 MMHG

## 2023-07-06 DIAGNOSIS — E11.9 DIABETES MELLITUS WITHOUT COMPLICATION (HCC): ICD-10-CM

## 2023-07-06 DIAGNOSIS — Z00.00 MEDICARE ANNUAL WELLNESS VISIT, SUBSEQUENT: Primary | ICD-10-CM

## 2023-07-06 DIAGNOSIS — J43.2 CENTRILOBULAR EMPHYSEMA (HCC): ICD-10-CM

## 2023-07-06 DIAGNOSIS — E41 NUTRITIONAL MARASMUS (HCC): ICD-10-CM

## 2023-07-06 DIAGNOSIS — G20 PRIMARY PARKINSONISM (HCC): ICD-10-CM

## 2023-07-06 DIAGNOSIS — F33.1 MODERATE EPISODE OF RECURRENT MAJOR DEPRESSIVE DISORDER (HCC): ICD-10-CM

## 2023-07-06 PROBLEM — G20.C PRIMARY PARKINSONISM: Status: ACTIVE | Noted: 2023-07-06

## 2023-07-06 PROBLEM — R43.8: Status: RESOLVED | Noted: 2022-06-03 | Resolved: 2023-07-06

## 2023-07-06 PROCEDURE — G0439 PPPS, SUBSEQ VISIT: HCPCS | Performed by: NURSE PRACTITIONER

## 2023-07-06 NOTE — ASSESSMENT & PLAN NOTE
Lab Results   Component Value Date    HGBA1C 5.9 (H) 04/26/2023   following with endocrine not on medications currently

## 2023-07-06 NOTE — PROGRESS NOTES
Assessment and Plan:     Problem List Items Addressed This Visit        Endocrine    Diabetes mellitus without complication ([address])       Lab Results   Component Value Date    HGBA1C 5.9 (H) 04/26/2023   following with endocrine not on medications currently             Respiratory    Centrilobular emphysema ([address])     Anoro and prn ventolin doing well with his breathing             Nervous and Auditory    Primary parkinsonism [facility])     Following with neurology and movement disorder specialist             Musculoskeletal and Integument    RESOLVED: Nutritional marasmus ([address])       Other    Moderate episode of recurrent major depressive disorder ([address])     Doing well on the Lexapro and wellbutrin          Medicare annual wellness visit, subsequent - Primary     BMI Counseling: Body mass index is 31.22 kg/m². The BMI is above normal. Nutrition recommendations include decreasing portion sizes, encouraging healthy choices of fruits and vegetables, decreasing fast food intake, consuming healthier snacks, limiting drinks that contain sugar, moderation in carbohydrate intake, increasing intake of lean protein and reducing intake of saturated and trans fat. Exercise recommendations include moderate physical activity 150 minutes/week. No pharmacotherapy was ordered. Patient referred to PCP. Rationale for BMI follow-up plan is due to patient being overweight or obese. Preventive health issues were discussed with patient, and age appropriate screening tests were ordered as noted in patient's After Visit Summary. Personalized health advice and appropriate referrals for health education or preventive services given if needed, as noted in patient's After Visit Summary. History of Present Illness:     Patient presents for a Medicare Wellness Visit    Patient here today for his wellness visit and has no issues to report.  Patient is here with his wife and does have his parkinsons and follows closely with neurology and movement specialist and was dismissed from PT and has lost 50 pounds since last year and is trying to lose weight      Patient Care Team:  Eloisa Lew as PCP - General (Family Medicine)  Alba Murphy MD as PCP - [facility]  Cat Hutson MD (Internal Medicine)  Nick Perrykayleen ([address])  Phoenix Ho MD (Gastroenterology)  Bettie High MD as Resident (Neurology)  Gwendolyn Gibson MD (Neurology)     Review of Systems:     Review of Systems   Constitutional: Negative for activity change, appetite change, chills, diaphoresis, fatigue, fever and unexpected weight change. HENT: Positive for rhinorrhea and voice change. Negative for congestion, ear pain, hearing loss, postnasal drip, sinus pressure, sinus pain, sneezing, sore throat and trouble swallowing. Eyes: Negative for pain, discharge, redness and visual disturbance. Respiratory: Negative for cough and shortness of breath. Cardiovascular: Negative for chest pain, palpitations and leg swelling. Gastrointestinal: Negative for abdominal pain, constipation, diarrhea, nausea and vomiting. Endocrine: Negative. Genitourinary: Negative. Musculoskeletal: Negative for arthralgias, back pain, gait problem, joint swelling, myalgias, neck pain and neck stiffness. Skin: Negative. Allergic/Immunologic: Negative. Neurological: Negative for dizziness and light-headedness. Hematological: Negative. Psychiatric/Behavioral: Negative for behavioral problems, dysphoric mood and sleep disturbance.         Problem List:     Patient Active Problem List   Diagnosis   • Depression   • Diabetes mellitus without complication ([address])   • Hypercholesterolemia   • Hypertension   • Hypothyroidism   • Low testosterone   • Psoriasis   • Centrilobular emphysema ([address])   • Former smoker   • DAFNE (obstructive sleep apnea)   • Memory impairment   • Episodic paroxysmal hemicrania, not intractable   • Abnormality of gait due to impairment of balance   • Balance problem   • Neuropathy   • Goiter, nontoxic simple   • Lewy body dementia without behavioral disturbance (HCC)   • Pulmonary nodule   • Constipation, chronic   • Restless legs syndrome   • REM behavioral disorder   • Moderate episode of recurrent major depressive disorder (HCC)   • Disturbance of smell   • Decreased appetite   • Weight loss   • Functional diarrhea   • Heterozygous for 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)   • Low iron   • Medicare annual wellness visit, subsequent   • Primary parkinsonism ([address])      Past Medical and Surgical History:     Past Medical History:   Diagnosis Date   • Alopecia    • Arthritis    • Back pain    • Benign essential hypertension    • Chronic obstructive pulmonary disease (COPD) ([address])    • Depression    • Diabetes mellitus (HCC)    • Emphysema, interstitial (HCC)    • Fatigue    • Hypercholesterolemia    • Hypersomnia, persistent    • Hypothyroidism    • Low testosterone    • Memory loss    • Oral candidiasis 5/11/2020   • Sinusitis    • Thyroid goiter      Past Surgical History:   Procedure Laterality Date   • ADENOIDECTOMY     • APPENDECTOMY     • HERNIA REPAIR     • TONSILLECTOMY        Family History:     Family History   Problem Relation Age of Onset   • Hypertension Father    • Aneurysm Father         cerebral   • Lupus Mother    • Hypothyroidism Daughter    • Hashimoto's thyroiditis Daughter       Social History:     Social History     Socioeconomic History   • Marital status: /Civil Union     Spouse name: None   • Number of children: None   • Years of education: None   • Highest education level: None   Occupational History   • None   Tobacco Use   • Smoking status: Former     Packs/day: 1.00     Years: 20.00     Total pack years: 20.00     Types: Cigarettes   • Smokeless tobacco: Never   Vaping Use   • Vaping Use: Never used   Substance and Sexual Activity   • Alcohol use: Never   • Drug use: No   • Sexual activity: None   Other Topics Concern   • None   Social History Narrative   • None     Social Determinants of Health     Financial Resource Strain: Low Risk  (7/5/2023)    Overall Financial Resource Strain (CARDIA)    • Difficulty of Paying Living Expenses: Not very hard   Food Insecurity: No Food Insecurity (1/25/2022)    Hunger Vital Sign    • Worried About Running Out of Food in the Last Year: Never true    • Ran Out of Food in the Last Year: Never true   Transportation Needs: No Transportation Needs (7/5/2023)    PRAPARE - Transportation    • Lack of Transportation (Medical): No    • Lack of Transportation (Non-Medical): No   Physical Activity: Not on file   Stress: Not on file   Social Connections: Not on file   Intimate Partner Violence: Not on file   Housing Stability: Unknown (1/25/2022)    Housing Stability Vital Sign    • Unable to Pay for Housing in the Last Year: No    • Number of Places Lived in the Last Year: 1    • Unstable Housing in the Last Year: Not on file      Medications and Allergies:     Current Outpatient Medications   Medication Sig Dispense Refill   • albuterol (PROVENTIL HFA,VENTOLIN HFA) 90 mcg/act inhaler Inhale 2 puffs every 6 (six) hours as needed for wheezing     • buPROPion (WELLBUTRIN XL) 300 mg 24 hr tablet TAKE 1 TABLET BY MOUTH EVERY DAY IN THE MORNING 90 tablet 3   • carbidopa-levodopa (SINEMET)  mg per tablet TAKE 2 TABLETS BY MOUTH 3 TIMES A DAY. 540 tablet 5   • cholestyramine (QUESTRAN) 4 g packet TAKE 1 PACKET (4 G TOTAL) BY MOUTH IN THE MORNING 90 packet 5   • escitalopram (LEXAPRO) 20 mg tablet TAKE 2 TABLETS BY MOUTH EVERY  tablet 2   • fluticasone (FLONASE) 50 mcg/act nasal spray 1 spray into each nostril if needed     • gabapentin (Neurontin) 300 mg capsule Take 1 capsule (300 mg total) by mouth daily at bedtime 90 capsule 2   • levothyroxine 75 mcg tablet TAKE 1 TABLET BY MOUTH DAILY.  TAKE ON EMPTY STOMACH 2 HOURS APART FROM OTHER MEDICATIONS AND FOODS     • LORazepam (Ativan) 0.5 mg tablet Take 1 tablet (0.5 mg total) by mouth daily at bedtime 90 tablet 0   • losartan (COZAAR) 50 mg tablet TAKE 1 TABLET BY MOUTH EVERY DAY 90 tablet 0   • mometasone (ELOCON) 0.1 % cream      • pantoprazole (PROTONIX) 40 mg tablet TAKE 1 TABLET BY MOUTH EVERY DAY 90 tablet 1   • rivastigmine (EXELON) 9.5 mg/24 hr TD 24 hr patch Place 1 patch on the skin over 24 hours daily 90 patch 1   • True Metrix Blood Glucose Test test strip TESTING TWICE A DAY E 11.9     • umeclidinium-vilanterol (Anoro Ellipta) 62.5-25 MCG/INH inhaler Inhale 1 puff daily 60 blister 0   • fluticasone (FLONASE) 50 mcg/act nasal spray 1 spray into each nostril if needed     • testosterone (ANDROGEL) 1% Place 0.05 g on the skin       No current facility-administered medications for this visit. Allergies   Allergen Reactions   • Cimetidine    • Codeine    • Contrast  [Iodinated Contrast Media]    • Metrizamide    • Prednisone Other (See Comments)     ALL STEROIDS  COMBATIVE   • Simvastatin Other (See Comments)     ALL STATINS  MUSCLE CRAMPS   • Statins       Immunizations:     Immunization History   Administered Date(s) Administered   • INFLUENZA 10/28/2008, 09/22/2009, 09/30/2011   • Influenza, seasonal, injectable 10/02/2012   • Tetanus, adsorbed 06/28/2001      Health Maintenance:         Topic Date Due   • Hepatitis C Screening  Never done   • Colorectal Cancer Screening  04/25/2026         Topic Date Due   • COVID-19 Vaccine (1) Never done   • Influenza Vaccine (1) 09/01/2023      Medicare Screening Tests and Risk Assessments:     Alexsander Cheek is here for his Subsequent Wellness visit. Health Risk Assessment:   Patient rates overall health as good. Patient feels that their physical health rating is slightly worse. Patient is dissatisfied with their life. Eyesight was rated as same. Hearing was rated as same. Patient feels that their emotional and mental health rating is slightly worse. Patients states they are often angry.  Patient states they are always unusually tired/fatigued. Pain experienced in the last 7 days has been some. Patient's pain rating has been 4/10. Patient states that he has experienced weight loss or gain in last 6 months. Depression Screening:   PHQ-9 Score: 0      Fall Risk Screening: In the past year, patient has experienced: history of falling in past year      Home Safety:  Patient does not have trouble with stairs inside or outside of their home. Patient has working smoke alarms and has working carbon monoxide detector. Home safety hazards include: none. Nutrition:   Current diet is Regular. Medications:   Patient is not currently taking any over-the-counter supplements. Patient is able to manage medications. Activities of Daily Living (ADLs)/Instrumental Activities of Daily Living (IADLs):   Walk and transfer into and out of bed and chair?: Yes  Dress and groom yourself?: Yes    Bathe or shower yourself?: Yes    Feed yourself?  Yes  Do your laundry/housekeeping?: Yes  Manage your money, pay your bills and track your expenses?: Yes  Make your own meals?: Yes    Do your own shopping?: Yes    Durable Medical Equipment Suppliers  [employer]-pap/    Previous Hospitalizations:   Any hospitalizations or ED visits within the last 12 months?: No      Advance Care Planning:   Living will: No    Durable POA for healthcare: No    Advanced directive: No      PREVENTIVE SCREENINGS      Cardiovascular Screening:    General: Screening Not Indicated and History Lipid Disorder      Diabetes Screening:     General: Screening Not Indicated and History Diabetes      Colorectal Cancer Screening:     General: Screening Current      Abdominal Aortic Aneurysm (AAA) Screening:    Risk factors include: age between 70-75 yo and tobacco use      Screening, Brief Intervention, and Referral to Treatment (SBIRT)    Screening  Typical number of drinks in a day: 0  Typical number of drinks in a week: 0  Interpretation: Low risk drinking behavior. AUDIT-C Screenin) How often did you have a drink containing alcohol in the past year? never  2) How many drinks did you have on a typical day when you were drinking in the past year? 0  3) How often did you have 6 or more drinks on one occasion in the past year? never    AUDIT-C Score: 0  Interpretation: Score 0-3 (male): Negative screen for alcohol misuse    Single Item Drug Screening:  How often have you used an illegal drug (including marijuana) or a prescription medication for non-medical reasons in the past year? never    Single Item Drug Screen Score: 0  Interpretation: Negative screen for possible drug use disorder    No results found. Physical Exam:     /84 (BP Location: Left arm, Patient Position: Sitting)   Pulse 74   Temp 97.8 °F (36.6 °C) (Temporal)   Ht 5' 9" (1.753 m)   Wt 95.9 kg (211 lb 6.4 oz)   SpO2 95%   BMI 31.22 kg/m²     Physical Exam  Vitals and nursing note reviewed. Constitutional:       General: He is not in acute distress. Appearance: He is well-developed. HENT:      Head: Normocephalic and atraumatic. Right Ear: Tympanic membrane normal.      Left Ear: Tympanic membrane normal.      Nose: Nose normal.      Mouth/Throat:      Mouth: Mucous membranes are moist.   Eyes:      Conjunctiva/sclera: Conjunctivae normal.   Cardiovascular:      Rate and Rhythm: Normal rate and regular rhythm. Pulses: no weak pulses          Dorsalis pedis pulses are 2+ on the right side and 2+ on the left side. Posterior tibial pulses are 2+ on the right side and 2+ on the left side. Heart sounds: No murmur heard. Pulmonary:      Effort: Pulmonary effort is normal. No respiratory distress. Breath sounds: Normal breath sounds. Abdominal:      Palpations: Abdomen is soft. Tenderness: There is no abdominal tenderness. Musculoskeletal:         General: No swelling. Cervical back: Neck supple.    Feet:      Right foot:      Skin integrity: No ulcer, skin breakdown, erythema, warmth, callus or dry skin. Left foot:      Skin integrity: No ulcer, skin breakdown, erythema, warmth, callus or dry skin. Skin:     General: Skin is warm and dry. Capillary Refill: Capillary refill takes less than 2 seconds. Neurological:      General: No focal deficit present. Mental Status: He is alert and oriented to person, place, and time. Psychiatric:         Mood and Affect: Mood normal.      Patient's shoes and socks removed. Right Foot/Ankle   Right Foot Inspection  Skin Exam: skin normal and skin intact. No dry skin, no warmth, no callus, no erythema, no maceration, no abnormal color, no pre-ulcer, no ulcer and no callus. Toe Exam: ROM and strength within normal limits. Sensory   Vibration: intact  Proprioception: intact  Monofilament testing: intact    Vascular  Capillary refills: < 3 seconds  The right DP pulse is 2+. The right PT pulse is 2+. Left Foot/Ankle  Left Foot Inspection  Skin Exam: skin normal and skin intact. No dry skin, no warmth, no erythema, no maceration, normal color, no pre-ulcer, no ulcer and no callus. Toe Exam: ROM and strength within normal limits. Sensory   Vibration: intact  Proprioception: intact  Monofilament testing: intact    Vascular  Capillary refills: < 3 seconds  The left DP pulse is 2+. The left PT pulse is 2+.      Assign Risk Category  No deformity present  No loss of protective sensation  No weak pulses  [id number]

## 2023-07-07 ENCOUNTER — TELEPHONE (OUTPATIENT)
Dept: ADMINISTRATIVE | Facility: OTHER | Age: 68
End: 2023-07-07

## 2023-07-07 NOTE — LETTER
Diabetic Eye Exam Form    Date Requested: 23  Patient: Osmar Flores  Patient : 1955   Referring Provider: VIVIANA Mac      DIABETIC Eye Exam Date _______________________________      Type of Exam MUST be documented for Diabetic Eye Exams. Please CHECK ONE. Retinal Exam       Dilated Retinal Exam       OCT       Optomap-Iris Exam      Fundus Photography       Left Eye - Please check Retinopathy or No Retinopathy        Exam did show retinopathy    Exam did not show retinopathy       Right Eye - Please check Retinopathy or No Retinopathy       Exam did show retinopathy    Exam did not show retinopathy       Comments __________________________________________________________    Practice Providing Exam ______________________________________________    Exam Performed By (print name) _______________________________________      Provider Signature ___________________________________________________      These reports are needed for  compliance. Please fax this completed form and a copy of the Diabetic Eye Exam report to our office located at [address] [place] [address] as soon as possible via Fax [phone] attention Stephanie: Phone [phone]  We thank you for your assistance in treating our mutual patient.

## 2023-07-07 NOTE — TELEPHONE ENCOUNTER
----- Message from Lenetta Ganser sent at 7/6/2023  2:06 PM EDT -----  Regarding: Segundo Huyenshayla Request  07/06/23 2:06 PM    Hello, our patient Luis Cameron has had Diabetic Eye Exam completed/performed. Please assist in updating the patient chart by making an External outreach to Dr Lisha Kaye  facility located in [place]. The date of service is August 2022.     Thank you,  Lenetta Ganser [facility]

## 2023-07-07 NOTE — TELEPHONE ENCOUNTER
Upon review of the In Basket request and the patient's chart, initial outreach has been made via fax to facility. Please see Contacts section for details.      Thank you  Darlene Zepeda MA

## 2023-07-11 NOTE — TELEPHONE ENCOUNTER
Upon review of the In Basket request we have found as a result of outreach that patient did not have the requested item(s) completed. Patient never seen at there office    Any additional questions or concerns should be emailed to the Practice Liaisons via the appropriate education email address, please do not reply via In Basket.     Thank you  Yusef Shi MA

## 2023-07-12 ENCOUNTER — OFFICE VISIT (OUTPATIENT)
Dept: GASTROENTEROLOGY | Facility: CLINIC | Age: 68
End: 2023-07-12
Payer: MEDICARE

## 2023-07-12 VITALS
HEIGHT: 69 IN | BODY MASS INDEX: 31.7 KG/M2 | OXYGEN SATURATION: 97 % | HEART RATE: 69 BPM | WEIGHT: 214 LBS | DIASTOLIC BLOOD PRESSURE: 60 MMHG | SYSTOLIC BLOOD PRESSURE: 110 MMHG

## 2023-07-12 DIAGNOSIS — K59.1 FUNCTIONAL DIARRHEA: Primary | ICD-10-CM

## 2023-07-12 DIAGNOSIS — G20 PARKINSON'S DISEASE (HCC): ICD-10-CM

## 2023-07-12 DIAGNOSIS — R63.4 WEIGHT LOSS: ICD-10-CM

## 2023-07-12 PROCEDURE — 99214 OFFICE O/P EST MOD 30 MIN: CPT | Performed by: PHYSICIAN ASSISTANT

## 2023-07-12 NOTE — PROGRESS NOTES
[address] [place] [address] [facility]  Jose Szymanski 79 y.o. male MRN: [id number]       CC: EGD/colonoscopy follow-up    HPI: Chavez Mims is a pleasant 80-year-old male who presents the office today with his wife, Inna Silva. He has history of Parkinson's diagnosed 10 years ago. He also has history of type 2 diabetes controlled on diet. Patient was seen by Dr. Anshu Dunn in January for diarrhea and unintentional weight loss. He had EGD and colonoscopy in April. EGD revealing LA class a reflux esophagitis and mild erosive gastritis. Patient had a single polyp removed during his colonoscopy and colon mucosa was normal up to the terminal ileum. Biopsies from the small bowel and stomach were unremarkable, random colon biopsy was negative for microscopic colitis. Colon polyp biopsied as tubular adenoma. He was recommended a 3-year recall. During this time, patient's thyroid also required adjustments in medication. TSH was as low as 0.08. His last thyroid check in April was normal fortunately. Patient reports that for more than 3 months now, he is now back to constipation symptoms. He denies change in his Parkinson's medications. No other new medication to his knowledge. Patient reports that his weight loss has currently stabilized. Review of Systems:    CONSTITUTIONAL: Denies any fever, chills, or rigors. Good appetite, and no recent weight loss. HEENT: No earache or tinnitus. Denies hearing loss or visual disturbances. CARDIOVASCULAR: No chest pain or palpitations. RESPIRATORY: Denies any cough, hemoptysis, shortness of breath or dyspnea on exertion. GASTROINTESTINAL: As noted in the History of Present Illness. GENITOURINARY: No problems with urination. Denies any hematuria or dysuria. NEUROLOGIC: No dizziness or vertigo, denies headaches. MUSCULOSKELETAL: Denies any muscle or joint pain. SKIN: Denies skin rashes or itching.    ENDOCRINE: Denies excessive thirst. Denies intolerance to heat or cold.  PSYCHOSOCIAL: Denies depression or anxiety. Denies any recent memory loss. Current Outpatient Medications   Medication Sig Dispense Refill   • albuterol (PROVENTIL HFA,VENTOLIN HFA) 90 mcg/act inhaler Inhale 2 puffs every 6 (six) hours as needed for wheezing     • buPROPion (WELLBUTRIN XL) 300 mg 24 hr tablet TAKE 1 TABLET BY MOUTH EVERY DAY IN THE MORNING 90 tablet 3   • carbidopa-levodopa (SINEMET)  mg per tablet TAKE 2 TABLETS BY MOUTH 3 TIMES A DAY. 540 tablet 5   • cholestyramine (QUESTRAN) 4 g packet TAKE 1 PACKET (4 G TOTAL) BY MOUTH IN THE MORNING 90 packet 5   • escitalopram (LEXAPRO) 20 mg tablet TAKE 2 TABLETS BY MOUTH EVERY  tablet 2   • fluticasone (FLONASE) 50 mcg/act nasal spray 1 spray into each nostril if needed     • gabapentin (Neurontin) 300 mg capsule Take 1 capsule (300 mg total) by mouth daily at bedtime 90 capsule 2   • levothyroxine 75 mcg tablet TAKE 1 TABLET BY MOUTH DAILY. TAKE ON EMPTY STOMACH 2 HOURS APART FROM OTHER MEDICATIONS AND FOODS     • LORazepam (Ativan) 0.5 mg tablet Take 1 tablet (0.5 mg total) by mouth daily at bedtime 90 tablet 0   • losartan (COZAAR) 50 mg tablet TAKE 1 TABLET BY MOUTH EVERY DAY 90 tablet 0   • mometasone (ELOCON) 0.1 % cream      • pantoprazole (PROTONIX) 40 mg tablet TAKE 1 TABLET BY MOUTH EVERY DAY 90 tablet 1   • rivastigmine (EXELON) 9.5 mg/24 hr TD 24 hr patch Place 1 patch on the skin over 24 hours daily 90 patch 1   • testosterone (ANDROGEL) 1% Place 0.05 g on the skin     • True Metrix Blood Glucose Test test strip TESTING TWICE A DAY E 11.9     • umeclidinium-vilanterol (Anoro Ellipta) 62.5-25 MCG/INH inhaler Inhale 1 puff daily 60 blister 0     No current facility-administered medications for this visit.      Past Medical History:   Diagnosis Date   • Alopecia    • Arthritis    • Back pain    • Benign essential hypertension    • Chronic obstructive pulmonary disease (COPD) (HCC)    • Depression    • Diabetes mellitus ([address])    • Emphysema, interstitial ([address])    • Fatigue    • Hypercholesterolemia    • Hypersomnia, persistent    • Hypertension    • Hypothyroidism    • Low testosterone    • Memory loss    • Oral candidiasis 05/11/2020   • Sinusitis    • Thyroid goiter      Past Surgical History:   Procedure Laterality Date   • ABDOMINAL SURGERY  8347    Umbilical herina repair   • ADENOIDECTOMY     • APPENDECTOMY     • CHOLECYSTECTOMY  2013   • HERNIA REPAIR     • TONSILLECTOMY       Social History     Socioeconomic History   • Marital status: /Civil Union     Spouse name: None   • Number of children: None   • Years of education: None   • Highest education level: None   Occupational History   • None   Tobacco Use   • Smoking status: Former     Packs/day: 1.00     Years: 20.00     Total pack years: 20.00     Types: Cigarettes   • Smokeless tobacco: Never   Vaping Use   • Vaping Use: Never used   Substance and Sexual Activity   • Alcohol use: No   • Drug use: No   • Sexual activity: None   Other Topics Concern   • None   Social History Narrative   • None     Social Determinants of Health     Financial Resource Strain: Low Risk  (7/5/2023)    Overall Financial Resource Strain (CARDIA)    • Difficulty of Paying Living Expenses: Not very hard   Food Insecurity: No Food Insecurity (1/25/2022)    Hunger Vital Sign    • Worried About Running Out of Food in the Last Year: Never true    • Ran Out of Food in the Last Year: Never true   Transportation Needs: No Transportation Needs (7/5/2023)    PRAPARE - Transportation    • Lack of Transportation (Medical): No    • Lack of Transportation (Non-Medical):  No   Physical Activity: Not on file   Stress: Not on file   Social Connections: Not on file   Intimate Partner Violence: Not on file   Housing Stability: Unknown (1/25/2022)    Housing Stability Vital Sign    • Unable to Pay for Housing in the Last Year: No    • Number of Places Lived in the Last Year: 1    • Unstable Housing in the Last Year: Not on file     Family History   Problem Relation Age of Onset   • Hypertension Father    • Aneurysm Father         cerebral   • Lupus Mother    • Hypothyroidism Daughter    • Hashimoto's thyroiditis Daughter             PHYSICAL EXAM:    Vitals:    07/12/23 1348   BP: 110/60   BP Location: Left arm   Patient Position: Sitting   Cuff Size: Large   Pulse: 69   SpO2: 97%   Weight: 97.1 kg (214 lb)   Height: 5' 9" (1.753 m)     General Appearance:   Alert and oriented x 3. Cooperative, and in no respiratory distress   HEENT:   Normocephalic, atraumatic, anicteric.      Neck:  Supple, symmetrical, trachea midline   Lungs:   Clear to auscultation bilaterally    Heart[de-identified]   Regular rate and rhythm   Abdomen:   Soft, non-tender, non-distended; normal bowel sounds; no masses, no organomegaly    Genitalia:   Deferred    Rectal:   Deferred    Extremities:  No cyanosis, clubbing or edema    Pulses:  2+ and symmetric all extremities    Skin:  Skin color, texture, turgor normal, no rashes or lesions    Lymph nodes:  No palpable cervical or supraclavicular lymphadenopathy        Lab Results:   Results from last 6 Months   Lab Units 04/26/23  0848   WBC Thousand/uL 7.36   HEMOGLOBIN g/dL 15.4   HEMATOCRIT % 46.6   PLATELETS Thousands/uL 261   NEUTROS PCT % 68   LYMPHS PCT % 20   MONOS PCT % 8   EOS PCT % 3     Results from last 6 Months   Lab Units 04/26/23  0848   POTASSIUM mmol/L 3.8   CHLORIDE mmol/L 102   CO2 mmol/L 31   BUN mg/dL 11   CREATININE mg/dL 0.95   CALCIUM mg/dL 10.4*   ALK PHOS U/L 81   ALT U/L 9   AST U/L 25               Imaging Studies:   Colonoscopy    Result Date: 4/26/2023  Impression: Normal colon and terminal ileum 1 polyp status post cold snare polypectomy RECOMMENDATION:  Repeat colonoscopy in 3 years  Personal history of colon polyps   Await pathology results Order CT imaging of the abdomen We will order malabsorption labwork  Rip Hunt MD     EGD    Result Date: 4/26/2023  Impression: LA grade a reflux esophagitis Mild erosive gastritis RECOMMENDATION: PPI Await pathology Weight loss Reflux precautions   Dina Tafoya MD       ASSESSMENT and PLAN:      1) Unintentional weight loss, change in bowel habits and history of Parkinson's disease- When patient initially saw Dr. Kaylin Zacarias in January, he was having diarrhea. Also, the patient had a very low TSH, and his most recent testing in April showed euthyroid. Patient reports that he is now back to constipation for more than 3 months now. May be related to his history of Parkinson's disease as it can affect GI motility as we discussed. In addition, abnormal thyroid function could do the same.  - Plan for CT abdomen pelvis as ordered by Dr. Kaylin Zacarias given his weight loss  - Provided samples of Linzess 290 mcg, patient's wife reports that she will message us on MyChart  - Further recommendations based on above findings  - Recheck thyroid function  - Continue to monitor weight  - Patient and patient's wife agree with above plan      Follow up after CT. Portions of the record may have been created with voice recognition software. Occasional wrong word or "sound a like" substitutions may have occurred due to the inherent limitations of voice recognition software. Read the chart carefully and recognize, using context, where substitutions have occurred.

## 2023-07-14 ENCOUNTER — TELEPHONE (OUTPATIENT)
Age: 68
End: 2023-07-14

## 2023-07-14 NOTE — TELEPHONE ENCOUNTER
Please give the standard contrast steroid protocol; please also explain to the daughter that the scan is useless without IV contrast

## 2023-07-14 NOTE — TELEPHONE ENCOUNTER
pts daughter Lauren Brannon called in to inform us that her father has a contrast allergy. I explained the two options we may advise on: allergy prep medication or CT W/O contrast. I explained I would reach out for advice. Pt is scheduled for 8/21. Lauren Brannon wanted me to remind Lesli &  that pt has parkinson's and he becomes combative with steroids.

## 2023-07-14 NOTE — TELEPHONE ENCOUNTER
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 Ped: recurr/persist (>=24 hrs) BP >ULN; monotherapy indicated, pt states his blood pressure is always normal at home/other office visits. BP managed with PCP.    The following toxicities were assessed as a 1:  Fatigue - Fatigue relieved by rest    Vital Signs: Performed on 2019 13:36  Height - 173.70 cms   Weight - 81.75 kg (LOW) REFUSED TO REMOVE SHOES   BSA - 1.96 sq.m   BMI - 27.0900   Temperature - 98.2 F   Pulse - 78 /min   Respiration - 20 /min   BP - 186/99 mm(hg) (HIGH)   Pain - 0    Allergies: No Known Allergies.  Allergies reviewed    Medication List:Centrum Silver  Folic Acid  Norvasc  Vitamin B ComplexVitamin C  The Medication List was reviewed.    Current Labs: There are no lab results available for this patient.  Labs were reviewed with the patient.     Psychosocial assessment:  No new psychosocial concerns    Oral Therapy Start Date:   8/21/15      Contraceptive Review:  NA: Pt's wife is postmenopausal    Adherence Assessment:    Do you ever forget to take your medication    _____Yes  __x___No If yes, please explain:  Did you skip your medication for any reason _____Yes  __x___No If yes, please explain:  Sometimes if you feel worse when you take the medication, do you stop taking  Spoke with patient's daughter. She is very reluctant to give the pre-meds for contrast because he gets combative with steroids. She also reports that he was set back after the sedation for E/C as well. She wishes to speak with you. it        _____Yes  ___x__No If yes, please explain:  When you feel better do you sometimes stop taking your medication        _____Yes  __x___No If yes, please explain:  Adherence Evaluation: (1 point for each yes; High adherence =0, Medium adherence  =1-2, Low adherence =3-4)      Score:____0_____________    Calendar/Diary Returned: _____Yes  _____No ___x__NA    Nursing Note:    S/O: Pt here for c49d1 Imbruvica. Pt seen and examined by Dr. Reynolds- esa reviewed and okay to proceed with treatment as ordered. Pt tolerating medication well, no new complaints voiced. Pt still states his BP is only high when he is seen at our office, but always normal at home or other MD offices. BP managed by PCP- currently on lisinopril. Pt states VSS at home today, refused second BP check. Pt reports cough x1 month- script for z-hannah sent to pharmacy per Dr Reynolds. Pt compliant w/ all doses. CBC reviewed in detail, copy given to pt. Pt aware of upcoming appts and labs.   A: c48d1 Imbruvica(Ibrutinib) 420mg tablet daily - pt tolerating well. Script sent electronically through [facility].  P: RTC per calendar. Instructed to call with any problems, questions or concerns. Pt verbalized understanding. Safe handling reviewed with pt. Reviewed side effects, drug/food interactions, discharge instructions including doses and administration instructions, adherence plan, safe handling of the medication and labs with patient. Pt reported understanding. Questions were answered and understanding was verbalized.      Electronically signed by,     Pily Dm

## 2023-07-18 ENCOUNTER — APPOINTMENT (OUTPATIENT)
Dept: LAB | Facility: CLINIC | Age: 68
End: 2023-07-18
Payer: MEDICARE

## 2023-07-18 DIAGNOSIS — K59.1 FUNCTIONAL DIARRHEA: ICD-10-CM

## 2023-07-18 DIAGNOSIS — R63.4 WEIGHT LOSS: ICD-10-CM

## 2023-07-18 LAB
ALBUMIN SERPL BCP-MCNC: 3.9 G/DL (ref 3.5–5)
ALP SERPL-CCNC: 69 U/L (ref 46–116)
ALT SERPL W P-5'-P-CCNC: 20 U/L (ref 12–78)
ANION GAP SERPL CALCULATED.3IONS-SCNC: 2 MMOL/L
AST SERPL W P-5'-P-CCNC: 19 U/L (ref 5–45)
BILIRUB SERPL-MCNC: 0.45 MG/DL (ref 0.2–1)
BUN SERPL-MCNC: 15 MG/DL (ref 5–25)
CALCIUM SERPL-MCNC: 9.4 MG/DL (ref 8.3–10.1)
CHLORIDE SERPL-SCNC: 107 MMOL/L (ref 96–108)
CO2 SERPL-SCNC: 31 MMOL/L (ref 21–32)
CREAT SERPL-MCNC: 1.1 MG/DL (ref 0.6–1.3)
GFR SERPL CREATININE-BSD FRML MDRD: 69 ML/MIN/1.73SQ M
GLUCOSE P FAST SERPL-MCNC: 116 MG/DL (ref 65–99)
POTASSIUM SERPL-SCNC: 4.3 MMOL/L (ref 3.5–5.3)
PROT SERPL-MCNC: 7.2 G/DL (ref 6.4–8.4)
SODIUM SERPL-SCNC: 140 MMOL/L (ref 135–147)
TSH SERPL DL<=0.05 MIU/L-ACNC: 1.06 UIU/ML (ref 0.45–4.5)

## 2023-07-18 PROCEDURE — 80053 COMPREHEN METABOLIC PANEL: CPT

## 2023-07-18 PROCEDURE — 84443 ASSAY THYROID STIM HORMONE: CPT

## 2023-07-18 PROCEDURE — 36415 COLL VENOUS BLD VENIPUNCTURE: CPT

## 2023-07-18 NOTE — TELEPHONE ENCOUNTER
Spoke with patient's daughter Мария Bragg, she would prefer CT scan with oral contrast instead. Dr. Vazquez Greco had already put an order in. She will call central scheduling.

## 2023-07-19 ENCOUNTER — TELEPHONE (OUTPATIENT)
Dept: GASTROENTEROLOGY | Facility: CLINIC | Age: 68
End: 2023-07-19

## 2023-07-19 NOTE — TELEPHONE ENCOUNTER
----- Message from Piyush Gutierrez PA-C sent at 7/19/2023  8:02 AM EDT -----  Please let patient know that his thyroid function is normal, thank you

## 2023-07-19 NOTE — TELEPHONE ENCOUNTER
Called and spoke to patients daughter. Gave her patients test results. She stated she saw them on mychart.  . She voiced understanding and had no further questions or cocnerns

## 2023-07-26 ENCOUNTER — TELEPHONE (OUTPATIENT)
Dept: GASTROENTEROLOGY | Facility: CLINIC | Age: 68
End: 2023-07-26

## 2023-07-26 NOTE — TELEPHONE ENCOUNTER
Called and LMOM that the order for the CT scan is in correctly and he can take the Barium. I verified with Symone Cooper as we got a call from Anita Fontana to verify the order.

## 2023-07-28 ENCOUNTER — HOSPITAL ENCOUNTER (OUTPATIENT)
Dept: CT IMAGING | Facility: CLINIC | Age: 68
Discharge: HOME | End: 2023-07-28
Payer: MEDICARE

## 2023-07-28 DIAGNOSIS — Z82.49 FAMILY HISTORY OF AORTIC ANEURYSM: ICD-10-CM

## 2023-07-28 PROCEDURE — 74176 CT ABD & PELVIS W/O CONTRAST: CPT

## 2023-08-02 ENCOUNTER — TELEPHONE (OUTPATIENT)
Age: 68
End: 2023-08-02

## 2023-08-02 ENCOUNTER — TELEPHONE (OUTPATIENT)
Dept: HEMATOLOGY ONCOLOGY | Facility: CLINIC | Age: 68
End: 2023-08-02

## 2023-08-02 NOTE — TELEPHONE ENCOUNTER
pts daughter Juliette Mandujano called in about her fathers CT scan not being resulted. I explained network wide delay and reassured kamilah I would call to expedite. I called radiology to have imaging read. She also mentioned that pt is still having issues with constipation. Pt was originally prescribed low dose linzess by pcp but it did not help. We gave pt samples of linzess 290mcg that pt took for 3 weeks. It did not make difference with constipation and pt believes he had adverse effects to it. He had stomach pain and dizziness but when he stopped the linzess 290 because it was not working, symptoms resolved. Pt still having issues with constipation and most OTC medications do not help pt. He has been using enema to relieve himself. pts sister is looking for advice and next steps.  Please return fabiana call at [phone]

## 2023-08-03 ENCOUNTER — TELEPHONE (OUTPATIENT)
Dept: CARDIOLOGY CLINIC | Facility: CLINIC | Age: 68
End: 2023-08-03

## 2023-08-03 NOTE — TELEPHONE ENCOUNTER
----- Message from Trav Hamilton MD sent at 8/3/2023  9:36 AM EDT -----  Please let him know there was no aneurysm on his CT abdomen. Please schedule him for routine follow up.

## 2023-08-03 NOTE — TELEPHONE ENCOUNTER
Spoke with patient daughter relayed  response, patient daughter verbally understood. Called Transferred to [facility] to schedule patient a appointment.

## 2023-08-03 NOTE — RESULT ENCOUNTER NOTE
Please let him know there was no aneurysm on his CT abdomen. Please schedule him for routine follow up.

## 2023-08-03 NOTE — TELEPHONE ENCOUNTER
----- Message from Sofia Darnell MD sent at 8/3/2023  9:36 AM EDT -----  Please let him know there was no aneurysm on his CT abdomen. Please schedule him for routine follow up.

## 2023-08-04 DIAGNOSIS — I10 PRIMARY HYPERTENSION: ICD-10-CM

## 2023-08-04 RX ORDER — LOSARTAN POTASSIUM 50 MG/1
TABLET ORAL
Qty: 90 TABLET | Refills: 0 | Status: SHIPPED | OUTPATIENT
Start: 2023-08-04

## 2023-08-14 LAB
LEFT EYE DIABETIC RETINOPATHY: NORMAL
RIGHT EYE DIABETIC RETINOPATHY: NORMAL

## 2023-08-23 ENCOUNTER — TELEPHONE (OUTPATIENT)
Dept: NEUROLOGY | Facility: CLINIC | Age: 68
End: 2023-08-23

## 2023-08-23 DIAGNOSIS — G31.83 LEWY BODY DEMENTIA WITHOUT BEHAVIORAL DISTURBANCE (HCC): Primary | ICD-10-CM

## 2023-08-23 DIAGNOSIS — F02.80 LEWY BODY DEMENTIA WITHOUT BEHAVIORAL DISTURBANCE (HCC): Primary | ICD-10-CM

## 2023-08-23 RX ORDER — RIVASTIGMINE 4.6 MG/24H
1 PATCH, EXTENDED RELEASE TRANSDERMAL DAILY
Qty: 90 PATCH | Refills: 2 | Status: SHIPPED | OUTPATIENT
Start: 2023-08-23

## 2023-08-23 NOTE — TELEPHONE ENCOUNTER
Called Daughter Glenna Garrett [employer]), Dr. Balbir Hardin recommendation concerning the bradycardia, to reduce the exelon patch to 4.6mg/24hr from 9.5mg/24hr. Daughter agreeable with above. Informed daughter that 4.6mg/24hr exelon patch has been sent to the pharmacy at [facility] which is the preferred pharmacy by Dr. Victor Hugo Mueller. Daughter is appreciative of call. She is also asking about the status of the followup with resident clinic. I will send a message concerning this. Daughter is agreeable with this. Daughter had no further questions.

## 2023-08-24 DIAGNOSIS — R63.4 WEIGHT LOSS: ICD-10-CM

## 2023-08-24 RX ORDER — PANTOPRAZOLE SODIUM 40 MG/1
TABLET, DELAYED RELEASE ORAL
Qty: 90 TABLET | Refills: 1 | Status: SHIPPED | OUTPATIENT
Start: 2023-08-24

## 2023-08-29 ENCOUNTER — OFFICE VISIT (OUTPATIENT)
Dept: CARDIOLOGY CLINIC | Facility: CLINIC | Age: 68
End: 2023-08-29
Payer: MEDICARE

## 2023-08-29 VITALS
HEART RATE: 60 BPM | RESPIRATION RATE: 18 BRPM | OXYGEN SATURATION: 98 % | BODY MASS INDEX: 31.99 KG/M2 | WEIGHT: 216 LBS | SYSTOLIC BLOOD PRESSURE: 119 MMHG | HEIGHT: 69 IN | DIASTOLIC BLOOD PRESSURE: 78 MMHG

## 2023-08-29 DIAGNOSIS — R00.1 BRADYCARDIA: ICD-10-CM

## 2023-08-29 DIAGNOSIS — I10 PRIMARY HYPERTENSION: ICD-10-CM

## 2023-08-29 DIAGNOSIS — E11.9 DIABETES MELLITUS WITHOUT COMPLICATION (HCC): Primary | ICD-10-CM

## 2023-08-29 PROCEDURE — 99205 OFFICE O/P NEW HI 60 MIN: CPT | Performed by: INTERNAL MEDICINE

## 2023-08-29 PROCEDURE — 93000 ELECTROCARDIOGRAM COMPLETE: CPT | Performed by: INTERNAL MEDICINE

## 2023-08-29 NOTE — PROGRESS NOTES
Cardiology Consultation     Rosamaria Ho  [id number]  1955  [facility] Melba Marie [address] [place] [address]  Lainey ZUÑIGA [id number]    Impression:  Bradycardia  Fatigue  Parkinson's  Family history of sudden cardiac death related to ruptured aortic aneurysm  Diabetes  Statin intolerance  Hypertriglyceridemia  Hypertension  Hypothyroidism  DAFNE on CPAP. Plan:  Rivastigmine has been associated with cardiac effects such as conduction abnormalities for example bradycardia, AV block, arrhythmias and hypertension. Syncope has also been reported. Bupropion has been associated with complete heart block. Gabapentin can cause fatigue and hypotension. Carbidopa-levodopa has been associated with hypertension. Rivastigmine is to be adjusted by Deon as they are. Thyroid is followed by endocrine. Check echo and 2 week zio cardiac monitor. EKG today reveals sinus rodrigo rate 56 and is normal.     Patient is seen as a new patient with chief complaint of bradycardia and fatigue. He has been getting iron infusions and at appts noted bradycardia. His rivastigmine had been increased then decreased due to bradycardia. No syncope. He does get lightheaeded and dizzy if he gets up too fast. No cp. He has chronic sob, hall attributed to copd. He is a 72-year-old male who saw Dr. Gema Puente in 2019 with cardiomegaly and atypical chest pain as well as a family history of sudden cardiac death related to ruptured aortic aneurysms. He has a history of diabetes, hypertriglyceridemia, hypertension, hypothyroidism, DAFNE on CPAP and statin intolerance. He declined stress testing at that time. He had a recent CT of the chest 8/21/23 [facility] which revealed no suspicious pulmonary nodules and was positive for emphysematous changes of the lungs. The heart and great vessels were noted to be of normal appearance. There was calcific CAD.     Echocardiogram December 20, 2018:  This was a limited study done to evaluate for cardiomegaly noted on chest x-ray. This was a very technically difficult study. Left and right ventricular size appear normal. This study was inadequate to accurately determine left ventricular function and to assess for regional wall motion abnormalities. The valves were  not well visualized.       Patient Active Problem List   Diagnosis   • Depression   • Diabetes mellitus without complication ([address])   • Hypercholesterolemia   • Hypertension   • Hypothyroidism   • Low testosterone   • Psoriasis   • Centrilobular emphysema ([address])   • Former smoker   • DAFNE (obstructive sleep apnea)   • Memory impairment   • Episodic paroxysmal hemicrania, not intractable   • Abnormality of gait due to impairment of balance   • Balance problem   • Neuropathy   • Goiter, nontoxic simple   • Lewy body dementia without behavioral disturbance (HCC)   • Pulmonary nodule   • Constipation, chronic   • Restless legs syndrome   • REM behavioral disorder   • Moderate episode of recurrent major depressive disorder (HCC)   • Disturbance of smell   • Decreased appetite   • Weight loss   • Functional diarrhea   • Heterozygous for 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 ([address])   • Low iron   • Medicare annual wellness visit, subsequent   • Primary parkinsonism ([address])     Past Medical History:   Diagnosis Date   • Alopecia    • Arthritis    • Back pain    • Benign essential hypertension    • Chronic obstructive pulmonary disease (COPD) ([address])    • Depression    • Diabetes mellitus (HCC)    • Emphysema, interstitial (HCC)    • Fatigue    • Hypercholesterolemia    • Hypersomnia, persistent    • Hypertension    • Hypothyroidism    • Low testosterone    • Memory loss    • Oral candidiasis 05/11/2020   • Sinusitis    • Thyroid goiter      Social History     Socioeconomic History   • Marital status: /Civil Union     Spouse name: Not on file   • Number of children: Not on file   • Years of education: Not on file • Highest education level: Not on file   Occupational History   • Not on file   Tobacco Use   • Smoking status: Former     Packs/day: 1.00     Years: 20.00     Total pack years: 20.00     Types: Cigarettes   • Smokeless tobacco: Never   Vaping Use   • Vaping Use: Never used   Substance and Sexual Activity   • Alcohol use: No   • Drug use: No   • Sexual activity: Not on file   Other Topics Concern   • Not on file   Social History Narrative   • Not on file     Social Determinants of Health     Financial Resource Strain: Low Risk  (7/5/2023)    Overall Financial Resource Strain (CARDIA)    • Difficulty of Paying Living Expenses: Not very hard   Food Insecurity: No Food Insecurity (1/25/2022)    Hunger Vital Sign    • Worried About Running Out of Food in the Last Year: Never true    • Ran Out of Food in the Last Year: Never true   Transportation Needs: No Transportation Needs (7/5/2023)    PRAPARE - Transportation    • Lack of Transportation (Medical): No    • Lack of Transportation (Non-Medical):  No   Physical Activity: Not on file   Stress: Not on file   Social Connections: Not on file   Intimate Partner Violence: Not on file   Housing Stability: Unknown (1/25/2022)    Housing Stability Vital Sign    • Unable to Pay for Housing in the Last Year: No    • Number of Places Lived in the Last Year: 1    • Unstable Housing in the Last Year: Not on file      Family History   Problem Relation Age of Onset   • Hypertension Father    • Aneurysm Father         cerebral   • Lupus Mother    • Hypothyroidism Daughter    • Hashimoto's thyroiditis Daughter      Past Surgical History:   Procedure Laterality Date   • ABDOMINAL SURGERY  8655    Umbilical herina repair   • ADENOIDECTOMY     • APPENDECTOMY     • CHOLECYSTECTOMY  2013   • HERNIA REPAIR     • TONSILLECTOMY         Current Outpatient Medications:   •  buPROPion (WELLBUTRIN XL) 300 mg 24 hr tablet, TAKE 1 TABLET BY MOUTH EVERY DAY IN THE MORNING, Disp: 90 tablet, Rfl: 3  • carbidopa-levodopa (SINEMET)  mg per tablet, TAKE 2 TABLETS BY MOUTH 3 TIMES A DAY., Disp: 540 tablet, Rfl: 5  •  escitalopram (LEXAPRO) 20 mg tablet, TAKE 2 TABLETS BY MOUTH EVERY DAY, Disp: 180 tablet, Rfl: 2  •  fluticasone (FLONASE) 50 mcg/act nasal spray, 1 spray into each nostril if needed, Disp: , Rfl:   •  gabapentin (Neurontin) 300 mg capsule, Take 1 capsule (300 mg total) by mouth daily at bedtime, Disp: 90 capsule, Rfl: 2  •  levothyroxine 75 mcg tablet, TAKE 1 TABLET BY MOUTH DAILY.  TAKE ON EMPTY STOMACH 2 HOURS APART FROM OTHER MEDICATIONS AND FOODS, Disp: , Rfl:   •  losartan (COZAAR) 50 mg tablet, TAKE 1 TABLET BY MOUTH EVERY DAY, Disp: 90 tablet, Rfl: 0  •  mometasone (ELOCON) 0.1 % cream, , Disp: , Rfl:   •  pantoprazole (PROTONIX) 40 mg tablet, TAKE 1 TABLET BY MOUTH EVERY DAY, Disp: 90 tablet, Rfl: 1  •  Plecanatide (Trulance) 3 MG TABS, Take 3 mg by mouth in the morning, Disp: 30 tablet, Rfl: 1  •  rivastigmine (EXELON) 4.6 mg/24 hr TD 24 hr patch, Place 1 patch on the skin over 24 hours daily, Disp: 90 patch, Rfl: 2  •  testosterone (ANDROGEL) 1%, Place 0.05 g on the skin, Disp: , Rfl:   •  True Metrix Blood Glucose Test test strip, TESTING TWICE A DAY E 11.9, Disp: , Rfl:   •  umeclidinium-vilanterol (Anoro Ellipta) 62.5-25 MCG/INH inhaler, Inhale 1 puff daily, Disp: 60 blister, Rfl: 0  •  albuterol (PROVENTIL HFA,VENTOLIN HFA) 90 mcg/act inhaler, Inhale 2 puffs every 6 (six) hours as needed for wheezing (Patient not taking: Reported on 8/29/2023), Disp: , Rfl:   •  LORazepam (Ativan) 0.5 mg tablet, Take 1 tablet (0.5 mg total) by mouth daily at bedtime (Patient not taking: Reported on 8/29/2023), Disp: 90 tablet, Rfl: 0  Allergies   Allergen Reactions   • Cimetidine    • Codeine    • Contrast  [Iodinated Contrast Media]    • Metrizamide    • Prednisone Other (See Comments)     ALL STEROIDS  COMBATIVE   • Simvastatin Other (See Comments)     ALL STATINS  MUSCLE CRAMPS   • Statins Vitals:    08/29/23 1419   BP: 119/78   BP Location: Left arm   Patient Position: Sitting   Cuff Size: Standard   Pulse: 60   Resp: 18   SpO2: 98%   Weight: 98 kg (216 lb)   Height: 5' 9" (1.753 m)       Labs:  Lab Results   Component Value Date    TRIG 276 (H) 04/26/2023    HDL 38 (L) 04/26/2023     Imaging: CT chest wo contrast    Result Date: 8/22/2023  Narrative: EXAM: CT CHEST WO CONTRAST TECHNIQUE: A helical CT scan of the chest was performed without contrast. Axial, coronal, and sagittal reconstructions were obtained. COMPARISON: CT chest 6/22/2022 HISTORY: Pulmonary nodules FINDINGS: Lungs and pleura: Centrilobular emphysematous changes. Small subpleural linear densities throughout bilateral lungs similar to prior exam. A 4 mm nodule in the right lower lobe (image 121) stable from 4/16/2018. No pneumothorax, pleural effusion or consolidation.  Central airways are clear.   Cardiovascular and mediastinum: Heart is normal in size.  Normal appearance of the great vessels.    There is calcific coronary artery disease.   Lymph nodes: None pathologically enlarged.   Upper abdomen: Patient is status post cholecystectomy. There is pancreatic atrophy. MSK: No acute or aggressive bony abnormality. Impression: IMPRESSION: 1.  No suspicious pulmonary nodules. 2.  Emphysematous changes of the lungs. CT examination performed with dose lowering protocol in accordance with ALARA. Workstation:[id number]      Review of Systems:  Review of Systems negative except for HPI    Physical Exam:  Physical Exam GEN: Alert and oriented x 3, in no acute distress. Well appearing and well nourished. HEENT: Sclera anicteric, conjunctivae pink, mucous membranes moist. Oropharynx clear. NECK: Supple, no carotid bruits, no significant JVD. Trachea midline, no thyromegaly. HEART: Bradycardic regular rhythm, normal S1 and S2, no murmurs, clicks, gallops or rubs. PMI nondisplaced, no thrills.    LUNGS: Clear to auscultation bilaterally; no wheezes, rales, or rhonchi. No increased work of breathing or signs of respiratory distress. ABDOMEN: Soft, nontender, nondistended, normoactive bowel sounds. EXTREMITIES: Skin warm and well perfused, no clubbing, cyanosis, or edema. NEURO: No focal findings. Normal speech. Mood and affect normal.   SKIN: Normal without suspicious lesions on exposed skin. 1. Diabetes mellitus without complication ([address])        2. Primary hypertension        3.  Bradycardia

## 2023-09-04 PROBLEM — Z00.00 MEDICARE ANNUAL WELLNESS VISIT, SUBSEQUENT: Status: RESOLVED | Noted: 2023-07-06 | Resolved: 2023-09-04

## 2023-09-11 ENCOUNTER — TELEPHONE (OUTPATIENT)
Age: 68
End: 2023-09-11

## 2023-09-11 NOTE — TELEPHONE ENCOUNTER
pts daughter Glenna Garrett called about instructions for pt to have malabsorption lab work. I explained pt had lab work done minus pancreatic elastase stool test. I advised for pt to have that test done. Call dropped.

## 2023-09-14 ENCOUNTER — HOSPITAL ENCOUNTER (OUTPATIENT)
Dept: NON INVASIVE DIAGNOSTICS | Facility: CLINIC | Age: 68
Discharge: HOME | End: 2023-09-14
Payer: MEDICARE

## 2023-09-14 VITALS
DIASTOLIC BLOOD PRESSURE: 78 MMHG | SYSTOLIC BLOOD PRESSURE: 119 MMHG | WEIGHT: 216 LBS | HEIGHT: 69 IN | BODY MASS INDEX: 31.99 KG/M2 | HEART RATE: 60 BPM

## 2023-09-14 DIAGNOSIS — R00.1 BRADYCARDIA: ICD-10-CM

## 2023-09-14 LAB
AORTIC ROOT: 3.2 CM
APICAL FOUR CHAMBER EJECTION FRACTION: 69 %
ASCENDING AORTA: 2.7 CM
E WAVE DECELERATION TIME: 219 MS
FRACTIONAL SHORTENING: 35 (ref 28–44)
INTERVENTRICULAR SEPTUM IN DIASTOLE (PARASTERNAL SHORT AXIS VIEW): 0.9 CM
INTERVENTRICULAR SEPTUM: 0.9 CM (ref 0.6–1.1)
LAAS-AP2: 18 CM2
LAAS-AP4: 16.9 CM2
LEFT ATRIUM SIZE: 3.7 CM
LEFT ATRIUM VOLUME (MOD BIPLANE): 46 ML
LEFT INTERNAL DIMENSION IN SYSTOLE: 2.6 CM (ref 2.1–4)
LEFT VENTRICULAR INTERNAL DIMENSION IN DIASTOLE: 4 CM (ref 3.5–6)
LEFT VENTRICULAR POSTERIOR WALL IN END DIASTOLE: 0.9 CM
LEFT VENTRICULAR STROKE VOLUME: 43 ML
LVSV (TEICH): 43 ML
MV E'TISSUE VEL-SEP: 8 CM/S
MV PEAK A VEL: 0.86 M/S
MV PEAK E VEL: 90 CM/S
MV STENOSIS PRESSURE HALF TIME: 63 MS
MV VALVE AREA P 1/2 METHOD: 3.49
RIGHT ATRIUM AREA SYSTOLE A4C: 13.7 CM2
RIGHT VENTRICLE ID DIMENSION: 3.5 CM
SL CV LEFT ATRIUM LENGTH A2C: 5.5 CM
SL CV LV EF: 65
SL CV PED ECHO LEFT VENTRICLE DIASTOLIC VOLUME (MOD BIPLANE) 2D: 68 ML
SL CV PED ECHO LEFT VENTRICLE SYSTOLIC VOLUME (MOD BIPLANE) 2D: 25 ML
TRICUSPID ANNULAR PLANE SYSTOLIC EXCURSION: 2.3 CM

## 2023-09-14 PROCEDURE — 93306 TTE W/DOPPLER COMPLETE: CPT

## 2023-09-14 PROCEDURE — 93306 TTE W/DOPPLER COMPLETE: CPT | Performed by: INTERNAL MEDICINE

## 2023-09-15 ENCOUNTER — TELEPHONE (OUTPATIENT)
Dept: CARDIOLOGY CLINIC | Facility: CLINIC | Age: 68
End: 2023-09-15

## 2023-09-15 NOTE — TELEPHONE ENCOUNTER
----- Message from Jordy Hoyos MD sent at 9/15/2023  3:45 PM EDT -----  Pls call pt and tell him echo is normal  ----- Message -----  From: Luis Fatima MD  Sent: 9/14/2023   2:55 PM EDT  To: Jordy Hoyos MD

## 2023-09-18 DIAGNOSIS — K59.09 CONSTIPATION, CHRONIC: ICD-10-CM

## 2023-09-18 RX ORDER — PLECANATIDE 3 MG/1
3 TABLET ORAL DAILY
Qty: 90 TABLET | Refills: 1 | Status: SHIPPED | OUTPATIENT
Start: 2023-09-18

## 2023-09-18 RX ORDER — PLECANATIDE 3 MG/1
3 TABLET ORAL DAILY
Qty: 30 TABLET | Refills: 1 | Status: SHIPPED | OUTPATIENT
Start: 2023-09-18 | End: 2023-09-18 | Stop reason: SDUPTHER

## 2023-09-18 NOTE — TELEPHONE ENCOUNTER
Called the Rx line for a refill:  Trulance 3 mg #30  [facility] [address] [facility]

## 2023-09-18 NOTE — TELEPHONE ENCOUNTER
Pt daughter called this morning 9.18.2023 asking for trulance 3 mg refill, she forgot to ask for 90D supply with 1 refill and said it was auto refilled for 30D    Re-sending to  Pharm  90D w 1 refill as requested by patient daughter Monet Gutierrez

## 2023-09-21 ENCOUNTER — CLINICAL SUPPORT (OUTPATIENT)
Dept: CARDIOLOGY CLINIC | Facility: CLINIC | Age: 68
End: 2023-09-21
Payer: MEDICARE

## 2023-09-21 DIAGNOSIS — R00.1 BRADYCARDIA: ICD-10-CM

## 2023-09-21 PROCEDURE — 93248 EXT ECG>7D<15D REV&INTERPJ: CPT | Performed by: INTERNAL MEDICINE

## 2023-09-22 ENCOUNTER — TELEPHONE (OUTPATIENT)
Dept: CARDIOLOGY CLINIC | Facility: CLINIC | Age: 68
End: 2023-09-22

## 2023-09-22 NOTE — TELEPHONE ENCOUNTER
----- Message from Isaias Parrish sent at 9/21/2023  9:18 PM EDT -----  Regarding: Question  Contact: [phone]  Back on 6/21/22 the results of a chest CT indicared the presence of lipomatous hypertrophy of interatrial septum. Could you please go back and look at the imaging for this study and see if this is a concern? There appears to be some indication that something could be causing the heart rate variabilty.  Thank you

## 2023-09-25 ENCOUNTER — PATIENT MESSAGE (OUTPATIENT)
Dept: NEUROLOGY | Facility: CLINIC | Age: 68
End: 2023-09-25

## 2023-09-25 ENCOUNTER — TELEPHONE (OUTPATIENT)
Dept: OTHER | Facility: HOSPITAL | Age: 68
End: 2023-09-25

## 2023-09-25 DIAGNOSIS — G20.C PRIMARY PARKINSONISM: Primary | ICD-10-CM

## 2023-09-25 DIAGNOSIS — F32.A DEPRESSION, UNSPECIFIED DEPRESSION TYPE: ICD-10-CM

## 2023-09-25 RX ORDER — BUPROPION HYDROCHLORIDE 150 MG/1
150 TABLET ORAL DAILY
Qty: 28 TABLET | Refills: 0 | Status: SHIPPED | OUTPATIENT
Start: 2023-09-25

## 2023-09-25 NOTE — TELEPHONE ENCOUNTER
Talked with patient's daughter. Daughter stated that patient's liver is enlarged and patient having worsening Parkinsonian symptoms. Highly advised daughter that it would be best to contact primary care concerning the welbutrin. Daughter stated that she has had trouble getting in contact with primary care and she was told to have followup in October. Ordered welbutrin temporarily for 150mg XL 24 hours daily for 4 weeks in the meantime and not take welbutrin 300mg 24 hrs during this interim. Recommended strongly daughter talk to PCP soon concerning this as welbutrin is a pharmacologic agent prescribed by PCP. Daughter agreeable with above and had no other questions. Will send a message to PCP as well for clarification of situation.

## 2023-09-25 NOTE — PATIENT COMMUNICATION
Patient Diego Kelly  calling to ask the advise of the two providers that the patient sees  In Neurology concerning the Wellbutrin     Patient spouse feels that advise from these specialist will be much better than advise from the PCP.    Daughter is asking for a Cb from one of the providers or their medical team to advise

## 2023-10-03 DIAGNOSIS — K59.09 CONSTIPATION, CHRONIC: ICD-10-CM

## 2023-10-04 ENCOUNTER — TELEPHONE (OUTPATIENT)
Dept: CARDIOLOGY CLINIC | Facility: CLINIC | Age: 68
End: 2023-10-04

## 2023-10-04 RX ORDER — PLECANATIDE 3 MG/1
3 TABLET ORAL DAILY
Qty: 30 TABLET | Refills: 11 | Status: SHIPPED | OUTPATIENT
Start: 2023-10-04

## 2023-10-04 NOTE — TELEPHONE ENCOUNTER
----- Message from Santa Hays MD sent at 10/4/2023  3:46 PM EDT -----  Please call pt and tell there are no significant arrhythmias.   ----- Message -----  From: Lyla Antunez  Sent: 9/29/2023   4:06 PM EDT  To: Santa Hays MD    Please provide conclusion  ----- Message -----  From: Santa Hays MD  Sent: 9/27/2023   3:08 PM EDT  To: [facility]    13 day Zio cardiac monitor:   Patient had a min HR of 43 bpm, max HR of 119 bpm, and avg HR of 61  bpm. Predominant underlying rhythm was Sinus Rhythm. First Degree AV  Block was present. Isolated SVEs were rare (<1.0%), and no SVE Couplets  or SVE Triplets were present. Isolated VEs were rare (<1.0%), and no VE  Couplets or VE Triplets were present. 1 triggered event during normal sinus rhythm.

## 2023-10-13 DIAGNOSIS — F33.1 MODERATE EPISODE OF RECURRENT MAJOR DEPRESSIVE DISORDER (HCC): Primary | ICD-10-CM

## 2023-10-13 RX ORDER — BUPROPION HYDROCHLORIDE 75 MG/1
75 TABLET ORAL 2 TIMES DAILY
Qty: 60 TABLET | Refills: 0 | Status: SHIPPED | OUTPATIENT
Start: 2023-10-13 | End: 2023-10-20 | Stop reason: ALTCHOICE

## 2023-10-16 ENCOUNTER — RA CDI HCC (OUTPATIENT)
Dept: OTHER | Facility: HOSPITAL | Age: 68
End: 2023-10-16

## 2023-10-20 ENCOUNTER — TELEMEDICINE (OUTPATIENT)
Dept: FAMILY MEDICINE CLINIC | Facility: CLINIC | Age: 68
End: 2023-10-20
Payer: MEDICARE

## 2023-10-20 DIAGNOSIS — E03.9 HYPOTHYROIDISM, UNSPECIFIED TYPE: Primary | ICD-10-CM

## 2023-10-20 DIAGNOSIS — E55.9 VITAMIN D DEFICIENCY: ICD-10-CM

## 2023-10-20 DIAGNOSIS — F33.1 MODERATE EPISODE OF RECURRENT MAJOR DEPRESSIVE DISORDER (HCC): ICD-10-CM

## 2023-10-20 PROCEDURE — 99213 OFFICE O/P EST LOW 20 MIN: CPT | Performed by: NURSE PRACTITIONER

## 2023-10-20 RX ORDER — LEVOTHYROXINE SODIUM 88 UG/1
88 TABLET ORAL
Qty: 90 TABLET | Refills: 1 | Status: SHIPPED | OUTPATIENT
Start: 2023-10-20 | End: 2024-04-17

## 2023-10-20 NOTE — PROGRESS NOTES
Virtual Regular Visit    Verification of patient location:Home    Patient is located at Home in the following state in which I hold an active license [place]      Assessment/Plan:    Problem List Items Addressed This Visit        Endocrine    Hypothyroidism - Primary    Relevant Medications    levothyroxine 88 mcg tablet    Other Relevant Orders    TSH, 3rd generation with Free T4 reflex    T4, free       Other    Moderate episode of recurrent major depressive disorder ([address])     Patient is weaning off the Wellbutrin and will continue for the next two weeks and then stop the 75 mg         Other Visit Diagnoses     Vitamin D deficiency        Relevant Orders    Vitamin D 25 hydroxy               Reason for visit is No chief complaint on file. Encounter provider Reyes Abu, CRNP    Provider located at [address] [place] [id number]      Recent Visits  No visits were found meeting these conditions. Showing recent visits within past 7 days and meeting all other requirements  Today's Visits  Date Type Provider Dept   10/20/23 Telemedicine Reyes Abu, CRNP [facility]   Showing today's visits and meeting all other requirements  Future Appointments  No visits were found meeting these conditions. Showing future appointments within next 150 days and meeting all other requirements       The patient was identified by name and date of birth. Nash Ramirez was informed that this is a telemedicine visit and that the visit is being conducted through the [place] He agrees to proceed. .  My office door was closed. No one else was in the room. He acknowledged consent and understanding of privacy and security of the video platform. The patient has agreed to participate and understands they can discontinue the visit at any time. Patient is aware this is a billable service.      Subjective  Nash Ramirez is a 79 y.o. male .      Patient here today and reports that he is titration off the Wellbutrin and is down to 75 mg bid and plans to go down to one time a day for two weeks and then stop the medication. Past Medical History:   Diagnosis Date   • Alopecia    • Arthritis    • Back pain    • Benign essential hypertension    • Chronic obstructive pulmonary disease (COPD) (HCC)    • Depression    • Diabetes mellitus ([address])    • Emphysema, interstitial (HCC)    • Fatigue    • Hypercholesterolemia    • Hypersomnia, persistent    • Hypertension    • Hypothyroidism    • Low testosterone    • Memory loss    • Oral candidiasis 05/11/2020   • Sinusitis    • Thyroid goiter        Past Surgical History:   Procedure Laterality Date   • ABDOMINAL SURGERY  9526    Umbilical herina repair   • ADENOIDECTOMY     • APPENDECTOMY     • CHOLECYSTECTOMY  2013   • HERNIA REPAIR     • TONSILLECTOMY         Current Outpatient Medications   Medication Sig Dispense Refill   • levothyroxine 88 mcg tablet Take 1 tablet (88 mcg total) by mouth daily in the early morning 90 tablet 1   • carbidopa-levodopa (SINEMET)  mg per tablet TAKE 2 TABLETS BY MOUTH 3 TIMES A DAY.  540 tablet 5   • escitalopram (LEXAPRO) 20 mg tablet TAKE 2 TABLETS BY MOUTH EVERY  tablet 2   • fluticasone (FLONASE) 50 mcg/act nasal spray 1 spray into each nostril if needed     • gabapentin (Neurontin) 300 mg capsule Take 1 capsule (300 mg total) by mouth daily at bedtime 90 capsule 2   • losartan (COZAAR) 50 mg tablet TAKE 1 TABLET BY MOUTH EVERY DAY 90 tablet 0   • mometasone (ELOCON) 0.1 % cream      • pantoprazole (PROTONIX) 40 mg tablet TAKE 1 TABLET BY MOUTH EVERY DAY 90 tablet 1   • rivastigmine (EXELON) 4.6 mg/24 hr TD 24 hr patch Place 1 patch on the skin over 24 hours daily 90 patch 2   • testosterone (ANDROGEL) 1% Place 0.05 g on the skin     • True Metrix Blood Glucose Test test strip TESTING TWICE A DAY E 11.9     • Trulance 3 MG TABS TAKE 3 MG BY MOUTH IN THE MORNING 30 tablet 11   • umeclidinium-vilanterol (Anoro Ellipta) 62.5-25 MCG/INH inhaler Inhale 1 puff daily 60 blister 0     No current facility-administered medications for this visit. Allergies   Allergen Reactions   • Cimetidine    • Codeine    • Contrast  [Iodinated Contrast Media]    • Metrizamide    • Prednisone Other (See Comments)     ALL STEROIDS  COMBATIVE   • Simvastatin Other (See Comments)     ALL STATINS  MUSCLE CRAMPS   • Statins        Review of Systems   Constitutional:  Positive for appetite change and fatigue. Negative for activity change, chills, diaphoresis, fever and unexpected weight change. HENT:  Negative for congestion, dental problem, drooling, ear discharge, ear pain, facial swelling, hearing loss, mouth sores, nosebleeds, postnasal drip, rhinorrhea, sinus pressure, sinus pain, sneezing, sore throat, tinnitus, trouble swallowing and voice change. Eyes:  Negative for discharge and visual disturbance. Respiratory: Negative. Cardiovascular: Negative. Gastrointestinal:  Positive for constipation and diarrhea. Endocrine: Negative. Genitourinary: Negative. Musculoskeletal: Negative. Skin: Negative. Allergic/Immunologic: Negative. Hematological: Negative. Video Exam    There were no vitals filed for this visit. Physical Exam  Constitutional:       Appearance: Normal appearance. Pulmonary:      Effort: Pulmonary effort is normal.      Breath sounds: Normal breath sounds. Abdominal:      Palpations: Abdomen is soft. Skin:     General: Skin is warm. Neurological:      Mental Status: He is alert and oriented to person, place, and time.           Visit Time  Total Visit Duration: 15

## 2023-10-20 NOTE — ASSESSMENT & PLAN NOTE
Patient is weaning off the Wellbutrin and will continue for the next two weeks and then stop the 75 mg

## 2023-10-24 DIAGNOSIS — I10 PRIMARY HYPERTENSION: ICD-10-CM

## 2023-10-24 RX ORDER — LOSARTAN POTASSIUM 50 MG/1
TABLET ORAL
Qty: 90 TABLET | Refills: 0 | Status: SHIPPED | OUTPATIENT
Start: 2023-10-24

## 2023-10-26 ENCOUNTER — OFFICE VISIT (OUTPATIENT)
Dept: NEUROLOGY | Facility: CLINIC | Age: 68
End: 2023-10-26
Payer: MEDICARE

## 2023-10-26 VITALS
BODY MASS INDEX: 32.49 KG/M2 | SYSTOLIC BLOOD PRESSURE: 113 MMHG | DIASTOLIC BLOOD PRESSURE: 54 MMHG | WEIGHT: 220 LBS | HEART RATE: 64 BPM

## 2023-10-26 DIAGNOSIS — G31.83 LEWY BODY DEMENTIA WITHOUT BEHAVIORAL DISTURBANCE (HCC): Primary | ICD-10-CM

## 2023-10-26 DIAGNOSIS — G25.81 RESTLESS LEGS SYNDROME: ICD-10-CM

## 2023-10-26 DIAGNOSIS — F02.80 LEWY BODY DEMENTIA WITHOUT BEHAVIORAL DISTURBANCE (HCC): Primary | ICD-10-CM

## 2023-10-26 DIAGNOSIS — G47.52 REM BEHAVIORAL DISORDER: ICD-10-CM

## 2023-10-26 PROCEDURE — 99215 OFFICE O/P EST HI 40 MIN: CPT | Performed by: PSYCHIATRY & NEUROLOGY

## 2023-10-26 RX ORDER — BUPROPION HYDROCHLORIDE 75 MG/1
75 TABLET ORAL DAILY
COMMUNITY

## 2023-10-26 NOTE — PROGRESS NOTES
Patient ID: Daniel Conteh is a 79 y.o. male    Assessment/Plan:    REM behavioral disorder  Symptoms infrequent and not bothersome. Restless legs syndrome  Patient feels overall symptoms have reduced in frequency. They have lowered his gabapentin 201 g nightly. Lewy body dementia without behavioral disturbance (HCC)  Progressive cognitive decline consistent with dementia with rest tremor and bradykinesia noted at the onset of symptoms fluctuations in cognition. By history this is most consistent with dementia with Lewy body but he has never developed psychosis, hallucinations. He is always been examined on carbidopa/levodopa and there has been little progression in motor exam.  He is now questioning whether parkinsonian symptoms are secondary to medication side effect. They are working on tapering off Wellbutrin with the PCP. Agree with continue taper. The question whether the interaction between Wellbutrin and Lexapro could be the underlying cause of parkinsonian symptoms. At this point he has minimal parkinsonian symptoms on 600 mg of levodopa. Questions with guards to supportive testing and parkinsonism were answered. We discussed DaTscan is not recommended at this time given current medications vs skin biopsy testing for alpha synuclein. At this time is better to treat clinically. Given the it is not clear what underlying parkinsonian symptoms are present. We can slowly taper off carbidopa/levodopa to lowest effective dose. Continue rivastigmine  Continue taper off Wellbutrin. If anxiety worsens despite being on Lexapro perhaps switch to sertraline could be considered. Will reduce carbidopa/levodopa 25.100 1.5 tabs 3 times daily for 2 weeks and then if no worsening of tremor, stiffness, slowness, gait then reduce further to 1 tab 3 times daily. If no return in parkinsonian symptoms after 2 weeks we can further reduce to 1/2 tab 3 times daily for a week and then discontinue. If parkinsonian symptoms return during taper he is to remain on lowest effective dose. .      Diagnoses and all orders for this visit:    Lewy body dementia without behavioral disturbance ([address])  -     Ambulatory Referral to Physical Therapy; Future    REM behavioral disorder    Restless legs syndrome    Other orders  -     buPROPion (WELLBUTRIN) 75 mg tablet; Take 75 mg by mouth in the morning        I have spent a total time of 45 minutes on 10/26/23 in caring for this patient including Prognosis, Instructions for management, Patient and family education, Impressions, Counseling / Coordination of care, Documenting in the medical record, Reviewing / ordering tests, medicine, procedures  , and Obtaining or reviewing history  . Subjective:      Michael Hopkins is a 79year old with HTN, depression, RLS, DAFNE uses CPAP, and neuropathy who presents for movement follow up for dementia with parkinsonism, possible Lewy Body dementia. He follows with Dr. Angelia Durbin in  residency clinic but wishes to be seen in movement clinic yearly. To review, onset in 2015 with forgetfulness and depression (wife ill at time). He was diagnosed with Parkinson's disease in 2017 at [facility] and stopped working. Carbidopa/levodopa with little initial improvement more apparent in recent years with increase. PET scan 3/6/2020 that showed no decreased uptake compatible with Alzheimer's, FTD, or LBD. First seen 10/2021 in my office and reported history of onset with fluctuating cognition and parkinsonian symptoms diagnosis considered to be more likely dementia with Lewy Bodies. Donepezil led to headache. He was on galantamine 8mg ER qhs. Switched to rivastigmine patch 4.6md daily. .     Current relevant medications:   Rivastigmine patch 4.6mg daily (higher doses causes bradycardia)  Sinemet 25/100mg 2 tablets TID   Gabapentin 100mg qhs     Presents today with his 2 daughters with history.   They are questioning Wellbutrin in combination with Lexapro causing parkinsonism. He also had an enlarged liver. They are working with his PCP on weaning off Wellbutrin and they feel he is having less tremor. He continues on Lexapro 40mg daily. Now on 75mg daily. In looking back in prior notes although family states that he was started on Wellbutrin in 2017, this is not mentioned on his initial visit at [address] neuro or when first seen by Dr. Pravin Mccartney in our offices. It was also not noted in his PCP, Dr. Ambar Bynum note in 2018. It is clear that Lexapro has been increased and Wellbutrin added since then. Initial visits at [facility] and in [facility] to mentioned rest tremors and bradykinesia. He has occasional tremor in the right hand. Speech is gravely in the am at times. There is no drooling. No difficulty chewing and swallowing. Reduced appetitie. Dressing independently. Hygiene acts performed independently without difficulty. There is no difficulty arising out of chair. Ambulates without difficulty. Shuffling has improved. Sleeps well. RLS every once in awhile. He has leg movements in sleep. There is daytime sedation. May talk in sleep. There are occasional experiences with lightheadedness on standing too quickly. Cognition is unchanged. It tends to fluctuate. He has good long term memory. No hallucinations or illusion. He have increased anxiety since weaning off Welbutrin. Objective:    /54 (BP Location: Right arm, Patient Position: Standing, Cuff Size: Large)   Pulse 64   Wt 99.8 kg (220 lb)   BMI 32.49 kg/m²       Physical Exam  Vitals reviewed. Eyes:      Extraocular Movements: Extraocular movements intact. Pupils: Pupils are equal, round, and reactive to light. Neurological:      Mental Status: He is alert. Motor: Motor strength is normal.  Psychiatric:         Speech: Speech normal.         Neurological Exam  Mental Status  Alert.  Oriented only to person, place and situation. Speech is normal. Language is fluent with no aphasia. Attention and concentration are normal.    Cranial Nerves  CN III, IV, VI: Extraocular movements intact bilaterally. Pupils equal round and reactive to light bilaterally. CN VII: Full and symmetric facial movement. CN VIII: Hearing is normal.  CN IX, X: Palate elevates symmetrically  CN XI: Shoulder shrug strength is normal.  CN XII: Tongue midline without atrophy or fasciculations. Motor   Normal muscle tone. Strength is 5/5 throughout all four extremities. Coordination    See motor UPDRS. Gait    Good stride. No freezing. Good armswing. Subtle hand movements while walking. .       S UPDRS III                                10/26/23   Time since last dose:      Speech  0 0   Facial Expression  0 0   Rigidity - Neck  0 0   Rigidity - Upper Extremity (Right)  0 0   Rigidity - Upper Extremity (Left)   0 0   Rigidity - Lower Extremity (Right)  0 0   Rigidity - Lower Extremity (Left)   0 0   Finger Taps (Right)   1 1   Finger Taps (Left)   1 1   Hand Movement (Right)  0 0   Hand Movement (Left)   1 0   Pronation/Supination (Right)  1 0   Pronation/Supination (Left)   1 0   Toe Tapping (Right)   0   Toe Tapping (Left)   0   Leg Agility (Right)  0 0   Leg Agility (Left)   0 0   Arising from Chair   0 0   Gait   0 0   Freezing of Gait 0 0   Postural Stability   0 0   Posture 0 0   Global spontaneity of movement 0 0   Postural Tremor (Right) 1 0   Postural Tremor (Left) 1 1   Kinetic Tremor (Right)  0 0   Kinetic Tremor (Left)  0 0   Rest tremor amplitude RUE 0 0   Rest tremor amplitude LUE 0 0   Rest tremor amplitude RLE 0 0   Reset tremor amplitude LLE 0 0   Lip/Jaw Tremor  0 0   Consistency of tremor 0 0   Motor Exam Total:        Dyskinesia: none  Dystonia: none    Subtle movements of the hands when walking      Esau Jauregui MD  Movement disorder physician  [facility]

## 2023-10-26 NOTE — ASSESSMENT & PLAN NOTE
Patient feels overall symptoms have reduced in frequency. They have lowered his gabapentin 201 g nightly.

## 2023-10-26 NOTE — ASSESSMENT & PLAN NOTE
Progressive cognitive decline consistent with dementia with rest tremor and bradykinesia noted at the onset of symptoms fluctuations in cognition. By history this is most consistent with dementia with Lewy body but he has never developed psychosis, hallucinations. He is always been examined on carbidopa/levodopa and there has been little progression in motor exam.  He is now questioning whether parkinsonian symptoms are secondary to medication side effect. They are working on tapering off Wellbutrin with the PCP. Agree with continue taper. The question whether the interaction between Wellbutrin and Lexapro could be the underlying cause of parkinsonian symptoms. At this point he has minimal parkinsonian symptoms on 600 mg of levodopa. Questions with guards to supportive testing and parkinsonism were answered. We discussed DaTscan is not recommended at this time given current medications vs skin biopsy testing for alpha synuclein. At this time is better to treat clinically. Given the it is not clear what underlying parkinsonian symptoms are present. We can slowly taper off carbidopa/levodopa to lowest effective dose. Continue rivastigmine  Continue taper off Wellbutrin. If anxiety worsens despite being on Lexapro perhaps switch to sertraline could be considered. Will reduce carbidopa/levodopa 25.100 1.5 tabs 3 times daily for 2 weeks and then if no worsening of tremor, stiffness, slowness, gait then reduce further to 1 tab 3 times daily. If no return in parkinsonian symptoms after 2 weeks we can further reduce to 1/2 tab 3 times daily for a week and then discontinue. If parkinsonian symptoms return during taper he is to remain on lowest effective dose. Seferino Donovan

## 2023-11-16 DIAGNOSIS — G20.C PRIMARY PARKINSONISM: ICD-10-CM

## 2023-11-20 DIAGNOSIS — G31.83 LEWY BODY DEMENTIA WITHOUT BEHAVIORAL DISTURBANCE (HCC): ICD-10-CM

## 2023-11-20 DIAGNOSIS — F02.80 LEWY BODY DEMENTIA WITHOUT BEHAVIORAL DISTURBANCE (HCC): ICD-10-CM

## 2023-11-20 RX ORDER — RIVASTIGMINE 4.6 MG/24H
1 PATCH, EXTENDED RELEASE TRANSDERMAL DAILY
Qty: 90 PATCH | Refills: 2 | Status: SHIPPED | OUTPATIENT
Start: 2023-11-20

## 2023-11-20 NOTE — TELEPHONE ENCOUNTER
Spoke with daughter, Ryanne Oro. She will speak with pt's wife and send a message through [address] [place] [address] with his current dose of Sinemet. She also requested that this script be sent to [facility] and not [facility]. She is also requesting a script for Exelon patches to be sent to [facility]. Pt is going to be transferring to [facility] for all scripts. See other refill request for Exelon patches.

## 2023-11-20 NOTE — TELEPHONE ENCOUNTER
Received the following message through [employer]:    Everett Jorge   to [facility] [address] Team 4 (supporting Bran Hernandez MD)         11/20/23  1:15 PM  Hi, I spoke with one of the nurses today( about 12:30) I told her that I will get the information the doctor how much Carbidopa- levodopa is currently taking. He takes 1 and a half tablets by mouth 3x a day. Since he is been trying to wean off Wellbutrin, we continue this amount of Carbiodopa- levodopa  for a while. As per conversation per send a new script as soon as possible to [facility] pharmacy [phone]  Thank you   Khurram Puga ( [phone]         Pended the med and routed to the provider for review.

## 2023-11-20 NOTE — TELEPHONE ENCOUNTER
Spoke with pt's daughter, Dale Davidson. She requested that a script for Exelon Patches be sent to [facility]. She stated that pt will be transferring to [facility] instead of [facility]. She called [facility] to see if they could transfer the refills over to [employer], but [facility] stated that their systems are down. Last OV was 10/26/23. Is requesting a 90 day supply.

## 2023-11-20 NOTE — TELEPHONE ENCOUNTER
Lindy Alexander MD   to Neurology [facility] Clinical Team 4       11/20/23  4:01 AM  Please call patient to assess how much sinemet he is currently taking. Last visit there was discussion of tapering Sinemet . Did he try this and how does he feel?

## 2023-11-21 DIAGNOSIS — G20.C PRIMARY PARKINSONISM: ICD-10-CM

## 2023-12-04 DIAGNOSIS — G25.81 RESTLESS LEGS SYNDROME (RLS): ICD-10-CM

## 2023-12-04 DIAGNOSIS — E03.9 HYPOTHYROIDISM, UNSPECIFIED TYPE: ICD-10-CM

## 2023-12-04 DIAGNOSIS — F33.1 MODERATE EPISODE OF RECURRENT MAJOR DEPRESSIVE DISORDER (HCC): ICD-10-CM

## 2023-12-04 RX ORDER — LEVOTHYROXINE SODIUM 88 UG/1
88 TABLET ORAL
Qty: 90 TABLET | Refills: 0 | Status: SHIPPED | OUTPATIENT
Start: 2023-12-04 | End: 2024-06-01

## 2023-12-04 RX ORDER — GABAPENTIN 300 MG/1
300 CAPSULE ORAL
Qty: 90 CAPSULE | Refills: 2 | Status: SHIPPED | OUTPATIENT
Start: 2023-12-04

## 2023-12-04 RX ORDER — ESCITALOPRAM OXALATE 20 MG/1
TABLET ORAL
Qty: 180 TABLET | Refills: 2 | Status: SHIPPED | OUTPATIENT
Start: 2023-12-04 | End: 2023-12-05 | Stop reason: SDUPTHER

## 2023-12-05 ENCOUNTER — OFFICE VISIT (OUTPATIENT)
Dept: FAMILY MEDICINE CLINIC | Facility: CLINIC | Age: 68
End: 2023-12-05
Payer: MEDICARE

## 2023-12-05 VITALS
HEIGHT: 69 IN | TEMPERATURE: 98.2 F | OXYGEN SATURATION: 96 % | DIASTOLIC BLOOD PRESSURE: 73 MMHG | BODY MASS INDEX: 33.33 KG/M2 | WEIGHT: 225 LBS | HEART RATE: 51 BPM | SYSTOLIC BLOOD PRESSURE: 128 MMHG

## 2023-12-05 DIAGNOSIS — G44.039 EPISODIC PAROXYSMAL HEMICRANIA, NOT INTRACTABLE: ICD-10-CM

## 2023-12-05 DIAGNOSIS — G31.83 LEWY BODY DEMENTIA WITHOUT BEHAVIORAL DISTURBANCE (HCC): ICD-10-CM

## 2023-12-05 DIAGNOSIS — G20.C PRIMARY PARKINSONISM: Primary | ICD-10-CM

## 2023-12-05 DIAGNOSIS — J43.2 CENTRILOBULAR EMPHYSEMA (HCC): ICD-10-CM

## 2023-12-05 DIAGNOSIS — F02.80 LEWY BODY DEMENTIA WITHOUT BEHAVIORAL DISTURBANCE (HCC): ICD-10-CM

## 2023-12-05 DIAGNOSIS — R41.3 MEMORY IMPAIRMENT: ICD-10-CM

## 2023-12-05 DIAGNOSIS — F33.1 MODERATE EPISODE OF RECURRENT MAJOR DEPRESSIVE DISORDER (HCC): ICD-10-CM

## 2023-12-05 PROCEDURE — 99213 OFFICE O/P EST LOW 20 MIN: CPT | Performed by: NURSE PRACTITIONER

## 2023-12-05 RX ORDER — ESCITALOPRAM OXALATE 20 MG/1
20 TABLET ORAL DAILY
Qty: 30 TABLET | Refills: 2
Start: 2023-12-05 | End: 2024-01-04

## 2023-12-05 NOTE — PROGRESS NOTES
Name: Therese Teresa      : 1955      MRN: [id number]  Encounter Provider: VIVIANA Sousa  Encounter Date: 2023   Encounter department: [address] [place] [address]     1. Primary parkinsonism  -     Zinc; Future  -     Folate; Future    2. Moderate episode of recurrent major depressive disorder [facility])  Assessment & Plan: Will decrease the Lexapro to 20 mg and start the Zoloft 50 mg and then f/u in 4 weeks and plans to have recheck in 4 weeks and plan to stop the lexapro at that time and increase zoloft to 100 mg     Orders:  -     escitalopram (LEXAPRO) 20 mg tablet; Take 1 tablet (20 mg total) by mouth daily  -     sertraline (ZOLOFT) 50 mg tablet; Take 1 tablet (50 mg total) by mouth daily  -     Zinc; Future  -     Folate; Future    3. Lewy body dementia without behavioral disturbance (HCC)    4. Episodic paroxysmal hemicrania, not intractable    5. Memory impairment  -     Folate; Future    6. Centrilobular emphysema ([address])           Subjective      Patient here today for follow up and reports that he has weaned off the Wellbutrin and is feeling improvement in his memory short term she is currently on his carvidopa/levadopa one pill three times a day. Patient has been having some anxiety and agitation. Patient would like to wean off the lexapro as well and start an alternate and has been on Lexapro for a long time. Patient is open to trying the sertraline for his mood. Patient is also having issues with his lower legs and ls itching his legs lower legs a lot and thinking psoriasis and is appearing the same. Review of Systems   Constitutional:  Negative for activity change, appetite change, chills, diaphoresis, fatigue, fever and unexpected weight change. HENT:  Negative for congestion, ear pain, hearing loss, postnasal drip, sinus pressure, sinus pain, sneezing and sore throat. Eyes:  Negative for pain, redness and visual disturbance.    Respiratory: Negative for cough and shortness of breath. Cardiovascular:  Negative for chest pain and leg swelling. Gastrointestinal:  Negative for abdominal pain, diarrhea, nausea and vomiting. Endocrine: Negative. Genitourinary: Negative. Musculoskeletal:  Positive for arthralgias. Skin: Negative. Allergic/Immunologic: Negative. Neurological:  Negative for dizziness, light-headedness and headaches. Hematological:  Negative for adenopathy. Does not bruise/bleed easily. Psychiatric/Behavioral:  Positive for dysphoric mood. Negative for behavioral problems.         Current Outpatient Medications on File Prior to Visit   Medication Sig   • carbidopa-levodopa (SINEMET)  mg per tablet Take 1.5 tablets by mouth 4 (four) times a day   • fluticasone (FLONASE) 50 mcg/act nasal spray 1 spray into each nostril if needed   • gabapentin (NEURONTIN) 300 mg capsule TAKE 1 CAPSULE BY MOUTH AT BEDTIME   • levothyroxine 88 mcg tablet Take 1 tablet (88 mcg total) by mouth daily in the early morning   • losartan (COZAAR) 50 mg tablet TAKE 1 TABLET BY MOUTH EVERY DAY   • mometasone (ELOCON) 0.1 % cream    • rivastigmine (EXELON) 4.6 mg/24 hr TD 24 hr patch Place 1 patch on the skin over 24 hours daily   • testosterone (ANDROGEL) 1% Place 0.05 g on the skin   • True Metrix Blood Glucose Test test strip TESTING TWICE A DAY E 11.9   • Trulance 3 MG TABS TAKE 3 MG BY MOUTH IN THE MORNING   • umeclidinium-vilanterol (Anoro Ellipta) 62.5-25 MCG/INH inhaler Inhale 1 puff daily   • [DISCONTINUED] buPROPion (WELLBUTRIN) 75 mg tablet Take 75 mg by mouth in the morning   • [DISCONTINUED] escitalopram (LEXAPRO) 20 mg tablet TAKE 2 TABLETS BY MOUTH EVERY DAY   • [DISCONTINUED] pantoprazole (PROTONIX) 40 mg tablet TAKE 1 TABLET BY MOUTH EVERY DAY       Objective     /73   Pulse (!) 51   Temp 98.2 °F (36.8 °C)   Ht 5' 9" (1.753 m)   Wt 102 kg (225 lb)   SpO2 96%   BMI 33.23 kg/m²     Physical Exam  Constitutional: General: He is not in acute distress. Appearance: He is well-developed. HENT:      Head: Normocephalic and atraumatic. Right Ear: Tympanic membrane normal.      Left Ear: Tympanic membrane normal.      Nose: Nose normal.      Mouth/Throat:      Mouth: Mucous membranes are moist.   Eyes:      Pupils: Pupils are equal, round, and reactive to light. Neck:      Thyroid: No thyromegaly. Cardiovascular:      Rate and Rhythm: Normal rate and regular rhythm. Heart sounds: Normal heart sounds. No murmur heard. Pulmonary:      Effort: Pulmonary effort is normal. No respiratory distress. Breath sounds: Normal breath sounds. No wheezing. Abdominal:      General: Bowel sounds are normal.      Palpations: Abdomen is soft. Musculoskeletal:         General: Normal range of motion. Cervical back: Normal range of motion. Right lower leg: No edema. Left lower leg: No edema. Skin:     General: Skin is warm and dry. Neurological:      General: No focal deficit present. Mental Status: He is alert and oriented to person, place, and time. GCS: GCS eye subscore is 4. GCS verbal subscore is 5. GCS motor subscore is 6. Psychiatric:         Attention and Perception: Attention normal.         Mood and Affect: Mood normal.         Speech: Speech normal.         Behavior: Behavior normal. Behavior is cooperative. Thought Content:  Thought content normal.         Cognition and Memory: Cognition normal.       PHQ-2/9 Depression Screening    Little interest or pleasure in doing things: 2 - more than half the days  Feeling down, depressed, or hopeless: 1 - several days  Trouble falling or staying asleep, or sleeping too much: 1 - several days  Feeling tired or having little energy: 2 - more than half the days  Poor appetite or overeatin - more than half the days  Feeling bad about yourself - or that you are a failure or have let yourself or your family down: 1 - several days  Trouble concentrating on things, such as reading the newspaper or watching television: 1 - several days  Moving or speaking so slowly that other people could have noticed.  Or the opposite - being so fidgety or restless that you have been moving around a lot more than usual: 1 - several days  Thoughts that you would be better off dead, or of hurting yourself in some way: 0 - not at all  PHQ-9 Score: 11   PHQ-9 Interpretation: Moderate depression         VIVIANA Cotton

## 2023-12-05 NOTE — ASSESSMENT & PLAN NOTE
Will decrease the Lexapro to 20 mg and start the Zoloft 50 mg and then f/u in 4 weeks and plans to have recheck in 4 weeks and plan to stop the lexapro at that time and increase zoloft to 100 mg

## 2023-12-18 ENCOUNTER — TELEPHONE (OUTPATIENT)
Dept: NEUROLOGY | Facility: CLINIC | Age: 68
End: 2023-12-18

## 2023-12-18 DIAGNOSIS — G20.C PRIMARY PARKINSONISM: ICD-10-CM

## 2023-12-18 NOTE — TELEPHONE ENCOUNTER
PA is needed for   rivastigmine (EXELON) 4.6 mg/24 hr TD 24 hr patch [[id number]]   Please assist. Pt got letter in the mail.   Thank you.

## 2023-12-19 ENCOUNTER — PATIENT MESSAGE (OUTPATIENT)
Dept: NEUROLOGY | Facility: CLINIC | Age: 68
End: 2023-12-19

## 2023-12-19 NOTE — TELEPHONE ENCOUNTER
Called [facility]. Per the pharmacist, the patches do not need a PA at this time. Pt picked up a 90 day supply in November. Called pt and spoke with his daughter. She stated pt received a letter in the mail that states the prior auth is expiring as of 1/1/24. She will be sending a copy of his current pharmacy insurance card via [employer], so the PA can be done.

## 2023-12-22 NOTE — TELEPHONE ENCOUNTER
Called [facility] for prescription insurance information.  ID# [id number]  [facility] [address] [id number]  Group [id number]

## 2023-12-28 NOTE — TELEPHONE ENCOUNTER
"Determination via [facility] states \"this request has been cancelled for the following reason: Provider does not accept the ePA terms and conditions disclaimer.  May have to call plan.  "

## 2023-12-29 ENCOUNTER — RA CDI HCC (OUTPATIENT)
Dept: OTHER | Facility: HOSPITAL | Age: 68
End: 2023-12-29

## 2024-01-03 DIAGNOSIS — F33.1 MODERATE EPISODE OF RECURRENT MAJOR DEPRESSIVE DISORDER (HCC): ICD-10-CM

## 2024-01-05 ENCOUNTER — TELEMEDICINE (OUTPATIENT)
Dept: FAMILY MEDICINE CLINIC | Facility: CLINIC | Age: 69
End: 2024-01-05
Payer: MEDICARE

## 2024-01-05 DIAGNOSIS — F33.1 MODERATE EPISODE OF RECURRENT MAJOR DEPRESSIVE DISORDER (HCC): ICD-10-CM

## 2024-01-05 PROCEDURE — 99213 OFFICE O/P EST LOW 20 MIN: CPT | Performed by: NURSE PRACTITIONER

## 2024-01-05 RX ORDER — SERTRALINE HYDROCHLORIDE 100 MG/1
100 TABLET, FILM COATED ORAL DAILY
Qty: 30 TABLET | Refills: 2 | Status: SHIPPED | OUTPATIENT
Start: 2024-01-05 | End: 2024-04-04

## 2024-01-05 NOTE — PROGRESS NOTES
Virtual Regular Visit    Verification of patient location:    Patient is located at Home in the following state in which I hold an active license [place]      Assessment/Plan:    Problem List Items Addressed This Visit        Other    Moderate episode of recurrent major depressive disorder (HCC)     Patient is open to increasing his Sertraline 100 mg sent and will f/u in 4 weeks for a recheck          Relevant Medications    sertraline (ZOLOFT) 100 mg tablet            Reason for visit is   Chief Complaint   Patient presents with   • Virtual Brief Visit     1 month follow up   • Virtual Regular Visit        Encounter provider VIVIANA Crawley    Provider located at [facility]  [address]  [facility] [id number]      Recent Visits  No visits were found meeting these conditions.  Showing recent visits within past 7 days and meeting all other requirements  Today's Visits  Date Type Provider Dept   01/05/24 Telemedicine VIVIANA Crawley [facility]   Showing today's visits and meeting all other requirements  Future Appointments  No visits were found meeting these conditions.  Showing future appointments within next 150 days and meeting all other requirements       The patient was identified by name and date of birth. Wilman Redding was informed that this is a telemedicine visit and that the visit is being conducted through the Epic Embedded platform. He agrees to proceed..  My office door was closed. No one else was in the room.  He acknowledged consent and understanding of privacy and security of the video platform. The patient has agreed to participate and understands they can discontinue the visit at any time.    Patient is aware this is a billable service.     Subjective  Wilman Redding is a 68 y.o. male  .      Patient calling today regarding that he has stopped the Wellbutrin, Lexapro, and the carvidopa and is currently on the Zoloft and is  working and is not having any side effects from the medication working to treat the depression and anxiety and had been working well but then stopped working as well          Past Medical History:   Diagnosis Date   • Alopecia    • Arthritis    • Back pain    • Benign essential hypertension    • Chronic obstructive pulmonary disease (COPD) (HCC)    • Depression    • Diabetes mellitus (HCC)    • Emphysema, interstitial (HCC)    • Fatigue    • Hypercholesterolemia    • Hypersomnia, persistent    • Hypertension    • Hypothyroidism    • Low testosterone    • Memory loss    • Oral candidiasis 05/11/2020   • Sinusitis    • Thyroid goiter        Past Surgical History:   Procedure Laterality Date   • ABDOMINAL SURGERY  1989    Umbilical herina repair   • ADENOIDECTOMY     • APPENDECTOMY     • CHOLECYSTECTOMY  2013   • HERNIA REPAIR     • TONSILLECTOMY         Current Outpatient Medications   Medication Sig Dispense Refill   • sertraline (ZOLOFT) 100 mg tablet Take 1 tablet (100 mg total) by mouth daily 30 tablet 2   • fluticasone (FLONASE) 50 mcg/act nasal spray 1 spray into each nostril if needed     • gabapentin (NEURONTIN) 300 mg capsule TAKE 1 CAPSULE BY MOUTH AT BEDTIME 90 capsule 2   • levothyroxine 88 mcg tablet Take 1 tablet (88 mcg total) by mouth daily in the early morning 90 tablet 0   • losartan (COZAAR) 50 mg tablet TAKE 1 TABLET BY MOUTH EVERY DAY 90 tablet 0   • mometasone (ELOCON) 0.1 % cream      • rivastigmine (EXELON) 4.6 mg/24 hr TD 24 hr patch Place 1 patch on the skin over 24 hours daily 90 patch 2   • testosterone (ANDROGEL) 1% Place 0.05 g on the skin     • True Metrix Blood Glucose Test test strip TESTING TWICE A DAY E 11.9     • Trulance 3 MG TABS TAKE 3 MG BY MOUTH IN THE MORNING 30 tablet 1   • umeclidinium-vilanterol (Anoro Ellipta) 62.5-25 MCG/INH inhaler Inhale 1 puff daily 60 blister 0     No current facility-administered medications for this visit.        Allergies   Allergen Reactions   •  Cimetidine    • Codeine    • Contrast  [Iodinated Contrast Media]    • Metrizamide    • Prednisone Other (See Comments)     ALL STEROIDS  COMBATIVE   • Simvastatin Other (See Comments)     ALL STATINS  MUSCLE CRAMPS   • Statins        Review of Systems   Constitutional:  Negative for activity change, appetite change, chills, diaphoresis, fatigue, fever and unexpected weight change.   HENT:  Negative for congestion, ear pain, hearing loss, postnasal drip, sinus pressure, sinus pain, sneezing and sore throat.    Eyes:  Negative for pain, redness and visual disturbance.   Respiratory:  Negative for cough and shortness of breath.    Cardiovascular:  Negative for chest pain and leg swelling.   Gastrointestinal:  Negative for abdominal pain, diarrhea, nausea and vomiting.   Endocrine: Negative.    Genitourinary: Negative.    Musculoskeletal:  Negative for arthralgias.   Allergic/Immunologic: Negative.    Neurological:  Negative for dizziness and light-headedness.   Hematological: Negative.    Psychiatric/Behavioral:  Positive for dysphoric mood. Negative for behavioral problems. The patient is nervous/anxious.        Video Exam    There were no vitals filed for this visit.    Physical Exam  Constitutional:       Appearance: Normal appearance.   Pulmonary:      Effort: Pulmonary effort is normal.   Abdominal:      Palpations: Abdomen is soft.   Neurological:      Mental Status: He is alert and oriented to person, place, and time.   Psychiatric:         Mood and Affect: Mood normal.         Behavior: Behavior normal.         Thought Content: Thought content normal.         Judgment: Judgment normal.        PHQ-2/9 Depression Screening    Little interest or pleasure in doing things: 2 - more than half the days  Feeling down, depressed, or hopeless: 1 - several days  Trouble falling or staying asleep, or sleeping too much: 1 - several days  Feeling tired or having little energy: 2 - more than half the days  Poor appetite or  overeatin - more than half the days  Feeling bad about yourself - or that you are a failure or have let yourself or your family down: 1 - several days  Trouble concentrating on things, such as reading the newspaper or watching television: 1 - several days  Moving or speaking so slowly that other people could have noticed. Or the opposite - being so fidgety or restless that you have been moving around a lot more than usual: 1 - several days  Thoughts that you would be better off dead, or of hurting yourself in some way: 0 - not at all  PHQ-9 Score: 11  PHQ-9 Interpretation: Moderate depression          Visit Time  Total Visit Duration: 15

## 2024-01-31 DIAGNOSIS — F33.1 MODERATE EPISODE OF RECURRENT MAJOR DEPRESSIVE DISORDER (HCC): ICD-10-CM

## 2024-01-31 RX ORDER — SERTRALINE HYDROCHLORIDE 100 MG/1
150 TABLET, FILM COATED ORAL DAILY
Qty: 135 TABLET | Refills: 1 | Status: SHIPPED | OUTPATIENT
Start: 2024-01-31 | End: 2024-07-29

## 2024-02-07 DIAGNOSIS — I10 PRIMARY HYPERTENSION: ICD-10-CM

## 2024-02-07 RX ORDER — LOSARTAN POTASSIUM 50 MG/1
TABLET ORAL
Qty: 90 TABLET | Refills: 0 | Status: SHIPPED | OUTPATIENT
Start: 2024-02-07

## 2024-03-07 ENCOUNTER — TELEPHONE (OUTPATIENT)
Dept: NEUROLOGY | Facility: CLINIC | Age: 69
End: 2024-03-07

## 2024-03-11 DIAGNOSIS — F33.1 MODERATE EPISODE OF RECURRENT MAJOR DEPRESSIVE DISORDER (HCC): Primary | ICD-10-CM

## 2024-03-11 RX ORDER — SERTRALINE HYDROCHLORIDE 100 MG/1
200 TABLET, FILM COATED ORAL DAILY
Qty: 60 TABLET | Refills: 2 | Status: SHIPPED | OUTPATIENT
Start: 2024-03-11 | End: 2024-06-09

## 2024-03-13 ENCOUNTER — TELEPHONE (OUTPATIENT)
Dept: FAMILY MEDICINE CLINIC | Facility: CLINIC | Age: 69
End: 2024-03-13

## 2024-03-13 NOTE — TELEPHONE ENCOUNTER
"I attempted to send order in, based off most recent endo appointment notes. [facility]  \"Documentation not supported. Continuous glucose monitoring is justified for patients who are currently on insulin or have history of problematic hypoglycemia with documentation.\"  "

## 2024-03-13 NOTE — TELEPHONE ENCOUNTER
3/13/2024 spoke with Lisa and she asked if you would be able to order a Dexcom G7 . She said that he went to endo and his sugars were not good. She said that he only see endo once yearly. He sees you every 3-4 months and because of his dementia he forget to check his sugars.

## 2024-03-14 ENCOUNTER — OFFICE VISIT (OUTPATIENT)
Dept: NEUROLOGY | Facility: CLINIC | Age: 69
End: 2024-03-14
Payer: MEDICARE

## 2024-03-14 VITALS
SYSTOLIC BLOOD PRESSURE: 110 MMHG | HEIGHT: 69 IN | BODY MASS INDEX: 33.62 KG/M2 | HEART RATE: 57 BPM | DIASTOLIC BLOOD PRESSURE: 70 MMHG | WEIGHT: 227 LBS

## 2024-03-14 DIAGNOSIS — G20.C PARKINSONISM: Primary | ICD-10-CM

## 2024-03-14 PROBLEM — E53.8 B12 DEFICIENCY: Status: ACTIVE | Noted: 2024-03-14

## 2024-03-14 PROCEDURE — 99215 OFFICE O/P EST HI 40 MIN: CPT | Performed by: PSYCHIATRY & NEUROLOGY

## 2024-03-14 PROCEDURE — G2211 COMPLEX E/M VISIT ADD ON: HCPCS | Performed by: PSYCHIATRY & NEUROLOGY

## 2024-03-14 NOTE — ASSESSMENT & PLAN NOTE
Patient's B12 is low at 211. No focal sensory deficits seen on this visit but with transient paresthesias, B12 supplementation would not hurt. Recommend patient following with PCP concerning B12 supplementation.

## 2024-03-14 NOTE — PATIENT INSTRUCTIONS
Ok to continue weaning off rivastigmine patch    Recommend Vitamin B12 supplementation; Talk with primary care doctor    Followup in 6 months    Followup with Dr. Trevino or her Advanced Practitioners on next appointment

## 2024-03-14 NOTE — ASSESSMENT & PLAN NOTE
Patient is a 69 year old man with hx of HTN, depression, RLS, DAFNE using CPAP, and neuropathy presenting for Parkinsonism. Patient's tremors, rigidity, bradykinesia , cognition, gait have significantly improved after tapering off rivastigmine and now titrating down Welbutrin. Patient not even on sinemet. Patient last saw Dr. Trevino 10/26/2023 who titrated down the sinemet and did not recommend Yogi scan at that time. Otherwise, no new numbness (besides legs falling asleep during night time), no new weakness, no new visual deficits. No cogwheel rigidity, no bradykinesia, no en bloc turning abnormalities on exam.    Plan:  Ok with continuing to downtitrate welbutrin provided no withdrawal side effects  Off sinemet, off welbutrin  Recommend patient followup with Dr. Trevino  Family and patient strongly would like to followup with Neurology resident clinic  Patient to followup in 6 months  Family and Patient agreeable to above

## 2024-03-14 NOTE — PROGRESS NOTES
Patient ID: Wilman Redding is a 69 y.o. male.    Assessment/Plan:    Parkinsonism  Patient is a 69 year old man with hx of HTN, depression, RLS, DAFNE using CPAP, and neuropathy presenting for Parkinsonism. Patient's tremors, rigidity, bradykinesia , cognition, gait have significantly improved after tapering off rivastigmine and now titrating down Welbutrin. Patient not even on sinemet. Patient last saw Dr. Trevino 10/26/2023 who titrated down the sinemet and did not recommend Yogi scan at that time. Otherwise, no new numbness (besides legs falling asleep during night time), no new weakness, no new visual deficits. No cogwheel rigidity, no bradykinesia, no en bloc turning abnormalities on exam.    Plan:  Ok with continuing to downtitrate welbutrin provided no withdrawal side effects  Off sinemet, off welbutrin  Recommend patient followup with Dr. Trevino  Family and patient strongly would like to followup with Neurology resident clinic  Patient to followup in 6 months  Family and Patient agreeable to above    B12 deficiency  Patient's B12 is low at 211. No focal sensory deficits seen on this visit but with transient paresthesias, B12 supplementation would not hurt. Recommend patient following with PCP concerning B12 supplementation.       Diagnoses and all orders for this visit:    Parkinsonism           Subjective:    Patient is a 69 year old true with hx of HTN, depression, RLS, DAFNE uses CPAP, and neuropathy presenting for Lewy Body Dementia. Patient weaning off exelon patch at 4.6mg and patient is off the welbutrin and patient is improved in appetite, cognition, parkinsonian movements, fatigue. Better fine tuned movements. Currently denying tremors, bradykinesia, difficulty with en bloc turning, rigidity. No numbness (except patient's feet feels like falling asleep at bedtime), no weakness, no new ambulatory dysfunction. Patient states having B12 being low at 211.    Seen by Dr. Trevino 10/26/2023 who reduced sinemet to 1.5  "tabs 3 times daily and then reduce to 1 tab 3 times daily if no significant symptoms. She did not recommend Yogi Scan at the time.      Seen by me:  \"Patient is doing well since last visit. Patient is doing physical and occupational therapy 2 times a week. Patient is doing exelon patch which is helping with his memory. Patient goes outside to walk dog for exercise. Tremors have been lot less. No change in movement problems in bradykinesia, balance and this seems to be much more improved with physical and occupational therapy. Patient continues to take sinemet 2 tabs TID and no side effects with the medication and only having mild constipation. Gabapentin has been very helpful with RLS. Patient have had weight loss to 210 lbs which the PCP is adjusting with levothyroxine. Patient following up with hematology due to low iron levels. Appetite level and weight loss started since a year ago in December. No falls since last visit.         From Previous visits:  Patient was last seen by [facility] in 8/26/2020 and was noted that cognition declined with short temper. PET scan showed no decrease in uptake compatible with Alzheimer's , FTD, or LBD. Patient was on sinemet  1.5TID. Patient previous movement specialist soctor was Dr. Plascencia and MOCA on 2/2020 was 23/30 tested by [facility].     Patient had worsening right hand tremors mostly at rest or when distracted. Also noted slower but no shuffling gait, rigidicty with movment, No difficulty turning around. Patient has times when he dropped objects. There was also concern for mood problems, with short temper, and frustration. Also increased restless legs in bed. Sinemet dosing was then increased to 2 tablet TID and recommended patient discuss adjustments of mood stabilizers concerning sertraline to help with mood     Patient then on 5/5/2022, patient was feeling more unbalanced and falling out of bed, more disoriented at night coming out of the bathroom. Tremors of " "hands bilaterally which were improved but still affecting daily life. Staring spells which were not likely seizures and most likely due to COVID. Sinemet continued at this time. Gabapentin trialed for RLS.     9/1/2022 Patient stable and needed to be placed on galantamine as patient did well with the medication and continue Sinemet at that time. Gabapentin continued for RLS due to continued benefit.\"             The following portions of the patient's history were reviewed and updated as appropriate: He  has a past medical history of Alopecia, Arthritis, Back pain, Benign essential hypertension, Chronic obstructive pulmonary disease (COPD) ([facility]), Depression, Diabetes mellitus ([facility]), Emphysema, interstitial ([facility]), Fatigue, Hypercholesterolemia, Hypersomnia, persistent, Hypertension, Hypothyroidism, Low testosterone, Memory loss, Oral candidiasis (05/11/2020), Sinusitis, and Thyroid goiter.  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[facility]) 01/19/2023    Decreased appetite 10/26/2022    Moderate episode of recurrent major depressive disorder (HCC) 06/03/2022    Disturbance of smell 06/03/2022    REM behavioral disorder 10/20/2021    Restless legs syndrome 07/15/2021    Constipation, chronic 04/12/2021    Lewy body dementia without behavioral disturbance (HCC) 12/05/2018    Pulmonary nodule 10/03/2018    Balance problem 09/26/2018    Neuropathy 09/26/2018    Memory impairment 08/24/2018    Episodic paroxysmal hemicrania, 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    Abnormality of gait due to impairment of balance 08/24/2018    Former smoker 06/14/2018    Centrilobular emphysema (HCC) 04/06/2018    DAFNE (obstructive sleep apnea) 04/06/2018    Low testosterone 06/29/2017    Psoriasis 04/13/2016    Goiter, nontoxic simple 06/22/2015    " Depression 06/28/2013    Hypercholesterolemia 06/27/2013    Hypertension 06/27/2013    Hypothyroidism 06/27/2013    Diabetes mellitus without complication (HCC) 07/29/2010     He  has a past surgical history that includes Appendectomy; Hernia repair; Tonsillectomy; ADENOIDECTOMY; Abdominal surgery (1989); and Cholecystectomy (2013).  His family history includes Aneurysm in his father; Hashimoto's thyroiditis in his daughter; Hypertension in his father; Hypothyroidism in his daughter; Lupus in his mother.  He  reports that he has quit smoking. His smoking use included cigarettes. He has a 20 pack-year smoking history. He has never used smokeless tobacco. He reports that he does not drink alcohol and does not use drugs.  Current Outpatient Medications   Medication Sig Dispense Refill    fluticasone (FLONASE) 50 mcg/act nasal spray 1 spray into each nostril if needed      gabapentin (NEURONTIN) 300 mg capsule TAKE 1 CAPSULE BY MOUTH AT BEDTIME 90 capsule 2    levothyroxine 88 mcg tablet Take 1 tablet (88 mcg total) by mouth daily in the early morning 90 tablet 0    losartan (COZAAR) 50 mg tablet TAKE 1 TABLET BY MOUTH EVERY DAY 90 tablet 0    mometasone (ELOCON) 0.1 % cream       sertraline (ZOLOFT) 100 mg tablet Take 2 tablets (200 mg total) by mouth daily 60 tablet 2    testosterone (ANDROGEL) 1% Place 0.05 g on the skin      True Metrix Blood Glucose Test test strip TESTING TWICE A DAY E 11.9      Trulance 3 MG TABS TAKE 3 MG BY MOUTH IN THE MORNING 30 tablet 1    rivastigmine (EXELON) 4.6 mg/24 hr TD 24 hr patch Place 1 patch on the skin over 24 hours daily 90 patch 2    umeclidinium-vilanterol (Anoro Ellipta) 62.5-25 MCG/INH inhaler Inhale 1 puff daily 60 blister 0     No current facility-administered medications for this visit.     Current Outpatient Medications on File Prior to Visit   Medication Sig    fluticasone (FLONASE) 50 mcg/act nasal spray 1 spray into each nostril if needed    gabapentin (NEURONTIN) 300  "mg capsule TAKE 1 CAPSULE BY MOUTH AT BEDTIME    levothyroxine 88 mcg tablet Take 1 tablet (88 mcg total) by mouth daily in the early morning    losartan (COZAAR) 50 mg tablet TAKE 1 TABLET BY MOUTH EVERY DAY    mometasone (ELOCON) 0.1 % cream     sertraline (ZOLOFT) 100 mg tablet Take 2 tablets (200 mg total) by mouth daily    testosterone (ANDROGEL) 1% Place 0.05 g on the skin    True Metrix Blood Glucose Test test strip TESTING TWICE A DAY E 11.9    Trulance 3 MG TABS TAKE 3 MG BY MOUTH IN THE MORNING    rivastigmine (EXELON) 4.6 mg/24 hr TD 24 hr patch Place 1 patch on the skin over 24 hours daily    umeclidinium-vilanterol (Anoro Ellipta) 62.5-25 MCG/INH inhaler Inhale 1 puff daily     No current facility-administered medications on file prior to visit.     He is allergic to cimetidine, codeine, contrast  [iodinated contrast media], metrizamide, prednisone, simvastatin, and statins..         Objective:    Blood pressure 110/70, pulse 57, height 5' 9\" (1.753 m), weight 103 kg (227 lb).    Physical Exam  Eyes:      Extraocular Movements: Extraocular movements intact.   Neurological:      Motor: Motor strength is normal.     Deep Tendon Reflexes: Reflexes are normal and symmetric.   Psychiatric:         Speech: Speech normal.         Neurological Exam  Mental Status  Awake, alert and oriented to person, place and time. Speech is normal. Language is fluent with no aphasia. Attention and concentration are normal.    Cranial Nerves  CN II: Visual fields full to confrontation.  CN III, IV, VI: Extraocular movements intact bilaterally.  CN V: Facial sensation is normal.  CN VII: Full and symmetric facial movement.  CN XI: Shoulder shrug strength is normal.  CN XII: Tongue midline without atrophy or fasciculations.    Motor   No abnormal involuntary movements. Strength is 5/5 throughout all four extremities.    Sensory  Light touch is normal in upper and lower extremities.     Reflexes  Deep tendon reflexes are 2+ and " symmetric in all four extremities.    Coordination  Right: Finger-to-nose normal.Left: Finger-to-nose normal.  No cogwheel rigidity on exam bilaterally on contralateral activation    Negative pull test    Normal en bloc turning.    Gait  Casual gait is normal including stance, stride, and arm swing.        ROS:    Review of Systems   Constitutional:  Negative for fatigue.   HENT:  Negative for sinus pressure.    Eyes:  Negative for photophobia.   Respiratory:  Negative for chest tightness and shortness of breath.    Cardiovascular:  Negative for chest pain.   Gastrointestinal:  Negative for nausea.   Skin:  Negative for pallor.   Neurological:  Negative for tremors and facial asymmetry.   Psychiatric/Behavioral:  Negative for behavioral problems.

## 2024-03-18 ENCOUNTER — RA CDI HCC (OUTPATIENT)
Dept: OTHER | Facility: HOSPITAL | Age: 69
End: 2024-03-18

## 2024-03-18 NOTE — PROGRESS NOTES
HCC coding opportunities          Chart Reviewed number of suggestions sent to Provider: 1     Patients Insurance   E11.40   Medicare Insurance: Medicare

## 2024-03-22 ENCOUNTER — TELEMEDICINE (OUTPATIENT)
Dept: FAMILY MEDICINE CLINIC | Facility: CLINIC | Age: 69
End: 2024-03-22
Payer: MEDICARE

## 2024-03-22 DIAGNOSIS — F33.1 MODERATE EPISODE OF RECURRENT MAJOR DEPRESSIVE DISORDER (HCC): ICD-10-CM

## 2024-03-22 DIAGNOSIS — F02.80 LEWY BODY DEMENTIA WITHOUT BEHAVIORAL DISTURBANCE (HCC): ICD-10-CM

## 2024-03-22 DIAGNOSIS — K90.9 INTESTINAL MALABSORPTION, UNSPECIFIED TYPE: ICD-10-CM

## 2024-03-22 DIAGNOSIS — E16.2 HYPOGLYCEMIC SYNDROME: ICD-10-CM

## 2024-03-22 DIAGNOSIS — I10 PRIMARY HYPERTENSION: ICD-10-CM

## 2024-03-22 DIAGNOSIS — E03.9 HYPOTHYROIDISM, UNSPECIFIED TYPE: Primary | ICD-10-CM

## 2024-03-22 DIAGNOSIS — E11.9 DIABETES MELLITUS WITHOUT COMPLICATION (HCC): ICD-10-CM

## 2024-03-22 DIAGNOSIS — G31.83 LEWY BODY DEMENTIA WITHOUT BEHAVIORAL DISTURBANCE (HCC): ICD-10-CM

## 2024-03-22 DIAGNOSIS — R26.89 ABNORMALITY OF GAIT DUE TO IMPAIRMENT OF BALANCE: ICD-10-CM

## 2024-03-22 DIAGNOSIS — E53.8 B12 DEFICIENCY: ICD-10-CM

## 2024-03-22 PROCEDURE — G2211 COMPLEX E/M VISIT ADD ON: HCPCS | Performed by: NURSE PRACTITIONER

## 2024-03-22 PROCEDURE — 99214 OFFICE O/P EST MOD 30 MIN: CPT | Performed by: NURSE PRACTITIONER

## 2024-03-22 RX ORDER — CYANOCOBALAMIN 1000 UG/ML
INJECTION, SOLUTION INTRAMUSCULAR; SUBCUTANEOUS WEEKLY
Qty: 25 ML | Refills: 1 | Status: SHIPPED | OUTPATIENT
Start: 2024-03-22 | End: 2024-06-20

## 2024-03-22 NOTE — ASSESSMENT & PLAN NOTE
Lab Results   Component Value Date    HGBA1C 7.1 (H) 03/09/2024   Patient and family are concerned that he is having issues with hypoglycemia and has been noticing that his sugars are 60 and he is having symptoms is not currently on medications for diabetes will order Dexcom

## 2024-03-22 NOTE — PROGRESS NOTES
"Virtual Regular Visit    Verification of patient location:    Patient is located at Home in the following state in which I hold an active license [place]      Assessment/Plan:    Problem List Items Addressed This Visit        Cardiovascular and Mediastinum    Hypertension     BP is well controlled             Endocrine    Diabetes mellitus without complication (HCC)       Lab Results   Component Value Date    HGBA1C 7.1 (H) 03/09/2024   Patient and family are concerned that he is having issues with hypoglycemia and has been noticing that his sugars are 60 and he is having symptoms is not currently on medications for diabetes will order Dexcom          Hypothyroidism - Primary    Relevant Orders    TSH, 3rd generation with Free T4 reflex       Nervous and Auditory    Lewy body dementia without behavioral disturbance (HCC)     Weaning off the Exelon patch             Behavioral Health    Moderate episode of recurrent major depressive disorder (HCC)     Sertraline 200 mg working well             Care Coordination    Abnormality of gait due to impairment of balance     No recent falls             Other    B12 deficiency    Relevant Medications    cyanocobalamin 1,000 mcg/mL    Syringe/Needle, Disp, 22G X 1-1/2\" 1 ML MISC    Hypoglycemic syndrome   Other Visit Diagnoses     Intestinal malabsorption, unspecified type        Relevant Orders    Copper Level    Zinc               Reason for visit is   Chief Complaint   Patient presents with   • Virtual Regular Visit        Encounter provider VIVIANA Crawley    Provider located at [facility]  [address]  [facility] [id number]      Recent Visits  No visits were found meeting these conditions.  Showing recent visits within past 7 days and meeting all other requirements  Today's Visits  Date Type Provider Dept   03/22/24 Telemedicine VIVIANA Crawley [facility]   Showing today's visits and meeting all " other requirements  Future Appointments  No visits were found meeting these conditions.  Showing future appointments within next 150 days and meeting all other requirements       The patient was identified by name and date of birth. Wilman Redding was informed that this is a telemedicine visit and that the visit is being conducted through the Epic Embedded platform. He agrees to proceed..  My office door was closed. No one else was in the room.  He acknowledged consent and understanding of privacy and security of the video platform. The patient has agreed to participate and understands they can discontinue the visit at any time.    Patient is aware this is a billable service.     Subjective  Wilman Redding is a 69 y.o. male  .      Patient here today for his check up and reports that last week they were at the endocrine and asking about a Dexcom for Dad to evaluate for hypoglycemic episodes and having some irritability lightheaded nausea and vomiting and noticing that his blood sugars are low he is not currently on any current diabetic medications and is interested in trying to get a Dexcom and is noticing his sugars are running in the 60's at times and having hypoglycemic episodes and no history of gastric surgery.   Patient also saw neurology and felt he would benefit from B12 injections and interested in having B12 injections          Past Medical History:   Diagnosis Date   • Alopecia    • Arthritis    • Back pain    • Benign essential hypertension    • Chronic obstructive pulmonary disease (COPD) (HCC)    • Depression    • Diabetes mellitus (HCC)    • Emphysema, interstitial (HCC)    • Fatigue    • Hypercholesterolemia    • Hypersomnia, persistent    • Hypertension    • Hypothyroidism    • Low testosterone    • Memory loss    • Oral candidiasis 05/11/2020   • Sinusitis    • Thyroid goiter        Past Surgical History:   Procedure Laterality Date   • ABDOMINAL SURGERY  1989    Umbilical herina repair   •  "ADENOIDECTOMY     • APPENDECTOMY     • CHOLECYSTECTOMY  2013   • HERNIA REPAIR     • TONSILLECTOMY         Current Outpatient Medications   Medication Sig Dispense Refill   • cyanocobalamin 1,000 mcg/mL Inject 1 mL (1,000 mcg total) into a muscle once a week for 30 days, THEN 1 mL (1,000 mcg total) every 14 (fourteen) days for 30 days, THEN 1 mL (1,000 mcg total) every 30 (thirty) days. 25 mL 1   • Syringe/Needle, Disp, 22G X 1-1/2\" 1 ML MISC Use every 30 (thirty) days 100 each 0   • fluticasone (FLONASE) 50 mcg/act nasal spray 1 spray into each nostril if needed     • gabapentin (NEURONTIN) 300 mg capsule TAKE 1 CAPSULE BY MOUTH AT BEDTIME 90 capsule 2   • levothyroxine 88 mcg tablet Take 1 tablet (88 mcg total) by mouth daily in the early morning 90 tablet 0   • losartan (COZAAR) 50 mg tablet TAKE 1 TABLET BY MOUTH EVERY DAY 90 tablet 0   • mometasone (ELOCON) 0.1 % cream      • sertraline (ZOLOFT) 100 mg tablet Take 2 tablets (200 mg total) by mouth daily 60 tablet 2   • testosterone (ANDROGEL) 1% Place 0.05 g on the skin     • True Metrix Blood Glucose Test test strip TESTING TWICE A DAY E 11.9     • Trulance 3 MG TABS TAKE 3 MG BY MOUTH IN THE MORNING 30 tablet 1   • umeclidinium-vilanterol (Anoro Ellipta) 62.5-25 MCG/INH inhaler Inhale 1 puff daily 60 blister 0     No current facility-administered medications for this visit.        Allergies   Allergen Reactions   • Cimetidine    • Codeine    • Contrast  [Iodinated Contrast Media]    • Metrizamide    • Prednisone Other (See Comments)     ALL STEROIDS  COMBATIVE   • Simvastatin Other (See Comments)     ALL STATINS  MUSCLE CRAMPS   • Statins        Review of Systems   Constitutional:  Positive for appetite change and fatigue. Negative for activity change, chills, diaphoresis, fever and unexpected weight change.   HENT:  Negative for congestion, dental problem, ear discharge, ear pain, hearing loss, postnasal drip, sinus pressure, sinus pain, sneezing, sore " throat and trouble swallowing.    Eyes:  Negative for pain, redness and visual disturbance.   Respiratory:  Negative for cough and shortness of breath.    Cardiovascular:  Negative for chest pain and leg swelling.   Gastrointestinal:  Negative for abdominal pain, diarrhea, nausea and vomiting.   Endocrine: Negative.    Genitourinary: Negative.    Musculoskeletal:  Negative for arthralgias.   Allergic/Immunologic: Negative.    Neurological:  Positive for numbness. Negative for dizziness and light-headedness.   Hematological: Negative.    Psychiatric/Behavioral:  Negative for behavioral problems and dysphoric mood.        Video Exam    There were no vitals filed for this visit.    Physical Exam  Vitals and nursing note reviewed.   Constitutional:       Appearance: Normal appearance.   HENT:      Nose: Nose normal.      Mouth/Throat:      Mouth: Mucous membranes are moist.   Pulmonary:      Effort: Pulmonary effort is normal.      Breath sounds: Normal breath sounds.   Abdominal:      Palpations: Abdomen is soft.   Musculoskeletal:         General: Normal range of motion.   Skin:     General: Skin is warm.   Neurological:      Mental Status: He is alert and oriented to person, place, and time.   Psychiatric:         Mood and Affect: Mood normal.          Visit Time  Total Visit Duration: 20

## 2024-03-28 DIAGNOSIS — E03.9 HYPOTHYROIDISM, UNSPECIFIED TYPE: ICD-10-CM

## 2024-03-28 RX ORDER — LEVOTHYROXINE SODIUM 88 UG/1
88 TABLET ORAL
Qty: 90 TABLET | Refills: 0 | Status: CANCELLED | OUTPATIENT
Start: 2024-03-28 | End: 2024-09-24

## 2024-03-28 RX ORDER — LEVOTHYROXINE SODIUM 0.1 MG/1
100 TABLET ORAL
Qty: 90 TABLET | Refills: 0 | Status: SHIPPED | OUTPATIENT
Start: 2024-03-28 | End: 2024-09-24

## 2024-03-29 DIAGNOSIS — I10 PRIMARY HYPERTENSION: ICD-10-CM

## 2024-03-29 RX ORDER — LOSARTAN POTASSIUM 50 MG/1
50 TABLET ORAL DAILY
Qty: 90 TABLET | Refills: 0 | Status: SHIPPED | OUTPATIENT
Start: 2024-03-29

## 2024-04-10 ENCOUNTER — TELEPHONE (OUTPATIENT)
Dept: FAMILY MEDICINE CLINIC | Facility: CLINIC | Age: 69
End: 2024-04-10

## 2024-04-10 NOTE — TELEPHONE ENCOUNTER
Hi, this is Lisa. I'm calling on the behalf of Wilman Redding, The reason for my call is during the video visit with Christel, we had explained that we wanted to get the Dexcom G7 for Wilman due to him not only having diabetes and high blood sugar numbers he's been having for the past four or five months at least, low blood sugars that cause him to get confused and irritable and we have to give him a something to eat to bring it up and when we finger stick to check his BS is in the 45 or 50 range and he does need intervention.     I guess that needs to be notated with [employer]. They called me on Friday to let me know that they're still waiting for updated records indicating those issues before the Dexcom is covered. For Wilman, his birthday is 1955. If somebody could call me back and have this done, I would really appreciate it. My number is [phone]. Again [phone]. Thank you.     No

## 2024-04-11 ENCOUNTER — TELEPHONE (OUTPATIENT)
Dept: FAMILY MEDICINE CLINIC | Facility: CLINIC | Age: 69
End: 2024-04-11

## 2024-04-11 NOTE — TELEPHONE ENCOUNTER
Can you addend the visit from 03- to say he has been having low blood sugars between 45-50 for the last 4-5 months. The family has to give him something to eat to bring his level back up. Need so he can get Dexcom approved.

## 2024-04-19 ENCOUNTER — OFFICE VISIT (OUTPATIENT)
Dept: NEUROLOGY | Facility: CLINIC | Age: 69
End: 2024-04-19
Payer: MEDICARE

## 2024-04-19 VITALS
TEMPERATURE: 97.2 F | HEART RATE: 94 BPM | BODY MASS INDEX: 34 KG/M2 | WEIGHT: 229.6 LBS | HEIGHT: 69 IN | DIASTOLIC BLOOD PRESSURE: 78 MMHG | OXYGEN SATURATION: 95 % | SYSTOLIC BLOOD PRESSURE: 130 MMHG

## 2024-04-19 DIAGNOSIS — F02.80 LEWY BODY DEMENTIA WITHOUT BEHAVIORAL DISTURBANCE (HCC): ICD-10-CM

## 2024-04-19 DIAGNOSIS — F33.1 MODERATE EPISODE OF RECURRENT MAJOR DEPRESSIVE DISORDER (HCC): ICD-10-CM

## 2024-04-19 DIAGNOSIS — G31.83 LEWY BODY DEMENTIA WITHOUT BEHAVIORAL DISTURBANCE (HCC): ICD-10-CM

## 2024-04-19 DIAGNOSIS — R41.3 MEMORY IMPAIRMENT: Primary | ICD-10-CM

## 2024-04-19 PROCEDURE — 99214 OFFICE O/P EST MOD 30 MIN: CPT | Performed by: PHYSICIAN ASSISTANT

## 2024-04-19 PROCEDURE — G2211 COMPLEX E/M VISIT ADD ON: HCPCS | Performed by: PHYSICIAN ASSISTANT

## 2024-04-19 NOTE — PROGRESS NOTES
Patient ID: Wilman Redding is a 69 y.o. male.    Assessment/Plan:    Lewy body dementia without behavioral disturbance (HCC)  Patient with history of progressive cognitive decline consistent with dementia along with development of rest tremor and bradykinesia.  There has been a question of dementia with Lewy body however he has never developed psychosis or hallucinations and his parkinsonian symptoms have actually improved since the last visit.     Patient presents with daughters who help to provide history.  Since the last visit he has weaned completely off Sinemet, rivastigmine and Wellbutrin.  With these changes they have not noticed significant improvement in his overall function.  He has not had any worsening of parkinsonian features since stopping Sinemet.  On exam today there was some intermittent right-sided tremor however this was the only parkinsonian features noted.  Because of this there is certainly a question of if there is indeed any underlying parkinsonian syndrome versus his prior symptoms being medication induced.    At this time given he is functioning well and there is no worsening of his parkinsonian symptoms, we will not restart his Sinemet.  We can continue to watch for any evidence of progression with time to suggest an underlying parkinsonian syndrome.    He was however encouraged to remain active.  We did discuss the importance of regular exercise and activity.    Cognitive issues remain, no clear improvement with the changes made to his medications.  No worsening however since being off of rivastigmine.  Overall he performs well on memory testing in the office today, scoring 25/30 on MoCA.  This is certainly improved since his last few visits.  There is however question of cognitive fluctuation per the daughters so unclear if this is a truly accurate representation of his current cognitive status.    In the past he did have a PET scan which was normal.  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  Given the continued question of cognitive deficits in regards to an underlying neurodegenerative process, we could consider MRI with neuro quant which would also allow us to follow-up with the previous lipoma.  His recent B12 however was low, 211, because of this we would first like for his B12 to be corrected.  He is currently getting B12 injections and we will have him complete this and get an updated B12 level.  If improved then we could certainly proceed with MRI with neuro quant.    In the future we could also consider neuropsych testing.  Unfortunately it looks like his insurance did not cover this in the past however this was a few years ago.    He also does continue to have some underlying depression and mood issues for which she is now on Zoloft.  I will make a referral to psychiatry and psychology to see if perhaps his mood could be better controlled.    Patient was encouraged to increase mind stimulating activities such as reading, crosswords, word searches, puzzles, Soduku, solitaire, coloring and other brain games.  We also discussed the importance of staying physically active and eating a health diet such as the Mediterranean or MIND diet.          Subjective:    Wilman Redding is a 69 year old with HTN, depression, RLS, DAFNE uses CPAP, and neuropathy who presents for movement follow up for dementia with parkinsonism, possible Lewy Body dementia. He follows with Dr. Fung in residency clinic but wishes to be seen in movement clinic yearly.     To review, onset in 2015 with forgetfulness and depression (wife ill at time). He was [facility] with [facility]'s disease in [facility] and stopped working. Carbidopa/levodopa with little initial improvement. PET scan 3/6/2020 that showed NO decreased uptake suggestive of Alzheimer's, FTD, or LBD. First seen 10/2021 in my office and reported history of onset with fluctuating cognition and parkinsonian symptoms diagnosis considered to be more likely  "dementia with Lewy Bodies. Donepezil led to headache. He was on galantamine 8mg ER qhs. Switched to rivastigmine patch 4.6mg daily.    At his last visit with Dr Trevino there was question of what his underlying parkinsonian symptoms were and if they were related to combination of Lexapro and Wellbutrin rather than PD. Because of this his Sinemet was weaned off. Recently seen by residency clinic and doing well, off Sinemet with no parkinsonian symptoms noted on exam.     INTERVAL HISTORY:  Since his last visit with Dr Trevino he weaned OFF Sinemet   He also weaned off the Exelon patch which was causing bradycardia   Also now OFF Wellbutrin   Lexapro switched to Zoloft     Overall he has improvement with being off of the rivastigmine patch, Sinemet and Wellbutrin   Dry mouth and nose dripping is better   Dizziness improved   Imbalance improved   Cognitive issues remain however no worse   He has always had some right sided tremor and movement of the hand, no worsening since being off of the Sinemet   He is still agitated easily   Recent B12 was low, 211, started on IV injections   TSH normal   No issues with daily functions   No issues with swallowing   Sleep is so/so, he uses CPAP  No hallucinations   PCP manages his mood medications, still \"grumpy\" on Zoloft     Current relevant medications:   Gabapentin 300mg qhs   Zoloft     Prior medications:  Sinemet 25/100mg 2 tablets TID   Rivastigmine patch 4.6mg daily (higher doses causes bradycardia)  Aricept - caused headaches   Galantamine - caused GI issues and weight loss       I personally reviewed and updated the ROS.       Objective:    Blood pressure 130/78, pulse 94, temperature (!) 97.2 °F (36.2 °C), temperature source Temporal, height 5' 9\" (1.753 m), weight 104 kg (229 lb 9.6 oz), SpO2 95%.    Physical Exam  Eyes:      Extraocular Movements: Extraocular movements intact.      Pupils: Pupils are equal, round, and reactive to light.   Neurological:      Mental Status: He " is alert.      Motor: Motor strength is normal.  Psychiatric:         Speech: Speech normal.         Neurological Exam  Mental Status  Alert. Oriented only to person, place and situation. Speech is normal. Language is fluent with no aphasia. Attention and concentration are normal.  MoCA 4/19/24 - 25/30     MoCA 10/26/22 - 20/30   MoCA 10/20/21 - 18/30   MoCA 2/10/20 - 23/30   MoCA 8/5/19 - 23/30   MoCA 12/5/18 - 25/30   MoCA 9/26/18 - 14/30 .    Cranial Nerves  CN III, IV, VI: Extraocular movements intact bilaterally. Pupils equal round and reactive to light bilaterally.  CN VII: Full and symmetric facial movement.  CN VIII: Hearing is normal.  CN IX, X: Palate elevates symmetrically  CN XI: Shoulder shrug strength is normal.  CN XII: Tongue midline without atrophy or fasciculations.    Motor   Normal muscle tone. Strength is 5/5 throughout all four extremities.    Coordination    See motor UPDRS.    Gait    Good stride.  No freezing.  Good armswing.  Subtle hand movements while walking..        ROS:    Review of Systems   Constitutional: Negative.    HENT: Negative.     Eyes: Negative.    Respiratory: Negative.     Cardiovascular: Negative.    Gastrointestinal: Negative.    Endocrine: Negative.    Genitourinary: Negative.    Musculoskeletal: Negative.    Skin: Negative.    Allergic/Immunologic: Negative.    Neurological:  Positive for tremors (slight tremors off and on) and weakness (a little. starting physical therapy).   Hematological: Negative.    Psychiatric/Behavioral: Negative.     All other systems reviewed and are negative.

## 2024-04-19 NOTE — PATIENT INSTRUCTIONS
Low B12 - currently getting B12 injections.   Memory loss - this has been present for years, slowly progressive per family.  He performed well on MoCA in the office today, 25/30.  In the past PET scan was normal.  May consider an MRI with NQ once B12 is corrected if memory loss remains. Repeat MRI of the brain to follow up on prior lipoma noted on MRI in the past   NO worsening of parkinsonian symptoms OFF Sinemet   Will continue to monitor for further progression of symptoms     Things that we know are helpful for thinking and memory   Exercise program: gradually increase your physical activity over time. Start small and be patient. Aerobic (cardio) activity is best but incorporate balance, strength and flexibility training as well.   Try to get at least 30min 3 times per week   Diet: Mediterranean diet (colorful fruits and vegetables, olive oil, fish, whole grains, very little red meet is any), MIND diet, anti-inflammatory diet.   Stay well hydrated: drink 6-8 glasses of water per day   What's good for your heart is good for your brain  Avoid high-salt foods   Sleep: aim for at least 7-8 hours per night   Avoid alcohol and medications like Benadryl (Tylenol PM) or other sedating drugs  Stress management/mindfulness practice:   Talk with our social workers about finding a cognitive behavioral therapists  Try a smart phone odilia like “CICCWORLD” or “mindfulness for beginners”   Try “curable” for pain    Take a course in Mindfulness Based Stress Reduction (MBSR)   https://www.mindfulness[place] of [place].com/  Read or listen to an audiobook about it:  Mindfulness for beginners   10% happier  The happiness advantage   Social engagement:   Stay in touch with family and friends   Plan a few specific activities for your social health every week   Join a local support group   Volunteer! www.sl[facility].org/volunteernow or call [phone]     MIND diet score:  1 point for each component.    Green leafy vegetables: at least 6 per week  Other  vegetable: at least 1 per day   Berries: at least 2 per week  Red meat: fewer than 4 per week   Fish: at least 1 per week   Poultry: at least 2 per week  Beans: at least 3 per week  Nuts: at least 5 per week  Fast or fried food: less than 1 per week  Olive oil   Butter less: less than 1 table-spoon per day   Cheese: less than 1 serving per week   Pastries/sweets: less than 5 servings per week  Alcohol: no more than 1 serving/ day

## 2024-04-19 NOTE — ASSESSMENT & PLAN NOTE
Patient with history of progressive cognitive decline consistent with dementia along with development of rest tremor and bradykinesia.  There has been a question of dementia with Lewy body however he has never developed psychosis or hallucinations and his parkinsonian symptoms have actually improved since the last visit.     Patient presents with daughters who help to provide history.  Since the last visit he has weaned completely off Sinemet, rivastigmine and Wellbutrin.  With these changes they HAVE noticed significant improvement in his overall function.  He has not had any worsening of parkinsonian features since stopping Sinemet.  On exam today there was some intermittent right-sided tremor however this was the only parkinsonian features noted.  Because of this there is certainly a question of if there is indeed any underlying parkinsonian syndrome versus his prior symptoms being medication induced.    At this time given he is functioning well and there is no worsening of his parkinsonian symptoms, we will not restart his Sinemet.  We can continue to watch for any evidence of progression with time to suggest an underlying parkinsonian syndrome.    He was however encouraged to remain active.  We did discuss the importance of regular exercise and activity.    Cognitive issues remain, no clear improvement with the changes made to his medications.  No worsening however since being off of rivastigmine.  Overall he performs well on memory testing in the office today, scoring 25/30 on MoCA.  This is certainly improved since his last few visits.  There is however question of cognitive fluctuation per the daughters so unclear if this is a truly accurate representation of his current cognitive status.    In the past he did have a PET scan which was normal.  Prior MRI of the brain showed evidence of a lipoma.  Given the continued question of cognitive deficits in regards to an underlying neurodegenerative process, we  could consider MRI with neuro quant which would also allow us to follow-up with the previous lipoma.  His recent B12 however was low, 211, because of this we would first like for his B12 to be corrected.  He is currently getting B12 injections and we will have him complete this and get an updated B12 level.  If improved then we could certainly proceed with MRI with neuro quant.    In the future we could also consider neuropsych testing.  Unfortunately it looks like his insurance did not cover this in the past however this was a few years ago.    He also does continue to have some underlying depression and mood issues for which she is now on Zoloft.  I will make a referral to psychiatry and psychology to see if perhaps his mood could be better controlled.    Patient was encouraged to increase mind stimulating activities such as reading, crosswords, word searches, puzzles, Soduku, solitaire, coloring and other brain games.  We also discussed the importance of staying physically active and eating a health diet such as the Mediterranean or MIND diet.

## 2024-05-08 ENCOUNTER — TELEPHONE (OUTPATIENT)
Age: 69
End: 2024-05-08

## 2024-05-08 ENCOUNTER — TELEPHONE (OUTPATIENT)
Dept: PSYCHIATRY | Facility: CLINIC | Age: 69
End: 2024-05-08

## 2024-05-08 DIAGNOSIS — G31.83 LEWY BODY DEMENTIA WITHOUT BEHAVIORAL DISTURBANCE (HCC): Primary | ICD-10-CM

## 2024-05-08 DIAGNOSIS — F02.80 LEWY BODY DEMENTIA WITHOUT BEHAVIORAL DISTURBANCE (HCC): Primary | ICD-10-CM

## 2024-05-08 RX ORDER — RIVASTIGMINE 4.6 MG/24H
PATCH, EXTENDED RELEASE TRANSDERMAL
Qty: 90 PATCH | Refills: 2 | Status: SHIPPED | OUTPATIENT
Start: 2024-05-08

## 2024-05-08 NOTE — TELEPHONE ENCOUNTER
Contacted patient in regards to Routine Referral in attempts to verify patient's needs of services and add patient to proper wait list. LVM for patient to contact intake dept  in regards to referral. 2nd attempt, referral closed.

## 2024-05-08 NOTE — TELEPHONE ENCOUNTER
Patients daughter called and would like to discuss possibly titrate to a lower dose or possible off of the patient's Gabapentin. The daughter expressed that her father has been more unsteady lately. He also has had some vision changes and some increased depression. She is concerned and would like to discuss with her father PCP some options.     Please advise and contact.     Thank you!

## 2024-05-09 DIAGNOSIS — G25.81 RESTLESS LEGS SYNDROME (RLS): ICD-10-CM

## 2024-05-09 RX ORDER — GABAPENTIN 100 MG/1
200 CAPSULE ORAL
Qty: 90 CAPSULE | Refills: 2 | Status: SHIPPED | OUTPATIENT
Start: 2024-05-09

## 2024-05-09 NOTE — TELEPHONE ENCOUNTER
Patient's daughter Lisa returning call regarding patient's medication.  Warm transfer to Odilon in clerical.

## 2024-05-09 NOTE — TELEPHONE ENCOUNTER
05/09 left message on the machine to call back and also Pt is scheduled for 5/14 @ 3320. Need confirmation

## 2024-05-09 NOTE — TELEPHONE ENCOUNTER
Daughter called asking for update with medication dosing. Mentions pt is very groggy in the morning, as well. Offered appts with Lisa HERNADEZ), soonest is 5/20. Daughter is requesting for sooner appt, late morning (not earlier than 10a). Please advise and call daughter back [phone]

## 2024-05-14 DIAGNOSIS — Z00.6 ENCOUNTER FOR EXAMINATION FOR NORMAL COMPARISON OR CONTROL IN CLINICAL RESEARCH PROGRAM: ICD-10-CM

## 2024-05-22 ENCOUNTER — PATIENT MESSAGE (OUTPATIENT)
Dept: FAMILY MEDICINE CLINIC | Facility: CLINIC | Age: 69
End: 2024-05-22

## 2024-05-22 DIAGNOSIS — R26.89 ABNORMALITY OF GAIT DUE TO IMPAIRMENT OF BALANCE: Primary | ICD-10-CM

## 2024-05-22 DIAGNOSIS — R53.81 PHYSICAL DECONDITIONING: ICD-10-CM

## 2024-05-22 NOTE — PATIENT COMMUNICATION
Patient's daughter Allison called, states that patient was supposed to have an order placed for strength training for physical therapy. States that they would like the order placed as quickly as possible so they can schedule patient and spouse for appointments on the same day.     Please advise.

## 2024-05-24 DIAGNOSIS — F33.1 MODERATE EPISODE OF RECURRENT MAJOR DEPRESSIVE DISORDER (HCC): ICD-10-CM

## 2024-05-24 RX ORDER — SERTRALINE HYDROCHLORIDE 100 MG/1
200 TABLET, FILM COATED ORAL DAILY
Qty: 180 TABLET | Refills: 1 | Status: SHIPPED | OUTPATIENT
Start: 2024-05-24

## 2024-06-04 ENCOUNTER — TELEPHONE (OUTPATIENT)
Age: 69
End: 2024-06-04

## 2024-06-04 DIAGNOSIS — K59.09 CONSTIPATION, CHRONIC: ICD-10-CM

## 2024-06-04 NOTE — TELEPHONE ENCOUNTER
Need refill for Trulance medication is still pending out of medication can this be done as a high priority.

## 2024-06-05 RX ORDER — PLECANATIDE 3 MG/1
3 TABLET ORAL DAILY
Qty: 30 TABLET | Refills: 0 | Status: SHIPPED | OUTPATIENT
Start: 2024-06-05

## 2024-06-05 RX ORDER — PLECANATIDE 3 MG/1
3 TABLET ORAL DAILY
Qty: 30 TABLET | Refills: 1 | Status: SHIPPED | OUTPATIENT
Start: 2024-06-05

## 2024-06-19 ENCOUNTER — NURSE TRIAGE (OUTPATIENT)
Age: 69
End: 2024-06-19

## 2024-06-19 ENCOUNTER — TELEPHONE (OUTPATIENT)
Age: 69
End: 2024-06-19

## 2024-06-19 DIAGNOSIS — K59.09 CONSTIPATION, CHRONIC: ICD-10-CM

## 2024-06-19 RX ORDER — PLECANATIDE 3 MG/1
3 TABLET ORAL DAILY
Qty: 90 TABLET | Refills: 1 | Status: SHIPPED | OUTPATIENT
Start: 2024-06-19

## 2024-06-19 RX ORDER — PLECANATIDE 3 MG/1
3 TABLET ORAL DAILY
Qty: 30 TABLET | Refills: 0 | Status: SHIPPED | OUTPATIENT
Start: 2024-06-19

## 2024-06-19 NOTE — TELEPHONE ENCOUNTER
Medication: Trulance    Dose/Frequency: 3 mg, take 3 mg by mouth in the morning    Quantity: 90, R-1    Pharmacy: [facility] [place]    Office:   [x] PCP/Provider -   [] Speciality/Provider -     Does the patient have enough for 3 days?   [] Yes   [x] No - Send as HP to POD

## 2024-06-19 NOTE — TELEPHONE ENCOUNTER
"Pt. Daughter calling regarding Trulance 3 mg, insurance is only allowing 90 day supply, advised script was sent to pharmacy for 90 day supply and pt. Can get med filled today   Reason for Disposition   Caller has medicine question only, adult not sick, and triager answers question    Answer Assessment - Initial Assessment Questions  1. NAME of MEDICATION: \"What medicine are you calling about?\"      Pt. Daughter calling about Trulance 90 day script    Protocols used: Medication Question Call-ADULT-[place]    "

## 2024-06-20 DIAGNOSIS — E03.9 HYPOTHYROIDISM, UNSPECIFIED TYPE: ICD-10-CM

## 2024-06-20 RX ORDER — LEVOTHYROXINE SODIUM 0.1 MG/1
100 TABLET ORAL
Qty: 90 TABLET | Refills: 1 | Status: SHIPPED | OUTPATIENT
Start: 2024-06-20

## 2024-07-12 ENCOUNTER — OFFICE VISIT (OUTPATIENT)
Dept: FAMILY MEDICINE CLINIC | Facility: CLINIC | Age: 69
End: 2024-07-12
Payer: MEDICARE

## 2024-07-12 VITALS
WEIGHT: 227.2 LBS | SYSTOLIC BLOOD PRESSURE: 126 MMHG | OXYGEN SATURATION: 96 % | BODY MASS INDEX: 33.65 KG/M2 | TEMPERATURE: 97.9 F | DIASTOLIC BLOOD PRESSURE: 76 MMHG | HEIGHT: 69 IN | HEART RATE: 90 BPM

## 2024-07-12 DIAGNOSIS — Z12.5 SCREENING FOR PROSTATE CANCER: ICD-10-CM

## 2024-07-12 DIAGNOSIS — E16.2 HYPOGLYCEMIC SYNDROME: ICD-10-CM

## 2024-07-12 DIAGNOSIS — F02.80 LEWY BODY DEMENTIA WITHOUT BEHAVIORAL DISTURBANCE (HCC): ICD-10-CM

## 2024-07-12 DIAGNOSIS — G31.83 LEWY BODY DEMENTIA WITHOUT BEHAVIORAL DISTURBANCE (HCC): ICD-10-CM

## 2024-07-12 DIAGNOSIS — Z87.891 FORMER SMOKER: ICD-10-CM

## 2024-07-12 DIAGNOSIS — J43.2 CENTRILOBULAR EMPHYSEMA (HCC): ICD-10-CM

## 2024-07-12 DIAGNOSIS — I10 PRIMARY HYPERTENSION: ICD-10-CM

## 2024-07-12 DIAGNOSIS — F33.1 MODERATE EPISODE OF RECURRENT MAJOR DEPRESSIVE DISORDER (HCC): ICD-10-CM

## 2024-07-12 DIAGNOSIS — D68.52 HETEROZYGOUS FOR PROTHROMBIN G20210A MUTATION (HCC): ICD-10-CM

## 2024-07-12 DIAGNOSIS — E03.9 HYPOTHYROIDISM, UNSPECIFIED TYPE: ICD-10-CM

## 2024-07-12 DIAGNOSIS — E11.40 TYPE 2 DIABETES MELLITUS WITH DIABETIC NEUROPATHY, WITHOUT LONG-TERM CURRENT USE OF INSULIN (HCC): ICD-10-CM

## 2024-07-12 DIAGNOSIS — E55.9 VITAMIN D DEFICIENCY: ICD-10-CM

## 2024-07-12 DIAGNOSIS — G20.A2 PARKINSON'S DISEASE WITH FLUCTUATING MANIFESTATIONS, UNSPECIFIED WHETHER DYSKINESIA PRESENT: ICD-10-CM

## 2024-07-12 DIAGNOSIS — E53.8 B12 DEFICIENCY: ICD-10-CM

## 2024-07-12 DIAGNOSIS — E61.1 LOW IRON: ICD-10-CM

## 2024-07-12 DIAGNOSIS — E78.00 HYPERCHOLESTEROLEMIA: ICD-10-CM

## 2024-07-12 DIAGNOSIS — E11.9 DIABETES MELLITUS WITHOUT COMPLICATION (HCC): Primary | ICD-10-CM

## 2024-07-12 LAB — SL AMB POCT HEMOGLOBIN AIC: 6.6 (ref ?–6.5)

## 2024-07-12 PROCEDURE — G0439 PPPS, SUBSEQ VISIT: HCPCS | Performed by: NURSE PRACTITIONER

## 2024-07-12 PROCEDURE — 83036 HEMOGLOBIN GLYCOSYLATED A1C: CPT | Performed by: NURSE PRACTITIONER

## 2024-07-12 PROCEDURE — 99214 OFFICE O/P EST MOD 30 MIN: CPT | Performed by: NURSE PRACTITIONER

## 2024-07-12 RX ORDER — SERTRALINE HYDROCHLORIDE 100 MG/1
150 TABLET, FILM COATED ORAL DAILY
Start: 2024-07-12

## 2024-07-12 NOTE — PROGRESS NOTES
Ambulatory Visit  Name: Wilman Redding      : 1955      MRN: [id number]  Encounter Provider: VIVIANA Crawley  Encounter Date: 2024   Encounter department: [facility]    Assessment & Plan   1. Diabetes mellitus without complication (HCC)  -     POCT hemoglobin A1c  -     Lipid panel; Future  -     Comprehensive metabolic panel; Future  -     CBC and differential; Future  2. Screening for prostate cancer  -     PSA, Total Screen; Future  3. Centrilobular emphysema (HCC)  4. Heterozygous for prothrombin [id number] mutation (HCC)  5. Type 2 diabetes mellitus with diabetic neuropathy, without long-term current use of insulin (HCC)  Assessment & Plan:    Lab Results   Component Value Date    HGBA1C 6.6 (A) 2024   Wearing his CGM and running in an acceptable range   6. Hypothyroidism, unspecified type  Assessment & Plan:  Thyroid dose 100 mcg  Orders:  -     TSH, 3rd generation with Free T4 reflex; Future  7. Hypercholesterolemia  8. Low iron  9. Hypoglycemic syndrome  10. B12 deficiency  -     Vitamin B12; Future  11. Vitamin D deficiency  -     Vitamin D 25 hydroxy; Future  12. Primary hypertension  Assessment & Plan:  Taking the Losartan 50 mg   13. Parkinson's disease with fluctuating manifestations, unspecified whether dyskinesia present  14. Lewy body dementia without behavioral disturbance (HCC)  Assessment & Plan:  Following with neurology and is currently off his medications   15. Moderate episode of recurrent major depressive disorder (HCC)  Assessment & Plan:  Sertraline 150 mg daily   Orders:  -     sertraline (ZOLOFT) 100 mg tablet; Take 1.5 tablets (150 mg total) by mouth daily  16. Former smoker  Assessment & Plan:  Not smoking currently        Preventive health issues were discussed with patient, and age appropriate screening tests were ordered as noted in patient's After Visit Summary. Personalized health advice and appropriate referrals for health education  or preventive services given if needed, as noted in patient's After Visit Summary.    History of Present Illness     Patient here today for his wellness visit and check up patient is having ongoing issues with sleeping and his memory is getting worse. Patient has been taking melatonin and also has some constipation and diarrhea at times and taking the magnesium at times and seems to sleep better. He is following with neurology and is not currently on medication for his parkinson's and working on raising his B12 levels and plans for MRI brain        Patient Care Team:  VIVIANA Crawley as PCP - General (Family Medicine)  Sherrie Mccullough MD as PCP - PCP-[facility] ([facility])  Katheryn Wiley MD (Internal Medicine)  Ramón Bella (Critical Care Medicine)  Gerald Tarango III, MD (Gastroenterology)  Mazin Fung MD as Resident (Neurology)  Tony Rooney MD (Neurology)    Review of Systems   Constitutional:  Positive for chills. Negative for activity change, appetite change, diaphoresis, fatigue, fever and unexpected weight change.   HENT:  Negative for congestion, ear pain, hearing loss, postnasal drip, sinus pressure, sinus pain, sneezing and sore throat.         Has deviated septum and has b/l hearing aides    Eyes:  Negative for pain, redness and visual disturbance.        Distance issues    Respiratory:  Negative for apnea, cough, shortness of breath and wheezing.    Cardiovascular:  Negative for chest pain and leg swelling.   Gastrointestinal:  Negative for abdominal pain, diarrhea, nausea and vomiting.   Endocrine: Negative.    Genitourinary: Negative.    Musculoskeletal:  Positive for back pain. Negative for arthralgias.        Following with chiropractor    Skin: Negative.    Allergic/Immunologic: Negative.    Neurological:  Negative for dizziness and light-headedness.   Hematological: Negative.    Psychiatric/Behavioral:  Positive for sleep disturbance. Negative for behavioral problems and  dysphoric mood.      Medical History Reviewed by provider this encounter:       Annual Wellness Visit Questionnaire   Wilman is here for his Subsequent Wellness visit. Last Medicare Wellness visit information reviewed, patient interviewed and updates made to the record today.      Health Risk Assessment:   Patient rates overall health as good. Patient feels that their physical health rating is same. Patient is dissatisfied with their life. Eyesight was rated as same. Hearing was rated as same. Patient feels that their emotional and mental health rating is slightly worse. Patients states they are often angry. Patient states they are always unusually tired/fatigued. Pain experienced in the last 7 days has been a lot. Patient's pain rating has been 9/10. Patient states that he has experienced no weight loss or gain in last 6 months.     Fall Risk Screening:   In the past year, patient has experienced: no history of falling in past year      Home Safety:  Patient does not have trouble with stairs inside or outside of their home. Patient has working smoke alarms and has working carbon monoxide detector. Home safety hazards include: none.     Nutrition:   Current diet is Diabetic.     Medications:   Patient is currently taking over-the-counter supplements. OTC medications include: see medication list. Patient is not able to manage medications. Wife does it    Activities of Daily Living (ADLs)/Instrumental Activities of Daily Living (IADLs):   Walk and transfer into and out of bed and chair?: Yes  Dress and groom yourself?: Yes    Bathe or shower yourself?: Yes    Feed yourself? Yes  Do your laundry/housekeeping?: Yes  Manage your money, pay your bills and track your expenses?: Yes  Make your own meals?: Yes    Do your own shopping?: Yes    Durable Medical Equipment Suppliers  [facility] [employer] supply    Previous Hospitalizations:   Any hospitalizations or ED visits within the last 12 months?: No      Advance Care  Planning:   Living will: No    Durable POA for healthcare: No    Advanced directive: No    Advanced directive counseling given: Yes    ACP document given: Yes    End of Life Decisions reviewed with patient: Yes    Provider agrees with end of life decisions: Yes      Cognitive Screening:   Provider or family/friend/caregiver concerned regarding cognition?: Yes    PREVENTIVE SCREENINGS      Cardiovascular Screening:    General: Screening Not Indicated, History Lipid Disorder and Risks and Benefits Discussed    Due for: Lipid Panel      Diabetes Screening:     General: Screening Not Indicated, History Diabetes, Risks and Benefits Discussed and Screening Current      Colorectal Cancer Screening:     General: Screening Current      Prostate Cancer Screening:    General: Risks and Benefits Discussed    Due for: PSA      Osteoporosis Screening:    General: Risks and Benefits Discussed and Screening Not Indicated      Abdominal Aortic Aneurysm (AAA) Screening:    Risk factors include: age between 65-74 yo and tobacco use        General: Risks and Benefits Discussed and Screening Not Indicated      Lung Cancer Screening:     General: Screening Current and Risks and Benefits Discussed      Hepatitis C Screening:    General: Risks and Benefits Discussed and Screening Not Indicated    Screening, Brief Intervention, and Referral to Treatment (SBIRT)    Screening  Typical number of drinks in a day: 0  Typical number of drinks in a week: 0  Interpretation: Low risk drinking behavior.    AUDIT-C Screenin) How often did you have a drink containing alcohol in the past year? never  2) How many drinks did you have on a typical day when you were drinking in the past year? 0  3) How often did you have 6 or more drinks on one occasion in the past year? never    AUDIT-C Score: 0  Interpretation: Score 0-3 (male): Negative screen for alcohol misuse    Single Item Drug Screening:  How often have you used an illegal drug (including  "marijuana) or a prescription medication for non-medical reasons in the past year? never    Single Item Drug Screen Score: 0  Interpretation: Negative screen for possible drug use disorder    Social Determinants of Health     Financial Resource Strain: Low Risk  (7/5/2023)    Overall Financial Resource Strain (CARDIA)    • Difficulty of Paying Living Expenses: Not very hard   Food Insecurity: Patient Declined (7/11/2024)    Hunger Vital Sign    • Worried About Running Out of Food in the Last Year: Patient declined    • Ran Out of Food in the Last Year: Patient declined   Transportation Needs: No Transportation Needs (7/11/2024)    PRAPARE - Transportation    • Lack of Transportation (Medical): No    • Lack of Transportation (Non-Medical): No   Housing Stability: Unknown (7/11/2024)    Housing Stability Vital Sign    • Unable to Pay for Housing in the Last Year: No    • Homeless in the Last Year: No   Utilities: Not At Risk (7/11/2024)    [facility] Utilities    • Threatened with loss of utilities: No     No results found.    Objective     /76 (BP Location: Left arm, Patient Position: Sitting)   Pulse 90   Temp 97.9 °F (36.6 °C) (Temporal)   Ht 5' 9\" (1.753 m)   Wt 103 kg (227 lb 3.2 oz)   SpO2 96%   BMI 33.55 kg/m²     Physical Exam  Constitutional:       General: He is not in acute distress.     Appearance: He is well-developed.   HENT:      Head: Normocephalic and atraumatic.   Eyes:      Pupils: Pupils are equal, round, and reactive to light.   Neck:      Thyroid: No thyromegaly.   Cardiovascular:      Rate and Rhythm: Normal rate and regular rhythm.      Pulses: no weak pulses.           Dorsalis pedis pulses are 2+ on the right side and 2+ on the left side.        Posterior tibial pulses are 2+ on the right side and 2+ on the left side.      Heart sounds: Normal heart sounds. No murmur heard.  Pulmonary:      Effort: Pulmonary effort is normal. No respiratory distress.      Breath sounds: Normal breath " sounds. No wheezing.   Abdominal:      General: Bowel sounds are normal.      Palpations: Abdomen is soft.   Musculoskeletal:         General: Normal range of motion.      Cervical back: Normal range of motion.   Feet:      Right foot:      Skin integrity: No ulcer, skin breakdown, erythema, warmth, callus or dry skin.      Left foot:      Skin integrity: No ulcer, skin breakdown, erythema, warmth, callus or dry skin.   Skin:     General: Skin is warm and dry.   Neurological:      Mental Status: He is alert and oriented to person, place, and time.     Patient's shoes and socks removed.    Right Foot/Ankle   Right Foot Inspection  Skin Exam: skin normal and skin intact. No dry skin, no warmth, no callus, no erythema, no maceration, no abnormal color, no pre-ulcer, no ulcer and no callus.     Toe Exam: ROM and strength within normal limits.     Sensory   Vibration: intact  Proprioception: intact  Monofilament testing: intact    Vascular  Capillary refills: < 3 seconds  The right DP pulse is 2+. The right PT pulse is 2+.     Left Foot/Ankle  Left Foot Inspection  Skin Exam: skin normal and skin intact. No dry skin, no warmth, no erythema, no maceration, normal color, no pre-ulcer, no ulcer and no callus.     Toe Exam: ROM and strength within normal limits.     Sensory   Vibration: intact  Proprioception: intact  Monofilament testing: intact    Vascular  Capillary refills: < 3 seconds  The left DP pulse is 2+. The left PT pulse is 2+.     Assign Risk Category  No deformity present  No loss of protective sensation  No weak pulses  Risk: 0     Administrative Statements

## 2024-07-12 NOTE — PATIENT INSTRUCTIONS
Medicare Preventive Visit Patient Instructions  Thank you for completing your Welcome to Medicare Visit or Medicare Annual Wellness Visit today. Your next wellness visit will be due in one year (7/13/2025).  The screening/preventive services that you may require over the next 5-10 years are detailed below. Some tests may not apply to you based off risk factors and/or age. Screening tests ordered at today's visit but not completed yet may show as past due. Also, please note that scanned in results may not display below.  Preventive Screenings:  Service Recommendations Previous Testing/Comments   Colorectal Cancer Screening  Colonoscopy    Fecal Occult Blood Test (FOBT)/Fecal Immunochemical Test (FIT)  Fecal DNA/Cologuard Test  Flexible Sigmoidoscopy Age: 45-75 years old   Colonoscopy: every 10 years (May be performed more frequently if at higher risk)  OR  FOBT/FIT: every 1 year  OR  Cologuard: every 3 years  OR  Sigmoidoscopy: every 5 years  Screening may be recommended earlier than age 45 if at higher risk for colorectal cancer. Also, an individualized decision between you and your healthcare provider will decide whether screening between the ages of 76-85 would be appropriate. Colonoscopy: 04/26/2023  FOBT/FIT: Not on file  Cologuard: Not on file  Sigmoidoscopy: Not on file    Screening Current     Prostate Cancer Screening Individualized decision between patient and health care provider in men between ages of 55-69   Medicare will cover every 12 months beginning on the day after your 50th birthday PSA: 0.4 ng/mL           Hepatitis C Screening Once for adults born between 1945 and 1965  More frequently in patients at high risk for Hepatitis C Hep C Antibody: Not on file        Diabetes Screening 1-2 times per year if you're at risk for diabetes or have pre-diabetes Fasting glucose: 116 mg/dL (7/18/2023)  A1C: 7.1 % (3/9/2024)  Screening Not Indicated  History Diabetes   Cholesterol Screening Once every 5 years if  you don't have a lipid disorder. May order more often based on risk factors. Lipid panel: 10/06/2023  Screening Not Indicated  History Lipid Disorder      Other Preventive Screenings Covered by Medicare:  Abdominal Aortic Aneurysm (AAA) Screening: covered once if your at risk. You're considered to be at risk if you have a family history of AAA or a male between the age of 65-75 who smoking at least 100 cigarettes in your lifetime.  Lung Cancer Screening: covers low dose CT scan once per year if you meet all of the following conditions: (1) Age 55-77; (2) No signs or symptoms of lung cancer; (3) Current smoker or have quit smoking within the last 15 years; (4) You have a tobacco smoking history of at least 20 pack years (packs per day x number of years you smoked); (5) You get a written order from a healthcare provider.  Glaucoma Screening: covered annually if you're considered high risk: (1) You have diabetes OR (2) Family history of glaucoma OR (3)  aged 50 and older OR (4)  American aged 65 and older  Osteoporosis Screening: covered every 2 years if you meet one of the following conditions: (1) Have a vertebral abnormality; (2) On glucocorticoid therapy for more than 3 months; (3) Have primary hyperparathyroidism; (4) On osteoporosis medications and need to assess response to drug therapy.  HIV Screening: covered annually if you're between the age of 15-65. Also covered annually if you are younger than 15 and older than 65 with risk factors for HIV infection. For pregnant patients, it is covered up to 3 times per pregnancy.    Immunizations:  Immunization Recommendations   Influenza Vaccine Annual influenza vaccination during flu season is recommended for all persons aged >= 6 months who do not have contraindications   Pneumococcal Vaccine   * Pneumococcal conjugate vaccine = PCV13 (Prevnar 13), PCV15 (Vaxneuvance), PCV20 (Prevnar 20)  * Pneumococcal polysaccharide vaccine = PPSV23  (Pneumovax) Adults 19-63 yo with certain risk factors or if 65+ yo  If never received any pneumonia vaccine: recommend Prevnar 20 (PCV20)  Give PCV20 if previously received 1 dose of PCV13 or PPSV23   Hepatitis B Vaccine 3 dose series if at intermediate or high risk (ex: diabetes, end stage renal disease, liver disease)   Respiratory syncytial virus (RSV) Vaccine - COVERED BY MEDICARE PART D  * RSVPreF3 (Arexvy) CDC recommends that adults 60 years of age and older may receive a single dose of RSV vaccine using shared clinical decision-making (SCDM)   Tetanus (Td) Vaccine - COST NOT COVERED BY MEDICARE PART B Following completion of primary series, a booster dose should be given every 10 years to maintain immunity against tetanus. Td may also be given as tetanus wound prophylaxis.   Tdap Vaccine - COST NOT COVERED BY MEDICARE PART B Recommended at least once for all adults. For pregnant patients, recommended with each pregnancy.   Shingles Vaccine (Shingrix) - COST NOT COVERED BY MEDICARE PART B  2 shot series recommended in those 19 years and older who have or will have weakened immune systems or those 50 years and older     Health Maintenance Due:      Topic Date Due   • Hepatitis C Screening  Never done   • Colorectal Cancer Screening  04/25/2026     Immunizations Due:      Topic Date Due   • Pneumococcal Vaccine: 65+ Years (1 of 2 - PCV) Never done   • COVID-19 Vaccine (1 - 2023-24 season) Never done   • Influenza Vaccine (1) 09/01/2024     Advance Directives   What are advance directives?  Advance directives are legal documents that state your wishes and plans for medical care. These plans are made ahead of time in case you lose your ability to make decisions for yourself. Advance directives can apply to any medical decision, such as the treatments you want, and if you want to donate organs.   What are the types of advance directives?  There are many types of advance directives, and each state has rules about  how to use them. You may choose a combination of any of the following:  Living will:  This is a written record of the treatment you want. You can also choose which treatments you do not want, which to limit, and which to stop at a certain time. This includes surgery, medicine, IV fluid, and tube feedings.   Durable power of  for healthcare (DPAHC):  This is a written record that states who you want to make healthcare choices for you when you are unable to make them for yourself. This person, called a proxy, is usually a family member or a friend. You may choose more than 1 proxy.  Do not resuscitate (DNR) order:  A DNR order is used in case your heart stops beating or you stop breathing. It is a request not to have certain forms of treatment, such as CPR. A DNR order may be included in other types of advance directives.  Medical directive:  This covers the care that you want if you are in a coma, near death, or unable to make decisions for yourself. You can list the treatments you want for each condition. Treatment may include pain medicine, surgery, blood transfusions, dialysis, IV or tube feedings, and a ventilator (breathing machine).  Values history:  This document has questions about your views, beliefs, and how you feel and think about life. This information can help others choose the care that you would choose.  Why are advance directives important?  An advance directive helps you control your care. Although spoken wishes may be used, it is better to have your wishes written down. Spoken wishes can be misunderstood, or not followed. Treatments may be given even if you do not want them. An advance directive may make it easier for your family to make difficult choices about your care.   Weight Management   Why it is important to manage your weight:  Being overweight increases your risk of health conditions such as heart disease, high blood pressure, type 2 diabetes, and certain types of cancer. It can  also increase your risk for osteoarthritis, sleep apnea, and other respiratory problems. Aim for a slow, steady weight loss. Even a small amount of weight loss can lower your risk of health problems.  How to lose weight safely:  A safe and healthy way to lose weight is to eat fewer calories and get regular exercise. You can lose up about 1 pound a week by decreasing the number of calories you eat by 500 calories each day.   Healthy meal plan for weight management:  A healthy meal plan includes a variety of foods, contains fewer calories, and helps you stay healthy. A healthy meal plan includes the following:  Eat whole-grain foods more often.  A healthy meal plan should contain fiber. Fiber is the part of grains, fruits, and vegetables that is not broken down by your body. Whole-grain foods are healthy and provide extra fiber in your diet. Some examples of whole-grain foods are whole-wheat breads and pastas, oatmeal, brown rice, and bulgur.  Eat a variety of vegetables every day.  Include dark, leafy greens such as spinach, kale, dhara greens, and mustard greens. Eat yellow and orange vegetables such as carrots, sweet potatoes, and winter squash.   Eat a variety of fruits every day.  Choose fresh or canned fruit (canned in its own juice or light syrup) instead of juice. Fruit juice has very little or no fiber.  Eat low-fat dairy foods.  Drink fat-free (skim) milk or 1% milk. Eat fat-free yogurt and low-fat cottage cheese. Try low-fat cheeses such as mozzarella and other reduced-fat cheeses.  Choose meat and other protein foods that are low in fat.  Choose beans or other legumes such as split peas or lentils. Choose fish, skinless poultry (chicken or turkey), or lean cuts of red meat (beef or pork). Before you cook meat or poultry, cut off any visible fat.   Use less fat and oil.  Try baking foods instead of frying them. Add less fat, such as margarine, sour cream, regular salad dressing and mayonnaise to foods.  Eat fewer high-fat foods. Some examples of high-fat foods include french fries, doughnuts, ice cream, and cakes.  Eat fewer sweets.  Limit foods and drinks that are high in sugar. This includes candy, cookies, regular soda, and sweetened drinks.  Exercise:  Exercise at least 30 minutes per day on most days of the week. Some examples of exercise include walking, biking, dancing, and swimming. You can also fit in more physical activity by taking the stairs instead of the elevator or parking farther away from stores. Ask your healthcare provider about the best exercise plan for you.      © Copyright [employer] 2018 Information is for End User's use only and may not be sold, redistributed or otherwise used for commercial purposes. All illustrations and images included in CareNotes® are the copyrighted property of [employer].A[employer]M., Inc[employer] or [employer]

## 2024-07-12 NOTE — ASSESSMENT & PLAN NOTE
Lab Results   Component Value Date    HGBA1C 6.6 (A) 07/12/2024   Wearing his CGM and running in an acceptable range

## 2024-07-22 ENCOUNTER — APPOINTMENT (OUTPATIENT)
Dept: LAB | Facility: HOSPITAL | Age: 69
End: 2024-07-22
Attending: PATHOLOGY

## 2024-07-22 ENCOUNTER — APPOINTMENT (OUTPATIENT)
Dept: LAB | Facility: CLINIC | Age: 69
End: 2024-07-22
Payer: MEDICARE

## 2024-07-22 DIAGNOSIS — Z12.5 SCREENING FOR PROSTATE CANCER: ICD-10-CM

## 2024-07-22 DIAGNOSIS — I25.119 ATHEROSCLEROSIS OF NATIVE CORONARY ARTERY WITH ANGINA PECTORIS, UNSPECIFIED WHETHER NATIVE OR TRANSPLANTED HEART (HCC): ICD-10-CM

## 2024-07-22 DIAGNOSIS — F33.1 MODERATE EPISODE OF RECURRENT MAJOR DEPRESSIVE DISORDER (HCC): ICD-10-CM

## 2024-07-22 DIAGNOSIS — E53.8 B12 DEFICIENCY: ICD-10-CM

## 2024-07-22 DIAGNOSIS — I25.84 CORONARY ATHEROSCLEROSIS DUE TO SEVERELY CALCIFIED CORONARY LESION: ICD-10-CM

## 2024-07-22 DIAGNOSIS — E11.9 DIABETES MELLITUS WITHOUT COMPLICATION (HCC): ICD-10-CM

## 2024-07-22 DIAGNOSIS — R41.3 MEMORY IMPAIRMENT: ICD-10-CM

## 2024-07-22 DIAGNOSIS — J43.2 CENTRIACINAR EMPHYSEMA (HCC): ICD-10-CM

## 2024-07-22 DIAGNOSIS — G47.33 OBSTRUCTIVE SLEEP APNEA (ADULT) (PEDIATRIC): ICD-10-CM

## 2024-07-22 DIAGNOSIS — E03.9 HYPOTHYROIDISM, UNSPECIFIED TYPE: ICD-10-CM

## 2024-07-22 DIAGNOSIS — D68.52 PROTHROMBIN GENE MUTATION (HCC): ICD-10-CM

## 2024-07-22 DIAGNOSIS — E78.2 MIXED HYPERLIPIDEMIA: ICD-10-CM

## 2024-07-22 DIAGNOSIS — E11.9 TYPE 2 DIABETES MELLITUS WITHOUT COMPLICATION, WITHOUT LONG-TERM CURRENT USE OF INSULIN (HCC): ICD-10-CM

## 2024-07-22 DIAGNOSIS — K90.9 INTESTINAL MALABSORPTION, UNSPECIFIED TYPE: ICD-10-CM

## 2024-07-22 DIAGNOSIS — E55.9 VITAMIN D DEFICIENCY: ICD-10-CM

## 2024-07-22 DIAGNOSIS — G20.C PRIMARY PARKINSONISM: ICD-10-CM

## 2024-07-22 DIAGNOSIS — Z87.891 PERSONAL HISTORY OF TOBACCO USE, PRESENTING HAZARDS TO HEALTH: ICD-10-CM

## 2024-07-22 DIAGNOSIS — Z00.6 ENCOUNTER FOR EXAMINATION FOR NORMAL COMPARISON OR CONTROL IN CLINICAL RESEARCH PROGRAM: ICD-10-CM

## 2024-07-22 LAB
25(OH)D3 SERPL-MCNC: 53.7 NG/ML (ref 30–100)
ALBUMIN SERPL BCG-MCNC: 4.1 G/DL (ref 3.5–5)
ALP SERPL-CCNC: 65 U/L (ref 34–104)
ALT SERPL W P-5'-P-CCNC: 34 U/L (ref 7–52)
ANION GAP SERPL CALCULATED.3IONS-SCNC: 8 MMOL/L (ref 4–13)
AST SERPL W P-5'-P-CCNC: 29 U/L (ref 13–39)
BILIRUB SERPL-MCNC: 0.47 MG/DL (ref 0.2–1)
BUN SERPL-MCNC: 12 MG/DL (ref 5–25)
CALCIUM SERPL-MCNC: 9.7 MG/DL (ref 8.4–10.2)
CHLORIDE SERPL-SCNC: 102 MMOL/L (ref 96–108)
CHOLEST SERPL-MCNC: 177 MG/DL
CO2 SERPL-SCNC: 29 MMOL/L (ref 21–32)
CREAT SERPL-MCNC: 0.78 MG/DL (ref 0.6–1.3)
EST. AVERAGE GLUCOSE BLD GHB EST-MCNC: 157 MG/DL
FOLATE SERPL-MCNC: >22.3 NG/ML
GFR SERPL CREATININE-BSD FRML MDRD: 93 ML/MIN/1.73SQ M
GLUCOSE P FAST SERPL-MCNC: 158 MG/DL (ref 65–99)
HBA1C MFR BLD: 7.1 %
HDLC SERPL-MCNC: 33 MG/DL
LDLC SERPL CALC-MCNC: 70 MG/DL (ref 0–100)
NONHDLC SERPL-MCNC: 144 MG/DL
POTASSIUM SERPL-SCNC: 4.2 MMOL/L (ref 3.5–5.3)
PROT SERPL-MCNC: 6.8 G/DL (ref 6.4–8.4)
PSA SERPL-MCNC: 0.38 NG/ML (ref 0–4)
SODIUM SERPL-SCNC: 139 MMOL/L (ref 135–147)
T4 FREE SERPL-MCNC: 0.82 NG/DL (ref 0.61–1.12)
TRIGL SERPL-MCNC: 369 MG/DL
TSH SERPL DL<=0.05 MIU/L-ACNC: 0.33 UIU/ML (ref 0.45–4.5)
VIT B12 SERPL-MCNC: 737 PG/ML (ref 180–914)

## 2024-07-22 PROCEDURE — 80061 LIPID PANEL: CPT

## 2024-07-22 PROCEDURE — 82607 VITAMIN B-12: CPT

## 2024-07-22 PROCEDURE — 82525 ASSAY OF COPPER: CPT

## 2024-07-22 PROCEDURE — 84439 ASSAY OF FREE THYROXINE: CPT

## 2024-07-22 PROCEDURE — G0103 PSA SCREENING: HCPCS

## 2024-07-22 PROCEDURE — 80053 COMPREHEN METABOLIC PANEL: CPT

## 2024-07-22 PROCEDURE — 84630 ASSAY OF ZINC: CPT

## 2024-07-22 PROCEDURE — 36415 COLL VENOUS BLD VENIPUNCTURE: CPT

## 2024-07-22 PROCEDURE — 82746 ASSAY OF FOLIC ACID SERUM: CPT

## 2024-07-22 PROCEDURE — 83036 HEMOGLOBIN GLYCOSYLATED A1C: CPT

## 2024-07-22 PROCEDURE — 82306 VITAMIN D 25 HYDROXY: CPT

## 2024-07-22 PROCEDURE — 84443 ASSAY THYROID STIM HORMONE: CPT

## 2024-07-22 PROCEDURE — 83695 ASSAY OF LIPOPROTEIN(A): CPT

## 2024-07-22 PROCEDURE — 86340 INTRINSIC FACTOR ANTIBODY: CPT

## 2024-07-23 ENCOUNTER — TELEPHONE (OUTPATIENT)
Age: 69
End: 2024-07-23

## 2024-07-23 LAB — LPA SERPL-SCNC: <9 NMOL/L

## 2024-07-23 NOTE — TELEPHONE ENCOUNTER
Pt daughter Allison called and said she saw all of his labs were resulted in his chart. She is concerned with the thyroid levels, says he has been very tired lately so not sure if the med he is on is too high of a dosage. She would like to be called back. Thanks

## 2024-07-23 NOTE — TELEPHONE ENCOUNTER
"Pt daughter calling in because pt blood was drawn at [facility] for a hem/onc appt that is at [facility].     Right now the labs are listed as \"in process\"     Pt daughter wants to make sure they are able to see the results when they come back and are interpreted (and that [facility] can see them as well)    Please call pt daughter back to let her know if this can be done.    Best contact for pt daughter is [phone]    Thank you  "

## 2024-07-24 LAB
COPPER SERPL-MCNC: 98 UG/DL (ref 69–132)
IF BLOCK AB SER QL RIA: 1.1 AU/ML (ref 0–1.1)
ZINC SERPL-MCNC: 101 UG/DL (ref 44–115)

## 2024-07-31 DIAGNOSIS — R41.3 MEMORY IMPAIRMENT: Primary | ICD-10-CM

## 2024-07-31 DIAGNOSIS — R93.0 ABNORMAL MRI OF THE HEAD: ICD-10-CM

## 2024-08-02 LAB
APOB+LDLR+PCSK9 GENE MUT ANL BLD/T: NOT DETECTED
BRCA1+BRCA2 DEL+DUP + FULL MUT ANL BLD/T: NOT DETECTED
MLH1+MSH2+MSH6+PMS2 GN DEL+DUP+FUL M: NOT DETECTED

## 2024-08-18 ENCOUNTER — HOSPITAL ENCOUNTER (OUTPATIENT)
Dept: MRI IMAGING | Facility: HOSPITAL | Age: 69
Discharge: HOME | End: 2024-08-18
Payer: MEDICARE

## 2024-08-18 DIAGNOSIS — R93.0 ABNORMAL MRI OF THE HEAD: ICD-10-CM

## 2024-08-18 DIAGNOSIS — R41.3 MEMORY IMPAIRMENT: ICD-10-CM

## 2024-08-18 PROCEDURE — 70551 MRI BRAIN STEM W/O DYE: CPT

## 2024-08-20 ENCOUNTER — PATIENT MESSAGE (OUTPATIENT)
Dept: NEUROLOGY | Facility: CLINIC | Age: 69
End: 2024-08-20

## 2024-08-23 NOTE — PATIENT COMMUNICATION
Rosalio Aguilar PA-C   to Neurology Neurovascular Team 4       8/23/24 10:54 AM  Can we please call over to the radiology department and see if the radiologist is able to put an addendum on the MRI reports.  If they could please compare the lipoma from the 2018 MRI to now just so that we can see if there is any change in size that would be greatly appreciated.  Thank you so much!

## 2024-08-23 NOTE — PATIENT COMMUNICATION
"Called over to the radiology reading room and spoke to the radiologist. Requested that an addendum be added to the final report regardidng the size of the lipoma. Radiologist stated that it is in the notes that the lipoma remains \"unchanged\".  "

## 2024-08-27 ENCOUNTER — TELEPHONE (OUTPATIENT)
Age: 69
End: 2024-08-27

## 2024-08-27 NOTE — TELEPHONE ENCOUNTER
Rosalio Aguilar PA-C  Neurology Neurovascular Team 41 hour ago (1:03 PM)       Could we get some more information regarding the new concerns?  He is scheduled with Dr Trevino in follow up which is the appropriate follow up at this time.  I am not entirely clear on the diagnosis of vascular dementia as there was no evidence of stroke on imaging and only mild microvascular changes.  Regardless his scheduled follow up would still be appropriate and she can discuss further.

## 2024-08-27 NOTE — TELEPHONE ENCOUNTER
pt's daughter allison called back returning our call.  Made her aware of below.    States that pt saw dr troy and dr gonzales (resident).   States that Uriel is a movement specialist and doesn't deal with lewy body dementia    Per saima's office note-He follows with Dr. Gonzales in residency clinic but wishes to be seen in movement clinic yearly.     He will keep appt with dr velarde but she is asking if there is a specific dr that pt can see in regards to the lewy body dementia.  She states that he Doesn't do well with change and is asking to see a dr in the practice instead of seeing a resident.    Agreeable to being see in any office    She states that pt is Having more memory problems, cognitive decline.      Please advise    [phone] ok to leave detailed message

## 2024-08-27 NOTE — TELEPHONE ENCOUNTER
"Pt's daughter Allison called to speak with provider in regard to pt's new diagnosis of vascular dementia. She'd like to know which provider should pt be following up with for this issue. Per Rosalio's mychart message \"pt's MRI is not consistent with a neurodegenerative process such as dementia\". Lisa however mentioned that he does have vascular dementia and she is noticing new symptoms. Please advise.  "

## 2024-08-28 NOTE — TELEPHONE ENCOUNTER
Herlinda Briones MD  Neurology Neurovascular Team 49 minutes ago (9:23 AM)       Reviewed concerns and chart.  For clarification for the patient and family, yes I am a movement disorder specialist so Lewy Body dementia is one of the conditions I do mange.  I saw him on levodopa with very little if any parkinsonian symptoms therefore it was tapered off. It appears there was not return of parkinsonian symptoms. Course has been atypical for Lewy Body dementia.  I do not see that anyone has diagnosed him with vascular dementia. Prior PET in 2020 from outside facility not suggestive of neurodegenerative condition.  Screening MOCA improved over the years.    Recommendation would be to keep appointment with me in Oct so that I can reexamine off levodopa and provide my thoughts and recommendations.  If looking for further cognitive assessment / another opinion given reported decline, we could have them be seen by [facility] for memory.

## 2024-08-28 NOTE — TELEPHONE ENCOUNTER
Called Lisa and left her a detailed voice mail message regarding Dr. Trevino's response/recommendations. Advised that she call the office back if she has any questions.

## 2024-08-30 DIAGNOSIS — G31.83 LEWY BODY DEMENTIA WITHOUT BEHAVIORAL DISTURBANCE (HCC): Primary | ICD-10-CM

## 2024-08-30 DIAGNOSIS — F02.80 LEWY BODY DEMENTIA WITHOUT BEHAVIORAL DISTURBANCE (HCC): Primary | ICD-10-CM

## 2024-08-30 DIAGNOSIS — G47.52 REM BEHAVIORAL DISORDER: ICD-10-CM

## 2024-08-30 NOTE — TELEPHONE ENCOUNTER
Pts daughter reports she needs a referral from PCP to have pt been seen by a sleep specialist.   Also, they would like to have the contact information for [facility] as well, that the neurologist suggested in this message encounter.  Please call daughter back @ [phone].

## 2024-09-23 ENCOUNTER — EVALUATION (OUTPATIENT)
Dept: PHYSICAL THERAPY | Facility: CLINIC | Age: 69
End: 2024-09-23
Payer: MEDICARE

## 2024-09-23 DIAGNOSIS — R26.89 ABNORMALITY OF GAIT DUE TO IMPAIRMENT OF BALANCE: Primary | ICD-10-CM

## 2024-09-23 DIAGNOSIS — R53.81 PHYSICAL DECONDITIONING: ICD-10-CM

## 2024-09-23 PROCEDURE — 97161 PT EVAL LOW COMPLEX 20 MIN: CPT | Performed by: PHYSICAL THERAPIST

## 2024-09-23 PROCEDURE — 97112 NEUROMUSCULAR REEDUCATION: CPT | Performed by: PHYSICAL THERAPIST

## 2024-09-23 PROCEDURE — 97110 THERAPEUTIC EXERCISES: CPT | Performed by: PHYSICAL THERAPIST

## 2024-09-23 NOTE — PROGRESS NOTES
PT Evaluation     Today's date: 24  Patient name: Wilman Redding  : 1955  MRN: [id number]  Referring provider: Lisa Verde, *  Dx:   Encounter Diagnosis     ICD-10-CM    1. Abnormality of gait due to impairment of balance  R26.89       2. Physical deconditioning  R53.81                       Assessment  Impairments: activity intolerance, impaired physical strength, lacks appropriate home exercise program, poor posture  and endurance  Functional limitations: Difficulty lifting/carrying heavier objects, LOB walkign on uneven terrain, and difficulty transferring floor to stand.  Symptom irritability: low    Assessment details: Wilman Redding is a 69 y.o. male referred with primary diagnosis of Abnormality of gait due to impairment of balance  (primary encounter diagnosis).  Functional impairments include: Difficulty lifting/carrying heavier objects, LOB walkign on uneven terrain, and difficulty transferring floor to stand.  Aside from mild weakness in bilateral Gluteus Des, patient demonstrates good strength in Bilateral UE/LE musculature.  Patient scored a 46/56 on the Wells Balance test.  Patient's biggest limitation to improved functional independence is poor endurance due to his sedentary lifestyle.  Spoke at length with patient and his spouse about the importance of regular physical activity to improve and maintain his strength, endurance and balance.  Treatment to include: Manual therapy techniques, extremity/core strengthening, neuromuscular control exercises, balance/proprioception training as appropriate, instruction in a comprehensive HEP, and modalities as needed.  They will benefit from skilled PT services to address the above functional deficits and to decrease pain to promote a return to their premorbid level of function.      Goals  ST. Patient will transfer floor to stand with min Assist  2. Patient will demonstrate BBS increase > or = 3 points to decrease risk of falls  3.  Patient will report the ability to climb stairs without difficulty  LTG (8 weeks)  1. Patient will be independent and compliant with a HEP in order to maintain gains made with skilled PT services  2. Patient will transfer floor to stand independently  3. Patient will demonstrate BBS increase > or = 5 points in order to improve stability with ambulation over uneven terrain.    Plan  Patient would benefit from: skilled physical therapy    Planned therapy interventions: balance, neuromuscular re-education, patient/caregiver education, strengthening, stretching, therapeutic activities, therapeutic exercise and home exercise program    Frequency: 2x week  Plan of Care beginning date: 9/23/2024  Plan of Care expiration date: 11/18/2024        Subjective Evaluation    History of Present Illness  Mechanism of injury: Patient states he hasn't been active for the past 3-4 years.  Over the past two years symptoms have gotten worse.  He has noticed that he is weaker with lifting things and getting up off of the floor.  He feels unsteady with ambulating on uneven terrain.  Spoke with his PCP and got a referral for outpatient PT services.  Patient Goals  Patient goals for therapy: increased strength    Pain  No pain reported  Location: Medial and lateral joint of left knee.      Diagnostic Tests  No diagnostic tests performed    FCE comments: Received outpatient PT services 2 years ago and got better, but did not do his HEP.      Objective     Strength/Myotome Testing     Left Shoulder     Planes of Motion   Flexion: 4+   Extension: 4+   Abduction: 4+   Internal rotation at 0°: 5     Right Shoulder     Planes of Motion   Flexion: 4+   Extension: 4+   Abduction: 4+   Internal rotation at 0°: 5     Left Elbow   Flexion: 5  Extension: 4+    Right Elbow   Flexion: 5  Extension: 4+    Left Wrist/Hand   Wrist extension: 4+  Wrist flexion: 4    Right Wrist/Hand   Wrist extension: 4+  Wrist flexion: 4+    Left Hip   Planes of Motion    Flexion: 4+  Extension: 4-  Abduction: 4+    Right Hip   Planes of Motion   Flexion: 4+  Extension: 4-  Abduction: 4+    Left Knee   Flexion: 5  Extension: 5    Right Knee   Flexion: 5  Extension: 5    Left Ankle/Foot   Dorsiflexion: 4+    Right Ankle/Foot   Dorsiflexion: 4+         Precautions: COPD, Emphysema, HTN, Memory Loss, DM, Depression    POC expires Unit limit Auth  expiration date PT/OT + Visit Limit?   11/18/24 NA 5/22/25 BOMN                 Visit/Unit Tracking  AUTH Status:  Date 9/23/24              Authorized Used 1               Remaining                  QR     Manuals 9/23                                                                Neuro Re-Ed             Pt education Daily HEP.  Deconditioning vs. weakness            Balance Beam Fwd, Side             Hurdles Fwd, Side             Foam FA EC             Foam FT EO                                       Ther Ex             UBE seated 1/2 F/R L1x 5'/5'            Nustep with UE NV            Matrix Quads 30# 2x10            Matrix HS 30# 2x10            Webslie Rows M,L             T-band Paloff Press             T-band Tricep             Bicep curls             Ther Activity             STS no UE             Step ups F/L             Lateral step downs             Stationary lunges             Gait Training                                       Modalities

## 2024-09-25 ENCOUNTER — TELEPHONE (OUTPATIENT)
Age: 69
End: 2024-09-25

## 2024-09-25 DIAGNOSIS — E03.9 HYPOTHYROIDISM, UNSPECIFIED TYPE: Primary | ICD-10-CM

## 2024-09-25 NOTE — TELEPHONE ENCOUNTER
Pt's daughter called and states she just noticed that TSH level from July was low and feels that pt's Levothyroxine should have been adjusted at that time.  States pt is experiencing extreme fatigue at this time and is inquiring if PCP could please order repeat TSH so pt can get levels drawn.  Please call or send [employer] message once orders are in.

## 2024-09-25 NOTE — PROGRESS NOTES
"Daily Note     Today's date: 2024  Patient name: Wilman Redding  : 1955  MRN: [id number]  Referring provider: Lisa Verde, *  Dx:   Encounter Diagnosis     ICD-10-CM    1. Abnormality of gait due to impairment of balance  R26.89       2. Physical deconditioning  R53.81                      Subjective: Patient reports feeling ok today.  Didn't have any muscle soreness after session.      Objective: See treatment diary below      Assessment: Tolerated treatment well. Patient demonstrated fatigue post treatment and would benefit from continued PT.  Initiated TE for strengthening bilateral UE and LE today.  Mild fatigue after session, but no complaints of pain.  Educated about DOMS and provided Blue T-band for home use.      Plan: Continue per plan of care.      Precautions: COPD, Emphysema, HTN, Memory Loss, DM, Depression    POC expires Unit limit Auth  expiration date PT/OT + Visit Limit?   24 NA 25 BOMN                 Visit/Unit Tracking  AUTH Status:  Date              Authorized Used 1 2              Remaining                  QR     Manuals                                                                Neuro Re-Ed             Pt education Daily HEP.  Deconditioning vs. weakness            Balance Beam Fwd, Side  NV           Hurdles Fwd, Side  NV           Foam FA EC             Foam FT EO                                       Ther Ex             UBE seated 1/2 F/R L1x 5'/5'            Nustep seat 10 with UE 9 NV L6x10'           Matrix Quads 30# 2x10 30# 2x10           Matrix HS 30# 2x10 30# 2x10           Webslie Rows M,L  BTB  3\" x20 ea           T-band Paloff Press             T-band Tricep  BTB 3\" x20 VC's           Bicep curls  5# 2x10           Ther Activity             STS no UE  NV           Step ups F/L  F 6\" x20 ea           Lateral step downs             Stationary lunges             Gait Training                                       Modalities        "

## 2024-09-26 ENCOUNTER — OFFICE VISIT (OUTPATIENT)
Dept: PHYSICAL THERAPY | Facility: CLINIC | Age: 69
End: 2024-09-26
Payer: MEDICARE

## 2024-09-26 DIAGNOSIS — R53.81 PHYSICAL DECONDITIONING: ICD-10-CM

## 2024-09-26 DIAGNOSIS — R26.89 ABNORMALITY OF GAIT DUE TO IMPAIRMENT OF BALANCE: Primary | ICD-10-CM

## 2024-09-26 PROCEDURE — 97110 THERAPEUTIC EXERCISES: CPT | Performed by: PHYSICAL THERAPIST

## 2024-09-26 PROCEDURE — 97112 NEUROMUSCULAR REEDUCATION: CPT | Performed by: PHYSICAL THERAPIST

## 2024-10-01 ENCOUNTER — OFFICE VISIT (OUTPATIENT)
Dept: PHYSICAL THERAPY | Facility: CLINIC | Age: 69
End: 2024-10-01
Payer: MEDICARE

## 2024-10-01 DIAGNOSIS — R26.89 ABNORMALITY OF GAIT DUE TO IMPAIRMENT OF BALANCE: Primary | ICD-10-CM

## 2024-10-01 DIAGNOSIS — R53.81 PHYSICAL DECONDITIONING: ICD-10-CM

## 2024-10-01 PROCEDURE — 97110 THERAPEUTIC EXERCISES: CPT | Performed by: PHYSICAL THERAPIST

## 2024-10-01 PROCEDURE — 97112 NEUROMUSCULAR REEDUCATION: CPT | Performed by: PHYSICAL THERAPIST

## 2024-10-01 NOTE — PROGRESS NOTES
"Daily Note     Today's date: 10/1/2024  Patient name: Wilman Redding  : 1955  MRN: [id number]  Referring provider: Lisa Verde, *  Dx:   Encounter Diagnosis     ICD-10-CM    1. Abnormality of gait due to impairment of balance  R26.89       2. Physical deconditioning  R53.81                      Subjective: Patient is feeling stronger and reports his endurance is getting better.  Is walking more in the community.      Objective: See treatment diary below      Assessment: Tolerated treatment well. Patient demonstrated fatigue post treatment and would benefit from continued PT.  New TE added today for balance.  Difficulty with tandem walking on balance beam..  Increased resistance with strengthening exercises for arms.  Progressing well.        Plan: Continue per plan of care.  Progress treatment as tolerated.       Precautions: COPD, Emphysema, HTN, Memory Loss, DM, Depression    POC expires Unit limit Auth  expiration date PT/OT + Visit Limit?   24 NA 25 BOMN                 Visit/Unit Tracking  AUTH Status:  Date 9/23 9/26 10/1            Authorized Used 1 2 3             Remaining                  QR     Manuals 9/23 9/26 10/1                                                              Neuro Re-Ed             Pt education Daily HEP.  Deconditioning vs. weakness            Balance Beam Fwd, Side  NV 5 laps each cl sup          Hurdles Fwd, Side  NV           Foam FA EC   FT EO head turns x1'          Foam FT EO   x1'                                    Ther Ex             UBE seated 1/2 F/R L1x 5'/5'            Nustep seat 10 with UE 9 NV L6x10' L6x10'          Matrix Quads 30# 2x10 30# 2x10 30# 3x10          Matrix HS 30# 2x10 30# 2x10 30# 3x10          Webslie Rows M,L  BTB  3\" x20 ea BTB  3\" x30 ea [place]         T-band Paloff Press             T-band Tricep  BTB 3\" x20 VC's [place] 15# 3\" 3x10          Bicep curls  5# 2x10 10# 3x10          Ther Activity             STS no UE  NV 3x10    " "      Step ups F/L  F 6\" x20 ea           Lateral step downs             Stationary lunges             Gait Training                                       Modalities                                              "

## 2024-10-02 NOTE — PROGRESS NOTES
"Daily Note     Today's date: 10/2/2024  Patient name: Wilman Redding  : 1955  MRN: [id number]  Referring provider: Lisa Verde, *  Dx:   Encounter Diagnosis     ICD-10-CM    1. Abnormality of gait due to impairment of balance  R26.89       2. Physical deconditioning  R53.81                      Subjective: Patient reports having some muscle soreness after last PT session.      Objective: See treatment diary below      Assessment: Tolerated treatment well. Patient demonstrated fatigue post treatment and would benefit from continued PT.  Initiated hurdles for balance.  VC's with stationary lunges to avoid leaning forward over his toes to avoid knee pain.  Patient encouraged to perform strengthening exercises more slowly to derive maximal benefit from TE and to avoid using momentum.      Plan: Continue per plan of care.  Progress treatment as tolerated.       Precautions: COPD, Emphysema, HTN, Memory Loss, DM, Depression    POC expires Unit limit Auth  expiration date PT/OT + Visit Limit?   24 NA 25 BOMN                 Visit/Unit Tracking  AUTH Status:  Date 9/23 9/26 10/1 10/3           Authorized Used 1 2 3 4            Remaining                  QR     Manuals 9/23 9/26 10/1 10/3                                                             Neuro Re-Ed             Pt education Daily HEP.  Deconditioning vs. weakness            Balance Beam Fwd, Side  NV 5 laps each cl sup          Hurdles Fwd, Side  NV  4 laps ea         Foam FA EC   FT EO head turns x1' FT EO head turns x1'         Foam FT EO   x1' x1'                                   Ther Ex             UBE seated 1/2 F/R L1x 5'/5'            Nustep seat 10 with UE 9 NV L6x10' L6x10' L6x10'         Matrix Quads 30# 2x10 30# 2x10 30# 3x10 30# 3x10         Matrix HS 30# 2x10 30# 2x10 30# 3x10 30# 3x10         Webslie Rows M,L  BTB  3\" x20 ea BTB  3\" x30 ea Cris         T-band Paloff Press             T-band Tricep  BTB 3\" x20 VC's [place] " "15# 3\" 3x10 [place] 15# 3\" 3x10         Bicep curls  5# 2x10 10# 3x10 10# 3x10         Ther Activity             STS no UE  NV 3x10          Step ups F/L  F 6\" x20 ea           Lateral step downs    NV         Stationary lunges    X15 christine.  VC's         Gait Training                                       Modalities                                                "

## 2024-10-03 ENCOUNTER — APPOINTMENT (OUTPATIENT)
Dept: LAB | Facility: CLINIC | Age: 69
End: 2024-10-03
Payer: MEDICARE

## 2024-10-03 ENCOUNTER — OFFICE VISIT (OUTPATIENT)
Dept: PHYSICAL THERAPY | Facility: CLINIC | Age: 69
End: 2024-10-03
Payer: MEDICARE

## 2024-10-03 DIAGNOSIS — E03.9 HYPOTHYROIDISM, UNSPECIFIED TYPE: ICD-10-CM

## 2024-10-03 DIAGNOSIS — R26.89 ABNORMALITY OF GAIT DUE TO IMPAIRMENT OF BALANCE: Primary | ICD-10-CM

## 2024-10-03 DIAGNOSIS — R53.81 PHYSICAL DECONDITIONING: ICD-10-CM

## 2024-10-03 LAB
T4 FREE SERPL-MCNC: 0.76 NG/DL (ref 0.61–1.12)
TSH SERPL DL<=0.05 MIU/L-ACNC: 2.23 UIU/ML (ref 0.45–4.5)

## 2024-10-03 PROCEDURE — 36415 COLL VENOUS BLD VENIPUNCTURE: CPT

## 2024-10-03 PROCEDURE — 84439 ASSAY OF FREE THYROXINE: CPT

## 2024-10-03 PROCEDURE — 97110 THERAPEUTIC EXERCISES: CPT | Performed by: PHYSICAL THERAPIST

## 2024-10-03 PROCEDURE — 84443 ASSAY THYROID STIM HORMONE: CPT

## 2024-10-03 PROCEDURE — 97112 NEUROMUSCULAR REEDUCATION: CPT | Performed by: PHYSICAL THERAPIST

## 2024-10-04 ENCOUNTER — TELEPHONE (OUTPATIENT)
Dept: FAMILY MEDICINE CLINIC | Facility: CLINIC | Age: 69
End: 2024-10-04

## 2024-10-04 NOTE — TELEPHONE ENCOUNTER
[email or web address]      ----- Message from VIVIANA Marmolejo sent at 10/4/2024  6:42 AM EDT -----  Thyroid is in a normal range continue with current thyroid dose

## 2024-10-04 NOTE — TELEPHONE ENCOUNTER
Pt's daughter returned call.  Relayed provider message regarding recent lab results.  No further questions or concerns at this time.

## 2024-10-07 ENCOUNTER — OFFICE VISIT (OUTPATIENT)
Dept: PHYSICAL THERAPY | Facility: CLINIC | Age: 69
End: 2024-10-07
Payer: MEDICARE

## 2024-10-07 DIAGNOSIS — R26.89 ABNORMALITY OF GAIT DUE TO IMPAIRMENT OF BALANCE: Primary | ICD-10-CM

## 2024-10-07 DIAGNOSIS — R53.81 PHYSICAL DECONDITIONING: ICD-10-CM

## 2024-10-07 PROCEDURE — 97112 NEUROMUSCULAR REEDUCATION: CPT | Performed by: PHYSICAL THERAPIST

## 2024-10-07 PROCEDURE — 97110 THERAPEUTIC EXERCISES: CPT | Performed by: PHYSICAL THERAPIST

## 2024-10-07 NOTE — PROGRESS NOTES
"Daily Note     Today's date: 10/7/2024  Patient name: Wilman Redding  : 1955  MRN: [id number]  Referring provider: Lisa Verde, *  Dx:   Encounter Diagnosis     ICD-10-CM    1. Abnormality of gait due to impairment of balance  R26.89       2. Physical deconditioning  R53.81                      Subjective: Patient has been feeling well.  No soreness noted after last session.      Objective: See treatment diary below      Assessment: Tolerated treatment well. Patient demonstrated fatigue post treatment and would benefit from continued PT.  New TE and increased resistance today to improve core strength.  Patient was very tired after session, but had no complaints of pain.      Plan: Continue per plan of care.  Progress treatment as tolerated.       Precautions: COPD, Emphysema, HTN, Memory Loss, DM, Depression    POC expires Unit limit Auth  expiration date PT/OT + Visit Limit?   24 NA 25 BOMN                 Visit/Unit Tracking  AUTH Status:  Date 9/23 9/26 10/1 10/3 10/7          Authorized Used 1 2 3 4 5           Remaining                  QR     Manuals 9/23 9/26 10/1 10/3 10/7                                                            Neuro Re-Ed             Pt education Daily HEP.  Deconditioning vs. weakness            Balance Beam Fwd, Side  NV 5 laps each cl sup          Hurdles Fwd, Side  NV  4 laps ea         Foam FA EC   FT EO head turns x1' FT EO head turns x1'         Foam FT EO   x1' x1'                                   Ther Ex             UBE seated 1/2 F/R L1x 5'/5'            Nustep seat 10 with UE 9 NV L6x10' L6x10' L6x10' L6x10'        Matrix Quads 30# 2x10 30# 2x10 30# 3x10 30# 3x10 30# 3x10        Matrix HS 30# 2x10 30# 2x10 30# 3x10 30# 3x10 30# 3x10        Webslie Rows M,L  BTB  3\" x20 ea BTB  3\" x30 ea [place] 20# 3\" 3x10 ea        T-band Paloff Press     [place]ris 10# 10\"x10        T-band Tricep  BTB 3\" x20 VC's [place] 15# 3\" 3x10 [place] 15# 3\" 3x10 [place] 15# " "3\" 3x10        Bicep curls  5# 2x10 10# 3x10 10# 3x10 10# 3x10        Ther Activity             STS no UE  NV 3x10  3x10        Step ups F/L  F 6\" x20 ea           Lateral step downs    NV         Stationary lunges    X15 christine.  VC's         Gait Training                                       Modalities                                                  "

## 2024-10-10 ENCOUNTER — OFFICE VISIT (OUTPATIENT)
Dept: PHYSICAL THERAPY | Facility: CLINIC | Age: 69
End: 2024-10-10
Payer: MEDICARE

## 2024-10-10 DIAGNOSIS — R53.81 PHYSICAL DECONDITIONING: ICD-10-CM

## 2024-10-10 DIAGNOSIS — R26.89 ABNORMALITY OF GAIT DUE TO IMPAIRMENT OF BALANCE: Primary | ICD-10-CM

## 2024-10-10 PROCEDURE — 97530 THERAPEUTIC ACTIVITIES: CPT | Performed by: PHYSICAL THERAPIST

## 2024-10-10 PROCEDURE — 97112 NEUROMUSCULAR REEDUCATION: CPT | Performed by: PHYSICAL THERAPIST

## 2024-10-10 PROCEDURE — 97110 THERAPEUTIC EXERCISES: CPT | Performed by: PHYSICAL THERAPIST

## 2024-10-10 NOTE — PROGRESS NOTES
"Daily Note     Today's date: 10/10/2024  Patient name: Wilman Redding  : 1955  MRN: [id number]  Referring provider: Lisa Verde, *  Dx:   Encounter Diagnosis     ICD-10-CM    1. Abnormality of gait due to impairment of balance  R26.89       2. Physical deconditioning  R53.81                      Subjective: Felt good after last session.  Did not have any residual soreness.      Objective: See treatment diary below      Assessment: Tolerated treatment well. Patient demonstrated fatigue post treatment and would benefit from continued PT  VC's for correct performance of Tricep presses.  No complaints of pain during or after session.      Plan: Continue per plan of care.  Progress treatment as tolerated.       Precautions: COPD, Emphysema, HTN, Memory Loss, DM, Depression    POC expires Unit limit Auth  expiration date PT/OT + Visit Limit?   24 NA 25 BOMN                 Visit/Unit Tracking  AUTH Status:  Date 9/23 9/26 10/1 10/3 10/7 10/10         Authorized Used 1 2 3 4 5           Remaining                  QR     Manuals 9/23 9/26 10/1 10/3 10/7 10/10                                                           Neuro Re-Ed             Pt education Daily HEP.  Deconditioning vs. weakness            Balance Beam Fwd, Side  NV 5 laps each cl sup          Hurdles Fwd, Side  NV  4 laps ea         Foam FA EC   FT EO head turns x1' FT EO head turns x1'         Foam FT EO   x1' x1'                                   Ther Ex             UBE seated 1/2 F/R L1x 5'/5'            Nustep seat 10 with UE 9 NV L6x10' L6x10' L6x10' L6x10' L6x10'       Matrix Quads 30# 2x10 30# 2x10 30# 3x10 30# 3x10 30# 3x10 30# 3x10       Matrix HS 30# 2x10 30# 2x10 30# 3x10 30# 3x10 30# 3x10 30# 3x10       Webslie Rows M,L  BTB  3\" x20 ea BTB  3\" x30 ea [place] 20# 3\" 3x10 ea [place] 20# 3\" 3x10 ea       T-band Paloff Press     Cris 10# 10\"x10 [place] 10# 10\"x10       T-band Tricep  BTB 3\" x20 VC's [place] 15# 3\" 3x10 " "[place]ris 15# 3\" 3x10 [place] 15# 3\" 3x10 [place] 15# 3\" 3x10       Bicep curls  5# 2x10 10# 3x10 10# 3x10 10# 3x10 10# 3x10       Ther Activity             STS no UE  NV 3x10  3x10        Step ups F/L  F 6\" x20 ea           Lateral step downs    NV  NV       Stationary lunges    X15 christine.  VC's         Gait Training                                       Modalities                                                    "

## 2024-10-14 ENCOUNTER — OFFICE VISIT (OUTPATIENT)
Dept: PHYSICAL THERAPY | Facility: CLINIC | Age: 69
End: 2024-10-14
Payer: MEDICARE

## 2024-10-14 DIAGNOSIS — R53.81 PHYSICAL DECONDITIONING: ICD-10-CM

## 2024-10-14 DIAGNOSIS — R26.89 ABNORMALITY OF GAIT DUE TO IMPAIRMENT OF BALANCE: Primary | ICD-10-CM

## 2024-10-14 PROCEDURE — 97110 THERAPEUTIC EXERCISES: CPT

## 2024-10-14 PROCEDURE — 97112 NEUROMUSCULAR REEDUCATION: CPT

## 2024-10-14 NOTE — PROGRESS NOTES
"Daily Note    Today's date: 10/14/24  Patient name: Wilman Redding  : 1955  MRN: [id number]  Referring provider: Lisa Verde, *  Dx:   Encounter Diagnosis     ICD-10-CM    1. Abnormality of gait due to impairment of balance  R26.89       2. Physical deconditioning  R53.81           Start Time: 1545  Stop Time: 1630  Total time in clinic (min): 45 minutes      Subjective: Marques reports no c/o today.     Objective: See treatment diary below.    Assessment: Marques tolerated treatment well with consistent cuing throughout. TE's were performed with increased reps and increased resistance. No new TE's were performed today. Following treatment, the patient demonstrated fatigue and would benefit from continued physical therapy.    Plan: Continue per plan of care.  Progress treatment as tolerated.         Precautions: COPD, Emphysema, HTN, Memory Loss, DM, Depression    POC expires Unit limit Auth  expiration date PT/OT + Visit Limit?   24 NA 25 BOMN                 Visit/Unit Tracking  AUTH Status:  Date 9/23 9/26 10/1 10/3 10/7 10/10 10/14        Authorized Used 1 2 3 4 5 6 7         Remaining                  QR     Manuals 9/23 9/26 10/1 10/3 10/7 10/10 10/14                                                          Neuro Re-Ed             Pt education Daily HEP.  Deconditioning vs. weakness            Balance Beam Fwd, Side  NV 5 laps each cl sup          Hurdles Fwd, Side  NV  4 laps ea         Foam FA EC   FT EO head turns x1' FT EO head turns x1'         Foam FT EO   x1' x1'                                   Ther Ex             UBE seated 1/2 F/R L1x 5'/5'            Nustep seat 10 with UE 9 NV L6x10' L6x10' L6x10' L6x10' L6x10' 10' L6       Matrix Quads 30# 2x10 30# 2x10 30# 3x10 30# 3x10 30# 3x10 30# 3x10 30# 3x10       Matrix HS 30# 2x10 30# 2x10 30# 3x10 30# 3x10 30# 3x10 30# 3x10 30# 3x10       Webslie Rows M,L  BTB  3\" x20 ea BTB  3\" x30 ea Cris Cris 20# 3\" 3x10 ea [place] 20# 3\" 3x10 " "ea [place] 20# 3x10 3\"      T-band Paloff Press     Cris 10# 10\"x10 [place] 10# 10\"x10 [place] 10x10\"       T-band Tricep  BTB 3\" x20 VC's [place] 15# 3\" 3x10 [place] 15# 3\" 3x10 [place] 15# 3\" 3x10 Cris 15# 3\" 3x10 [place] 15# 3\" 3x10       Bicep curls  5# 2x10 10# 3x10 10# 3x10 10# 3x10 10# 3x10 [place] 20# 3x10       Ther Activity             STS no UE  NV 3x10  3x10        Step ups F/L  F 6\" x20 ea           Lateral step downs    NV  NV nv      Stationary lunges    X15 christine.  VC's         Gait Training                                       Modalities                                                      Chandana Rao, PT  10/14/2024,4:59 PM  "

## 2024-10-16 NOTE — PROGRESS NOTES
"Daily Note     Today's date: 10/16/2024  Patient name: Wilman Redding  : 1955  MRN: [id number]  Referring provider: Lisa Verde, *  Dx:   Encounter Diagnosis     ICD-10-CM    1. Abnormality of gait due to impairment of balance  R26.89       2. Physical deconditioning  R53.81                      Subjective: ***      Objective: See treatment diary below      Assessment: Tolerated treatment {Tolerated treatment :6575550181}. Patient {assessment:3241843835}      Plan: {PLAN:9767339119}     Precautions: COPD, Emphysema, HTN, Memory Loss, DM, Depression    POC expires Unit limit Auth  expiration date PT/OT + Visit Limit?   24 NA 25 BOMN                 Visit/Unit Tracking  AUTH Status:  Date 9/23 9/26 10/1 10/3 10/7 10/10 10/14        Authorized Used 1 2 3 4 5 6 7         Remaining                  QR     Manuals 9/23 9/26 10/1 10/3 10/7 10/10 10/14                                                          Neuro Re-Ed             Pt education Daily HEP.  Deconditioning vs. weakness            Balance Beam Fwd, Side  NV 5 laps each cl sup          Hurdles Fwd, Side  NV  4 laps ea         Foam FA EC   FT EO head turns x1' FT EO head turns x1'         Foam FT EO   x1' x1'                                   Ther Ex             UBE seated 1/2 F/R L1x 5'/5'            Nustep seat 10 with UE 9 NV L6x10' L6x10' L6x10' L6x10' L6x10' 10' L6       Matrix Quads 30# 2x10 30# 2x10 30# 3x10 30# 3x10 30# 3x10 30# 3x10 30# 3x10       Matrix HS 30# 2x10 30# 2x10 30# 3x10 30# 3x10 30# 3x10 30# 3x10 30# 3x10       Webslie Rows M,L  BTB  3\" x20 ea BTB  3\" x30 ea [place] 20# 3\" 3x10 ea [place] 20# 3\" 3x10 ea [place] 20# 3x10 3\"      T-band Paloff Press     [place]ris 10# 10\"x10 [place] 10# 10\"x10 [place] 10x10\"       T-band Tricep  BTB 3\" x20 VC's Cris 15# 3\" 3x10 [place] 15# 3\" 3x10 [place] 15# 3\" 3x10 [place] 15# 3\" 3x10 [place] 15# 3\" 3x10       Bicep curls  5# 2x10 10# 3x10 10# 3x10 10# 3x10 10# 3x10 [place] 20# 3x10    " "   Ther Activity             STS no UE  NV 3x10  3x10        Step ups F/L  F 6\" x20 ea           Lateral step downs    NV  NV nv      Stationary lunges    X15 christine.  VC's         Gait Training                                       Modalities                                                        "

## 2024-10-17 ENCOUNTER — APPOINTMENT (OUTPATIENT)
Dept: PHYSICAL THERAPY | Facility: CLINIC | Age: 69
End: 2024-10-17
Payer: MEDICARE

## 2024-10-17 DIAGNOSIS — R26.89 ABNORMALITY OF GAIT DUE TO IMPAIRMENT OF BALANCE: Primary | ICD-10-CM

## 2024-10-17 DIAGNOSIS — R53.81 PHYSICAL DECONDITIONING: ICD-10-CM

## 2024-10-21 ENCOUNTER — OFFICE VISIT (OUTPATIENT)
Dept: NEUROLOGY | Facility: CLINIC | Age: 69
End: 2024-10-21
Payer: MEDICARE

## 2024-10-21 VITALS
DIASTOLIC BLOOD PRESSURE: 68 MMHG | WEIGHT: 234 LBS | BODY MASS INDEX: 34.56 KG/M2 | SYSTOLIC BLOOD PRESSURE: 136 MMHG | HEART RATE: 60 BPM

## 2024-10-21 DIAGNOSIS — G47.33 OSA (OBSTRUCTIVE SLEEP APNEA): ICD-10-CM

## 2024-10-21 DIAGNOSIS — F03.90 DEMENTIA (HCC): Primary | ICD-10-CM

## 2024-10-21 PROBLEM — G20.C PARKINSONISM (HCC): Status: RESOLVED | Noted: 2023-07-06 | Resolved: 2024-10-21

## 2024-10-21 PROCEDURE — 99215 OFFICE O/P EST HI 40 MIN: CPT | Performed by: PSYCHIATRY & NEUROLOGY

## 2024-10-21 NOTE — PROGRESS NOTES
Review of Systems   Constitutional: Negative.  Negative for appetite change, fatigue and fever.   HENT: Negative.  Negative for hearing loss, tinnitus, trouble swallowing and voice change.    Eyes: Negative.  Negative for photophobia, pain and visual disturbance.   Respiratory: Negative.  Negative for shortness of breath.    Cardiovascular: Negative.  Negative for palpitations.   Gastrointestinal: Negative.  Negative for nausea and vomiting.   Endocrine: Negative.  Negative for cold intolerance.   Genitourinary: Negative.  Negative for dysuria, frequency and urgency.   Musculoskeletal:  Positive for myalgias (Cramps at times in Right Leg). Negative for back pain, gait problem, neck pain and neck stiffness.   Skin: Negative.  Negative for rash.   Allergic/Immunologic: Negative.    Neurological:  Positive for tremors (Hands a little bit) and numbness (Feet, states only when going to bed when laying down). Negative for dizziness, seizures, syncope, facial asymmetry, speech difficulty, weakness, light-headedness and headaches.        Pill rolling in Right Hand, not all the time only sometimes and is unaware that he is doing it     Hematological: Negative.  Does not bruise/bleed easily.   Psychiatric/Behavioral:  Positive for sleep disturbance (Increase of issues, cant fall asleep). Negative for confusion and hallucinations.         Memory Issues, has become harder for him     All other systems reviewed and are negative.

## 2024-10-21 NOTE — PROGRESS NOTES
Ambulatory Visit  Name: Wilman Redding      : 1955      MRN: [id number]  Encounter Provider: Herlinda Briones MD  Encounter Date: 10/21/2024   Encounter department: [facility] [place]    Assessment & Plan  Dementia (Formerly [facility])  Patient with history of progressive cognitive decline consistent with dementia with rest tremor and bradykinesia noted at the onset of symptoms.  Initially diagnosed with Parkinson disease in 2017 at [facility] and started on levodopa but noted no clear improvement.  He never developed psychosis/ hallucinations.    He has followed in our offices since  being evaluated on levodopa with little progression in motor exam therefore there was a question as to whether parkinsonism could be medication induced.  He was tapered off of high doses of Wellbutrin and escitalopram.  Also levodopa was tapered off.  Off medication he has no parkinsonian symptoms.  Current exam is not consistent with Parkinson's disease or dementia with Lewy bodies.  B12 was also replaced.    He does continue to have cognitive difficulties.  They do impact certain daily functions which be consistent with mild dementia.  He has not tolerated trials of rivastigmine, donepezil or galantamine.  MRI of the brain was reviewed, and he does not have a great vascular burden and is suggestive vascular dementia.  He does not have atrophy of the hippocampus as seen with Alzheimer's dementia.  He has not had any behavioral changes to suggest FTD.  The question whether this could be mild, impairment impacted by his sleep disorder and mood disorder versus early Alzheimer's disease.  They are interested in any further testing which may clarify diagnosis.  Will obtain amyloid PET scan determinate whether this is early Alzheimer's disease dementia as this may change treatment options available to him.    .        Diagnoses and all orders for this visit:     Lewy body dementia without behav  Orders:    NM PET CT Amyloid  Brain; Future    DAFNE (obstructive sleep apnea)    He will be following up with sleep medicine with regards to sleep apnea.  Currently he is utilizing his CPAP.  He has shifted his sleep-wake cycle to the early morning hours.                History of Present Illness   Wilman Redding is a 69 year old with HTN, depression, RLS, DAFNE uses CPAP, and neuropathy who presents for movement follow up for dementia. He follows with Dr. Fung in residency clinic but wishes to be seen in movement clinic yearly.     To review, onset in 2015 with forgetfulness and depression (wife ill at time). He was diagnosed with Parkinson's disease in 2017 at [facility] and stopped working. Carbidopa/levodopa with little initial improvement. PET scan 3/6/2020 that showed NO decreased uptake suggestive of Alzheimer's, FTD, or LBD. First seen 10/2021 in our office and reported history of onset with fluctuating cognition and parkinsonian symptoms diagnosis considered to be more likely dementia with Lewy Bodies. Donepezil led to headache. He was on galantamine 8mg ER qhs. Switched to rivastigmine patch 4.6mg daily. Given the question of what his underlying parkinsonian symptoms were and if they were related to combination of Lexapro and Wellbutrin rather than PD levodopa was weaned off. He continued to do well with perhaps a mild, intermittent rest tremor when seen in office. He therefore has remained off levodopa.  He weaned off rivastigmine patch due to bradycardia.     Sleeps 5-6 hours but seems to have shifted to the early am hours (6am - 11am/12pm). H has sleep apnea. Wears CPAP. Scheduled to see sleep medicine in Jan.    No known dream enactment. He has however fallen out of bed while trying to reposition.   Finances always managed by his wife.   Household maintenance used to be hobby but now is more difficult. He can forget what he was suppose to go get or why he entered a room.   He mows and does the yard work with no issues.    Medications have  always been managed by his wife.   He drives without issues. No accident or citations. Family is not concerned about his memory.      Current relevant medications:   Sertaline 100mg daily     Prior medications:  Sinemet 25/100mg 2 tablets TID   Rivastigmine patch 4.6mg daily (higher doses causes bradycardia)  Aricept - caused headaches Galantamine - caused GI issues and weight loss   Gabapentin 300mg qhs       Review of Systems  I have personally reviewed the MA's review of systems and made changes as necessary.      Objective     /68 (BP Location: Right arm, Patient Position: Sitting, Cuff Size: Large)   Pulse 60   Wt 106 kg (234 lb)   BMI 34.56 kg/m²     Physical Exam  Vitals reviewed.   Eyes:      Extraocular Movements: EOM normal.      Pupils: Pupils are equal, round, and reactive to light.   Neurological:      Motor: Motor strength is normal.     Coordination: Finger-Nose-Finger Test normal.      Gait: Gait is intact.   Psychiatric:         Speech: Speech normal.       Neurologic Exam     Mental Status   Disoriented to person.   Disoriented to city.   Disoriented to time. Oriented to year, month and date.   Follows 3 step commands.   Attention: normal. Concentration: normal.   Speech: speech is normal (hypophonia)  Level of consciousness: alert  Able to name object. Normal comprehension.   MOCA 20/30     Cranial Nerves     CN III, IV, VI   Pupils are equal, round, and reactive to light.  Extraocular motions are normal.     CN VII   Facial expression full, symmetric.     CN VIII   CN VIII normal.   Hearing: intact    CN IX, X   Palate: symmetric    CN XI   Right trapezius strength: normal  Left trapezius strength: normal    CN XII   Tongue deviation: none    Motor Exam   Overall muscle tone: normal    Strength   Strength 5/5 throughout.     Sensory Exam   Light touch normal.     Gait, Coordination, and Reflexes     Gait  Gait: normal    Coordination   Finger to nose coordination:  normal          Administrative Statements   I have spent a total time of 40 minutes in caring for this patient on the day of the visit/encounter including Patient and family education, Impressions, Documenting in the medical record, Reviewing / ordering tests, medicine, procedures  , and Obtaining or reviewing history  .

## 2024-10-21 NOTE — ASSESSMENT & PLAN NOTE
He will be following up with sleep medicine with regards to sleep apnea.  Currently he is utilizing his CPAP.  He has shifted his sleep-wake cycle to the early morning hours.

## 2024-10-21 NOTE — ASSESSMENT & PLAN NOTE
Patient with history of progressive cognitive decline consistent with dementia with rest tremor and bradykinesia noted at the onset of symptoms.  Initially diagnosed with Parkinson disease in 2017 at [facility] and started on levodopa but noted no clear improvement.  He never developed psychosis/ hallucinations.    He has followed in our offices since 2021 being evaluated on levodopa with little progression in motor exam therefore there was a question as to whether parkinsonism could be medication induced.  He was tapered off of high doses of Wellbutrin and escitalopram.  Also levodopa was tapered off.  Off medication he has no parkinsonian symptoms.  Current exam is not consistent with Parkinson's disease or dementia with Lewy bodies.  B12 was also replaced.    He does continue to have cognitive difficulties.  They do impact certain daily functions which be consistent with mild dementia.  He has not tolerated trials of rivastigmine, donepezil or galantamine.  MRI of the brain was reviewed, and he does not have a great vascular burden and is suggestive vascular dementia.  He does not have atrophy of the hippocampus as seen with Alzheimer's dementia.  He has not had any behavioral changes to suggest FTD.  The question whether this could be mild, impairment impacted by his sleep disorder and mood disorder versus early Alzheimer's disease.  They are interested in any further testing which may clarify diagnosis.  Will obtain amyloid PET scan determinate whether this is early Alzheimer's disease dementia as this may change treatment options available to him.    .        Diagnoses and all orders for this visit:     Lewy body dementia without behav  Orders:    NM PET CT Amyloid Brain; Future

## 2024-10-22 ENCOUNTER — OFFICE VISIT (OUTPATIENT)
Dept: PHYSICAL THERAPY | Facility: CLINIC | Age: 69
End: 2024-10-22
Payer: MEDICARE

## 2024-10-22 DIAGNOSIS — R53.81 PHYSICAL DECONDITIONING: ICD-10-CM

## 2024-10-22 DIAGNOSIS — R26.89 ABNORMALITY OF GAIT DUE TO IMPAIRMENT OF BALANCE: Primary | ICD-10-CM

## 2024-10-22 PROCEDURE — 97112 NEUROMUSCULAR REEDUCATION: CPT

## 2024-10-22 PROCEDURE — 97110 THERAPEUTIC EXERCISES: CPT

## 2024-10-22 NOTE — PROGRESS NOTES
Daily Note     Today's date: 10/22/2024  Patient name: Wilman Redding  : 1955  MRN: [id number]  Referring provider: Lisa Verde, *  Dx:   Encounter Diagnosis     ICD-10-CM    1. Abnormality of gait due to impairment of balance  R26.89       2. Physical deconditioning  R53.81           Start Time: 1430  Stop Time: 1509  Total time in clinic (min): 39 minutes    Subjective: no new changes.       Objective: See treatment diary below      Assessment:  Continued with treatment session with focus on balance as well as LE strengthening.  Added some additional  strengthening with some c/o soreness in R knee. Tolerated treatment well. Patient exhibited good technique with therapeutic exercises and would benefit from continued PT. S/p treatment session no changes noted.       Plan: Continue per plan of care. Re scheduled for NV with primary therapist.      Precautions: COPD, Emphysema, HTN, Memory Loss, DM, Depression    POC expires Unit limit Auth  expiration date PT/OT + Visit Limit?   24 NA 25 BOMN                 Visit/Unit Tracking  AUTH Status:  Date 9/23 9/26 10/1 10/3 10/7 10/10 10/14 10/22       Authorized Used 1 2 3 4 5 6 7 8        Remaining                  QR     Manuals 9/23 9/26 10/1 10/3 10/7 10/10 10/14 10/22                                                         Neuro Re-Ed             Pt education Daily HEP.  Deconditioning vs. weakness            Balance Beam Fwd, Side  NV 5 laps each cl sup     8 laps      Hurdles Fwd, Side  NV  4 laps ea         Foam FA EC   FT EO head turns x1' FT EO head turns x1'         Foam FT EO   x1' x1'                                   Ther Ex             UBE seated 1/2 F/R L1x 5'/5'            Nustep seat 10 with UE 9 NV L6x10' L6x10' L6x10' L6x10' L6x10' 10' L6  L7 10 min      Matrix Quads 30# 2x10 30# 2x10 30# 3x10 30# 3x10 30# 3x10 30# 3x10 30# 3x10  35# 3x 10      Matrix HS 30# 2x10 30# 2x10 30# 3x10 30# 3x10 30# 3x10 30# 3x10 30# 3x10  45# 3x 10   "   Hernandezlie Rows M,L  BTB  3\" x20 ea BTB  3\" x30 ea [place] 20# 3\" 3x10 ea Cris 20# 3\" 3x10 ea [place] 20# 3x10 3\" Cris 20# 3x 10      T-band Paloff Press     [place] 10# 10\"x10 [place] 10# 10\"x10 Cris 10x10\"  Cris 10x10\"  10#     T-band Tricep  BTB 3\" x20 VC's Cris 15# 3\" 3x10 [place] 15# 3\" 3x10 [place] 15# 3\" 3x10 Cris 15# 3\" 3x10 Cris 15# 3\" 3x10  [place] 20# 3\"3x 10      Bicep curls  5# 2x10 10# 3x10 10# 3x10 10# 3x10 10# 3x10 [place] 20# 3x10  [place] 20# 3x10      Ther Activity             STS no UE  NV 3x10  3x10        Step ups F/L  F 6\" x20 ea           Lateral step downs    NV  NV nv 6\" 10x ea      Stationary lunges    X15 christine.  VC's         Gait Training                                       Modalities                                                        "

## 2024-10-23 NOTE — PROGRESS NOTES
PT Discharge    Today's date: 10/23/24  Patient name: Wilman Redding  : 1955  MRN: [id number]  Referring provider: Lisa Verde, *  Dx:   Encounter Diagnosis     ICD-10-CM    1. Abnormality of gait due to impairment of balance  R26.89       2. Physical deconditioning  R53.81                       Assessment  Functional limitations: None  Symptom irritability: low    Assessment details: Patient reports feeling 50% improved to date.  He has noticed an improvement in his strength, balance, and endurance.  He denies LOB or falls and feels more steady ambulating on uneven terrain.  He has become more active at home and has started doing yard work for the first time in a long time.  He denies functional limitations, but does say that he still doesn't have good endurance.  Objectively, bilateral LE strength and balance are WNL.  Wells Balance Score improved from 46/56 to 51/56.  Patient has met PT goals and requires no further skilled PT services at this time.  Patient plans to attend Fitness Program at this facility.  He is independent and compliant with a HEP.    Goals  ST. Patient will transfer floor to stand with min Assist- met  2. Patient will demonstrate BBS increase > or = 3 points to decrease risk of falls - met  3. Patient will report the ability to climb stairs without difficulty - met  LTG (8 weeks)  1. Patient will be independent and compliant with a HEP in order to maintain gains made with skilled PT services - met  2. Patient will transfer floor to stand independently - met  3. Patient will demonstrate BBS increase > or = 5 points in order to improve stability with ambulation over uneven terrain.- met    Plan    Planned therapy interventions: patient/caregiver education and home exercise program    Plan of Care beginning date: 2024  Plan of Care expiration date: 2024      Subjective Evaluation    History of Present Illness  Mechanism of injury: Patient reports feeling 50%  improved since starting PT services.  He has noticed improvements in his strength, balance and endurance.  He feels he still needs to work on his endurance.  He has resumed doing yard work.  He denies feeling unsteady or having LOB.    Feels much stronger when lifting and carrying things.  Patient Goals  Patient goals for therapy: increased strength    Pain  No pain reported  Location: Medial and lateral joint of left knee.      Diagnostic Tests  No diagnostic tests performed    FCE comments: Received outpatient PT services 2 years ago and got better, but did not do his HEP.    Objective     Strength/Myotome Testing     Left Shoulder     Planes of Motion   Flexion: 4+   Extension: 4+   Abduction: 4+   Internal rotation at 0°: 5     Right Shoulder     Planes of Motion   Flexion: 4+   Extension: 4+   Abduction: 4+   Internal rotation at 0°: 5     Left Elbow   Flexion: 5  Extension: 4+    Right Elbow   Flexion: 5  Extension: 4+    Left Wrist/Hand   Wrist extension: 4+  Wrist flexion: 4    Right Wrist/Hand   Wrist extension: 4+  Wrist flexion: 4+    Left Hip   Planes of Motion   Flexion: 4+  Extension: 4+  Abduction: 4+    Right Hip   Planes of Motion   Flexion: 4+  Extension: 4+  Abduction: 4+    Left Knee   Flexion: 5  Extension: 5    Right Knee   Flexion: 5  Extension: 5    Left Ankle/Foot   Dorsiflexion: 5    Right Ankle/Foot   Dorsiflexion: 5         Precautions: COPD, Emphysema, HTN, Memory Loss, DM, Depression    POC expires Unit limit Auth  expiration date PT/OT + Visit Limit?   11/18/24 NA 5/22/25 BOMN                 Visit/Unit Tracking  AUTH Status:  Date 9/23 9/26 10/1 10/3 10/7 10/10 10/14 10/22 10/24      Authorized Used 1 2 3 4 5 6 7 8        Remaining                  QR     Manuals 9/23 9/26 10/1 10/3 10/7 10/10 10/14 10/22 10/24    RE performed         JF                                           Neuro Re-Ed             Pt education Daily HEP.  Deconditioning vs. weakness            Balance Beam Fwd,  "Side  NV 5 laps each cl sup     8 laps      Hurdles Fwd, Side  NV  4 laps ea         Foam FA EC   FT EO head turns x1' FT EO head turns x1'         Foam FT EO   x1' x1'                                   Ther Ex             UBE seated 1/2 F/R L1x 5'/5'            Nustep seat 10 with UE 9 NV L6x10' L6x10' L6x10' L6x10' L6x10' 10' L6  L7 10 min  L7 10 min     Matrix Quads 30# 2x10 30# 2x10 30# 3x10 30# 3x10 30# 3x10 30# 3x10 30# 3x10  35# 3x 10      Matrix HS 30# 2x10 30# 2x10 30# 3x10 30# 3x10 30# 3x10 30# 3x10 30# 3x10  45# 3x 10     Webslie Rows M,L  BTB  3\" x20 ea BTB  3\" x30 ea [place] 20# 3\" 3x10 ea [place] 20# 3\" 3x10 ea Cris 20# 3x10 3\" [place] 20# 3x 10      T-band Paloff Press     Cris 10# 10\"x10 [place] 10# 10\"x10 [place] 10x10\"  Cris 10x10\"  10#     T-band Tricep  BTB 3\" x20 VC's [place] 15# 3\" 3x10 [place] 15# 3\" 3x10 Cris 15# 3\" 3x10 Cris 15# 3\" 3x10 [place] 15# 3\" 3x10  Cris 20# 3\"3x 10      Bicep curls  5# 2x10 10# 3x10 10# 3x10 10# 3x10 10# 3x10 [place] 20# 3x10  [place]ris 20# 3x10      Ther Activity             STS no UE  NV 3x10  3x10        Step ups F/L  F 6\" x20 ea           Lateral step downs    NV  NV nv 6\" 10x ea      Stationary lunges    X15 christine.  VC's         Gait Training                                       Modalities                                            "

## 2024-10-24 ENCOUNTER — EVALUATION (OUTPATIENT)
Dept: PHYSICAL THERAPY | Facility: CLINIC | Age: 69
End: 2024-10-24
Payer: MEDICARE

## 2024-10-24 DIAGNOSIS — R26.89 ABNORMALITY OF GAIT DUE TO IMPAIRMENT OF BALANCE: Primary | ICD-10-CM

## 2024-10-24 DIAGNOSIS — R53.81 PHYSICAL DECONDITIONING: ICD-10-CM

## 2024-10-24 PROCEDURE — 97110 THERAPEUTIC EXERCISES: CPT | Performed by: PHYSICAL THERAPIST

## 2024-11-05 ENCOUNTER — HOSPITAL ENCOUNTER (OUTPATIENT)
Dept: RADIOLOGY | Age: 69
Discharge: HOME | End: 2024-11-05
Payer: MEDICARE

## 2024-11-05 DIAGNOSIS — F03.90 DEMENTIA (HCC): ICD-10-CM

## 2024-11-05 PROCEDURE — 78814 PET IMAGE W/CT LMTD: CPT

## 2024-11-09 ENCOUNTER — PATIENT MESSAGE (OUTPATIENT)
Dept: NEUROLOGY | Facility: CLINIC | Age: 69
End: 2024-11-09

## 2024-11-11 DIAGNOSIS — E11.9 DIABETES MELLITUS WITHOUT COMPLICATION (HCC): Primary | ICD-10-CM

## 2024-11-12 ENCOUNTER — TELEPHONE (OUTPATIENT)
Dept: NEUROLOGY | Facility: CLINIC | Age: 69
End: 2024-11-12

## 2024-11-21 DIAGNOSIS — F33.1 MODERATE EPISODE OF RECURRENT MAJOR DEPRESSIVE DISORDER (HCC): ICD-10-CM

## 2024-11-21 RX ORDER — SERTRALINE HYDROCHLORIDE 100 MG/1
100 TABLET, FILM COATED ORAL DAILY
Qty: 90 TABLET | Refills: 1 | Status: SHIPPED | OUTPATIENT
Start: 2024-11-21 | End: 2025-05-20

## 2024-11-25 DIAGNOSIS — I25.84 CORONARY ARTERY DISEASE DUE TO CALCIFIED CORONARY LESION: Primary | ICD-10-CM

## 2024-11-25 DIAGNOSIS — I25.10 CORONARY ARTERY DISEASE DUE TO CALCIFIED CORONARY LESION: Primary | ICD-10-CM

## 2024-12-09 DIAGNOSIS — E03.9 HYPOTHYROIDISM, UNSPECIFIED TYPE: ICD-10-CM

## 2024-12-10 RX ORDER — LEVOTHYROXINE SODIUM 100 UG/1
100 TABLET ORAL
Qty: 90 TABLET | Refills: 1 | Status: SHIPPED | OUTPATIENT
Start: 2024-12-10

## 2024-12-11 ENCOUNTER — HOSPITAL ENCOUNTER (OUTPATIENT)
Dept: RADIOLOGY | Facility: MEDICAL CENTER | Age: 69
Discharge: HOME | End: 2024-12-11
Payer: MEDICARE

## 2024-12-11 DIAGNOSIS — I25.10 CORONARY ARTERY DISEASE DUE TO CALCIFIED CORONARY LESION: ICD-10-CM

## 2024-12-11 DIAGNOSIS — I25.84 CORONARY ARTERY DISEASE DUE TO CALCIFIED CORONARY LESION: ICD-10-CM

## 2024-12-11 PROCEDURE — 93880 EXTRACRANIAL BILAT STUDY: CPT | Performed by: SURGERY

## 2024-12-11 PROCEDURE — 93880 EXTRACRANIAL BILAT STUDY: CPT

## 2024-12-12 ENCOUNTER — RESULTS FOLLOW-UP (OUTPATIENT)
Dept: FAMILY MEDICINE CLINIC | Facility: CLINIC | Age: 69
End: 2024-12-12

## 2024-12-12 PROBLEM — I65.23 BILATERAL CAROTID ARTERY STENOSIS: Status: ACTIVE | Noted: 2024-12-12

## 2024-12-22 DIAGNOSIS — I10 PRIMARY HYPERTENSION: ICD-10-CM

## 2024-12-23 RX ORDER — LOSARTAN POTASSIUM 50 MG/1
50 TABLET ORAL DAILY
Qty: 90 TABLET | Refills: 1 | Status: SHIPPED | OUTPATIENT
Start: 2024-12-23

## 2025-01-13 ENCOUNTER — RA CDI HCC (OUTPATIENT)
Dept: OTHER | Facility: HOSPITAL | Age: 70
End: 2025-01-13

## 2025-01-13 NOTE — PROGRESS NOTES
HCC coding opportunities          Chart Reviewed number of suggestions sent to Provider: 1     Patients Insurance   [email or web address]  Medicare Insurance: Medicare

## 2025-01-16 PROBLEM — R63.0 DECREASED APPETITE: Status: RESOLVED | Noted: 2022-10-26 | Resolved: 2025-01-16

## 2025-01-17 ENCOUNTER — TELEMEDICINE (OUTPATIENT)
Dept: FAMILY MEDICINE CLINIC | Facility: CLINIC | Age: 70
End: 2025-01-17
Payer: MEDICARE

## 2025-01-17 VITALS — SYSTOLIC BLOOD PRESSURE: 118 MMHG | DIASTOLIC BLOOD PRESSURE: 70 MMHG

## 2025-01-17 DIAGNOSIS — R79.89 LOW TESTOSTERONE: ICD-10-CM

## 2025-01-17 DIAGNOSIS — J43.2 CENTRILOBULAR EMPHYSEMA (HCC): ICD-10-CM

## 2025-01-17 DIAGNOSIS — E11.9 DIABETES MELLITUS WITHOUT COMPLICATION (HCC): ICD-10-CM

## 2025-01-17 DIAGNOSIS — E03.9 HYPOTHYROIDISM, UNSPECIFIED TYPE: ICD-10-CM

## 2025-01-17 DIAGNOSIS — Z12.11 SCREEN FOR COLON CANCER: ICD-10-CM

## 2025-01-17 DIAGNOSIS — R91.1 PULMONARY NODULE: ICD-10-CM

## 2025-01-17 DIAGNOSIS — F03.A3 MILD DEMENTIA WITH MOOD DISTURBANCE, UNSPECIFIED DEMENTIA TYPE (HCC): ICD-10-CM

## 2025-01-17 DIAGNOSIS — E11.40 TYPE 2 DIABETES MELLITUS WITH DIABETIC NEUROPATHY, WITHOUT LONG-TERM CURRENT USE OF INSULIN (HCC): ICD-10-CM

## 2025-01-17 DIAGNOSIS — E16.2 HYPOGLYCEMIC SYNDROME: ICD-10-CM

## 2025-01-17 DIAGNOSIS — K59.1 FUNCTIONAL DIARRHEA: ICD-10-CM

## 2025-01-17 DIAGNOSIS — F33.1 MODERATE EPISODE OF RECURRENT MAJOR DEPRESSIVE DISORDER (HCC): ICD-10-CM

## 2025-01-17 DIAGNOSIS — E78.00 HYPERCHOLESTEROLEMIA: ICD-10-CM

## 2025-01-17 DIAGNOSIS — I10 PRIMARY HYPERTENSION: Primary | ICD-10-CM

## 2025-01-17 DIAGNOSIS — E53.8 B12 DEFICIENCY: ICD-10-CM

## 2025-01-17 DIAGNOSIS — E61.1 LOW IRON: ICD-10-CM

## 2025-01-17 DIAGNOSIS — K59.09 CONSTIPATION, CHRONIC: ICD-10-CM

## 2025-01-17 DIAGNOSIS — G47.33 OSA (OBSTRUCTIVE SLEEP APNEA): ICD-10-CM

## 2025-01-17 DIAGNOSIS — D68.52 HETEROZYGOUS FOR PROTHROMBIN G20210A MUTATION (HCC): ICD-10-CM

## 2025-01-17 DIAGNOSIS — I65.23 BILATERAL CAROTID ARTERY STENOSIS: ICD-10-CM

## 2025-01-17 PROBLEM — R63.4 WEIGHT LOSS: Status: RESOLVED | Noted: 2023-01-19 | Resolved: 2025-01-17

## 2025-01-17 PROCEDURE — G2211 COMPLEX E/M VISIT ADD ON: HCPCS | Performed by: NURSE PRACTITIONER

## 2025-01-17 PROCEDURE — 99214 OFFICE O/P EST MOD 30 MIN: CPT | Performed by: NURSE PRACTITIONER

## 2025-01-17 NOTE — ASSESSMENT & PLAN NOTE
Following with pulmonology currently on Anoro Ellipta 62.5-25 daily and no present issues with his breathing

## 2025-01-17 NOTE — ASSESSMENT & PLAN NOTE
Lab Results   Component Value Date    HGBA1C 7.1 (H) 07/22/2024     Orders:  •  Hemoglobin A1C; Future  •  Comprehensive metabolic panel; Future  •  CBC and differential; Future  •  Albumin / creatinine urine ratio; Future  •  Lipid Panel with Direct LDL reflex; Future

## 2025-01-17 NOTE — PROGRESS NOTES
Virtual Regular Visit  Name: Wilman Redding      : 1955      MRN: [id number]  Encounter Provider: VIVIANA Crawley  Encounter Date: 2025   Encounter department: [facility]      Verification of patient location:  Patient is located at Home in the following state in which I hold an active license [place] :  Assessment & Plan  Primary hypertension       Taking Losartan 50 mg daily and blood pressure well controlled on home reading   Bilateral carotid artery stenosis       recent carotid US showing no evidence of carotid stenosis on US   Centrilobular emphysema (HCC)       Following with pulmonology currently on Anoro Ellipta 62.5-25 daily and no present issues with his breathing  DAFNE (obstructive sleep apnea)       Using mask nightly for his sleep CPAP   Pulmonary nodule       following with pulmonary yearly CT scan of the chest last completed 2024 showing pulmonary nodules stable   Functional diarrhea    Orders:  •  Occult Blood, Fecal Immunochemical; Future  advised to stop Metformin for 2 weeks and monitor his diarrhea and see if this improving his symptoms and to call with a report of his symptoms   Constipation, chronic  No current issues with constipation        Type 2 diabetes mellitus with diabetic neuropathy, without long-term current use of insulin (HCC)    Lab Results   Component Value Date    HGBA1C 7.1 (H) 2024        labs ordered to update is currently on Metformin bid but is having diarrhea and holding to see if improving the diarrhea may need to change to alternate agent   Moderate episode of recurrent major depressive disorder (HCC)    Orders:  •  sertraline (ZOLOFT) 50 mg tablet; Take 1 tablet (50 mg total) by mouth daily  Depression Screening Follow-up Plan: Patient's depression screening was positive with a PHQ-9 score of 14. Patient assessed for underlying major depression. They have no active suicidal ideations. Brief counseling provided and recommend  additional follow-up/re-evaluation next office visit.   Patient is not tolerating the Sertraline and is weaning off the medication is currently on 50 mg and will wean off over the next month rx sent for the 50 mg and break in half in two weeks and wean off. Will also check a genesight test as he has failed several SSRI   Hypothyroidism, unspecified type    Orders:  •  TSH, 3rd generation with Free T4 reflex; Future  Currently on Levothyroxine 100 mcg will update labs   Diabetes mellitus without complication (HCC)    Lab Results   Component Value Date    HGBA1C 7.1 (H) 07/22/2024     Orders:  •  Hemoglobin A1C; Future  •  Comprehensive metabolic panel; Future  •  CBC and differential; Future  •  Albumin / creatinine urine ratio; Future  •  Lipid Panel with Direct LDL reflex; Future    Mild dementia with mood disturbance, unspecified dementia type (HCC)       Patient is following with neurology   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)       denies any current symptoms of bleeding or clotting   Hypoglycemic syndrome       Patient has been using a CGA as his blood sugars have been low in the past and having symptoms from the low blood sugars and would benefit from ongoing use   Hypercholesterolemia    Orders:  •  Lipid Panel with Direct LDL reflex; Future  labs ordered to update   B12 deficiency    Orders:  •  Vitamin B12; Future  continue with B12 injections and getting at home with his family and labs ordered to update   Low testosterone    Orders:  •  Testosterone, free, total; Future  levels ordered to update   Low iron    Orders:  •  Iron Panel (Includes Ferritin, Iron Sat%, Iron, and TIBC); Future    Screen for colon cancer    Orders:  •  Occult Blood, Fecal Immunochemical; Future        Encounter provider VIVIANA Crawley    The patient was identified by name and date of birth. Wilman Redding was informed that this is a telemedicine visit and that the visit is being conducted through the Epic  Embedded platform. He agrees to proceed..  My office door was closed. No one else was in the room.  He acknowledged consent and understanding of privacy and security of the video platform. The patient has agreed to participate and understands they can discontinue the visit at any time.    Patient is aware this is a billable service.     History of Present Illness     Patient at home with his daughter and wife and is signing in to do a virtual visit at home today. Has has been having ongoing issues with diarrhea and loose bowels       Review of Systems   Constitutional:  Negative for activity change, appetite change, chills, diaphoresis, fatigue, fever and unexpected weight change.   HENT:  Positive for congestion, postnasal drip and rhinorrhea. Negative for ear pain, hearing loss, sinus pressure, sinus pain, sneezing and sore throat.    Eyes:  Negative for pain, redness and visual disturbance.   Respiratory:  Negative for cough and shortness of breath.    Cardiovascular:  Negative for chest pain and leg swelling.   Gastrointestinal:  Positive for diarrhea. Negative for abdominal pain, nausea and vomiting.   Musculoskeletal:  Negative for arthralgias.   Neurological:  Negative for dizziness and light-headedness.   Psychiatric/Behavioral:  Positive for sleep disturbance. Negative for behavioral problems and dysphoric mood.        Objective   /70     Physical Exam  Constitutional:       General: He is not in acute distress.     Appearance: Normal appearance. He is well-developed and well-groomed.   HENT:      Head: Normocephalic and atraumatic.   Eyes:      General: Lids are normal.   Pulmonary:      Effort: Pulmonary effort is normal.   Abdominal:      Palpations: Abdomen is soft.   Neurological:      General: No focal deficit present.      Mental Status: He is alert.   Psychiatric:         Attention and Perception: Attention normal.         Mood and Affect: Mood normal.         Speech: Speech normal.          Behavior: Behavior normal. Behavior is cooperative.         Thought Content: Thought content normal.         Cognition and Memory: Cognition normal.         Judgment: Judgment normal.       PHQ-2/9 Depression Screening    Little interest or pleasure in doing things: 0 - not at all  Feeling down, depressed, or hopeless: 1 - several days  Trouble falling or staying asleep, or sleeping too much: 3 - nearly every day  Feeling tired or having little energy: 3 - nearly every day  Poor appetite or overeatin - several days  Feeling bad about yourself - or that you are a failure or have let yourself or your family down: 1 - several days  Trouble concentrating on things, such as reading the newspaper or watching television: 3 - nearly every day  Moving or speaking so slowly that other people could have noticed. Or the opposite - being so fidgety or restless that you have been moving around a lot more than usual: 2 - more than half the days  Thoughts that you would be better off dead, or of hurting yourself in some way: 0 - not at all  PHQ-9 Score: 14  PHQ-9 Interpretation: Moderate depression        Visit Time  Total Visit Duration: 20

## 2025-01-17 NOTE — ASSESSMENT & PLAN NOTE
Lab Results   Component Value Date    HGBA1C 7.1 (H) 07/22/2024        labs ordered to update is currently on Metformin bid but is having diarrhea and holding to see if improving the diarrhea may need to change to alternate agent

## 2025-01-17 NOTE — ASSESSMENT & PLAN NOTE
Orders:  •  TSH, 3rd generation with Free T4 reflex; Future  Currently on Levothyroxine 100 mcg will update labs

## 2025-01-17 NOTE — ASSESSMENT & PLAN NOTE
Orders:  •  Occult Blood, Fecal Immunochemical; Future  advised to stop Metformin for 2 weeks and monitor his diarrhea and see if this improving his symptoms and to call with a report of his symptoms

## 2025-01-17 NOTE — ASSESSMENT & PLAN NOTE
Orders:  •  sertraline (ZOLOFT) 50 mg tablet; Take 1 tablet (50 mg total) by mouth daily  Depression Screening Follow-up Plan: Patient's depression screening was positive with a PHQ-9 score of 14. Patient assessed for underlying major depression. They have no active suicidal ideations. Brief counseling provided and recommend additional follow-up/re-evaluation next office visit.   Patient is not tolerating the Sertraline and is weaning off the medication is currently on 50 mg and will wean off over the next month rx sent for the 50 mg and break in half in two weeks and wean off. Will also check a Circle[employer] test as he has failed several SSRI

## 2025-01-17 NOTE — ASSESSMENT & PLAN NOTE
Patient has been using a CGA as his blood sugars have been low in the past and having symptoms from the low blood sugars and would benefit from ongoing use

## 2025-01-17 NOTE — ASSESSMENT & PLAN NOTE
following with pulmonary yearly CT scan of the chest last completed 8/2024 showing pulmonary nodules stable

## 2025-01-17 NOTE — ASSESSMENT & PLAN NOTE
Orders:  •  Vitamin B12; Future  continue with B12 injections and getting at home with his family and labs ordered to update

## 2025-02-10 ENCOUNTER — TELEPHONE (OUTPATIENT)
Dept: FAMILY MEDICINE CLINIC | Facility: CLINIC | Age: 70
End: 2025-02-10

## 2025-02-20 ENCOUNTER — APPOINTMENT (OUTPATIENT)
Dept: LAB | Facility: CLINIC | Age: 70
End: 2025-02-20
Payer: MEDICARE

## 2025-02-20 DIAGNOSIS — E78.00 HYPERCHOLESTEROLEMIA: ICD-10-CM

## 2025-02-20 DIAGNOSIS — E53.8 B12 DEFICIENCY: ICD-10-CM

## 2025-02-20 DIAGNOSIS — E11.9 DIABETES MELLITUS WITHOUT COMPLICATION (HCC): ICD-10-CM

## 2025-02-20 DIAGNOSIS — Z12.11 SCREEN FOR COLON CANCER: ICD-10-CM

## 2025-02-20 DIAGNOSIS — Z87.891 FORMER SMOKER: ICD-10-CM

## 2025-02-20 DIAGNOSIS — J43.2 CENTRIACINAR EMPHYSEMA (HCC): ICD-10-CM

## 2025-02-20 DIAGNOSIS — K59.1 FUNCTIONAL DIARRHEA: ICD-10-CM

## 2025-02-20 DIAGNOSIS — E78.2 MIXED HYPERLIPIDEMIA: ICD-10-CM

## 2025-02-20 DIAGNOSIS — I25.10 ATHEROSCLEROSIS OF NATIVE CORONARY ARTERY, UNSPECIFIED WHETHER ANGINA PRESENT, UNSPECIFIED WHETHER NATIVE OR TRANSPLANTED HEART: ICD-10-CM

## 2025-02-20 DIAGNOSIS — E03.9 HYPOTHYROIDISM, UNSPECIFIED TYPE: ICD-10-CM

## 2025-02-20 DIAGNOSIS — G47.33 OBSTRUCTIVE SLEEP APNEA: ICD-10-CM

## 2025-02-20 DIAGNOSIS — R79.89 LOW TESTOSTERONE: ICD-10-CM

## 2025-02-20 DIAGNOSIS — E61.1 LOW IRON: ICD-10-CM

## 2025-02-20 LAB
ALBUMIN SERPL BCG-MCNC: 4.3 G/DL (ref 3.5–5)
ALP SERPL-CCNC: 58 U/L (ref 34–104)
ALT SERPL W P-5'-P-CCNC: 28 U/L (ref 7–52)
ANION GAP SERPL CALCULATED.3IONS-SCNC: 9 MMOL/L (ref 4–13)
AST SERPL W P-5'-P-CCNC: 26 U/L (ref 13–39)
BASOPHILS # BLD AUTO: 0.04 THOUSANDS/ΜL (ref 0–0.1)
BASOPHILS NFR BLD AUTO: 1 % (ref 0–1)
BILIRUB SERPL-MCNC: 0.59 MG/DL (ref 0.2–1)
BUN SERPL-MCNC: 17 MG/DL (ref 5–25)
CALCIUM SERPL-MCNC: 11.5 MG/DL (ref 8.4–10.2)
CHLORIDE SERPL-SCNC: 101 MMOL/L (ref 96–108)
CHOLEST SERPL-MCNC: 203 MG/DL (ref ?–200)
CO2 SERPL-SCNC: 31 MMOL/L (ref 21–32)
CREAT SERPL-MCNC: 1.11 MG/DL (ref 0.6–1.3)
CREAT UR-MCNC: 63.7 MG/DL
EOSINOPHIL # BLD AUTO: 0.21 THOUSAND/ΜL (ref 0–0.61)
EOSINOPHIL NFR BLD AUTO: 4 % (ref 0–6)
ERYTHROCYTE [DISTWIDTH] IN BLOOD BY AUTOMATED COUNT: 13.3 % (ref 11.6–15.1)
EST. AVERAGE GLUCOSE BLD GHB EST-MCNC: 177 MG/DL
FERRITIN SERPL-MCNC: 191 NG/ML (ref 24–336)
GFR SERPL CREATININE-BSD FRML MDRD: 68 ML/MIN/1.73SQ M
GLUCOSE P FAST SERPL-MCNC: 163 MG/DL (ref 65–99)
HBA1C MFR BLD: 7.8 %
HCT VFR BLD AUTO: 41.1 % (ref 36.5–49.3)
HDLC SERPL-MCNC: 38 MG/DL
HGB BLD-MCNC: 13.5 G/DL (ref 12–17)
IMM GRANULOCYTES # BLD AUTO: 0.04 THOUSAND/UL (ref 0–0.2)
IMM GRANULOCYTES NFR BLD AUTO: 1 % (ref 0–2)
IRON SATN MFR SERPL: 33 % (ref 15–50)
IRON SERPL-MCNC: 115 UG/DL (ref 50–212)
LDLC SERPL CALC-MCNC: 102 MG/DL (ref 0–100)
LYMPHOCYTES # BLD AUTO: 1.63 THOUSANDS/ΜL (ref 0.6–4.47)
LYMPHOCYTES NFR BLD AUTO: 28 % (ref 14–44)
MCH RBC QN AUTO: 30.4 PG (ref 26.8–34.3)
MCHC RBC AUTO-ENTMCNC: 32.8 G/DL (ref 31.4–37.4)
MCV RBC AUTO: 93 FL (ref 82–98)
MICROALBUMIN UR-MCNC: <7 MG/L
MONOCYTES # BLD AUTO: 0.53 THOUSAND/ΜL (ref 0.17–1.22)
MONOCYTES NFR BLD AUTO: 9 % (ref 4–12)
NEUTROPHILS # BLD AUTO: 3.29 THOUSANDS/ΜL (ref 1.85–7.62)
NEUTS SEG NFR BLD AUTO: 57 % (ref 43–75)
NRBC BLD AUTO-RTO: 0 /100 WBCS
PLATELET # BLD AUTO: 236 THOUSANDS/UL (ref 149–390)
PMV BLD AUTO: 9.6 FL (ref 8.9–12.7)
POTASSIUM SERPL-SCNC: 3.5 MMOL/L (ref 3.5–5.3)
PROT SERPL-MCNC: 7.2 G/DL (ref 6.4–8.4)
RBC # BLD AUTO: 4.44 MILLION/UL (ref 3.88–5.62)
SODIUM SERPL-SCNC: 141 MMOL/L (ref 135–147)
TIBC SERPL-MCNC: 344.4 UG/DL (ref 250–450)
TRANSFERRIN SERPL-MCNC: 246 MG/DL (ref 203–362)
TRIGL SERPL-MCNC: 316 MG/DL (ref ?–150)
TSH SERPL DL<=0.05 MIU/L-ACNC: 2.74 UIU/ML (ref 0.45–4.5)
UIBC SERPL-MCNC: 229 UG/DL (ref 155–355)
VIT B12 SERPL-MCNC: 977 PG/ML (ref 180–914)
WBC # BLD AUTO: 5.74 THOUSAND/UL (ref 4.31–10.16)

## 2025-02-20 PROCEDURE — 36415 COLL VENOUS BLD VENIPUNCTURE: CPT

## 2025-02-20 PROCEDURE — 80061 LIPID PANEL: CPT

## 2025-02-20 PROCEDURE — 83540 ASSAY OF IRON: CPT

## 2025-02-20 PROCEDURE — 82570 ASSAY OF URINE CREATININE: CPT

## 2025-02-20 PROCEDURE — 83036 HEMOGLOBIN GLYCOSYLATED A1C: CPT

## 2025-02-20 PROCEDURE — 84402 ASSAY OF FREE TESTOSTERONE: CPT

## 2025-02-20 PROCEDURE — 84443 ASSAY THYROID STIM HORMONE: CPT

## 2025-02-20 PROCEDURE — 84403 ASSAY OF TOTAL TESTOSTERONE: CPT

## 2025-02-20 PROCEDURE — 82043 UR ALBUMIN QUANTITATIVE: CPT

## 2025-02-20 PROCEDURE — 82607 VITAMIN B-12: CPT

## 2025-02-20 PROCEDURE — 82728 ASSAY OF FERRITIN: CPT

## 2025-02-20 PROCEDURE — 85025 COMPLETE CBC W/AUTO DIFF WBC: CPT

## 2025-02-20 PROCEDURE — 83550 IRON BINDING TEST: CPT

## 2025-02-20 PROCEDURE — 80053 COMPREHEN METABOLIC PANEL: CPT

## 2025-02-21 ENCOUNTER — RESULTS FOLLOW-UP (OUTPATIENT)
Dept: FAMILY MEDICINE CLINIC | Facility: CLINIC | Age: 70
End: 2025-02-21

## 2025-02-23 LAB
TESTOST FREE SERPL-MCNC: 7.6 PG/ML (ref 6.6–18.1)
TESTOST SERPL-MCNC: 252 NG/DL (ref 264–916)

## 2025-03-28 DIAGNOSIS — G25.81 RESTLESS LEGS SYNDROME: Primary | ICD-10-CM

## 2025-03-28 RX ORDER — GABAPENTIN 300 MG/1
300 CAPSULE ORAL
Qty: 90 CAPSULE | Refills: 1 | Status: SHIPPED | OUTPATIENT
Start: 2025-03-28 | End: 2025-09-24

## 2025-04-14 DIAGNOSIS — F33.1 MODERATE EPISODE OF RECURRENT MAJOR DEPRESSIVE DISORDER (HCC): Primary | ICD-10-CM

## 2025-04-14 RX ORDER — DULOXETIN HYDROCHLORIDE 20 MG/1
20 CAPSULE, DELAYED RELEASE ORAL DAILY
Qty: 90 CAPSULE | Refills: 1 | Status: SHIPPED | OUTPATIENT
Start: 2025-04-14 | End: 2025-10-11

## 2025-04-28 DIAGNOSIS — G72.0 DRUG-INDUCED MYOPATHY: Primary | ICD-10-CM

## 2025-04-30 DIAGNOSIS — E53.8 B12 DEFICIENCY: ICD-10-CM

## 2025-04-30 RX ORDER — CYANOCOBALAMIN 1000 UG/ML
INJECTION, SOLUTION INTRAMUSCULAR; SUBCUTANEOUS
Qty: 25 ML | Refills: 1 | Status: SHIPPED | OUTPATIENT
Start: 2025-04-30

## 2025-05-07 ENCOUNTER — TELEPHONE (OUTPATIENT)
Dept: NEUROLOGY | Facility: CLINIC | Age: 70
End: 2025-05-07

## 2025-05-07 NOTE — TELEPHONE ENCOUNTER
Pearl in regards to appointment on 05/14/2025. Advise patient his appointment will be changed to virtual. Rosalio is no longer seeing patients in person on Wednesdays. She now sees patients virtually advised if he needs to reschedule or cancel his appointment. Patient can call [phone]

## 2025-05-08 DIAGNOSIS — F33.1 MODERATE EPISODE OF RECURRENT MAJOR DEPRESSIVE DISORDER (HCC): Primary | ICD-10-CM

## 2025-05-08 RX ORDER — DULOXETIN HYDROCHLORIDE 30 MG/1
30 CAPSULE, DELAYED RELEASE ORAL DAILY
Qty: 30 CAPSULE | Refills: 5 | Status: SHIPPED | OUTPATIENT
Start: 2025-05-08

## 2025-05-14 ENCOUNTER — TELEMEDICINE (OUTPATIENT)
Dept: NEUROLOGY | Facility: CLINIC | Age: 70
End: 2025-05-14
Payer: MEDICARE

## 2025-05-14 DIAGNOSIS — F33.1 MODERATE EPISODE OF RECURRENT MAJOR DEPRESSIVE DISORDER (HCC): Primary | ICD-10-CM

## 2025-05-14 DIAGNOSIS — F03.90 DEMENTIA (HCC): ICD-10-CM

## 2025-05-14 PROCEDURE — 99214 OFFICE O/P EST MOD 30 MIN: CPT | Performed by: PHYSICIAN ASSISTANT

## 2025-05-14 PROCEDURE — G2211 COMPLEX E/M VISIT ADD ON: HCPCS | Performed by: PHYSICIAN ASSISTANT

## 2025-05-14 RX ORDER — MEMANTINE HYDROCHLORIDE 10 MG/1
10 TABLET ORAL 2 TIMES DAILY
Qty: 180 TABLET | Refills: 3 | Status: SHIPPED | OUTPATIENT
Start: 2025-05-14

## 2025-05-14 RX ORDER — MEMANTINE HYDROCHLORIDE 5 MG-10 MG
KIT ORAL
Qty: 1 TABLET | Refills: 0 | Status: SHIPPED | OUTPATIENT
Start: 2025-05-14 | End: 2025-05-15

## 2025-05-14 NOTE — PROGRESS NOTES
Virtual Regular VisitName: Wilman Redding      : 1955      MRN: [id number]  Encounter Provider: Rosalio Aguilar PA-C  Encounter Date: 2025   Encounter department: [facility]  :  Assessment & Plan  Moderate episode of recurrent major depressive disorder (HCC)    Orders:    Ambulatory referral to Psych Services; Future    Dementia (HCC)  Patient with history of progressive cognitive decline consistent with dementia.  At the onset of symptoms he also was noted to have rest tremor and bradykinesia for which he was started on a trial of Sinemet.  Through the years his Sinemet dose was weaned off with no worsening of parkinsonian symptoms.  He actually had overall improvement with weaning off of Wellbutrin and escitalopram.  Because of this it was felt unlikely that he has Parkinson's disease or Lewy body dementia.    Most recently he underwent an amyloid PET scan which did have findings suggestive of Alzheimer's dementia.  We had a long discussion with the patient and family regarding these findings.  Overall patient and family continue to notice some slowly progressive cognitive decline.  One of the family's main concerns however his continued mood issues as well as increased anger.  He has been seen by psychiatry years ago, PCP has more recently been managing.  Sertraline recently weaned off and he was started on Cymbalta.  At this time I do feel that he would benefit from psychiatry evaluation to help better manage his mood issues.  A referral was placed for this.    We also had a long discussion regarding treatment options for Alzheimer's dementia.  Unfortunately he had side effects with galantamine, Aricept and rivastigmine in the past.  He is open to a trial of Namenda.  We also did discuss a trial of Leqembi however they would like to hold off on this for now.  Most recent B12 was elevated.    Will have him start with Namenda titration pack for the next month.  If tolerated  will remain on 10 mg 6 a day.  We discussed potential side effects including GI, confusion, headaches or sleepiness.    Patient was encouraged to increase mind stimulating activities such as reading, crosswords, word searches, puzzles, Soduku, solitaire, coloring and other brain games.  We also discussed the importance of staying physically active and eating a health diet such as the Mediterranean or MIND diet.        Orders:    memantine (NAMENDA TITRATION PACK); Follow package directions.    memantine (Namenda) 10 mg tablet; Take 1 tablet (10 mg total) by mouth 2 (two) times a day        History of Present Illness     Wilman Redding is a 69 year old with HTN, depression, RLS, DAFNE uses CPAP, and neuropathy who presents for movement follow up for dementia.      To review, onset in 2015 with forgetfulness and depression (wife ill at time). He was diagnosed with Parkinson's disease in 2017 at [facility] and stopped working. Carbidopa/levodopa with little initial improvement. PET scan 3/6/2020 showed NO decreased uptake suggestive of Alzheimer's, FTD, or LBD. First seen 10/2021 in our office and reported history of onset with fluctuating cognition and parkinsonian symptoms. At that time diagnosis considered to be more likely dementia with Lewy Bodies. Given the question of what his underlying parkinsonian symptoms were and if they were related to combination of Lexapro and Wellbutrin rather than PD, levodopa was weaned off. He continued to do well with perhaps a mild, intermittent rest tremor when seen in office. He therefore has remained off levodopa.      At his last visit there remained question of an underlying diagnosis and he was sent for amyloid PET.     INTERVAL HISTORY:  Amyloid PET with results suspicious for AD.   He has episodes of anger, he is more upset in life and he will get mad at family and friends more often now   He is swearing more, almost seems to be worse when he evening is starting   He is more sensitive  to sounds   His sleep is sporadic, some nights he sleeps well   He tends to go to bed around midnight   On Cymbalta for neuropathy and mood - just started in the past month   He has been seen by psychiatry years ago   Memory issues present, some days better  than others   He may forget what he is trying to do at times   He will lose his train of thought at times   He can perform all ADLs on his own   He may need some prompting at times   Never managed his own medications, family helps with this   He has poor appetite, he takes Boost twice a day   No hallucinations      Current relevant medications:   Cymbalta - recently started by PCP     Prior medications:  Sinemet 25/100mg 2 tablets TID   Rivastigmine patch 4.6mg daily (higher doses causes bradycardia)  Aricept - caused headaches   Galantamine - caused GI issues and weight loss   Gabapentin 300mg qhs   Sertaline 100mg daily - no long term benefit     PRIOR WORKUP:  PET brain 3/4/20 - 1. No scintigraphic evidence of Alzheimer's dementia, frontotemporal dementia, dementia of Lewy body, or other primary neurodegenerative dementing process. 2. Partially opacified left maxillary sinus incidentally noted on corresponding CT, likely due to sinus disease.     MRI brain with NQ 8/18/24 - No acute infarct, mass effect or midline shift. Redemonstrated dorsal left midbrain/tectal lipoma. NeuroQuant analysis was performed: Normal study; Does not support neurodegeneration.     Amyloid PET 11/5/24 - Findings suspicious for early beta amyloid deposition. Follow-up in 12-24 months may be considered to evaluate for any progression.      Review of Systems    Objective   There were no vitals taken for this visit.    Physical Exam  Constitutional:       Appearance: Normal appearance.   Pulmonary:      Effort: Pulmonary effort is normal.     Neurological:      Mental Status: He is alert and oriented to person, place, and time.      Coordination: Coordination is intact.     Psychiatric:          Behavior: Behavior is cooperative.      Comments: [facility] 4/19/24 - 25/30   [facility] 10/26/22 - 20/30   [facility] 10/20/21 - 18/30   [facility] 2/10/20 - 23/30   [facility] 8/5/19 - 23/30   [facility] 12/5/18 - 25/30   [facility] 9/26/18 - 14/30 .           Administrative Statements   Encounter provider Rosalio Aguilar PA-C    The Patient is located at Home and in the following state in which I hold an active license [place].    The patient was identified by name and date of birth. Wilman Redding was informed that this is a telemedicine visit and that the visit is being conducted through the Epic Embedded platform. He agrees to proceed..  My office door was closed. No one else was in the room.  He acknowledged consent and understanding of privacy and security of the video platform. The patient has agreed to participate and understands they can discontinue the visit at any time.    I have spent a total time of 35 minutes in caring for this patient on the day of the visit/encounter including Diagnostic results, Prognosis, Risks and benefits of tx options, Instructions for management, Patient and family education, Impressions, Counseling / Coordination of care, Documenting in the medical record, and Obtaining or reviewing history  , not including the time spent for establishing the audio/video connection.

## 2025-05-14 NOTE — ASSESSMENT & PLAN NOTE
Patient with history of progressive cognitive decline consistent with dementia.  At the onset of symptoms he also was noted to have rest tremor and bradykinesia for which he was started on a trial of Sinemet.  Through the years his Sinemet dose was weaned off with no worsening of parkinsonian symptoms.  He actually had overall improvement with weaning off of Wellbutrin and escitalopram.  Because of this it was felt unlikely that he has Parkinson's disease or Lewy body dementia.    Most recently he underwent an amyloid PET scan which did have findings suggestive of Alzheimer's dementia.  We had a long discussion with the patient and family regarding these findings.  Overall patient and family continue to notice some slowly progressive cognitive decline.  One of the family's main concerns however his continued mood issues as well as increased anger.  He has been seen by psychiatry years ago, PCP has more recently been managing.  Sertraline recently weaned off and he was started on Cymbalta.  At this time I do feel that he would benefit from psychiatry evaluation to help better manage his mood issues.  A referral was placed for this.    We also had a long discussion regarding treatment options for Alzheimer's dementia.  Unfortunately he had side effects with galantamine, Aricept and rivastigmine in the past.  He is open to a trial of Namenda.  We also did discuss a trial of Leqembi however they would like to hold off on this for now.  Most recent B12 was elevated.    Will have him start with Namenda titration pack for the next month.  If tolerated will remain on 10 mg 6 a day.  We discussed potential side effects including GI, confusion, headaches or sleepiness.    Patient was encouraged to increase mind stimulating activities such as reading, crosswords, word searches, puzzles, Soduku, solitaire, coloring and other brain games.  We also discussed the importance of staying physically active and eating a health diet  such as the Mediterranean or MIND diet.        Orders:    memantine (NAMENDA TITRATION PACK); Follow package directions.    memantine (Namenda) 10 mg tablet; Take 1 tablet (10 mg total) by mouth 2 (two) times a day

## 2025-05-15 ENCOUNTER — TELEPHONE (OUTPATIENT)
Age: 70
End: 2025-05-15

## 2025-05-15 DIAGNOSIS — F03.90 DEMENTIA (HCC): Primary | ICD-10-CM

## 2025-05-15 RX ORDER — MEMANTINE HYDROCHLORIDE 5 MG/1
TABLET ORAL
Qty: 28 TABLET | Refills: 0 | Status: SHIPPED | OUTPATIENT
Start: 2025-05-15

## 2025-05-15 NOTE — TELEPHONE ENCOUNTER
[facility] called and states that namenda titration pack is   not available, discontinued by .      Asking if separate script can be sent for the 5mg tabs with titration instructions   They already received 10mg tabs script    Please send script as appropriate

## 2025-05-29 ENCOUNTER — TELEPHONE (OUTPATIENT)
Age: 70
End: 2025-05-29

## 2025-05-30 DIAGNOSIS — F33.1 MODERATE EPISODE OF RECURRENT MAJOR DEPRESSIVE DISORDER (HCC): ICD-10-CM

## 2025-05-30 RX ORDER — DULOXETIN HYDROCHLORIDE 30 MG/1
30 CAPSULE, DELAYED RELEASE ORAL DAILY
Qty: 90 CAPSULE | Refills: 1 | Status: SHIPPED | OUTPATIENT
Start: 2025-05-30

## 2025-05-31 DIAGNOSIS — E03.9 HYPOTHYROIDISM, UNSPECIFIED TYPE: ICD-10-CM

## 2025-06-01 RX ORDER — LEVOTHYROXINE SODIUM 100 UG/1
100 TABLET ORAL
Qty: 90 TABLET | Refills: 1 | Status: SHIPPED | OUTPATIENT
Start: 2025-06-01

## 2025-06-02 ENCOUNTER — TELEPHONE (OUTPATIENT)
Age: 70
End: 2025-06-02

## 2025-06-02 NOTE — TELEPHONE ENCOUNTER
Patient wife called in to request an update on medication (Testosterone) cream being placed for a prior auth. Triage nurse informed wife nothing was noted yet in the chart and that I will update the PCP. Wife requested to speak to roula from the office. Triage nurse warm tx the call to Staff: Roula.

## 2025-06-30 DIAGNOSIS — E03.9 HYPOTHYROIDISM, UNSPECIFIED TYPE: ICD-10-CM

## 2025-06-30 DIAGNOSIS — F33.1 MODERATE EPISODE OF RECURRENT MAJOR DEPRESSIVE DISORDER (HCC): ICD-10-CM

## 2025-06-30 DIAGNOSIS — I10 PRIMARY HYPERTENSION: ICD-10-CM

## 2025-06-30 RX ORDER — LOSARTAN POTASSIUM 50 MG/1
50 TABLET ORAL DAILY
Qty: 90 TABLET | Refills: 1 | Status: SHIPPED | OUTPATIENT
Start: 2025-06-30

## 2025-06-30 RX ORDER — DULOXETIN HYDROCHLORIDE 30 MG/1
30 CAPSULE, DELAYED RELEASE ORAL DAILY
Qty: 90 CAPSULE | Refills: 1 | Status: SHIPPED | OUTPATIENT
Start: 2025-06-30

## 2025-06-30 RX ORDER — LEVOTHYROXINE SODIUM 100 UG/1
100 TABLET ORAL
Qty: 90 TABLET | Refills: 1 | Status: SHIPPED | OUTPATIENT
Start: 2025-06-30

## 2025-07-01 RX ORDER — TESTOSTERONE GEL, 1% 10 MG/G
50 GEL TRANSDERMAL
Refills: 0 | OUTPATIENT
Start: 2025-07-01

## 2025-08-05 ENCOUNTER — TELEPHONE (OUTPATIENT)
Age: 70
End: 2025-08-05

## 2025-08-05 DIAGNOSIS — E11.40 TYPE 2 DIABETES MELLITUS WITH DIABETIC NEUROPATHY, WITHOUT LONG-TERM CURRENT USE OF INSULIN (HCC): ICD-10-CM

## 2025-08-05 DIAGNOSIS — E78.00 HYPERCHOLESTEROLEMIA: Primary | ICD-10-CM

## 2025-08-05 DIAGNOSIS — E53.8 B12 DEFICIENCY: ICD-10-CM

## 2025-08-05 DIAGNOSIS — E61.1 LOW IRON: ICD-10-CM

## 2025-08-05 RX ORDER — CYANOCOBALAMIN 1000 UG/ML
1000 INJECTION, SOLUTION INTRAMUSCULAR; SUBCUTANEOUS WEEKLY
Qty: 25 ML | Refills: 1 | Status: SHIPPED | OUTPATIENT
Start: 2025-08-05 | End: 2026-02-01

## 2025-08-06 DIAGNOSIS — F33.1 MODERATE EPISODE OF RECURRENT MAJOR DEPRESSIVE DISORDER (HCC): ICD-10-CM

## 2025-08-06 DIAGNOSIS — E53.8 B12 DEFICIENCY: ICD-10-CM

## 2025-08-06 RX ORDER — DULOXETIN HYDROCHLORIDE 60 MG/1
60 CAPSULE, DELAYED RELEASE ORAL DAILY
Qty: 90 CAPSULE | Refills: 1 | Status: SHIPPED | OUTPATIENT
Start: 2025-08-06 | End: 2026-02-02

## 2025-08-07 RX ORDER — SYRINGE WITH NEEDLE, 1 ML 25GX5/8"
SYRINGE, EMPTY DISPOSABLE MISCELLANEOUS
Qty: 25 EACH | Refills: 0 | Status: SHIPPED | OUTPATIENT
Start: 2025-08-07

## 2025-08-18 ENCOUNTER — TELEMEDICINE (OUTPATIENT)
Dept: FAMILY MEDICINE CLINIC | Facility: CLINIC | Age: 70
End: 2025-08-18
Payer: MEDICARE

## 2025-08-18 DIAGNOSIS — E11.40 TYPE 2 DIABETES MELLITUS WITH DIABETIC NEUROPATHY, WITHOUT LONG-TERM CURRENT USE OF INSULIN (HCC): ICD-10-CM

## 2025-08-18 DIAGNOSIS — Z00.00 MEDICARE ANNUAL WELLNESS VISIT, SUBSEQUENT: Primary | ICD-10-CM

## 2025-08-18 DIAGNOSIS — F33.1 MODERATE EPISODE OF RECURRENT MAJOR DEPRESSIVE DISORDER (HCC): ICD-10-CM

## 2025-08-18 DIAGNOSIS — G47.33 OSA (OBSTRUCTIVE SLEEP APNEA): ICD-10-CM

## 2025-08-18 PROCEDURE — G0439 PPPS, SUBSEQ VISIT: HCPCS | Performed by: NURSE PRACTITIONER
